# Patient Record
Sex: MALE | Race: WHITE | Employment: FULL TIME | URBAN - METROPOLITAN AREA
[De-identification: names, ages, dates, MRNs, and addresses within clinical notes are randomized per-mention and may not be internally consistent; named-entity substitution may affect disease eponyms.]

---

## 2017-02-14 ENCOUNTER — OFFICE VISIT (OUTPATIENT)
Dept: INTERNAL MEDICINE CLINIC | Age: 57
End: 2017-02-14

## 2017-02-14 VITALS
RESPIRATION RATE: 18 BRPM | WEIGHT: 220.4 LBS | HEART RATE: 111 BPM | OXYGEN SATURATION: 96 % | DIASTOLIC BLOOD PRESSURE: 86 MMHG | SYSTOLIC BLOOD PRESSURE: 128 MMHG | HEIGHT: 69 IN | BODY MASS INDEX: 32.64 KG/M2 | TEMPERATURE: 97.8 F

## 2017-02-14 DIAGNOSIS — E11.41 DIABETIC MONONEUROPATHY ASSOCIATED WITH TYPE 2 DIABETES MELLITUS (HCC): ICD-10-CM

## 2017-02-14 DIAGNOSIS — Z23 ENCOUNTER FOR IMMUNIZATION: ICD-10-CM

## 2017-02-14 DIAGNOSIS — E11.65 CONTROLLED TYPE 2 DIABETES MELLITUS WITH HYPERGLYCEMIA, WITHOUT LONG-TERM CURRENT USE OF INSULIN (HCC): Primary | ICD-10-CM

## 2017-02-14 DIAGNOSIS — I10 ESSENTIAL HYPERTENSION: ICD-10-CM

## 2017-02-14 RX ORDER — METFORMIN HYDROCHLORIDE 500 MG/1
TABLET ORAL
COMMUNITY
Start: 2016-12-09

## 2017-02-14 RX ORDER — VARDENAFIL HYDROCHLORIDE 20 MG/1
20 TABLET ORAL AS NEEDED
COMMUNITY

## 2017-02-14 NOTE — PATIENT INSTRUCTIONS
Pneumococcal 13-Valent Vaccine, Diphtheria Conjugate (By injection)   Pneumococcal 13-Valent Vaccine, Diphtheria Conjugate (BIR-pnx-EMU-al 13-VAY-lent VAX-een, dif-THEER-ee-a VXH-jhk-mqlo)  Prevents infections, such as pneumonia and meningitis. Brand Name(s):Prevnar 13   There may be other brand names for this medicine. When This Medicine Should Not Be Used: This vaccine is not right for everyone. You should not receive this vaccine if you had an allergic reaction to pneumococcal or diphtheria vaccine. How to Use This Medicine:   Injectable  · A nurse or other health provider will give you this medicine. This vaccine is usually given as a shot into a muscle in the thigh or upper arm. · The vaccine schedule is different for different people. ¨ Tell your doctor if your child was born prematurely. Children who were premature may need to follow a different schedule. ¨ Children under 6: This vaccine is usually given as 3 or 4 separate shots over several months. Your child's doctor will tell you how many shots are needed and when to come back for the next one. ¨ Children over 6: This vaccine is given as a single shot. If your child recently received another pneumonia vaccine, this one should be given at least 8 weeks later. ¨ Adults over 50: This vaccine is given as a single dose. · It is very important for your child to receive all of the shots for the vaccine. · Missed dose: This vaccine must be given on a fixed schedule. If your child misses a dose, call your child's doctor for another appointment. Drugs and Foods to Avoid:   Ask your doctor or pharmacist before using any other medicine, including over-the-counter medicines, vitamins, and herbal products. · Some medicines can affect how this vaccine works. Tell your doctor if you are receiving a treatment or medicine that causes a weak immune system.  This includes radiation treatment, steroid medicine (such as hydrocortisone, methylprednisolone, prednisolone, prednisone), or cancer medicine. Warnings While Using This Medicine:   · Adults and adolescents: Tell your doctor if you are pregnant or breastfeeding. · Tell your doctor if you have a weak immune system. You may not be fully protected by this vaccine. Possible Side Effects While Using This Medicine:   Call your doctor right away if you notice any of these side effects:  · Allergic reaction: Itching or hives, swelling in your face or hands, swelling or tingling in your mouth or throat, chest tightness, trouble breathing  · High fever  If you notice these less serious side effects, talk with your doctor:   · Crying, irritability, or fussiness  · Joint or muscle pain  · Mild skin rash  · Pain, burning, redness, or swelling where the shot was given  · Poor appetite  · Sleep changes  If you notice other side effects that you think are caused by this medicine, tell your doctor. Call your doctor for medical advice about side effects. You may report side effects to FDA at 4-052-FDA-3982  © 2016 [address] is for End User's use only and may not be sold, redistributed or otherwise used for commercial purposes. The above information is an  only. It is not intended as medical advice for individual conditions or treatments. Talk to your doctor, nurse or pharmacist before following any medical regimen to see if it is safe and effective for you.  ---------------------------  February 16th from 10-12 PNA shot       Learning About Sun Damage and Your Skin  How does the sun affect your skin? Being out in the sun can be good for you. Sunlight can brighten your mood and help you feel healthier. But getting too much of the ultraviolet (UV) rays in sunlight or from a tanning bed can harm your skin. The damage may include:  · Skin changes like early wrinkling, sagging skin, and age spots. · Skin cancer.   The lighter your skin is, the more easily it can be damaged by the sun. But everyone can benefit from protecting their skin from the sun, regardless of skin color. The amount of skin damage you can get from sunlight depends on:  · The time of day. You are more likely to get a sunburn in the middle of the day. You might think that exposure to the sun isn't a problem on cloudy days, but the sun's UV rays can still pass through clouds. · Whether you are near reflective surfaces, such as water, white sand, concrete, snow, or ice. All of these reflect the sun's UV rays. · The season of the year. The intense sunlight of summer days can cause more skin damage than the sunlight in other seasons. How can you protect your skin from the sun? Most sunburn and skin cancer can be prevented. Use the following tips to protect your skin. Avoid exposure to the sun  · Try to spend less time in the sun from 10 in the morning to 4 in the afternoon. Find shade if you need to be outdoors. · Wear clothing that blocks the sun. This can be a wide-brimmed hat that covers your neck, ears, eyes, and scalp. It can also include loose-fitting, tightly-woven clothes that cover your arms and legs. · Wear sunglasses that block UV rays. Use sunscreen  · Always wear sunscreen on exposed skin. Make sure to use a broad-spectrum sunscreen that has a sun protection factor (SPF) of 30 or higher. Use it every day, even when it is cloudy. · Apply sunscreen at least 30 minutes before you go out in the sun. Put on more every 2 to 3 hours while you are in the sun and after you sweat a lot or swim. · Take extra care to protect your skin when you're near water, at higher elevations, or in tropical climates. · Use a broad-spectrum lip balm or cream that has SPF of 30 to protect your lips from getting sunburned. Where can you learn more? Go to http://tara-clifford.info/. Enter R455 in the search box to learn more about \"Learning About Sun Damage and Your Skin. \"  Current as of: February 5, 2016  Content Version: 11.1  © 6388-6932 [employer]. Care instructions adapted under license by [employer] (which disclaims liability or warranty for this information). If you have questions about a medical condition or this instruction, always ask your healthcare professional. [address] any warranty or liability for your use of this information.

## 2017-02-14 NOTE — PROGRESS NOTES
Chief Complaint   Patient presents with   [place] [facility] Establish Care     1. Have you been to the ER, urgent care clinic since your last visit? Hospitalized since your last visit? No    2. Have you seen or consulted any other health care providers outside of the [facility] since your last visit? Include any pap smears or colon screening.  No

## 2017-02-14 NOTE — PROGRESS NOTES
Mr. Nelson Cobb is a new patient who is here to establish care. CC:  Establish Care/ DMII and HTN        HPI:  [facility], patient sees multiple specialists but no current PCP. Diabetes type 2: patient reports difficult to control diabetes followed by endocrinologist [facility] endocrinologist   Currently on Metformin 2000 and amaryl 4mg - Previously on insulin lantus with poor control. Currently not on insulin   Has tried [place] and [place]. Glucose running 180s-200s - patient wants to continued to be managed by diabetes specialist and obtains all his labs there. Last eye appointment in 2016 - no hx of diabetic retinopathy  Admits to diabetic neuropathy of left foot and followed by podiatrist    Reports hx of renal issues related to eating too many tomatoes: patient sees renal specialist periodically. GI: Dr Jose A Mccall  Last colonoscopy: 2015 - normal     HTN: on lisinopril. Well controlled. Denies chest pain and shortness of breath. Has good exercise tolerance. On aspirin 81 mg daily. Neck pain hx: had cervical neck surgery and doing well. Only taking aleve for pain.  [facility]       Review of systems:  Constitutional: negative for fever, chills, weight loss, night sweats   Eyes : negative for vision changes, eye pain and discharge  Nose and Throat: negative for tinnitus, sore throat   Cardiovascular: negative for chest pain, palpitations and shortness of breath  Respiratory: negative for shortness of breath, cough and wheezing   Gastroinstestinal: negative for abdominal pain, nausea, vomiting, diarrhea, constipation, and blood in the stool  Musculoskeletal: chronic neck pain much improved after neck sx   Genitourinary: negative for dysuria, nocturia, polyuria and hematuria   Neurologic: Negative for focal weakness, numbness or incoordination  Skin: works outside, does not wear sunscreen  Hematologic: negative for easy bruising      Past Medical History   Diagnosis Date    Diabetes ([facility] [address]     Elevated cholesterol     GERD (gastroesophageal reflux disease)      helps with meds upsetting stomach    Hypertension     Low vitamin D level     Neuropathy      rt lower extremity        Past Surgical History   Procedure Laterality Date    Pr cardiac surg procedure unlist       heart cath. normal    Hx colonoscopy  2015    Hx hernia repair       umbilical    Hx orthopaedic       reattached tendon and muscle rt shoulder    Hx cervical fusion       2015       No Known Allergies    Current Outpatient Prescriptions on File Prior to Visit   Medication Sig Dispense Refill    glimepiride (AMARYL) 4 mg tablet Take 4 mg by mouth two (2) times a day.  ergocalciferol (VITAMIN D2) 50,000 unit capsule Take 50,000 Units by mouth every seven (7) days.  aspirin delayed-release 81 mg tablet Take 81 mg by mouth daily.  omeprazole (PRILOSEC) 20 mg capsule Take 20 mg by mouth daily.  rosuvastatin (CRESTOR) 20 mg tablet Take 20 mg by mouth nightly.  fenofibric acid (TRILIPIX) 135 mg capsule Take 135 mg by mouth nightly.  lisinopril (PRINIVIL, ZESTRIL) 20 mg tablet Take 20 mg by mouth two (2) times a day.  metFORMIN (GLUCOPHAGE) 1,000 mg tablet Take 2,000 mg by mouth two (2) times a day. No current facility-administered medications on file prior to visit. family history is not on file. Social History     Social History    Marital status: UNKNOWN     Spouse name: N/A    Number of children: N/A    Years of education: N/A     Occupational History    Not on file.      Social History Main Topics    Smoking status: Current Every Day Smoker     Packs/day: 1.00    Smokeless tobacco: Not on file    Alcohol use Yes      Comment: rarely    Drug use: Not on file    Sexual activity: Not on file     Other Topics Concern    Not on file     Social History Narrative       Visit Vitals    /86 (BP 1 Location: Left arm, BP Patient Position: Sitting)  Pulse (!) 111    Temp 97.8 °F (36.6 °C) (Oral)    Resp 18    Ht 5' 9\" (1.753 m)    Wt 220 lb 6.4 oz (100 kg)    SpO2 96%    BMI 32.55 kg/m2     General:  Well appearing male no acute distress  HEENT:   PERRL,normal conjunctiva. External ear and canals normal, TMs normal.  Hearing normal to voice. Nose without edema or discharge, normal septum. Lips, teeth, gums normal.  Oropharynx: no erythema, no exudates, no lesions, normal tongue. Neck:  Supple. Thyroid normal size, nontender, without nodules. No carotid bruit. No masses or lymphadenopathy  Respiratory: no respiratory distress,  no wheezing, no rhonchi, no rales. No chest wall tenderness. Cardiovascular:  RRR, normal S1S2, no murmur. Gastrointestinal: normal bowel sounds, soft, nontender, without masses. No hepatosplenomegaly. Extremities +2 pulses, no edema, normal sensation   Musculoskeletal:  Normal gait. Normal digits and nails. Normal strength and tone, no atrophy, and no abnormal movement. Skin:  Evidence of sun damage on face (too much sun exposure) no lesions noted  Neuro:  A and OX4, fluent speech, cranial nerves normal 2-12. Sensation normal to light touch.     Psych:  Normal affect      Lab Results   Component Value Date/Time    WBC 9.4 12/11/2015 09:31 AM    HGB 15.9 12/11/2015 09:31 AM    HCT 46.5 12/11/2015 09:31 AM    PLATELET 547 70/26/1929 09:31 AM    MCV 97.9 12/11/2015 09:31 AM     Lab Results   Component Value Date/Time    Sodium 138 12/11/2015 09:31 AM    Potassium 4.4 12/11/2015 09:31 AM    Chloride 105 12/11/2015 09:31 AM    CO2 23 12/11/2015 09:31 AM    Anion gap 10 12/11/2015 09:31 AM    Glucose 323 12/11/2015 09:31 AM    BUN 21 12/11/2015 09:31 AM    Creatinine 1.03 12/11/2015 09:31 AM    BUN/Creatinine ratio 20 12/11/2015 09:31 AM    GFR est AA >60 12/11/2015 09:31 AM    GFR est non-AA >60 12/11/2015 09:31 AM    Calcium 9.2 12/11/2015 09:31 AM     Lab Results   Component Value Date/Time    Hemoglobin A1c 11.4 12/11/2015 09:31 AM     Lab Results   Component Value Date/Time    Vitamin D 25-Hydroxy 38.5 12/11/2015 09:31 AM             Patient reports recent labs done at endocrinologist and we will request records         Assessment and Plan:     1.  type 2 diabetes mellitus with hyperglycemia, without long-term current use of insulin ([facility] [address]  - followed by [facility] endocrinologist - patient is currently on glimepiride and metformin. Reports difficult to control diabetes  - up to date on eye exam no hx of diabetic retinopathy  - requested records from [place] endocrinologit  - on aspirin and ace inhibitor     2. Diabetic neuropathy associated with type 2 diabetes mellitus ([facility] [address]  - patient sees podiatrist every 6 months       3. Essential hypertension  - BP is at goal   - continue lisinopril     4. Elevated cholesterol: on crestor and trilipix   - requested records of last lipid panel check done at [facility] endocrinologist      5. Low vitamin D: followed by endocrinologist   - patient is on vitamin D2 50,000 units    Encounter for immunization    - TETANUS, DIPHTHERIA TOXOIDS AND ACELLULAR PERTUSSIS VACCINE (TDAP), IN INDIVIDS. >=7, IM  - MO IMMUNIZ ADMIN,1 SINGLE/COMB VAC/TOXOID    Advised to come to [address] [place] [address] on Feb 16th   Need records from last colonoscopy in 2015 per patient and normal    Follow-up Disposition:  Return in about 6 months (around 8/14/2017) for HTN, Diabetes .      MD Yoni

## 2017-02-14 NOTE — MR AVS SNAPSHOT
Visit Information Date & Time Provider Department Dept. Phone Encounter #  
 2/14/2017 10:00 AM Yoni, [address] [id number] Follow-up Instructions Return in about 6 months (around 8/14/2017) for HTN, Diabetes . Upcoming Health Maintenance Date Due Hepatitis C Screening 1960 Pneumococcal 19-64 Medium Risk (1 of 1 - PPSV23) 9/16/1978 DTaP/Tdap/Td series (1 - Tdap) 9/16/1980 FOBT Q 1 YEAR AGE 50-75 5/5/2016 Allergies as of 2/14/2017  Review Complete On: 2/14/2017 By: Dandre Garcia No Known Allergies Current Immunizations  Never Reviewed Name Date Tdap  Incomplete Not reviewed this visit You Were Diagnosed With   
  
 Codes Comments Controlled type 2 diabetes mellitus with hyperglycemia, without long-term current use of insulin ([facility].)    -  Primary ICD-10-CM: E11.65 ICD-9-CM: 250.80, 790.29 Diabetic mononeuropathy associated with type 2 diabetes mellitus ([facility]     ICD-10-CM: E11.41 
ICD-9-CM: 250.60, 355.9 Essential hypertension     ICD-10-CM: I10 
ICD-9-CM: 401.9 Encounter for immunization     ICD-10-CM: F32 ICD-9-CM: V03.89 Vitals BP Pulse Temp Resp Height(growth percentile) Weight(growth percentile) 128/86 (BP 1 Location: Left arm, BP Patient Position: Sitting) (!) 111 97.8 °F (36.6 °C) (Oral) 18 5' 9\" (1.753 m) 220 lb 6.4 oz (100 kg) SpO2 BMI Smoking Status 96% 32.55 kg/m2 Current Every Day Smoker BMI and BSA Data Body Mass Index Body Surface Area 32.55 kg/m 2 2.21 m 2 Preferred Pharmacy Pharmacy Name Phone [facility] [phone] Your Updated Medication List  
  
   
This list is accurate as of: 2/14/17 10:46 AM.  Always use your most recent med list.  
  
  
  
  
 aspirin delayed-release 81 mg tablet Take 81 mg by mouth daily. CRESTOR 20 mg tablet Generic drug:  rosuvastatin Take 20 mg by mouth nightly. glimepiride 4 mg tablet Commonly known as:  AMARYL Take 4 mg by mouth two (2) times a day. LEVITRA 20 mg tablet Generic drug:  vardenafil Take 20 mg by mouth as needed. lisinopril 20 mg tablet Commonly known as:  Marion November Take 20 mg by mouth two (2) times a day. * metFORMIN 1,000 mg tablet Commonly known as:  GLUCOPHAGE Take 2,000 mg by mouth two (2) times a day. * metFORMIN 500 mg tablet Commonly known as:  GLUCOPHAGE  
  
 omeprazole 20 mg capsule Commonly known as:  PRILOSEC Take 20 mg by mouth daily. TRILIPIX 135 mg capsule Generic drug:  fenofibric acid Take 135 mg by mouth nightly. VITAMIN D2 50,000 unit capsule Generic drug:  ergocalciferol Take 50,000 Units by mouth every seven (7) days. * Notice: This list has 2 medication(s) that are the same as other medications prescribed for you. Read the directions carefully, and ask your doctor or other care provider to review them with you. We Performed the Following CT IMMUNIZ ADMIN,1 SINGLE/COMB VAC/TOXOID [id number] CPT(R)] TETANUS, DIPHTHERIA TOXOIDS AND ACELLULAR PERTUSSIS VACCINE (TDAP), IN INDIVIDS. >=7, IM [id number] CPT(R)] Follow-up Instructions Return in about 6 months (around 8/14/2017) for HTN, Diabetes . Patient Instructions Pneumococcal 13-Valent Vaccine, Diphtheria Conjugate (By injection) Pneumococcal 13-Valent Vaccine, Diphtheria Conjugate (CQW-txx-MVL-al 13-VAY-lent VAX-een, dif-THEER-ee-a WKU-kmq-oqnh) Prevents infections, such as pneumonia and meningitis. Brand Name(s):Prevnar 13 There may be other brand names for this medicine. When This Medicine Should Not Be Used: This vaccine is not right for everyone. You should not receive this vaccine if you had an allergic reaction to pneumococcal or diphtheria vaccine. How to Use This Medicine:  
Injectable · A nurse or other health provider will give you this medicine. This vaccine is usually given as a shot into a muscle in the thigh or upper arm. · The vaccine schedule is different for different people. ¨ Tell your doctor if your child was born prematurely. Children who were premature may need to follow a different schedule. ¨ Children under 6: This vaccine is usually given as 3 or 4 separate shots over several months. Your child's doctor will tell you how many shots are needed and when to come back for the next one. ¨ Children over 6: This vaccine is given as a single shot. If your child recently received another pneumonia vaccine, this one should be given at least 8 weeks later. ¨ Adults over 50: This vaccine is given as a single dose. · It is very important for your child to receive all of the shots for the vaccine. · Missed dose: This vaccine must be given on a fixed schedule. If your child misses a dose, call your child's doctor for another appointment. Drugs and Foods to Avoid: Ask your doctor or pharmacist before using any other medicine, including over-the-counter medicines, vitamins, and herbal products. · Some medicines can affect how this vaccine works. Tell your doctor if you are receiving a treatment or medicine that causes a weak immune system. This includes radiation treatment, steroid medicine (such as hydrocortisone, methylprednisolone, prednisolone, prednisone), or cancer medicine. Warnings While Using This Medicine: · Adults and adolescents: Tell your doctor if you are pregnant or breastfeeding. · Tell your doctor if you have a weak immune system. You may not be fully protected by this vaccine. Possible Side Effects While Using This Medicine:  
Call your doctor right away if you notice any of these side effects: · Allergic reaction: Itching or hives, swelling in your face or hands, swelling or tingling in your mouth or throat, chest tightness, trouble breathing · High fever If you notice these less serious side effects, talk with your doctor: · Crying, irritability, or fussiness · Joint or muscle pain · Mild skin rash · Pain, burning, redness, or swelling where the shot was given · Poor appetite · Sleep changes If you notice other side effects that you think are caused by this medicine, tell your doctor. Call your doctor for medical advice about side effects. You may report side effects to FDA at 0-717-GEB-9837 © 2016 [address] is for End User's use only and may not be sold, redistributed or otherwise used for commercial purposes. The above information is an  only. It is not intended as medical advice for individual conditions or treatments. Talk to your doctor, nurse or pharmacist before following any medical regimen to see if it is safe and effective for you. 
--------------------------- February 16th from 10-12 PNA shot Learning About Sun Damage and Your Skin How does the sun affect your skin? Being out in the sun can be good for you. Sunlight can brighten your mood and help you feel healthier. But getting too much of the ultraviolet (UV) rays in sunlight or from a tanning bed can harm your skin. The damage may include: 
· Skin changes like early wrinkling, sagging skin, and age spots. · Skin cancer. The lighter your skin is, the more easily it can be damaged by the sun. But everyone can benefit from protecting their skin from the sun, regardless of skin color. The amount of skin damage you can get from sunlight depends on: · The time of day. You are more likely to get a sunburn in the middle of the day. You might think that exposure to the sun isn't a problem on cloudy days, but the sun's UV rays can still pass through clouds.  
· Whether you are near reflective surfaces, such as water, white sand, concrete, snow, or ice. All of these reflect the sun's UV rays. · The season of the year. The intense sunlight of summer days can cause more skin damage than the sunlight in other seasons. How can you protect your skin from the sun? Most sunburn and skin cancer can be prevented. Use the following tips to protect your skin. Avoid exposure to the sun · Try to spend less time in the sun from 10 in the morning to 4 in the afternoon. Find shade if you need to be outdoors. · Wear clothing that blocks the sun. This can be a wide-brimmed hat that covers your neck, ears, eyes, and scalp. It can also include loose-fitting, tightly-woven clothes that cover your arms and legs. · Wear sunglasses that block UV rays. Use sunscreen · Always wear sunscreen on exposed skin. Make sure to use a broad-spectrum sunscreen that has a sun protection factor (SPF) of 30 or higher. Use it every day, even when it is cloudy. · Apply sunscreen at least 30 minutes before you go out in the sun. Put on more every 2 to 3 hours while you are in the sun and after you sweat a lot or swim. · Take extra care to protect your skin when you're near water, at higher elevations, or in tropical climates. · Use a broad-spectrum lip balm or cream that has SPF of 30 to protect your lips from getting sunburned. Where can you learn more? Go to http://tara-clifford.info/. Enter R455 in the search box to learn more about \"Learning About Sun Damage and Your Skin. \" Current as of: February 5, 2016 Content Version: 11.1 © 5930-3355 [employer]wise[employer]. Care instructions adapted under license by [employer] (which disclaims liability or warranty for this information). If you have questions about a medical condition or this instruction, always ask your healthcare professional. [address]  any warranty or liability for your use of this information. Introducing [facility]! Mariela Ro introduces [employer] patient portal. Now you can access parts of your medical record, email your doctor's office, and request medication refills online. 1. In your internet browser, go to [email or web address] 2. Click on the First Time User? Click Here link in the Sign In box. You will see the New Member Sign Up page. 3. Enter your [employer] Access Code exactly as it appears below. You will not need to use this code after youve completed the sign-up process. If you do not sign up before the expiration date, you must request a new code. · [employer] Access Code: [email or web address] Expires: 5/15/2017 10:46 AM 
 
4. Enter the last four digits of your Social Security Number (xxxx) and Date of Birth (mm/dd/yyyy) as indicated and click Submit. You will be taken to the next sign-up page. 5. Create a [employer] ID. This will be your [employer] login ID and cannot be changed, so think of one that is secure and easy to remember. 6. Create a [employer] password. You can change your password at any time. 7. Enter your Password Reset Question and Answer. This can be used at a later time if you forget your password. 8. Enter your e-mail address. You will receive e-mail notification when new information is available in [address] 9. Click Sign Up. You can now view and download portions of your medical record. 10. Click the Download Summary menu link to download a portable copy of your medical information. If you have questions, please visit the Frequently Asked Questions section of the [employer] website. Remember, [employer] is NOT to be used for urgent needs. For medical emergencies, dial 911. Now available from your iPhone and Android! Please provide this summary of care documentation to your next provider. Your primary care clinician is listed as SMITH HAYES [address] If you have any questions after today's visit, please call [phone].

## 2017-04-18 ENCOUNTER — HOSPITAL ENCOUNTER (OUTPATIENT)
Dept: GENERAL RADIOLOGY | Age: 57
Discharge: HOME | End: 2017-04-18
Payer: COMMERCIAL

## 2017-04-18 DIAGNOSIS — K59.00 CONSTIPATION: ICD-10-CM

## 2017-04-18 PROCEDURE — 74020 XR ABD (AP AND ERECT OR DECUB): CPT

## 2020-01-01 ENCOUNTER — HOSPITAL ENCOUNTER (INPATIENT)
Age: 60
LOS: 12 days | DRG: 003 | End: 2020-09-22
Attending: EMERGENCY MEDICINE | Admitting: THORACIC SURGERY (CARDIOTHORACIC VASCULAR SURGERY)
Payer: COMMERCIAL

## 2020-01-01 ENCOUNTER — APPOINTMENT (OUTPATIENT)
Dept: GENERAL RADIOLOGY | Age: 60
DRG: 003 | End: 2020-01-01
Attending: INTERNAL MEDICINE
Payer: COMMERCIAL

## 2020-01-01 ENCOUNTER — APPOINTMENT (OUTPATIENT)
Dept: GENERAL RADIOLOGY | Age: 60
DRG: 003 | End: 2020-01-01
Attending: SURGERY
Payer: COMMERCIAL

## 2020-01-01 ENCOUNTER — APPOINTMENT (OUTPATIENT)
Dept: GENERAL RADIOLOGY | Age: 60
DRG: 003 | End: 2020-01-01
Attending: NURSE PRACTITIONER
Payer: COMMERCIAL

## 2020-01-01 ENCOUNTER — ANESTHESIA EVENT (OUTPATIENT)
Dept: CARDIOTHORACIC SURGERY | Age: 60
DRG: 003 | End: 2020-01-01
Payer: COMMERCIAL

## 2020-01-01 ENCOUNTER — APPOINTMENT (OUTPATIENT)
Dept: INTERVENTIONAL RADIOLOGY/VASCULAR | Age: 60
DRG: 003 | End: 2020-01-01
Attending: INTERNAL MEDICINE
Payer: COMMERCIAL

## 2020-01-01 ENCOUNTER — APPOINTMENT (OUTPATIENT)
Dept: GENERAL RADIOLOGY | Age: 60
DRG: 003 | End: 2020-01-01
Attending: THORACIC SURGERY (CARDIOTHORACIC VASCULAR SURGERY)
Payer: COMMERCIAL

## 2020-01-01 ENCOUNTER — ANESTHESIA EVENT (OUTPATIENT)
Dept: SURGERY | Age: 60
DRG: 003 | End: 2020-01-01
Payer: COMMERCIAL

## 2020-01-01 ENCOUNTER — ANESTHESIA (OUTPATIENT)
Dept: CARDIOTHORACIC SURGERY | Age: 60
DRG: 003 | End: 2020-01-01
Payer: COMMERCIAL

## 2020-01-01 ENCOUNTER — APPOINTMENT (OUTPATIENT)
Dept: NON INVASIVE DIAGNOSTICS | Age: 60
DRG: 003 | End: 2020-01-01
Attending: NURSE PRACTITIONER
Payer: COMMERCIAL

## 2020-01-01 ENCOUNTER — APPOINTMENT (OUTPATIENT)
Dept: GENERAL RADIOLOGY | Age: 60
DRG: 003 | End: 2020-01-01
Attending: RADIOLOGY
Payer: COMMERCIAL

## 2020-01-01 ENCOUNTER — APPOINTMENT (OUTPATIENT)
Dept: ULTRASOUND IMAGING | Age: 60
DRG: 003 | End: 2020-01-01
Attending: SURGERY
Payer: COMMERCIAL

## 2020-01-01 ENCOUNTER — APPOINTMENT (OUTPATIENT)
Dept: ULTRASOUND IMAGING | Age: 60
DRG: 003 | End: 2020-01-01
Attending: NURSE PRACTITIONER
Payer: COMMERCIAL

## 2020-01-01 ENCOUNTER — APPOINTMENT (OUTPATIENT)
Dept: NON INVASIVE DIAGNOSTICS | Age: 60
DRG: 003 | End: 2020-01-01
Attending: INTERNAL MEDICINE
Payer: COMMERCIAL

## 2020-01-01 ENCOUNTER — APPOINTMENT (OUTPATIENT)
Dept: ULTRASOUND IMAGING | Age: 60
DRG: 003 | End: 2020-01-01
Attending: INTERNAL MEDICINE
Payer: COMMERCIAL

## 2020-01-01 ENCOUNTER — APPOINTMENT (OUTPATIENT)
Dept: MRI IMAGING | Age: 60
DRG: 003 | End: 2020-01-01
Attending: THORACIC SURGERY (CARDIOTHORACIC VASCULAR SURGERY)
Payer: COMMERCIAL

## 2020-01-01 ENCOUNTER — APPOINTMENT (OUTPATIENT)
Dept: MRI IMAGING | Age: 60
DRG: 003 | End: 2020-01-01
Attending: PSYCHIATRY & NEUROLOGY
Payer: COMMERCIAL

## 2020-01-01 ENCOUNTER — ANESTHESIA (OUTPATIENT)
Dept: SURGERY | Age: 60
DRG: 003 | End: 2020-01-01
Payer: COMMERCIAL

## 2020-01-01 VITALS
WEIGHT: 212.08 LBS | TEMPERATURE: 99.8 F | HEIGHT: 69 IN | DIASTOLIC BLOOD PRESSURE: 49 MMHG | OXYGEN SATURATION: 97 % | SYSTOLIC BLOOD PRESSURE: 89 MMHG | BODY MASS INDEX: 31.41 KG/M2 | HEART RATE: 109 BPM | RESPIRATION RATE: 24 BRPM

## 2020-01-01 DIAGNOSIS — I46.9 CARDIAC ARREST (HCC): Primary | ICD-10-CM

## 2020-01-01 DIAGNOSIS — N17.0 ACUTE RENAL FAILURE WITH TUBULAR NECROSIS (HCC): ICD-10-CM

## 2020-01-01 DIAGNOSIS — I25.10 CORONARY ARTERY DISEASE DUE TO LIPID RICH PLAQUE: ICD-10-CM

## 2020-01-01 DIAGNOSIS — R57.9 SHOCK (HCC): ICD-10-CM

## 2020-01-01 DIAGNOSIS — Z51.5 COMFORT MEASURES ONLY STATUS: ICD-10-CM

## 2020-01-01 DIAGNOSIS — K72.00 SHOCK LIVER: ICD-10-CM

## 2020-01-01 DIAGNOSIS — R04.2 HEMOPTYSIS: ICD-10-CM

## 2020-01-01 DIAGNOSIS — R53.1 WEAKNESS GENERALIZED: ICD-10-CM

## 2020-01-01 DIAGNOSIS — R65.20 SEPSIS WITH ACUTE HYPOXIC RESPIRATORY FAILURE WITHOUT SEPTIC SHOCK, DUE TO UNSPECIFIED ORGANISM (HCC): ICD-10-CM

## 2020-01-01 DIAGNOSIS — I63.49 CEREBROVASCULAR ACCIDENT (CVA) DUE TO EMBOLISM OF OTHER CEREBRAL ARTERY (HCC): ICD-10-CM

## 2020-01-01 DIAGNOSIS — A41.9 SEPSIS WITH ACUTE HYPOXIC RESPIRATORY FAILURE WITHOUT SEPTIC SHOCK, DUE TO UNSPECIFIED ORGANISM (HCC): ICD-10-CM

## 2020-01-01 DIAGNOSIS — J96.02 ACUTE RESPIRATORY FAILURE WITH HYPOXIA AND HYPERCAPNIA (HCC): ICD-10-CM

## 2020-01-01 DIAGNOSIS — J96.01 ACUTE RESPIRATORY FAILURE WITH HYPOXIA AND HYPERCAPNIA (HCC): ICD-10-CM

## 2020-01-01 DIAGNOSIS — R91.8 BILATERAL PULMONARY INFILTRATES ON CHEST X-RAY: ICD-10-CM

## 2020-01-01 DIAGNOSIS — J96.01 SEPSIS WITH ACUTE HYPOXIC RESPIRATORY FAILURE WITHOUT SEPTIC SHOCK, DUE TO UNSPECIFIED ORGANISM (HCC): ICD-10-CM

## 2020-01-01 DIAGNOSIS — G93.1 HYPOXIC BRAIN INJURY (HCC): ICD-10-CM

## 2020-01-01 DIAGNOSIS — E11.65 TYPE 2 DIABETES MELLITUS WITH HYPERGLYCEMIA, UNSPECIFIED WHETHER LONG TERM INSULIN USE (HCC): ICD-10-CM

## 2020-01-01 DIAGNOSIS — I25.83 CORONARY ARTERY DISEASE DUE TO LIPID RICH PLAQUE: ICD-10-CM

## 2020-01-01 DIAGNOSIS — D72.825 BANDEMIA: ICD-10-CM

## 2020-01-01 DIAGNOSIS — G93.40 ENCEPHALOPATHY ACUTE: ICD-10-CM

## 2020-01-01 LAB
ABO + RH BLD: NORMAL
ABO + RH BLD: NORMAL
ACE SERPL-CCNC: 10 U/L (ref 14–82)
ACT BLD: 131 SECS (ref 79–138)
ACT BLD: 136 SECS (ref 79–138)
ACT BLD: 136 SECS (ref 79–138)
ACT BLD: 142 SECS (ref 79–138)
ACT BLD: 142 SECS (ref 79–138)
ACT BLD: 147 SECS (ref 79–138)
ACT BLD: 186 SECS (ref 79–138)
ACT BLD: 439 SECS (ref 79–138)
ADMINISTERED INITIALS, ADMINIT: NORMAL
ALBUMIN SERPL-MCNC: 2.1 G/DL (ref 3.5–5)
ALBUMIN SERPL-MCNC: 2.3 G/DL (ref 3.5–5)
ALBUMIN SERPL-MCNC: 2.3 G/DL (ref 3.5–5)
ALBUMIN SERPL-MCNC: 2.5 G/DL (ref 3.5–5)
ALBUMIN SERPL-MCNC: 2.7 G/DL (ref 3.5–5)
ALBUMIN SERPL-MCNC: 2.8 G/DL (ref 3.5–5)
ALBUMIN SERPL-MCNC: 3 G/DL (ref 3.5–5)
ALBUMIN SERPL-MCNC: 3.2 G/DL (ref 3.5–5)
ALBUMIN/GLOB SERPL: 0.5 {RATIO} (ref 1.1–2.2)
ALBUMIN/GLOB SERPL: 0.6 {RATIO} (ref 1.1–2.2)
ALBUMIN/GLOB SERPL: 0.6 {RATIO} (ref 1.1–2.2)
ALBUMIN/GLOB SERPL: 0.7 {RATIO} (ref 1.1–2.2)
ALBUMIN/GLOB SERPL: 0.7 {RATIO} (ref 1.1–2.2)
ALBUMIN/GLOB SERPL: 0.8 {RATIO} (ref 1.1–2.2)
ALBUMIN/GLOB SERPL: 0.9 {RATIO} (ref 1.1–2.2)
ALBUMIN/GLOB SERPL: 1.2 {RATIO} (ref 1.1–2.2)
ALBUMIN/GLOB SERPL: 1.3 {RATIO} (ref 1.1–2.2)
ALBUMIN/GLOB SERPL: 1.3 {RATIO} (ref 1.1–2.2)
ALBUMIN/GLOB SERPL: 1.4 {RATIO} (ref 1.1–2.2)
ALBUMIN/GLOB SERPL: 1.5 {RATIO} (ref 1.1–2.2)
ALBUMIN/GLOB SERPL: 1.8 {RATIO} (ref 1.1–2.2)
ALDOLASE SERPL-CCNC: 12.4 U/L (ref 3.3–10.3)
ALP SERPL-CCNC: 120 U/L (ref 45–117)
ALP SERPL-CCNC: 130 U/L (ref 45–117)
ALP SERPL-CCNC: 212 U/L (ref 45–117)
ALP SERPL-CCNC: 219 U/L (ref 45–117)
ALP SERPL-CCNC: 232 U/L (ref 45–117)
ALP SERPL-CCNC: 28 U/L (ref 45–117)
ALP SERPL-CCNC: 42 U/L (ref 45–117)
ALP SERPL-CCNC: 44 U/L (ref 45–117)
ALP SERPL-CCNC: 46 U/L (ref 45–117)
ALP SERPL-CCNC: 48 U/L (ref 45–117)
ALP SERPL-CCNC: 49 U/L (ref 45–117)
ALP SERPL-CCNC: 66 U/L (ref 45–117)
ALP SERPL-CCNC: 67 U/L (ref 45–117)
ALP SERPL-CCNC: 72 U/L (ref 45–117)
ALP SERPL-CCNC: 72 U/L (ref 45–117)
ALT SERPL-CCNC: 11 U/L (ref 12–78)
ALT SERPL-CCNC: 11 U/L (ref 12–78)
ALT SERPL-CCNC: 128 U/L (ref 12–78)
ALT SERPL-CCNC: 13 U/L (ref 12–78)
ALT SERPL-CCNC: 153 U/L (ref 12–78)
ALT SERPL-CCNC: 19 U/L (ref 12–78)
ALT SERPL-CCNC: 247 U/L (ref 12–78)
ALT SERPL-CCNC: 28 U/L (ref 12–78)
ALT SERPL-CCNC: 30 U/L (ref 12–78)
ALT SERPL-CCNC: 31 U/L (ref 12–78)
ALT SERPL-CCNC: 35 U/L (ref 12–78)
ALT SERPL-CCNC: 84 U/L (ref 12–78)
ALT SERPL-CCNC: 9 U/L (ref 12–78)
ANION GAP BLD CALC-SCNC: 22 MMOL/L (ref 10–20)
ANION GAP SERPL CALC-SCNC: 1 MMOL/L (ref 5–15)
ANION GAP SERPL CALC-SCNC: 10 MMOL/L (ref 5–15)
ANION GAP SERPL CALC-SCNC: 11 MMOL/L (ref 5–15)
ANION GAP SERPL CALC-SCNC: 11 MMOL/L (ref 5–15)
ANION GAP SERPL CALC-SCNC: 20 MMOL/L (ref 5–15)
ANION GAP SERPL CALC-SCNC: 3 MMOL/L (ref 5–15)
ANION GAP SERPL CALC-SCNC: 4 MMOL/L (ref 5–15)
ANION GAP SERPL CALC-SCNC: 5 MMOL/L (ref 5–15)
ANION GAP SERPL CALC-SCNC: 6 MMOL/L (ref 5–15)
ANION GAP SERPL CALC-SCNC: 7 MMOL/L (ref 5–15)
ANION GAP SERPL CALC-SCNC: 8 MMOL/L (ref 5–15)
ANION GAP SERPL CALC-SCNC: 9 MMOL/L (ref 5–15)
APPEARANCE UR: CLEAR
APTT PPP: 25.4 SEC (ref 22.1–32)
APTT PPP: 31 SEC (ref 22.1–32)
APTT PPP: 33.9 SEC (ref 22.1–32)
APTT PPP: 34.5 SEC (ref 22.1–32)
APTT PPP: 34.9 SEC (ref 22.1–32)
APTT PPP: 35.1 SEC (ref 22.1–32)
APTT PPP: 37.3 SEC (ref 22.1–32)
APTT PPP: 37.3 SEC (ref 22.1–32)
APTT PPP: 37.5 SEC (ref 22.1–32)
APTT PPP: 37.9 SEC (ref 22.1–32)
APTT PPP: 38.4 SEC (ref 22.1–32)
APTT PPP: 39.7 SEC (ref 22.1–32)
APTT PPP: 40.7 SEC (ref 22.1–32)
APTT PPP: 41 SEC (ref 22.1–32)
APTT PPP: 43.4 SEC (ref 22.1–32)
APTT PPP: 43.7 SEC (ref 22.1–32)
APTT PPP: 47.6 SEC (ref 22.1–32)
APTT PPP: 53.6 SEC (ref 22.1–32)
APTT PPP: 55.3 SEC (ref 22.1–32)
APTT PPP: 70.1 SEC (ref 22.1–32)
APTT PPP: 72.1 SEC (ref 22.1–32)
APTT PPP: 74.3 SEC (ref 22.1–32)
APTT PPP: 76.9 SEC (ref 22.1–32)
APTT PPP: 77.1 SEC (ref 22.1–32)
APTT PPP: 77.7 SEC (ref 22.1–32)
APTT PPP: 80 SEC (ref 22.1–32)
APTT PPP: 84.5 SEC (ref 22.1–32)
APTT PPP: 86 SEC (ref 22.1–32)
APTT PPP: >130 SEC (ref 22.1–32)
ARTERIAL PATENCY WRIST A: ABNORMAL
ARTERIAL PATENCY WRIST A: NORMAL
ARTERIAL PATENCY WRIST A: NORMAL
AST SERPL-CCNC: 109 U/L (ref 15–37)
AST SERPL-CCNC: 162 U/L (ref 15–37)
AST SERPL-CCNC: 215 U/L (ref 15–37)
AST SERPL-CCNC: 300 U/L (ref 15–37)
AST SERPL-CCNC: 38 U/L (ref 15–37)
AST SERPL-CCNC: 42 U/L (ref 15–37)
AST SERPL-CCNC: 44 U/L (ref 15–37)
AST SERPL-CCNC: 45 U/L (ref 15–37)
AST SERPL-CCNC: 50 U/L (ref 15–37)
AST SERPL-CCNC: 51 U/L (ref 15–37)
AST SERPL-CCNC: 55 U/L (ref 15–37)
AST SERPL-CCNC: 55 U/L (ref 15–37)
AST SERPL-CCNC: 58 U/L (ref 15–37)
AST SERPL-CCNC: 62 U/L (ref 15–37)
AST SERPL-CCNC: 68 U/L (ref 15–37)
BACTERIA SPEC CULT: ABNORMAL
BACTERIA SPEC CULT: ABNORMAL
BACTERIA SPEC CULT: NORMAL
BACTERIA SPEC CULT: NORMAL
BACTERIA URNS QL MICRO: NEGATIVE /HPF
BASE DEFICIT BLD-SCNC: 1 MMOL/L
BASE DEFICIT BLD-SCNC: 10 MMOL/L
BASE DEFICIT BLD-SCNC: 11 MMOL/L
BASE DEFICIT BLD-SCNC: 12 MMOL/L
BASE DEFICIT BLD-SCNC: 12 MMOL/L
BASE DEFICIT BLD-SCNC: 2 MMOL/L
BASE DEFICIT BLD-SCNC: 3 MMOL/L
BASE DEFICIT BLD-SCNC: 4 MMOL/L
BASE DEFICIT BLD-SCNC: 4 MMOL/L
BASE DEFICIT BLD-SCNC: 5 MMOL/L
BASE DEFICIT BLD-SCNC: 7 MMOL/L
BASE DEFICIT BLD-SCNC: 9 MMOL/L
BASE EXCESS BLD CALC-SCNC: 0 MMOL/L
BASE EXCESS BLD CALC-SCNC: 1 MMOL/L
BASE EXCESS BLD CALC-SCNC: 2 MMOL/L
BASE EXCESS BLD CALC-SCNC: 3 MMOL/L
BASE EXCESS BLD CALC-SCNC: 4 MMOL/L
BASE EXCESS BLD CALC-SCNC: 5 MMOL/L
BASE EXCESS BLD CALC-SCNC: 7 MMOL/L
BASE EXCESS BLD CALC-SCNC: 8 MMOL/L
BASOPHILS # BLD: 0 K/UL (ref 0–0.1)
BASOPHILS NFR BLD: 0 % (ref 0–1)
BDY SITE: ABNORMAL
BDY SITE: NORMAL
BDY SITE: NORMAL
BILIRUB DIRECT SERPL-MCNC: 0.9 MG/DL (ref 0–0.2)
BILIRUB SERPL-MCNC: 0.7 MG/DL (ref 0.2–1)
BILIRUB SERPL-MCNC: 0.8 MG/DL (ref 0.2–1)
BILIRUB SERPL-MCNC: 0.9 MG/DL (ref 0.2–1)
BILIRUB SERPL-MCNC: 1 MG/DL (ref 0.2–1)
BILIRUB SERPL-MCNC: 1 MG/DL (ref 0.2–1)
BILIRUB SERPL-MCNC: 1.1 MG/DL (ref 0.2–1)
BILIRUB SERPL-MCNC: 1.1 MG/DL (ref 0.2–1)
BILIRUB SERPL-MCNC: 1.2 MG/DL (ref 0.2–1)
BILIRUB SERPL-MCNC: 1.2 MG/DL (ref 0.2–1)
BILIRUB SERPL-MCNC: 1.3 MG/DL (ref 0.2–1)
BILIRUB SERPL-MCNC: 1.6 MG/DL (ref 0.2–1)
BILIRUB SERPL-MCNC: 2.2 MG/DL (ref 0.2–1)
BILIRUB UR QL: NEGATIVE
BLD PROD TYP BPU: NORMAL
BLOOD GROUP ANTIBODIES SERPL: NORMAL
BLOOD GROUP ANTIBODIES SERPL: NORMAL
BODY TEMPERATURE: 93.2
BODY TEMPERATURE: 93.3
BPU ID: NORMAL
BUN BLD-MCNC: 22 MG/DL (ref 9–20)
BUN SERPL-MCNC: 22 MG/DL (ref 6–20)
BUN SERPL-MCNC: 28 MG/DL (ref 6–20)
BUN SERPL-MCNC: 30 MG/DL (ref 6–20)
BUN SERPL-MCNC: 31 MG/DL (ref 6–20)
BUN SERPL-MCNC: 31 MG/DL (ref 6–20)
BUN SERPL-MCNC: 32 MG/DL (ref 6–20)
BUN SERPL-MCNC: 32 MG/DL (ref 6–20)
BUN SERPL-MCNC: 34 MG/DL (ref 6–20)
BUN SERPL-MCNC: 35 MG/DL (ref 6–20)
BUN SERPL-MCNC: 36 MG/DL (ref 6–20)
BUN SERPL-MCNC: 38 MG/DL (ref 6–20)
BUN SERPL-MCNC: 38 MG/DL (ref 6–20)
BUN SERPL-MCNC: 40 MG/DL (ref 6–20)
BUN SERPL-MCNC: 41 MG/DL (ref 6–20)
BUN SERPL-MCNC: 42 MG/DL (ref 6–20)
BUN SERPL-MCNC: 42 MG/DL (ref 6–20)
BUN SERPL-MCNC: 45 MG/DL (ref 6–20)
BUN SERPL-MCNC: 46 MG/DL (ref 6–20)
BUN SERPL-MCNC: 48 MG/DL (ref 6–20)
BUN SERPL-MCNC: 49 MG/DL (ref 6–20)
BUN SERPL-MCNC: 51 MG/DL (ref 6–20)
BUN SERPL-MCNC: 51 MG/DL (ref 6–20)
BUN SERPL-MCNC: 52 MG/DL (ref 6–20)
BUN SERPL-MCNC: 53 MG/DL (ref 6–20)
BUN SERPL-MCNC: 56 MG/DL (ref 6–20)
BUN SERPL-MCNC: 57 MG/DL (ref 6–20)
BUN SERPL-MCNC: 57 MG/DL (ref 6–20)
BUN SERPL-MCNC: 62 MG/DL (ref 6–20)
BUN SERPL-MCNC: 71 MG/DL (ref 6–20)
BUN/CREAT SERPL: 12 (ref 12–20)
BUN/CREAT SERPL: 13 (ref 12–20)
BUN/CREAT SERPL: 14 (ref 12–20)
BUN/CREAT SERPL: 14 (ref 12–20)
BUN/CREAT SERPL: 15 (ref 12–20)
BUN/CREAT SERPL: 16 (ref 12–20)
BUN/CREAT SERPL: 17 (ref 12–20)
BUN/CREAT SERPL: 18 (ref 12–20)
BUN/CREAT SERPL: 18 (ref 12–20)
BUN/CREAT SERPL: 21 (ref 12–20)
BUN/CREAT SERPL: 21 (ref 12–20)
BUN/CREAT SERPL: 22 (ref 12–20)
BUN/CREAT SERPL: 22 (ref 12–20)
CA-I BLD-MCNC: 1.17 MMOL/L (ref 1.12–1.32)
CA-I BLD-SCNC: 1.03 MMOL/L (ref 1.12–1.32)
CA-I BLD-SCNC: 1.07 MMOL/L (ref 1.12–1.32)
CA-I BLD-SCNC: 1.09 MMOL/L (ref 1.12–1.32)
CA-I BLD-SCNC: 1.1 MMOL/L (ref 1.12–1.32)
CA-I BLD-SCNC: 1.11 MMOL/L (ref 1.12–1.32)
CA-I BLD-SCNC: 1.11 MMOL/L (ref 1.12–1.32)
CA-I BLD-SCNC: 1.12 MMOL/L (ref 1.12–1.32)
CA-I BLD-SCNC: 1.13 MMOL/L (ref 1.12–1.32)
CA-I BLD-SCNC: 1.13 MMOL/L (ref 1.12–1.32)
CA-I BLD-SCNC: 1.14 MMOL/L (ref 1.12–1.32)
CA-I BLD-SCNC: 1.14 MMOL/L (ref 1.12–1.32)
CA-I BLD-SCNC: 1.15 MMOL/L (ref 1.12–1.32)
CA-I BLD-SCNC: 1.17 MMOL/L (ref 1.12–1.32)
CA-I BLD-SCNC: 1.18 MMOL/L (ref 1.12–1.32)
CA-I BLD-SCNC: 1.18 MMOL/L (ref 1.12–1.32)
CA-I BLD-SCNC: 1.19 MMOL/L (ref 1.12–1.32)
CA-I BLD-SCNC: 1.2 MMOL/L (ref 1.12–1.32)
CA-I BLD-SCNC: 1.22 MMOL/L (ref 1.12–1.32)
CA-I BLD-SCNC: 1.23 MMOL/L (ref 1.12–1.32)
CA-I BLD-SCNC: 1.24 MMOL/L (ref 1.12–1.32)
CA-I BLD-SCNC: 1.26 MMOL/L (ref 1.12–1.32)
CA-I BLD-SCNC: 1.27 MMOL/L (ref 1.12–1.32)
CA-I BLD-SCNC: 1.28 MMOL/L (ref 1.12–1.32)
CA-I BLD-SCNC: 1.29 MMOL/L (ref 1.12–1.32)
CA-I BLD-SCNC: 1.3 MMOL/L (ref 1.12–1.32)
CA-I BLD-SCNC: 1.31 MMOL/L (ref 1.12–1.32)
CA-I BLD-SCNC: 1.32 MMOL/L (ref 1.12–1.32)
CA-I BLD-SCNC: 1.34 MMOL/L (ref 1.12–1.32)
CA-I BLD-SCNC: 1.34 MMOL/L (ref 1.12–1.32)
CA-I BLD-SCNC: 1.35 MMOL/L (ref 1.12–1.32)
CA-I BLD-SCNC: 1.36 MMOL/L (ref 1.12–1.32)
CA-I BLD-SCNC: 1.37 MMOL/L (ref 1.12–1.32)
CA-I BLD-SCNC: 1.38 MMOL/L (ref 1.12–1.32)
CA-I BLD-SCNC: 1.39 MMOL/L (ref 1.12–1.32)
CA-I BLD-SCNC: 1.4 MMOL/L (ref 1.12–1.32)
CA-I BLD-SCNC: 1.41 MMOL/L (ref 1.12–1.32)
CA-I BLD-SCNC: 1.41 MMOL/L (ref 1.12–1.32)
CA-I BLD-SCNC: 1.42 MMOL/L (ref 1.12–1.32)
CA-I BLD-SCNC: 1.42 MMOL/L (ref 1.12–1.32)
CA-I BLD-SCNC: 1.44 MMOL/L (ref 1.12–1.32)
CA-I BLD-SCNC: 1.5 MMOL/L (ref 1.12–1.32)
CA-I BLD-SCNC: 1.5 MMOL/L (ref 1.12–1.32)
CA-I BLD-SCNC: 1.52 MMOL/L (ref 1.12–1.32)
CALCIUM SERPL-MCNC: 10.4 MG/DL (ref 8.5–10.1)
CALCIUM SERPL-MCNC: 7.5 MG/DL (ref 8.5–10.1)
CALCIUM SERPL-MCNC: 7.6 MG/DL (ref 8.5–10.1)
CALCIUM SERPL-MCNC: 7.7 MG/DL (ref 8.5–10.1)
CALCIUM SERPL-MCNC: 7.7 MG/DL (ref 8.5–10.1)
CALCIUM SERPL-MCNC: 7.8 MG/DL (ref 8.5–10.1)
CALCIUM SERPL-MCNC: 7.9 MG/DL (ref 8.5–10.1)
CALCIUM SERPL-MCNC: 7.9 MG/DL (ref 8.5–10.1)
CALCIUM SERPL-MCNC: 8 MG/DL (ref 8.5–10.1)
CALCIUM SERPL-MCNC: 8 MG/DL (ref 8.5–10.1)
CALCIUM SERPL-MCNC: 8.1 MG/DL (ref 8.5–10.1)
CALCIUM SERPL-MCNC: 8.3 MG/DL (ref 8.5–10.1)
CALCIUM SERPL-MCNC: 8.4 MG/DL (ref 8.5–10.1)
CALCIUM SERPL-MCNC: 8.5 MG/DL (ref 8.5–10.1)
CALCIUM SERPL-MCNC: 8.6 MG/DL (ref 8.5–10.1)
CALCIUM SERPL-MCNC: 8.7 MG/DL (ref 8.5–10.1)
CALCIUM SERPL-MCNC: 8.7 MG/DL (ref 8.5–10.1)
CALCIUM SERPL-MCNC: 8.8 MG/DL (ref 8.5–10.1)
CALCIUM SERPL-MCNC: 8.9 MG/DL (ref 8.5–10.1)
CALCIUM SERPL-MCNC: 9.2 MG/DL (ref 8.5–10.1)
CALCIUM SERPL-MCNC: 9.3 MG/DL (ref 8.5–10.1)
CALCIUM SERPL-MCNC: 9.4 MG/DL (ref 8.5–10.1)
CALCIUM SERPL-MCNC: 9.6 MG/DL (ref 8.5–10.1)
CALCIUM SERPL-MCNC: 9.7 MG/DL (ref 8.5–10.1)
CHLORIDE BLD-SCNC: 105 MMOL/L (ref 98–107)
CHLORIDE SERPL-SCNC: 104 MMOL/L (ref 97–108)
CHLORIDE SERPL-SCNC: 104 MMOL/L (ref 97–108)
CHLORIDE SERPL-SCNC: 105 MMOL/L (ref 97–108)
CHLORIDE SERPL-SCNC: 107 MMOL/L (ref 97–108)
CHLORIDE SERPL-SCNC: 108 MMOL/L (ref 97–108)
CHLORIDE SERPL-SCNC: 111 MMOL/L (ref 97–108)
CHLORIDE SERPL-SCNC: 111 MMOL/L (ref 97–108)
CHLORIDE SERPL-SCNC: 112 MMOL/L (ref 97–108)
CHLORIDE SERPL-SCNC: 112 MMOL/L (ref 97–108)
CHLORIDE SERPL-SCNC: 113 MMOL/L (ref 97–108)
CHLORIDE SERPL-SCNC: 113 MMOL/L (ref 97–108)
CHLORIDE SERPL-SCNC: 114 MMOL/L (ref 97–108)
CHLORIDE SERPL-SCNC: 115 MMOL/L (ref 97–108)
CHLORIDE SERPL-SCNC: 115 MMOL/L (ref 97–108)
CHLORIDE SERPL-SCNC: 116 MMOL/L (ref 97–108)
CHLORIDE SERPL-SCNC: 117 MMOL/L (ref 97–108)
CHOLEST SERPL-MCNC: 146 MG/DL
CK SERPL-CCNC: 929 U/L (ref 39–308)
CO2 BLD-SCNC: 17 MMOL/L (ref 21–32)
CO2 SERPL-SCNC: 17 MMOL/L (ref 21–32)
CO2 SERPL-SCNC: 23 MMOL/L (ref 21–32)
CO2 SERPL-SCNC: 24 MMOL/L (ref 21–32)
CO2 SERPL-SCNC: 24 MMOL/L (ref 21–32)
CO2 SERPL-SCNC: 25 MMOL/L (ref 21–32)
CO2 SERPL-SCNC: 26 MMOL/L (ref 21–32)
CO2 SERPL-SCNC: 27 MMOL/L (ref 21–32)
CO2 SERPL-SCNC: 28 MMOL/L (ref 21–32)
CO2 SERPL-SCNC: 29 MMOL/L (ref 21–32)
CO2 SERPL-SCNC: 31 MMOL/L (ref 21–32)
CO2 SERPL-SCNC: 33 MMOL/L (ref 21–32)
CO2 SERPL-SCNC: 34 MMOL/L (ref 21–32)
COHGB MFR BLD: 1.1 % (ref 1–2)
COLOR UR: ABNORMAL
COMMENT, HOLDF: NORMAL
COVID-19, XGCOVT: NOT DETECTED
CREAT BLD-MCNC: 1 MG/DL (ref 0.6–1.3)
CREAT SERPL-MCNC: 1.76 MG/DL (ref 0.7–1.3)
CREAT SERPL-MCNC: 1.89 MG/DL (ref 0.7–1.3)
CREAT SERPL-MCNC: 1.95 MG/DL (ref 0.7–1.3)
CREAT SERPL-MCNC: 1.99 MG/DL (ref 0.7–1.3)
CREAT SERPL-MCNC: 2.01 MG/DL (ref 0.7–1.3)
CREAT SERPL-MCNC: 2.01 MG/DL (ref 0.7–1.3)
CREAT SERPL-MCNC: 2.05 MG/DL (ref 0.7–1.3)
CREAT SERPL-MCNC: 2.08 MG/DL (ref 0.7–1.3)
CREAT SERPL-MCNC: 2.12 MG/DL (ref 0.7–1.3)
CREAT SERPL-MCNC: 2.14 MG/DL (ref 0.7–1.3)
CREAT SERPL-MCNC: 2.15 MG/DL (ref 0.7–1.3)
CREAT SERPL-MCNC: 2.16 MG/DL (ref 0.7–1.3)
CREAT SERPL-MCNC: 2.19 MG/DL (ref 0.7–1.3)
CREAT SERPL-MCNC: 2.2 MG/DL (ref 0.7–1.3)
CREAT SERPL-MCNC: 2.2 MG/DL (ref 0.7–1.3)
CREAT SERPL-MCNC: 2.21 MG/DL (ref 0.7–1.3)
CREAT SERPL-MCNC: 2.21 MG/DL (ref 0.7–1.3)
CREAT SERPL-MCNC: 2.23 MG/DL (ref 0.7–1.3)
CREAT SERPL-MCNC: 2.25 MG/DL (ref 0.7–1.3)
CREAT SERPL-MCNC: 2.27 MG/DL (ref 0.7–1.3)
CREAT SERPL-MCNC: 2.36 MG/DL (ref 0.7–1.3)
CREAT SERPL-MCNC: 2.49 MG/DL (ref 0.7–1.3)
CREAT SERPL-MCNC: 2.56 MG/DL (ref 0.7–1.3)
CREAT SERPL-MCNC: 2.7 MG/DL (ref 0.7–1.3)
CREAT SERPL-MCNC: 2.76 MG/DL (ref 0.7–1.3)
CREAT SERPL-MCNC: 2.8 MG/DL (ref 0.7–1.3)
CREAT SERPL-MCNC: 2.99 MG/DL (ref 0.7–1.3)
CREAT SERPL-MCNC: 3.12 MG/DL (ref 0.7–1.3)
CREAT SERPL-MCNC: 3.19 MG/DL (ref 0.7–1.3)
CREAT SERPL-MCNC: 3.32 MG/DL (ref 0.7–1.3)
CREAT SERPL-MCNC: 3.41 MG/DL (ref 0.7–1.3)
CREAT SERPL-MCNC: 3.53 MG/DL (ref 0.7–1.3)
CREAT SERPL-MCNC: 3.82 MG/DL (ref 0.7–1.3)
CREAT SERPL-MCNC: 4.06 MG/DL (ref 0.7–1.3)
CREAT SERPL-MCNC: 4.23 MG/DL (ref 0.7–1.3)
CROSSMATCH RESULT,%XM: NORMAL
D50 ADMINISTERED, D50ADM: 0 ML
D50 ADMINISTERED, D50ADM: 8 ML
D50 ADMINISTERED, D50ADM: 9 ML
D50 ORDER, D50ORD: 0 ML
D50 ORDER, D50ORD: 8 ML
D50 ORDER, D50ORD: 9 ML
DIFFERENTIAL METHOD BLD: ABNORMAL
EOSINOPHIL # BLD: 0 K/UL (ref 0–0.4)
EOSINOPHIL # BLD: 0.5 K/UL (ref 0–0.4)
EOSINOPHIL NFR BLD: 0 % (ref 0–7)
EOSINOPHIL NFR BLD: 2 % (ref 0–7)
EPITH CASTS URNS QL MICRO: ABNORMAL /LPF
ERYTHROCYTE [DISTWIDTH] IN BLOOD BY AUTOMATED COUNT: 13.5 % (ref 11.5–14.5)
ERYTHROCYTE [DISTWIDTH] IN BLOOD BY AUTOMATED COUNT: 13.6 % (ref 11.5–14.5)
ERYTHROCYTE [DISTWIDTH] IN BLOOD BY AUTOMATED COUNT: 13.6 % (ref 11.5–14.5)
ERYTHROCYTE [DISTWIDTH] IN BLOOD BY AUTOMATED COUNT: 13.7 % (ref 11.5–14.5)
ERYTHROCYTE [DISTWIDTH] IN BLOOD BY AUTOMATED COUNT: 13.8 % (ref 11.5–14.5)
ERYTHROCYTE [DISTWIDTH] IN BLOOD BY AUTOMATED COUNT: 13.9 % (ref 11.5–14.5)
ERYTHROCYTE [DISTWIDTH] IN BLOOD BY AUTOMATED COUNT: 14.2 % (ref 11.5–14.5)
ERYTHROCYTE [DISTWIDTH] IN BLOOD BY AUTOMATED COUNT: 14.5 % (ref 11.5–14.5)
ERYTHROCYTE [DISTWIDTH] IN BLOOD BY AUTOMATED COUNT: 14.7 % (ref 11.5–14.5)
ERYTHROCYTE [DISTWIDTH] IN BLOOD BY AUTOMATED COUNT: 14.8 % (ref 11.5–14.5)
ERYTHROCYTE [DISTWIDTH] IN BLOOD BY AUTOMATED COUNT: 15 % (ref 11.5–14.5)
ERYTHROCYTE [DISTWIDTH] IN BLOOD BY AUTOMATED COUNT: 15.2 % (ref 11.5–14.5)
ERYTHROCYTE [DISTWIDTH] IN BLOOD BY AUTOMATED COUNT: 16.4 % (ref 11.5–14.5)
ERYTHROCYTE [DISTWIDTH] IN BLOOD BY AUTOMATED COUNT: 16.4 % (ref 11.5–14.5)
ERYTHROCYTE [DISTWIDTH] IN BLOOD BY AUTOMATED COUNT: 16.7 % (ref 11.5–14.5)
ERYTHROCYTE [DISTWIDTH] IN BLOOD BY AUTOMATED COUNT: 16.8 % (ref 11.5–14.5)
ERYTHROCYTE [DISTWIDTH] IN BLOOD BY AUTOMATED COUNT: 16.8 % (ref 11.5–14.5)
ERYTHROCYTE [DISTWIDTH] IN BLOOD BY AUTOMATED COUNT: 16.9 % (ref 11.5–14.5)
ERYTHROCYTE [DISTWIDTH] IN BLOOD BY AUTOMATED COUNT: 16.9 % (ref 11.5–14.5)
ERYTHROCYTE [DISTWIDTH] IN BLOOD BY AUTOMATED COUNT: 17.1 % (ref 11.5–14.5)
ERYTHROCYTE [DISTWIDTH] IN BLOOD BY AUTOMATED COUNT: 17.1 % (ref 11.5–14.5)
ERYTHROCYTE [DISTWIDTH] IN BLOOD BY AUTOMATED COUNT: 17.2 % (ref 11.5–14.5)
ERYTHROCYTE [DISTWIDTH] IN BLOOD BY AUTOMATED COUNT: 17.3 % (ref 11.5–14.5)
ERYTHROCYTE [DISTWIDTH] IN BLOOD BY AUTOMATED COUNT: 17.4 % (ref 11.5–14.5)
ERYTHROCYTE [DISTWIDTH] IN BLOOD BY AUTOMATED COUNT: 17.5 % (ref 11.5–14.5)
ERYTHROCYTE [DISTWIDTH] IN BLOOD BY AUTOMATED COUNT: 17.5 % (ref 11.5–14.5)
ERYTHROCYTE [DISTWIDTH] IN BLOOD BY AUTOMATED COUNT: 17.7 % (ref 11.5–14.5)
ERYTHROCYTE [DISTWIDTH] IN BLOOD BY AUTOMATED COUNT: 17.8 % (ref 11.5–14.5)
ERYTHROCYTE [DISTWIDTH] IN BLOOD BY AUTOMATED COUNT: 18.1 % (ref 11.5–14.5)
ERYTHROCYTE [DISTWIDTH] IN BLOOD BY AUTOMATED COUNT: 18.1 % (ref 11.5–14.5)
EST. AVERAGE GLUCOSE BLD GHB EST-MCNC: 194 MG/DL
FOLATE SERPL-MCNC: 15.6 NG/ML (ref 5–21)
GAS FLOW.O2 O2 DELIVERY SYS: ABNORMAL L/MIN
GAS FLOW.O2 O2 DELIVERY SYS: NORMAL L/MIN
GAS FLOW.O2 O2 DELIVERY SYS: NORMAL L/MIN
GAS FLOW.O2 SETTING OXYMISER: 10 BPM
GAS FLOW.O2 SETTING OXYMISER: 12 BPM
GAS FLOW.O2 SETTING OXYMISER: 26 BPM
GAS FLOW.O2 SETTING OXYMISER: 26 BPM
GAS FLOW.O2 SETTING OXYMISER: 30 BPM
GAS FLOW.O2 SETTING OXYMISER: 30 BPM
GLOBULIN SER CALC-MCNC: 1.8 G/DL (ref 2–4)
GLOBULIN SER CALC-MCNC: 2 G/DL (ref 2–4)
GLOBULIN SER CALC-MCNC: 2.1 G/DL (ref 2–4)
GLOBULIN SER CALC-MCNC: 2.2 G/DL (ref 2–4)
GLOBULIN SER CALC-MCNC: 2.4 G/DL (ref 2–4)
GLOBULIN SER CALC-MCNC: 2.6 G/DL (ref 2–4)
GLOBULIN SER CALC-MCNC: 2.9 G/DL (ref 2–4)
GLOBULIN SER CALC-MCNC: 3 G/DL (ref 2–4)
GLOBULIN SER CALC-MCNC: 3.1 G/DL (ref 2–4)
GLOBULIN SER CALC-MCNC: 3.3 G/DL (ref 2–4)
GLOBULIN SER CALC-MCNC: 3.4 G/DL (ref 2–4)
GLOBULIN SER CALC-MCNC: 3.4 G/DL (ref 2–4)
GLOBULIN SER CALC-MCNC: 3.8 G/DL (ref 2–4)
GLOBULIN SER CALC-MCNC: 3.8 G/DL (ref 2–4)
GLOBULIN SER CALC-MCNC: 4.5 G/DL (ref 2–4)
GLSCOM COMMENTS: NORMAL
GLUCOSE BLD STRIP.AUTO-MCNC: 100 MG/DL (ref 65–100)
GLUCOSE BLD STRIP.AUTO-MCNC: 101 MG/DL (ref 65–100)
GLUCOSE BLD STRIP.AUTO-MCNC: 101 MG/DL (ref 65–100)
GLUCOSE BLD STRIP.AUTO-MCNC: 102 MG/DL (ref 65–100)
GLUCOSE BLD STRIP.AUTO-MCNC: 103 MG/DL (ref 65–100)
GLUCOSE BLD STRIP.AUTO-MCNC: 104 MG/DL (ref 65–100)
GLUCOSE BLD STRIP.AUTO-MCNC: 105 MG/DL (ref 65–100)
GLUCOSE BLD STRIP.AUTO-MCNC: 105 MG/DL (ref 65–100)
GLUCOSE BLD STRIP.AUTO-MCNC: 106 MG/DL (ref 65–100)
GLUCOSE BLD STRIP.AUTO-MCNC: 107 MG/DL (ref 65–100)
GLUCOSE BLD STRIP.AUTO-MCNC: 108 MG/DL (ref 65–100)
GLUCOSE BLD STRIP.AUTO-MCNC: 108 MG/DL (ref 65–100)
GLUCOSE BLD STRIP.AUTO-MCNC: 109 MG/DL (ref 65–100)
GLUCOSE BLD STRIP.AUTO-MCNC: 110 MG/DL (ref 65–100)
GLUCOSE BLD STRIP.AUTO-MCNC: 111 MG/DL (ref 65–100)
GLUCOSE BLD STRIP.AUTO-MCNC: 112 MG/DL (ref 65–100)
GLUCOSE BLD STRIP.AUTO-MCNC: 112 MG/DL (ref 65–100)
GLUCOSE BLD STRIP.AUTO-MCNC: 113 MG/DL (ref 65–100)
GLUCOSE BLD STRIP.AUTO-MCNC: 114 MG/DL (ref 65–100)
GLUCOSE BLD STRIP.AUTO-MCNC: 115 MG/DL (ref 65–100)
GLUCOSE BLD STRIP.AUTO-MCNC: 116 MG/DL (ref 65–100)
GLUCOSE BLD STRIP.AUTO-MCNC: 118 MG/DL (ref 65–100)
GLUCOSE BLD STRIP.AUTO-MCNC: 118 MG/DL (ref 65–100)
GLUCOSE BLD STRIP.AUTO-MCNC: 119 MG/DL (ref 65–100)
GLUCOSE BLD STRIP.AUTO-MCNC: 119 MG/DL (ref 65–100)
GLUCOSE BLD STRIP.AUTO-MCNC: 120 MG/DL (ref 65–100)
GLUCOSE BLD STRIP.AUTO-MCNC: 120 MG/DL (ref 65–100)
GLUCOSE BLD STRIP.AUTO-MCNC: 121 MG/DL (ref 65–100)
GLUCOSE BLD STRIP.AUTO-MCNC: 122 MG/DL (ref 65–100)
GLUCOSE BLD STRIP.AUTO-MCNC: 123 MG/DL (ref 65–100)
GLUCOSE BLD STRIP.AUTO-MCNC: 124 MG/DL (ref 65–100)
GLUCOSE BLD STRIP.AUTO-MCNC: 126 MG/DL (ref 65–100)
GLUCOSE BLD STRIP.AUTO-MCNC: 126 MG/DL (ref 65–100)
GLUCOSE BLD STRIP.AUTO-MCNC: 127 MG/DL (ref 65–100)
GLUCOSE BLD STRIP.AUTO-MCNC: 127 MG/DL (ref 65–100)
GLUCOSE BLD STRIP.AUTO-MCNC: 128 MG/DL (ref 65–100)
GLUCOSE BLD STRIP.AUTO-MCNC: 132 MG/DL (ref 65–100)
GLUCOSE BLD STRIP.AUTO-MCNC: 133 MG/DL (ref 65–100)
GLUCOSE BLD STRIP.AUTO-MCNC: 135 MG/DL (ref 65–100)
GLUCOSE BLD STRIP.AUTO-MCNC: 135 MG/DL (ref 65–100)
GLUCOSE BLD STRIP.AUTO-MCNC: 137 MG/DL (ref 65–100)
GLUCOSE BLD STRIP.AUTO-MCNC: 138 MG/DL (ref 65–100)
GLUCOSE BLD STRIP.AUTO-MCNC: 138 MG/DL (ref 65–100)
GLUCOSE BLD STRIP.AUTO-MCNC: 139 MG/DL (ref 65–100)
GLUCOSE BLD STRIP.AUTO-MCNC: 139 MG/DL (ref 65–100)
GLUCOSE BLD STRIP.AUTO-MCNC: 140 MG/DL (ref 65–100)
GLUCOSE BLD STRIP.AUTO-MCNC: 143 MG/DL (ref 65–100)
GLUCOSE BLD STRIP.AUTO-MCNC: 143 MG/DL (ref 65–100)
GLUCOSE BLD STRIP.AUTO-MCNC: 148 MG/DL (ref 65–100)
GLUCOSE BLD STRIP.AUTO-MCNC: 154 MG/DL (ref 65–100)
GLUCOSE BLD STRIP.AUTO-MCNC: 155 MG/DL (ref 65–100)
GLUCOSE BLD STRIP.AUTO-MCNC: 162 MG/DL (ref 65–100)
GLUCOSE BLD STRIP.AUTO-MCNC: 162 MG/DL (ref 65–100)
GLUCOSE BLD STRIP.AUTO-MCNC: 163 MG/DL (ref 65–100)
GLUCOSE BLD STRIP.AUTO-MCNC: 166 MG/DL (ref 65–100)
GLUCOSE BLD STRIP.AUTO-MCNC: 166 MG/DL (ref 65–100)
GLUCOSE BLD STRIP.AUTO-MCNC: 170 MG/DL (ref 65–100)
GLUCOSE BLD STRIP.AUTO-MCNC: 173 MG/DL (ref 65–100)
GLUCOSE BLD STRIP.AUTO-MCNC: 177 MG/DL (ref 65–100)
GLUCOSE BLD STRIP.AUTO-MCNC: 181 MG/DL (ref 65–100)
GLUCOSE BLD STRIP.AUTO-MCNC: 189 MG/DL (ref 65–100)
GLUCOSE BLD STRIP.AUTO-MCNC: 193 MG/DL (ref 65–100)
GLUCOSE BLD STRIP.AUTO-MCNC: 199 MG/DL (ref 65–100)
GLUCOSE BLD STRIP.AUTO-MCNC: 205 MG/DL (ref 65–100)
GLUCOSE BLD STRIP.AUTO-MCNC: 210 MG/DL (ref 65–100)
GLUCOSE BLD STRIP.AUTO-MCNC: 214 MG/DL (ref 65–100)
GLUCOSE BLD STRIP.AUTO-MCNC: 217 MG/DL (ref 65–100)
GLUCOSE BLD STRIP.AUTO-MCNC: 223 MG/DL (ref 65–100)
GLUCOSE BLD STRIP.AUTO-MCNC: 228 MG/DL (ref 65–100)
GLUCOSE BLD STRIP.AUTO-MCNC: 229 MG/DL (ref 65–100)
GLUCOSE BLD STRIP.AUTO-MCNC: 231 MG/DL (ref 65–100)
GLUCOSE BLD STRIP.AUTO-MCNC: 232 MG/DL (ref 65–100)
GLUCOSE BLD STRIP.AUTO-MCNC: 244 MG/DL (ref 65–100)
GLUCOSE BLD STRIP.AUTO-MCNC: 248 MG/DL (ref 65–100)
GLUCOSE BLD STRIP.AUTO-MCNC: 256 MG/DL (ref 65–100)
GLUCOSE BLD STRIP.AUTO-MCNC: 263 MG/DL (ref 65–100)
GLUCOSE BLD STRIP.AUTO-MCNC: 271 MG/DL (ref 65–100)
GLUCOSE BLD STRIP.AUTO-MCNC: 286 MG/DL (ref 65–100)
GLUCOSE BLD STRIP.AUTO-MCNC: 297 MG/DL (ref 65–100)
GLUCOSE BLD STRIP.AUTO-MCNC: 300 MG/DL (ref 65–100)
GLUCOSE BLD STRIP.AUTO-MCNC: 301 MG/DL (ref 65–100)
GLUCOSE BLD STRIP.AUTO-MCNC: 304 MG/DL (ref 65–100)
GLUCOSE BLD STRIP.AUTO-MCNC: 309 MG/DL (ref 65–100)
GLUCOSE BLD STRIP.AUTO-MCNC: 325 MG/DL (ref 65–100)
GLUCOSE BLD STRIP.AUTO-MCNC: 326 MG/DL (ref 65–100)
GLUCOSE BLD STRIP.AUTO-MCNC: 332 MG/DL (ref 65–100)
GLUCOSE BLD STRIP.AUTO-MCNC: 335 MG/DL (ref 65–100)
GLUCOSE BLD STRIP.AUTO-MCNC: 345 MG/DL (ref 65–100)
GLUCOSE BLD STRIP.AUTO-MCNC: 346 MG/DL (ref 65–100)
GLUCOSE BLD STRIP.AUTO-MCNC: 387 MG/DL (ref 65–100)
GLUCOSE BLD STRIP.AUTO-MCNC: 388 MG/DL (ref 65–100)
GLUCOSE BLD STRIP.AUTO-MCNC: 416 MG/DL (ref 65–100)
GLUCOSE BLD STRIP.AUTO-MCNC: 445 MG/DL (ref 65–100)
GLUCOSE BLD STRIP.AUTO-MCNC: 450 MG/DL (ref 65–100)
GLUCOSE BLD STRIP.AUTO-MCNC: 530 MG/DL (ref 65–100)
GLUCOSE BLD STRIP.AUTO-MCNC: 68 MG/DL (ref 65–100)
GLUCOSE BLD STRIP.AUTO-MCNC: 78 MG/DL (ref 65–100)
GLUCOSE BLD STRIP.AUTO-MCNC: 79 MG/DL (ref 65–100)
GLUCOSE BLD STRIP.AUTO-MCNC: 81 MG/DL (ref 65–100)
GLUCOSE BLD STRIP.AUTO-MCNC: 82 MG/DL (ref 65–100)
GLUCOSE BLD STRIP.AUTO-MCNC: 85 MG/DL (ref 65–100)
GLUCOSE BLD STRIP.AUTO-MCNC: 86 MG/DL (ref 65–100)
GLUCOSE BLD STRIP.AUTO-MCNC: 88 MG/DL (ref 65–100)
GLUCOSE BLD STRIP.AUTO-MCNC: 89 MG/DL (ref 65–100)
GLUCOSE BLD STRIP.AUTO-MCNC: 92 MG/DL (ref 65–100)
GLUCOSE BLD STRIP.AUTO-MCNC: 92 MG/DL (ref 65–100)
GLUCOSE BLD STRIP.AUTO-MCNC: 93 MG/DL (ref 65–100)
GLUCOSE BLD STRIP.AUTO-MCNC: 94 MG/DL (ref 65–100)
GLUCOSE BLD STRIP.AUTO-MCNC: 94 MG/DL (ref 65–100)
GLUCOSE BLD STRIP.AUTO-MCNC: 95 MG/DL (ref 65–100)
GLUCOSE BLD STRIP.AUTO-MCNC: 96 MG/DL (ref 65–100)
GLUCOSE BLD STRIP.AUTO-MCNC: 97 MG/DL (ref 65–100)
GLUCOSE BLD STRIP.AUTO-MCNC: 98 MG/DL (ref 65–100)
GLUCOSE BLD STRIP.AUTO-MCNC: 99 MG/DL (ref 65–100)
GLUCOSE BLD-MCNC: 386 MG/DL (ref 65–100)
GLUCOSE SERPL-MCNC: 104 MG/DL (ref 65–100)
GLUCOSE SERPL-MCNC: 104 MG/DL (ref 65–100)
GLUCOSE SERPL-MCNC: 105 MG/DL (ref 65–100)
GLUCOSE SERPL-MCNC: 106 MG/DL (ref 65–100)
GLUCOSE SERPL-MCNC: 110 MG/DL (ref 65–100)
GLUCOSE SERPL-MCNC: 113 MG/DL (ref 65–100)
GLUCOSE SERPL-MCNC: 114 MG/DL (ref 65–100)
GLUCOSE SERPL-MCNC: 115 MG/DL (ref 65–100)
GLUCOSE SERPL-MCNC: 116 MG/DL (ref 65–100)
GLUCOSE SERPL-MCNC: 117 MG/DL (ref 65–100)
GLUCOSE SERPL-MCNC: 118 MG/DL (ref 65–100)
GLUCOSE SERPL-MCNC: 123 MG/DL (ref 65–100)
GLUCOSE SERPL-MCNC: 127 MG/DL (ref 65–100)
GLUCOSE SERPL-MCNC: 129 MG/DL (ref 65–100)
GLUCOSE SERPL-MCNC: 129 MG/DL (ref 65–100)
GLUCOSE SERPL-MCNC: 131 MG/DL (ref 65–100)
GLUCOSE SERPL-MCNC: 133 MG/DL (ref 65–100)
GLUCOSE SERPL-MCNC: 160 MG/DL (ref 65–100)
GLUCOSE SERPL-MCNC: 166 MG/DL (ref 65–100)
GLUCOSE SERPL-MCNC: 170 MG/DL (ref 65–100)
GLUCOSE SERPL-MCNC: 220 MG/DL (ref 65–100)
GLUCOSE SERPL-MCNC: 230 MG/DL (ref 65–100)
GLUCOSE SERPL-MCNC: 240 MG/DL (ref 65–100)
GLUCOSE SERPL-MCNC: 301 MG/DL (ref 65–100)
GLUCOSE SERPL-MCNC: 342 MG/DL (ref 65–100)
GLUCOSE SERPL-MCNC: 482 MG/DL (ref 65–100)
GLUCOSE SERPL-MCNC: 526 MG/DL (ref 65–100)
GLUCOSE SERPL-MCNC: 580 MG/DL (ref 65–100)
GLUCOSE SERPL-MCNC: 86 MG/DL (ref 65–100)
GLUCOSE SERPL-MCNC: 88 MG/DL (ref 65–100)
GLUCOSE SERPL-MCNC: 92 MG/DL (ref 65–100)
GLUCOSE SERPL-MCNC: 94 MG/DL (ref 65–100)
GLUCOSE SERPL-MCNC: 96 MG/DL (ref 65–100)
GLUCOSE SERPL-MCNC: 96 MG/DL (ref 65–100)
GLUCOSE SERPL-MCNC: 97 MG/DL (ref 65–100)
GLUCOSE SERPL-MCNC: 97 MG/DL (ref 65–100)
GLUCOSE UR STRIP.AUTO-MCNC: NEGATIVE MG/DL
GLUCOSE, GLC: 100 MG/DL
GLUCOSE, GLC: 101 MG/DL
GLUCOSE, GLC: 101 MG/DL
GLUCOSE, GLC: 102 MG/DL
GLUCOSE, GLC: 103 MG/DL
GLUCOSE, GLC: 104 MG/DL
GLUCOSE, GLC: 105 MG/DL
GLUCOSE, GLC: 105 MG/DL
GLUCOSE, GLC: 106 MG/DL
GLUCOSE, GLC: 107 MG/DL
GLUCOSE, GLC: 108 MG/DL
GLUCOSE, GLC: 108 MG/DL
GLUCOSE, GLC: 109 MG/DL
GLUCOSE, GLC: 110 MG/DL
GLUCOSE, GLC: 111 MG/DL
GLUCOSE, GLC: 112 MG/DL
GLUCOSE, GLC: 112 MG/DL
GLUCOSE, GLC: 113 MG/DL
GLUCOSE, GLC: 114 MG/DL
GLUCOSE, GLC: 115 MG/DL
GLUCOSE, GLC: 116 MG/DL
GLUCOSE, GLC: 118 MG/DL
GLUCOSE, GLC: 118 MG/DL
GLUCOSE, GLC: 119 MG/DL
GLUCOSE, GLC: 119 MG/DL
GLUCOSE, GLC: 120 MG/DL
GLUCOSE, GLC: 120 MG/DL
GLUCOSE, GLC: 121 MG/DL
GLUCOSE, GLC: 122 MG/DL
GLUCOSE, GLC: 123 MG/DL
GLUCOSE, GLC: 124 MG/DL
GLUCOSE, GLC: 126 MG/DL
GLUCOSE, GLC: 127 MG/DL
GLUCOSE, GLC: 127 MG/DL
GLUCOSE, GLC: 128 MG/DL
GLUCOSE, GLC: 132 MG/DL
GLUCOSE, GLC: 133 MG/DL
GLUCOSE, GLC: 135 MG/DL
GLUCOSE, GLC: 135 MG/DL
GLUCOSE, GLC: 137 MG/DL
GLUCOSE, GLC: 138 MG/DL
GLUCOSE, GLC: 138 MG/DL
GLUCOSE, GLC: 139 MG/DL
GLUCOSE, GLC: 139 MG/DL
GLUCOSE, GLC: 140 MG/DL
GLUCOSE, GLC: 143 MG/DL
GLUCOSE, GLC: 143 MG/DL
GLUCOSE, GLC: 148 MG/DL
GLUCOSE, GLC: 154 MG/DL
GLUCOSE, GLC: 155 MG/DL
GLUCOSE, GLC: 163 MG/DL
GLUCOSE, GLC: 166 MG/DL
GLUCOSE, GLC: 170 MG/DL
GLUCOSE, GLC: 181 MG/DL
GLUCOSE, GLC: 248 MG/DL
GLUCOSE, GLC: 271 MG/DL
GLUCOSE, GLC: 300 MG/DL
GLUCOSE, GLC: 309 MG/DL
GLUCOSE, GLC: 325 MG/DL
GLUCOSE, GLC: 326 MG/DL
GLUCOSE, GLC: 332 MG/DL
GLUCOSE, GLC: 335 MG/DL
GLUCOSE, GLC: 345 MG/DL
GLUCOSE, GLC: 387 MG/DL
GLUCOSE, GLC: 388 MG/DL
GLUCOSE, GLC: 416 MG/DL
GLUCOSE, GLC: 450 MG/DL
GLUCOSE, GLC: 530 MG/DL
GLUCOSE, GLC: 78 MG/DL
GLUCOSE, GLC: 79 MG/DL
GLUCOSE, GLC: 81 MG/DL
GLUCOSE, GLC: 82 MG/DL
GLUCOSE, GLC: 86 MG/DL
GLUCOSE, GLC: 88 MG/DL
GLUCOSE, GLC: 89 MG/DL
GLUCOSE, GLC: 92 MG/DL
GLUCOSE, GLC: 92 MG/DL
GLUCOSE, GLC: 93 MG/DL
GLUCOSE, GLC: 94 MG/DL
GLUCOSE, GLC: 94 MG/DL
GLUCOSE, GLC: 95 MG/DL
GLUCOSE, GLC: 96 MG/DL
GLUCOSE, GLC: 97 MG/DL
GLUCOSE, GLC: 98 MG/DL
GLUCOSE, GLC: 99 MG/DL
GLUCOSE, GLC: 99 MG/DL
GRAM STN SPEC: ABNORMAL
GRAM STN SPEC: NORMAL
HBA1C MFR BLD: 8.4 % (ref 4–5.6)
HBV E AB SERPL QL IA: NEGATIVE
HBV E AG SERPL QL IA: NEGATIVE
HBV SURFACE AB SER QL: REACTIVE
HBV SURFACE AB SER-ACNC: >1000 MIU/ML
HBV SURFACE AG SER QL: <0.1 INDEX
HBV SURFACE AG SER QL: NEGATIVE
HCO3 BLD-SCNC: 16 MMOL/L (ref 22–26)
HCO3 BLD-SCNC: 16.5 MMOL/L (ref 22–26)
HCO3 BLD-SCNC: 16.6 MMOL/L (ref 22–26)
HCO3 BLD-SCNC: 17.5 MMOL/L (ref 22–26)
HCO3 BLD-SCNC: 17.6 MMOL/L (ref 22–26)
HCO3 BLD-SCNC: 18.6 MMOL/L (ref 22–26)
HCO3 BLD-SCNC: 20.1 MMOL/L (ref 22–26)
HCO3 BLD-SCNC: 20.3 MMOL/L (ref 22–26)
HCO3 BLD-SCNC: 21 MMOL/L (ref 22–26)
HCO3 BLD-SCNC: 21 MMOL/L (ref 22–26)
HCO3 BLD-SCNC: 21.2 MMOL/L (ref 22–26)
HCO3 BLD-SCNC: 21.5 MMOL/L (ref 22–26)
HCO3 BLD-SCNC: 21.9 MMOL/L (ref 22–26)
HCO3 BLD-SCNC: 22.1 MMOL/L (ref 22–26)
HCO3 BLD-SCNC: 22.1 MMOL/L (ref 22–26)
HCO3 BLD-SCNC: 22.2 MMOL/L (ref 22–26)
HCO3 BLD-SCNC: 22.2 MMOL/L (ref 22–26)
HCO3 BLD-SCNC: 22.3 MMOL/L (ref 22–26)
HCO3 BLD-SCNC: 22.5 MMOL/L (ref 22–26)
HCO3 BLD-SCNC: 22.7 MMOL/L (ref 22–26)
HCO3 BLD-SCNC: 22.9 MMOL/L (ref 22–26)
HCO3 BLD-SCNC: 23 MMOL/L (ref 22–26)
HCO3 BLD-SCNC: 23.2 MMOL/L (ref 22–26)
HCO3 BLD-SCNC: 23.5 MMOL/L (ref 22–26)
HCO3 BLD-SCNC: 23.7 MMOL/L (ref 22–26)
HCO3 BLD-SCNC: 23.8 MMOL/L (ref 22–26)
HCO3 BLD-SCNC: 23.9 MMOL/L (ref 22–26)
HCO3 BLD-SCNC: 24.1 MMOL/L (ref 22–26)
HCO3 BLD-SCNC: 24.3 MMOL/L (ref 22–26)
HCO3 BLD-SCNC: 24.3 MMOL/L (ref 22–26)
HCO3 BLD-SCNC: 24.4 MMOL/L (ref 22–26)
HCO3 BLD-SCNC: 24.5 MMOL/L (ref 22–26)
HCO3 BLD-SCNC: 24.5 MMOL/L (ref 22–26)
HCO3 BLD-SCNC: 24.7 MMOL/L (ref 22–26)
HCO3 BLD-SCNC: 24.8 MMOL/L (ref 22–26)
HCO3 BLD-SCNC: 24.9 MMOL/L (ref 22–26)
HCO3 BLD-SCNC: 25.4 MMOL/L (ref 22–26)
HCO3 BLD-SCNC: 25.5 MMOL/L (ref 22–26)
HCO3 BLD-SCNC: 25.6 MMOL/L (ref 22–26)
HCO3 BLD-SCNC: 25.6 MMOL/L (ref 22–26)
HCO3 BLD-SCNC: 25.7 MMOL/L (ref 22–26)
HCO3 BLD-SCNC: 25.7 MMOL/L (ref 22–26)
HCO3 BLD-SCNC: 25.8 MMOL/L (ref 22–26)
HCO3 BLD-SCNC: 25.9 MMOL/L (ref 22–26)
HCO3 BLD-SCNC: 26 MMOL/L (ref 22–26)
HCO3 BLD-SCNC: 26.1 MMOL/L (ref 22–26)
HCO3 BLD-SCNC: 26.2 MMOL/L (ref 22–26)
HCO3 BLD-SCNC: 26.2 MMOL/L (ref 22–26)
HCO3 BLD-SCNC: 26.4 MMOL/L (ref 22–26)
HCO3 BLD-SCNC: 26.4 MMOL/L (ref 22–26)
HCO3 BLD-SCNC: 26.5 MMOL/L (ref 22–26)
HCO3 BLD-SCNC: 26.5 MMOL/L (ref 22–26)
HCO3 BLD-SCNC: 26.6 MMOL/L (ref 22–26)
HCO3 BLD-SCNC: 26.7 MMOL/L (ref 22–26)
HCO3 BLD-SCNC: 26.8 MMOL/L (ref 22–26)
HCO3 BLD-SCNC: 26.9 MMOL/L (ref 22–26)
HCO3 BLD-SCNC: 26.9 MMOL/L (ref 22–26)
HCO3 BLD-SCNC: 27 MMOL/L (ref 22–26)
HCO3 BLD-SCNC: 27.1 MMOL/L (ref 22–26)
HCO3 BLD-SCNC: 27.4 MMOL/L (ref 22–26)
HCO3 BLD-SCNC: 27.9 MMOL/L (ref 22–26)
HCO3 BLD-SCNC: 28.2 MMOL/L (ref 22–26)
HCO3 BLD-SCNC: 28.3 MMOL/L (ref 22–26)
HCO3 BLD-SCNC: 28.4 MMOL/L (ref 22–26)
HCO3 BLD-SCNC: 28.4 MMOL/L (ref 22–26)
HCO3 BLD-SCNC: 28.7 MMOL/L (ref 22–26)
HCO3 BLD-SCNC: 28.7 MMOL/L (ref 22–26)
HCO3 BLD-SCNC: 30 MMOL/L (ref 22–26)
HCO3 BLD-SCNC: 30.2 MMOL/L (ref 22–26)
HCO3 BLD-SCNC: 30.6 MMOL/L (ref 22–26)
HCO3 BLD-SCNC: 31.4 MMOL/L (ref 22–26)
HCT VFR BLD AUTO: 17.9 % (ref 36.6–50.3)
HCT VFR BLD AUTO: 20.4 % (ref 36.6–50.3)
HCT VFR BLD AUTO: 21.5 % (ref 36.6–50.3)
HCT VFR BLD AUTO: 21.6 % (ref 36.6–50.3)
HCT VFR BLD AUTO: 22 % (ref 36.6–50.3)
HCT VFR BLD AUTO: 22.1 % (ref 36.6–50.3)
HCT VFR BLD AUTO: 22.2 % (ref 36.6–50.3)
HCT VFR BLD AUTO: 22.7 % (ref 36.6–50.3)
HCT VFR BLD AUTO: 22.9 % (ref 36.6–50.3)
HCT VFR BLD AUTO: 23.1 % (ref 36.6–50.3)
HCT VFR BLD AUTO: 23.1 % (ref 36.6–50.3)
HCT VFR BLD AUTO: 23.4 % (ref 36.6–50.3)
HCT VFR BLD AUTO: 23.9 % (ref 36.6–50.3)
HCT VFR BLD AUTO: 24 % (ref 36.6–50.3)
HCT VFR BLD AUTO: 24.2 % (ref 36.6–50.3)
HCT VFR BLD AUTO: 24.3 % (ref 36.6–50.3)
HCT VFR BLD AUTO: 24.5 % (ref 36.6–50.3)
HCT VFR BLD AUTO: 24.5 % (ref 36.6–50.3)
HCT VFR BLD AUTO: 24.8 % (ref 36.6–50.3)
HCT VFR BLD AUTO: 24.9 % (ref 36.6–50.3)
HCT VFR BLD AUTO: 25.1 % (ref 36.6–50.3)
HCT VFR BLD AUTO: 25.2 % (ref 36.6–50.3)
HCT VFR BLD AUTO: 25.4 % (ref 36.6–50.3)
HCT VFR BLD AUTO: 25.4 % (ref 36.6–50.3)
HCT VFR BLD AUTO: 26 % (ref 36.6–50.3)
HCT VFR BLD AUTO: 26.3 % (ref 36.6–50.3)
HCT VFR BLD AUTO: 29.5 % (ref 36.6–50.3)
HCT VFR BLD AUTO: 29.7 % (ref 36.6–50.3)
HCT VFR BLD AUTO: 30.5 % (ref 36.6–50.3)
HCT VFR BLD AUTO: 31.3 % (ref 36.6–50.3)
HCT VFR BLD AUTO: 31.9 % (ref 36.6–50.3)
HCT VFR BLD AUTO: 34.2 % (ref 36.6–50.3)
HCT VFR BLD AUTO: 40.9 % (ref 36.6–50.3)
HCT VFR BLD AUTO: 46.2 % (ref 36.6–50.3)
HCT VFR BLD CALC: 53 % (ref 36.6–50.3)
HDLC SERPL-MCNC: 20 MG/DL
HDLC SERPL: 7.3 {RATIO} (ref 0–5)
HEALTH STATUS, XMCV2T: NORMAL
HGB BLD OXIMETRY-MCNC: 5.7 G/DL (ref 14–17)
HGB BLD-MCNC: 10 G/DL (ref 12.1–17)
HGB BLD-MCNC: 10.4 G/DL (ref 12.1–17)
HGB BLD-MCNC: 10.6 G/DL (ref 12.1–17)
HGB BLD-MCNC: 11 G/DL (ref 12.1–17)
HGB BLD-MCNC: 11 G/DL (ref 12.1–17)
HGB BLD-MCNC: 11.9 G/DL (ref 12.1–17)
HGB BLD-MCNC: 14 G/DL (ref 12.1–17)
HGB BLD-MCNC: 15.6 G/DL (ref 12.1–17)
HGB BLD-MCNC: 6.1 G/DL (ref 12.1–17)
HGB BLD-MCNC: 6.9 G/DL (ref 12.1–17)
HGB BLD-MCNC: 7 G/DL (ref 12.1–17)
HGB BLD-MCNC: 7 G/DL (ref 12.1–17)
HGB BLD-MCNC: 7.1 G/DL (ref 12.1–17)
HGB BLD-MCNC: 7.2 G/DL (ref 12.1–17)
HGB BLD-MCNC: 7.2 G/DL (ref 12.1–17)
HGB BLD-MCNC: 7.5 G/DL (ref 12.1–17)
HGB BLD-MCNC: 7.5 G/DL (ref 12.1–17)
HGB BLD-MCNC: 7.6 G/DL (ref 12.1–17)
HGB BLD-MCNC: 7.7 G/DL (ref 12.1–17)
HGB BLD-MCNC: 7.9 G/DL (ref 12.1–17)
HGB BLD-MCNC: 8 G/DL (ref 12.1–17)
HGB BLD-MCNC: 8.1 G/DL (ref 12.1–17)
HGB BLD-MCNC: 8.2 G/DL (ref 12.1–17)
HGB BLD-MCNC: 8.3 G/DL (ref 12.1–17)
HGB BLD-MCNC: 8.4 G/DL (ref 12.1–17)
HGB BLD-MCNC: 8.5 G/DL (ref 12.1–17)
HGB BLD-MCNC: 8.5 G/DL (ref 12.1–17)
HGB BLD-MCNC: 8.7 G/DL (ref 12.1–17)
HGB BLD-MCNC: 8.8 G/DL (ref 12.1–17)
HGB BLD-MCNC: 8.9 G/DL (ref 12.1–17)
HGB BLD-MCNC: 9.1 G/DL (ref 12.1–17)
HGB UR QL STRIP: ABNORMAL
HIGH TARGET, HITG: 130 MG/DL
HISTORY CHECKED?,CKHIST: NORMAL
IMM GRANULOCYTES # BLD AUTO: 0 K/UL (ref 0–0.04)
IMM GRANULOCYTES # BLD AUTO: 0.1 K/UL (ref 0–0.04)
IMM GRANULOCYTES NFR BLD AUTO: 0 % (ref 0–0.5)
IMM GRANULOCYTES NFR BLD AUTO: 1 % (ref 0–0.5)
INSPIRATION.DURATION SETTING TIME VENT: 0.8 SEC
INSPIRATION.DURATION SETTING TIME VENT: 1.3 SEC
INSPIRATION.DURATION SETTING TIME VENT: 1.31 SEC
INSPIRATION.DURATION SETTING TIME VENT: 1.5 SEC
INSPIRATION.DURATION SETTING TIME VENT: 1.7 SEC
INSULIN ADMINSTERED, INSADM: 0 UNITS/HOUR
INSULIN ADMINSTERED, INSADM: 1.3 UNITS/HOUR
INSULIN ADMINSTERED, INSADM: 1.5 UNITS/HOUR
INSULIN ADMINSTERED, INSADM: 1.7 UNITS/HOUR
INSULIN ADMINSTERED, INSADM: 1.7 UNITS/HOUR
INSULIN ADMINSTERED, INSADM: 10.5 UNITS/HOUR
INSULIN ADMINSTERED, INSADM: 12.5 UNITS/HOUR
INSULIN ADMINSTERED, INSADM: 14.2 UNITS/HOUR
INSULIN ADMINSTERED, INSADM: 14.9 UNITS/HOUR
INSULIN ADMINSTERED, INSADM: 17.5 UNITS/HOUR
INSULIN ADMINSTERED, INSADM: 18.6 UNITS/HOUR
INSULIN ADMINSTERED, INSADM: 18.8 UNITS/HOUR
INSULIN ADMINSTERED, INSADM: 19.4 UNITS/HOUR
INSULIN ADMINSTERED, INSADM: 19.5 UNITS/HOUR
INSULIN ADMINSTERED, INSADM: 19.6 UNITS/HOUR
INSULIN ADMINSTERED, INSADM: 2 UNITS/HOUR
INSULIN ADMINSTERED, INSADM: 2.1 UNITS/HOUR
INSULIN ADMINSTERED, INSADM: 2.2 UNITS/HOUR
INSULIN ADMINSTERED, INSADM: 2.3 UNITS/HOUR
INSULIN ADMINSTERED, INSADM: 2.4 UNITS/HOUR
INSULIN ADMINSTERED, INSADM: 2.5 UNITS/HOUR
INSULIN ADMINSTERED, INSADM: 2.6 UNITS/HOUR
INSULIN ADMINSTERED, INSADM: 2.7 UNITS/HOUR
INSULIN ADMINSTERED, INSADM: 2.8 UNITS/HOUR
INSULIN ADMINSTERED, INSADM: 2.9 UNITS/HOUR
INSULIN ADMINSTERED, INSADM: 2.9 UNITS/HOUR
INSULIN ADMINSTERED, INSADM: 21.2 UNITS/HOUR
INSULIN ADMINSTERED, INSADM: 21.6 UNITS/HOUR
INSULIN ADMINSTERED, INSADM: 27.4 UNITS/HOUR
INSULIN ADMINSTERED, INSADM: 28.2 UNITS/HOUR
INSULIN ADMINSTERED, INSADM: 28.5 UNITS/HOUR
INSULIN ADMINSTERED, INSADM: 29.5 UNITS/HOUR
INSULIN ADMINSTERED, INSADM: 3 UNITS/HOUR
INSULIN ADMINSTERED, INSADM: 3.1 UNITS/HOUR
INSULIN ADMINSTERED, INSADM: 3.1 UNITS/HOUR
INSULIN ADMINSTERED, INSADM: 3.2 UNITS/HOUR
INSULIN ADMINSTERED, INSADM: 3.3 UNITS/HOUR
INSULIN ADMINSTERED, INSADM: 3.4 UNITS/HOUR
INSULIN ADMINSTERED, INSADM: 3.5 UNITS/HOUR
INSULIN ADMINSTERED, INSADM: 3.6 UNITS/HOUR
INSULIN ADMINSTERED, INSADM: 3.7 UNITS/HOUR
INSULIN ADMINSTERED, INSADM: 3.9 UNITS/HOUR
INSULIN ADMINSTERED, INSADM: 3.9 UNITS/HOUR
INSULIN ADMINSTERED, INSADM: 33 UNITS/HOUR
INSULIN ADMINSTERED, INSADM: 35.4 UNITS/HOUR
INSULIN ADMINSTERED, INSADM: 4 UNITS/HOUR
INSULIN ADMINSTERED, INSADM: 4.1 UNITS/HOUR
INSULIN ADMINSTERED, INSADM: 4.2 UNITS/HOUR
INSULIN ADMINSTERED, INSADM: 4.2 UNITS/HOUR
INSULIN ADMINSTERED, INSADM: 4.3 UNITS/HOUR
INSULIN ADMINSTERED, INSADM: 4.4 UNITS/HOUR
INSULIN ADMINSTERED, INSADM: 4.8 UNITS/HOUR
INSULIN ADMINSTERED, INSADM: 4.9 UNITS/HOUR
INSULIN ADMINSTERED, INSADM: 5 UNITS/HOUR
INSULIN ADMINSTERED, INSADM: 5.1 UNITS/HOUR
INSULIN ADMINSTERED, INSADM: 5.2 UNITS/HOUR
INSULIN ADMINSTERED, INSADM: 5.3 UNITS/HOUR
INSULIN ADMINSTERED, INSADM: 5.4 UNITS/HOUR
INSULIN ADMINSTERED, INSADM: 5.5 UNITS/HOUR
INSULIN ADMINSTERED, INSADM: 5.8 UNITS/HOUR
INSULIN ADMINSTERED, INSADM: 5.8 UNITS/HOUR
INSULIN ADMINSTERED, INSADM: 6.3 UNITS/HOUR
INSULIN ADMINSTERED, INSADM: 6.3 UNITS/HOUR
INSULIN ADMINSTERED, INSADM: 6.4 UNITS/HOUR
INSULIN ADMINSTERED, INSADM: 6.6 UNITS/HOUR
INSULIN ADMINSTERED, INSADM: 6.9 UNITS/HOUR
INSULIN ADMINSTERED, INSADM: 7.1 UNITS/HOUR
INSULIN ADMINSTERED, INSADM: 7.2 UNITS/HOUR
INSULIN ADMINSTERED, INSADM: 7.3 UNITS/HOUR
INSULIN ADMINSTERED, INSADM: 7.4 UNITS/HOUR
INSULIN ADMINSTERED, INSADM: 7.6 UNITS/HOUR
INSULIN ADMINSTERED, INSADM: 7.8 UNITS/HOUR
INSULIN ADMINSTERED, INSADM: 7.9 UNITS/HOUR
INSULIN ADMINSTERED, INSADM: 8.2 UNITS/HOUR
INSULIN ADMINSTERED, INSADM: 8.2 UNITS/HOUR
INSULIN ADMINSTERED, INSADM: 8.3 UNITS/HOUR
INSULIN ADMINSTERED, INSADM: 8.5 UNITS/HOUR
INSULIN ADMINSTERED, INSADM: 9.2 UNITS/HOUR
INSULIN ADMINSTERED, INSADM: 9.2 UNITS/HOUR
INSULIN ADMINSTERED, INSADM: 9.5 UNITS/HOUR
INSULIN ADMINSTERED, INSADM: 9.8 UNITS/HOUR
INSULIN ORDER, INSORD: 0 UNITS/HOUR
INSULIN ORDER, INSORD: 0 UNITS/HOUR
INSULIN ORDER, INSORD: 1.3 UNITS/HOUR
INSULIN ORDER, INSORD: 1.5 UNITS/HOUR
INSULIN ORDER, INSORD: 1.7 UNITS/HOUR
INSULIN ORDER, INSORD: 1.7 UNITS/HOUR
INSULIN ORDER, INSORD: 1.8 UNITS/HOUR
INSULIN ORDER, INSORD: 10.5 UNITS/HOUR
INSULIN ORDER, INSORD: 12.5 UNITS/HOUR
INSULIN ORDER, INSORD: 14.2 UNITS/HOUR
INSULIN ORDER, INSORD: 14.9 UNITS/HOUR
INSULIN ORDER, INSORD: 17.5 UNITS/HOUR
INSULIN ORDER, INSORD: 18.6 UNITS/HOUR
INSULIN ORDER, INSORD: 18.8 UNITS/HOUR
INSULIN ORDER, INSORD: 19.4 UNITS/HOUR
INSULIN ORDER, INSORD: 19.5 UNITS/HOUR
INSULIN ORDER, INSORD: 19.6 UNITS/HOUR
INSULIN ORDER, INSORD: 2 UNITS/HOUR
INSULIN ORDER, INSORD: 2.1 UNITS/HOUR
INSULIN ORDER, INSORD: 2.2 UNITS/HOUR
INSULIN ORDER, INSORD: 2.3 UNITS/HOUR
INSULIN ORDER, INSORD: 2.4 UNITS/HOUR
INSULIN ORDER, INSORD: 2.5 UNITS/HOUR
INSULIN ORDER, INSORD: 2.6 UNITS/HOUR
INSULIN ORDER, INSORD: 2.7 UNITS/HOUR
INSULIN ORDER, INSORD: 2.8 UNITS/HOUR
INSULIN ORDER, INSORD: 2.9 UNITS/HOUR
INSULIN ORDER, INSORD: 2.9 UNITS/HOUR
INSULIN ORDER, INSORD: 21.2 UNITS/HOUR
INSULIN ORDER, INSORD: 21.6 UNITS/HOUR
INSULIN ORDER, INSORD: 27.4 UNITS/HOUR
INSULIN ORDER, INSORD: 28.2 UNITS/HOUR
INSULIN ORDER, INSORD: 28.5 UNITS/HOUR
INSULIN ORDER, INSORD: 29.5 UNITS/HOUR
INSULIN ORDER, INSORD: 3 UNITS/HOUR
INSULIN ORDER, INSORD: 3.1 UNITS/HOUR
INSULIN ORDER, INSORD: 3.1 UNITS/HOUR
INSULIN ORDER, INSORD: 3.2 UNITS/HOUR
INSULIN ORDER, INSORD: 3.3 UNITS/HOUR
INSULIN ORDER, INSORD: 3.4 UNITS/HOUR
INSULIN ORDER, INSORD: 3.5 UNITS/HOUR
INSULIN ORDER, INSORD: 3.6 UNITS/HOUR
INSULIN ORDER, INSORD: 3.7 UNITS/HOUR
INSULIN ORDER, INSORD: 3.9 UNITS/HOUR
INSULIN ORDER, INSORD: 3.9 UNITS/HOUR
INSULIN ORDER, INSORD: 33 UNITS/HOUR
INSULIN ORDER, INSORD: 35.4 UNITS/HOUR
INSULIN ORDER, INSORD: 4 UNITS/HOUR
INSULIN ORDER, INSORD: 4.1 UNITS/HOUR
INSULIN ORDER, INSORD: 4.2 UNITS/HOUR
INSULIN ORDER, INSORD: 4.2 UNITS/HOUR
INSULIN ORDER, INSORD: 4.3 UNITS/HOUR
INSULIN ORDER, INSORD: 4.4 UNITS/HOUR
INSULIN ORDER, INSORD: 4.8 UNITS/HOUR
INSULIN ORDER, INSORD: 4.9 UNITS/HOUR
INSULIN ORDER, INSORD: 5 UNITS/HOUR
INSULIN ORDER, INSORD: 5.1 UNITS/HOUR
INSULIN ORDER, INSORD: 5.2 UNITS/HOUR
INSULIN ORDER, INSORD: 5.3 UNITS/HOUR
INSULIN ORDER, INSORD: 5.4 UNITS/HOUR
INSULIN ORDER, INSORD: 5.5 UNITS/HOUR
INSULIN ORDER, INSORD: 5.8 UNITS/HOUR
INSULIN ORDER, INSORD: 5.8 UNITS/HOUR
INSULIN ORDER, INSORD: 6.3 UNITS/HOUR
INSULIN ORDER, INSORD: 6.3 UNITS/HOUR
INSULIN ORDER, INSORD: 6.4 UNITS/HOUR
INSULIN ORDER, INSORD: 6.6 UNITS/HOUR
INSULIN ORDER, INSORD: 6.9 UNITS/HOUR
INSULIN ORDER, INSORD: 7.1 UNITS/HOUR
INSULIN ORDER, INSORD: 7.2 UNITS/HOUR
INSULIN ORDER, INSORD: 7.3 UNITS/HOUR
INSULIN ORDER, INSORD: 7.4 UNITS/HOUR
INSULIN ORDER, INSORD: 7.6 UNITS/HOUR
INSULIN ORDER, INSORD: 7.8 UNITS/HOUR
INSULIN ORDER, INSORD: 7.9 UNITS/HOUR
INSULIN ORDER, INSORD: 8.2 UNITS/HOUR
INSULIN ORDER, INSORD: 8.2 UNITS/HOUR
INSULIN ORDER, INSORD: 8.3 UNITS/HOUR
INSULIN ORDER, INSORD: 8.5 UNITS/HOUR
INSULIN ORDER, INSORD: 9.2 UNITS/HOUR
INSULIN ORDER, INSORD: 9.2 UNITS/HOUR
INSULIN ORDER, INSORD: 9.5 UNITS/HOUR
INSULIN ORDER, INSORD: 9.8 UNITS/HOUR
KETONES UR QL STRIP.AUTO: NEGATIVE MG/DL
LACTATE BLD-SCNC: 13.8 MMOL/L (ref 0.4–2)
LACTATE SERPL-SCNC: 0.9 MMOL/L (ref 0.4–2)
LACTATE SERPL-SCNC: 0.9 MMOL/L (ref 0.4–2)
LACTATE SERPL-SCNC: 1.6 MMOL/L (ref 0.4–2)
LACTATE SERPL-SCNC: 10.1 MMOL/L (ref 0.4–2)
LACTATE SERPL-SCNC: 8.9 MMOL/L (ref 0.4–2)
LDH SERPL L TO P-CCNC: 288 U/L (ref 85–241)
LDH SERPL L TO P-CCNC: 499 U/L (ref 85–241)
LDH SERPL L TO P-CCNC: 644 U/L (ref 85–241)
LDLC SERPL CALC-MCNC: 55.2 MG/DL (ref 0–100)
LEUKOCYTE ESTERASE UR QL STRIP.AUTO: NEGATIVE
LIPID PROFILE,FLP: ABNORMAL
LOW TARGET, LOT: 95 MG/DL
LYMPHOCYTES # BLD: 1.3 K/UL (ref 0.8–3.5)
LYMPHOCYTES # BLD: 1.6 K/UL (ref 0.8–3.5)
LYMPHOCYTES # BLD: 1.7 K/UL (ref 0.8–3.5)
LYMPHOCYTES # BLD: 2.5 K/UL (ref 0.8–3.5)
LYMPHOCYTES NFR BLD: 13 % (ref 12–49)
LYMPHOCYTES NFR BLD: 6 % (ref 12–49)
LYMPHOCYTES NFR BLD: 6 % (ref 12–49)
LYMPHOCYTES NFR BLD: 8 % (ref 12–49)
MAGNESIUM SERPL-MCNC: 1.7 MG/DL (ref 1.6–2.4)
MAGNESIUM SERPL-MCNC: 1.8 MG/DL (ref 1.6–2.4)
MAGNESIUM SERPL-MCNC: 1.8 MG/DL (ref 1.6–2.4)
MAGNESIUM SERPL-MCNC: 1.9 MG/DL (ref 1.6–2.4)
MAGNESIUM SERPL-MCNC: 2 MG/DL (ref 1.6–2.4)
MAGNESIUM SERPL-MCNC: 2.1 MG/DL (ref 1.6–2.4)
MAGNESIUM SERPL-MCNC: 2.2 MG/DL (ref 1.6–2.4)
MAGNESIUM SERPL-MCNC: 2.3 MG/DL (ref 1.6–2.4)
MAGNESIUM SERPL-MCNC: 2.6 MG/DL (ref 1.6–2.4)
MAGNESIUM SERPL-MCNC: 2.7 MG/DL (ref 1.6–2.4)
MAGNESIUM SERPL-MCNC: 2.7 MG/DL (ref 1.6–2.4)
MAGNESIUM SERPL-MCNC: 2.9 MG/DL (ref 1.6–2.4)
MAGNESIUM SERPL-MCNC: 2.9 MG/DL (ref 1.6–2.4)
MCH RBC QN AUTO: 30 PG (ref 26–34)
MCH RBC QN AUTO: 30.1 PG (ref 26–34)
MCH RBC QN AUTO: 30.2 PG (ref 26–34)
MCH RBC QN AUTO: 30.4 PG (ref 26–34)
MCH RBC QN AUTO: 30.4 PG (ref 26–34)
MCH RBC QN AUTO: 30.6 PG (ref 26–34)
MCH RBC QN AUTO: 30.8 PG (ref 26–34)
MCH RBC QN AUTO: 31 PG (ref 26–34)
MCH RBC QN AUTO: 31.4 PG (ref 26–34)
MCH RBC QN AUTO: 31.7 PG (ref 26–34)
MCH RBC QN AUTO: 32.4 PG (ref 26–34)
MCH RBC QN AUTO: 32.6 PG (ref 26–34)
MCH RBC QN AUTO: 32.7 PG (ref 26–34)
MCH RBC QN AUTO: 33 PG (ref 26–34)
MCH RBC QN AUTO: 33.1 PG (ref 26–34)
MCH RBC QN AUTO: 33.2 PG (ref 26–34)
MCH RBC QN AUTO: 33.2 PG (ref 26–34)
MCH RBC QN AUTO: 33.3 PG (ref 26–34)
MCH RBC QN AUTO: 33.4 PG (ref 26–34)
MCH RBC QN AUTO: 33.7 PG (ref 26–34)
MCH RBC QN AUTO: 33.7 PG (ref 26–34)
MCH RBC QN AUTO: 34 PG (ref 26–34)
MCH RBC QN AUTO: 34 PG (ref 26–34)
MCH RBC QN AUTO: 34.1 PG (ref 26–34)
MCH RBC QN AUTO: 34.2 PG (ref 26–34)
MCH RBC QN AUTO: 34.9 PG (ref 26–34)
MCH RBC QN AUTO: 34.9 PG (ref 26–34)
MCH RBC QN AUTO: 35 PG (ref 26–34)
MCHC RBC AUTO-ENTMCNC: 31.1 G/DL (ref 30–36.5)
MCHC RBC AUTO-ENTMCNC: 31.7 G/DL (ref 30–36.5)
MCHC RBC AUTO-ENTMCNC: 32.1 G/DL (ref 30–36.5)
MCHC RBC AUTO-ENTMCNC: 32.3 G/DL (ref 30–36.5)
MCHC RBC AUTO-ENTMCNC: 32.4 G/DL (ref 30–36.5)
MCHC RBC AUTO-ENTMCNC: 32.5 G/DL (ref 30–36.5)
MCHC RBC AUTO-ENTMCNC: 32.6 G/DL (ref 30–36.5)
MCHC RBC AUTO-ENTMCNC: 32.7 G/DL (ref 30–36.5)
MCHC RBC AUTO-ENTMCNC: 32.7 G/DL (ref 30–36.5)
MCHC RBC AUTO-ENTMCNC: 32.9 G/DL (ref 30–36.5)
MCHC RBC AUTO-ENTMCNC: 33.1 G/DL (ref 30–36.5)
MCHC RBC AUTO-ENTMCNC: 33.3 G/DL (ref 30–36.5)
MCHC RBC AUTO-ENTMCNC: 33.5 G/DL (ref 30–36.5)
MCHC RBC AUTO-ENTMCNC: 33.5 G/DL (ref 30–36.5)
MCHC RBC AUTO-ENTMCNC: 33.6 G/DL (ref 30–36.5)
MCHC RBC AUTO-ENTMCNC: 33.8 G/DL (ref 30–36.5)
MCHC RBC AUTO-ENTMCNC: 33.8 G/DL (ref 30–36.5)
MCHC RBC AUTO-ENTMCNC: 33.9 G/DL (ref 30–36.5)
MCHC RBC AUTO-ENTMCNC: 33.9 G/DL (ref 30–36.5)
MCHC RBC AUTO-ENTMCNC: 34.1 G/DL (ref 30–36.5)
MCHC RBC AUTO-ENTMCNC: 34.2 G/DL (ref 30–36.5)
MCHC RBC AUTO-ENTMCNC: 34.2 G/DL (ref 30–36.5)
MCHC RBC AUTO-ENTMCNC: 34.3 G/DL (ref 30–36.5)
MCHC RBC AUTO-ENTMCNC: 34.5 G/DL (ref 30–36.5)
MCHC RBC AUTO-ENTMCNC: 34.6 G/DL (ref 30–36.5)
MCHC RBC AUTO-ENTMCNC: 34.7 G/DL (ref 30–36.5)
MCHC RBC AUTO-ENTMCNC: 34.8 G/DL (ref 30–36.5)
MCHC RBC AUTO-ENTMCNC: 34.8 G/DL (ref 30–36.5)
MCHC RBC AUTO-ENTMCNC: 35 G/DL (ref 30–36.5)
MCHC RBC AUTO-ENTMCNC: 35.1 G/DL (ref 30–36.5)
MCHC RBC AUTO-ENTMCNC: 35.3 G/DL (ref 30–36.5)
MCV RBC AUTO: 100.4 FL (ref 80–99)
MCV RBC AUTO: 100.7 FL (ref 80–99)
MCV RBC AUTO: 101.2 FL (ref 80–99)
MCV RBC AUTO: 102 FL (ref 80–99)
MCV RBC AUTO: 102.3 FL (ref 80–99)
MCV RBC AUTO: 92.1 FL (ref 80–99)
MCV RBC AUTO: 92.5 FL (ref 80–99)
MCV RBC AUTO: 92.9 FL (ref 80–99)
MCV RBC AUTO: 93.5 FL (ref 80–99)
MCV RBC AUTO: 93.6 FL (ref 80–99)
MCV RBC AUTO: 94 FL (ref 80–99)
MCV RBC AUTO: 94.2 FL (ref 80–99)
MCV RBC AUTO: 94.3 FL (ref 80–99)
MCV RBC AUTO: 94.3 FL (ref 80–99)
MCV RBC AUTO: 94.6 FL (ref 80–99)
MCV RBC AUTO: 94.8 FL (ref 80–99)
MCV RBC AUTO: 94.9 FL (ref 80–99)
MCV RBC AUTO: 94.9 FL (ref 80–99)
MCV RBC AUTO: 95 FL (ref 80–99)
MCV RBC AUTO: 95.1 FL (ref 80–99)
MCV RBC AUTO: 95.2 FL (ref 80–99)
MCV RBC AUTO: 95.5 FL (ref 80–99)
MCV RBC AUTO: 95.7 FL (ref 80–99)
MCV RBC AUTO: 95.8 FL (ref 80–99)
MCV RBC AUTO: 96.7 FL (ref 80–99)
MCV RBC AUTO: 96.9 FL (ref 80–99)
MCV RBC AUTO: 97 FL (ref 80–99)
MCV RBC AUTO: 97.2 FL (ref 80–99)
MCV RBC AUTO: 97.7 FL (ref 80–99)
MCV RBC AUTO: 97.7 FL (ref 80–99)
MCV RBC AUTO: 98.1 FL (ref 80–99)
MCV RBC AUTO: 98.3 FL (ref 80–99)
MCV RBC AUTO: 99.3 FL (ref 80–99)
METAMYELOCYTES NFR BLD MANUAL: 2 %
METHGB MFR BLD: 0.9 % (ref 0–1.4)
MINUTES UNTIL NEXT BG, NBG: 120 MIN
MINUTES UNTIL NEXT BG, NBG: 15 MIN
MINUTES UNTIL NEXT BG, NBG: 15 MIN
MINUTES UNTIL NEXT BG, NBG: 60 MIN
MONOCYTES # BLD: 0.2 K/UL (ref 0–1)
MONOCYTES # BLD: 0.4 K/UL (ref 0–1)
MONOCYTES # BLD: 0.5 K/UL (ref 0–1)
MONOCYTES # BLD: 1.4 K/UL (ref 0–1)
MONOCYTES NFR BLD: 1 % (ref 5–13)
MONOCYTES NFR BLD: 2 % (ref 5–13)
MONOCYTES NFR BLD: 2 % (ref 5–13)
MONOCYTES NFR BLD: 7 % (ref 5–13)
MULTIPLIER, MUL: 0.03
MULTIPLIER, MUL: 0.04
MULTIPLIER, MUL: 0.04
MULTIPLIER, MUL: 0.05
MULTIPLIER, MUL: 0.06
MULTIPLIER, MUL: 0.07
MULTIPLIER, MUL: 0.08
MULTIPLIER, MUL: 0.09
MULTIPLIER, MUL: 0.1
MULTIPLIER, MUL: 0.11
MULTIPLIER, MUL: 0.12
MULTIPLIER, MUL: 0.13
MULTIPLIER, MUL: 0.14
MULTIPLIER, MUL: 0.15
MULTIPLIER, MUL: 0.15
MULTIPLIER, MUL: 0.16
MULTIPLIER, MUL: 0.17
MULTIPLIER, MUL: 0.18
MULTIPLIER, MUL: 0.18
MYELOCYTES NFR BLD MANUAL: 1 %
NEUTS BAND NFR BLD MANUAL: 10 %
NEUTS BAND NFR BLD MANUAL: 8 %
NEUTS BAND NFR BLD MANUAL: 9 %
NEUTS SEG # BLD: 15.1 K/UL (ref 1.8–8)
NEUTS SEG # BLD: 19.2 K/UL (ref 1.8–8)
NEUTS SEG # BLD: 19.2 K/UL (ref 1.8–8)
NEUTS SEG # BLD: 22.7 K/UL (ref 1.8–8)
NEUTS SEG NFR BLD: 77 % (ref 32–75)
NEUTS SEG NFR BLD: 79 % (ref 32–75)
NEUTS SEG NFR BLD: 83 % (ref 32–75)
NEUTS SEG NFR BLD: 83 % (ref 32–75)
NITRITE UR QL STRIP.AUTO: NEGATIVE
NRBC # BLD: 0 K/UL (ref 0–0.01)
NRBC # BLD: 0.02 K/UL (ref 0–0.01)
NRBC # BLD: 0.04 K/UL (ref 0–0.01)
NRBC # BLD: 0.05 K/UL (ref 0–0.01)
NRBC # BLD: 0.06 K/UL (ref 0–0.01)
NRBC BLD-RTO: 0 PER 100 WBC
NRBC BLD-RTO: 0.1 PER 100 WBC
NRBC BLD-RTO: 0.2 PER 100 WBC
O2/TOTAL GAS SETTING VFR VENT: 100 %
O2/TOTAL GAS SETTING VFR VENT: 28 %
O2/TOTAL GAS SETTING VFR VENT: 40 %
O2/TOTAL GAS SETTING VFR VENT: 50 %
O2/TOTAL GAS SETTING VFR VENT: 60 %
O2/TOTAL GAS SETTING VFR VENT: 70 %
O2/TOTAL GAS SETTING VFR VENT: 700 %
O2/TOTAL GAS SETTING VFR VENT: 80 %
O2/TOTAL GAS SETTING VFR VENT: 90 %
O2/TOTAL GAS SETTING VFR VENT: 90 %
ORDER INITIALS, ORDINIT: NORMAL
OXYHGB MFR BLD: 65.8 % (ref 94–97)
PCO2 BLD: 27.8 MMHG (ref 35–45)
PCO2 BLD: 30.4 MMHG (ref 35–45)
PCO2 BLD: 30.5 MMHG (ref 35–45)
PCO2 BLD: 32.4 MMHG (ref 35–45)
PCO2 BLD: 33 MMHG (ref 35–45)
PCO2 BLD: 33.6 MMHG (ref 35–45)
PCO2 BLD: 34 MMHG (ref 35–45)
PCO2 BLD: 35.2 MMHG (ref 35–45)
PCO2 BLD: 35.4 MMHG (ref 35–45)
PCO2 BLD: 35.5 MMHG (ref 35–45)
PCO2 BLD: 35.6 MMHG (ref 35–45)
PCO2 BLD: 35.6 MMHG (ref 35–45)
PCO2 BLD: 35.8 MMHG (ref 35–45)
PCO2 BLD: 35.9 MMHG (ref 35–45)
PCO2 BLD: 36 MMHG (ref 35–45)
PCO2 BLD: 36.3 MMHG (ref 35–45)
PCO2 BLD: 36.3 MMHG (ref 35–45)
PCO2 BLD: 36.4 MMHG (ref 35–45)
PCO2 BLD: 36.6 MMHG (ref 35–45)
PCO2 BLD: 36.7 MMHG (ref 35–45)
PCO2 BLD: 36.8 MMHG (ref 35–45)
PCO2 BLD: 37.4 MMHG (ref 35–45)
PCO2 BLD: 37.5 MMHG (ref 35–45)
PCO2 BLD: 37.6 MMHG (ref 35–45)
PCO2 BLD: 37.7 MMHG (ref 35–45)
PCO2 BLD: 37.8 MMHG (ref 35–45)
PCO2 BLD: 37.9 MMHG (ref 35–45)
PCO2 BLD: 38 MMHG (ref 35–45)
PCO2 BLD: 38.1 MMHG (ref 35–45)
PCO2 BLD: 38.2 MMHG (ref 35–45)
PCO2 BLD: 38.3 MMHG (ref 35–45)
PCO2 BLD: 38.4 MMHG (ref 35–45)
PCO2 BLD: 38.4 MMHG (ref 35–45)
PCO2 BLD: 38.6 MMHG (ref 35–45)
PCO2 BLD: 39 MMHG (ref 35–45)
PCO2 BLD: 39 MMHG (ref 35–45)
PCO2 BLD: 39.1 MMHG (ref 35–45)
PCO2 BLD: 39.1 MMHG (ref 35–45)
PCO2 BLD: 39.5 MMHG (ref 35–45)
PCO2 BLD: 39.6 MMHG (ref 35–45)
PCO2 BLD: 39.7 MMHG (ref 35–45)
PCO2 BLD: 39.8 MMHG (ref 35–45)
PCO2 BLD: 40.1 MMHG (ref 35–45)
PCO2 BLD: 40.2 MMHG (ref 35–45)
PCO2 BLD: 40.2 MMHG (ref 35–45)
PCO2 BLD: 40.3 MMHG (ref 35–45)
PCO2 BLD: 40.4 MMHG (ref 35–45)
PCO2 BLD: 40.5 MMHG (ref 35–45)
PCO2 BLD: 40.5 MMHG (ref 35–45)
PCO2 BLD: 40.7 MMHG (ref 35–45)
PCO2 BLD: 40.8 MMHG (ref 35–45)
PCO2 BLD: 40.9 MMHG (ref 35–45)
PCO2 BLD: 41.1 MMHG (ref 35–45)
PCO2 BLD: 41.3 MMHG (ref 35–45)
PCO2 BLD: 41.3 MMHG (ref 35–45)
PCO2 BLD: 41.6 MMHG (ref 35–45)
PCO2 BLD: 41.6 MMHG (ref 35–45)
PCO2 BLD: 41.8 MMHG (ref 35–45)
PCO2 BLD: 42.3 MMHG (ref 35–45)
PCO2 BLD: 42.7 MMHG (ref 35–45)
PCO2 BLD: 42.9 MMHG (ref 35–45)
PCO2 BLD: 43.4 MMHG (ref 35–45)
PCO2 BLD: 43.5 MMHG (ref 35–45)
PCO2 BLD: 43.8 MMHG (ref 35–45)
PCO2 BLD: 43.9 MMHG (ref 35–45)
PCO2 BLD: 44.4 MMHG (ref 35–45)
PCO2 BLD: 45.6 MMHG (ref 35–45)
PCO2 BLD: 45.6 MMHG (ref 35–45)
PCO2 BLD: 47.5 MMHG (ref 35–45)
PCO2 BLD: 48.1 MMHG (ref 35–45)
PCO2 BLD: 50 MMHG (ref 35–45)
PCO2 BLD: 51.4 MMHG (ref 35–45)
PCO2 BLD: 52.2 MMHG (ref 35–45)
PCO2 BLD: 52.9 MMHG (ref 35–45)
PCO2 BLD: 54.6 MMHG (ref 35–45)
PCO2 BLD: 61 MMHG (ref 35–45)
PEEP RESPIRATORY: 10 CMH2O
PEEP RESPIRATORY: 12 CMH2O
PEEP RESPIRATORY: 6 CMH2O
PEEP RESPIRATORY: 8 CMH2O
PH BLD: 7.21 [PH] (ref 7.35–7.45)
PH BLD: 7.21 [PH] (ref 7.35–7.45)
PH BLD: 7.23 [PH] (ref 7.35–7.45)
PH BLD: 7.24 [PH] (ref 7.35–7.45)
PH BLD: 7.24 [PH] (ref 7.35–7.45)
PH BLD: 7.25 [PH] (ref 7.35–7.45)
PH BLD: 7.26 [PH] (ref 7.35–7.45)
PH BLD: 7.3 [PH] (ref 7.35–7.45)
PH BLD: 7.31 [PH] (ref 7.35–7.45)
PH BLD: 7.31 [PH] (ref 7.35–7.45)
PH BLD: 7.32 [PH] (ref 7.35–7.45)
PH BLD: 7.33 [PH] (ref 7.35–7.45)
PH BLD: 7.34 [PH] (ref 7.35–7.45)
PH BLD: 7.34 [PH] (ref 7.35–7.45)
PH BLD: 7.35 [PH] (ref 7.35–7.45)
PH BLD: 7.35 [PH] (ref 7.35–7.45)
PH BLD: 7.36 [PH] (ref 7.35–7.45)
PH BLD: 7.37 [PH] (ref 7.35–7.45)
PH BLD: 7.38 [PH] (ref 7.35–7.45)
PH BLD: 7.39 [PH] (ref 7.35–7.45)
PH BLD: 7.39 [PH] (ref 7.35–7.45)
PH BLD: 7.4 [PH] (ref 7.35–7.45)
PH BLD: 7.41 [PH] (ref 7.35–7.45)
PH BLD: 7.42 [PH] (ref 7.35–7.45)
PH BLD: 7.43 [PH] (ref 7.35–7.45)
PH BLD: 7.44 [PH] (ref 7.35–7.45)
PH BLD: 7.45 [PH] (ref 7.35–7.45)
PH BLD: 7.47 [PH] (ref 7.35–7.45)
PH BLD: 7.48 [PH] (ref 7.35–7.45)
PH BLD: 7.48 [PH] (ref 7.35–7.45)
PH BLD: 7.5 [PH] (ref 7.35–7.45)
PH BLD: 7.52 [PH] (ref 7.35–7.45)
PH BLD: 7.53 [PH] (ref 7.35–7.45)
PH BLD: 7.55 [PH] (ref 7.35–7.45)
PH UR STRIP: 7.5 [PH] (ref 5–8)
PHOSPHATE SERPL-MCNC: 2.3 MG/DL (ref 2.6–4.7)
PHOSPHATE SERPL-MCNC: 2.5 MG/DL (ref 2.6–4.7)
PHOSPHATE SERPL-MCNC: 2.6 MG/DL (ref 2.6–4.7)
PHOSPHATE SERPL-MCNC: 3.8 MG/DL (ref 2.6–4.7)
PHOSPHATE SERPL-MCNC: 3.9 MG/DL (ref 2.6–4.7)
PHOSPHATE SERPL-MCNC: 4 MG/DL (ref 2.6–4.7)
PHOSPHATE SERPL-MCNC: 4.1 MG/DL (ref 2.6–4.7)
PHOSPHATE SERPL-MCNC: 4.3 MG/DL (ref 2.6–4.7)
PHOSPHATE SERPL-MCNC: 8.6 MG/DL (ref 2.6–4.7)
PIP ISTAT,IPIP: 10
PIP ISTAT,IPIP: 15
PIP ISTAT,IPIP: 18
PIP ISTAT,IPIP: 20
PIP ISTAT,IPIP: 26
PIP ISTAT,IPIP: 27
PIP ISTAT,IPIP: 29
PLATELET # BLD AUTO: 107 K/UL (ref 150–400)
PLATELET # BLD AUTO: 108 K/UL (ref 150–400)
PLATELET # BLD AUTO: 109 K/UL (ref 150–400)
PLATELET # BLD AUTO: 109 K/UL (ref 150–400)
PLATELET # BLD AUTO: 122 K/UL (ref 150–400)
PLATELET # BLD AUTO: 134 K/UL (ref 150–400)
PLATELET # BLD AUTO: 156 K/UL (ref 150–400)
PLATELET # BLD AUTO: 198 K/UL (ref 150–400)
PLATELET # BLD AUTO: 201 K/UL (ref 150–400)
PLATELET # BLD AUTO: 239 K/UL (ref 150–400)
PLATELET # BLD AUTO: 330 K/UL (ref 150–400)
PLATELET # BLD AUTO: 49 K/UL (ref 150–400)
PLATELET # BLD AUTO: 493 K/UL (ref 150–400)
PLATELET # BLD AUTO: 50 K/UL (ref 150–400)
PLATELET # BLD AUTO: 51 K/UL (ref 150–400)
PLATELET # BLD AUTO: 51 K/UL (ref 150–400)
PLATELET # BLD AUTO: 52 K/UL (ref 150–400)
PLATELET # BLD AUTO: 53 K/UL (ref 150–400)
PLATELET # BLD AUTO: 57 K/UL (ref 150–400)
PLATELET # BLD AUTO: 58 K/UL (ref 150–400)
PLATELET # BLD AUTO: 61 K/UL (ref 150–400)
PLATELET # BLD AUTO: 62 K/UL (ref 150–400)
PLATELET # BLD AUTO: 62 K/UL (ref 150–400)
PLATELET # BLD AUTO: 64 K/UL (ref 150–400)
PLATELET # BLD AUTO: 65 K/UL (ref 150–400)
PLATELET # BLD AUTO: 66 K/UL (ref 150–400)
PLATELET # BLD AUTO: 68 K/UL (ref 150–400)
PLATELET # BLD AUTO: 68 K/UL (ref 150–400)
PLATELET # BLD AUTO: 71 K/UL (ref 150–400)
PLATELET # BLD AUTO: 81 K/UL (ref 150–400)
PLATELET # BLD AUTO: 86 K/UL (ref 150–400)
PLATELET # BLD AUTO: 93 K/UL (ref 150–400)
PLATELET # BLD AUTO: 96 K/UL (ref 150–400)
PMV BLD AUTO: 10.7 FL (ref 8.9–12.9)
PMV BLD AUTO: 10.9 FL (ref 8.9–12.9)
PMV BLD AUTO: 11 FL (ref 8.9–12.9)
PMV BLD AUTO: 11.2 FL (ref 8.9–12.9)
PMV BLD AUTO: 11.2 FL (ref 8.9–12.9)
PMV BLD AUTO: 11.3 FL (ref 8.9–12.9)
PMV BLD AUTO: 11.4 FL (ref 8.9–12.9)
PMV BLD AUTO: 11.5 FL (ref 8.9–12.9)
PMV BLD AUTO: 11.6 FL (ref 8.9–12.9)
PMV BLD AUTO: 11.6 FL (ref 8.9–12.9)
PMV BLD AUTO: 11.7 FL (ref 8.9–12.9)
PMV BLD AUTO: 11.8 FL (ref 8.9–12.9)
PMV BLD AUTO: 11.9 FL (ref 8.9–12.9)
PMV BLD AUTO: 12 FL (ref 8.9–12.9)
PMV BLD AUTO: 12.1 FL (ref 8.9–12.9)
PMV BLD AUTO: 12.2 FL (ref 8.9–12.9)
PO2 BLD: 100 MMHG (ref 80–100)
PO2 BLD: 103 MMHG (ref 80–100)
PO2 BLD: 106 MMHG (ref 80–100)
PO2 BLD: 109 MMHG (ref 80–100)
PO2 BLD: 116 MMHG (ref 80–100)
PO2 BLD: 118 MMHG (ref 80–100)
PO2 BLD: 120 MMHG (ref 80–100)
PO2 BLD: 120 MMHG (ref 80–100)
PO2 BLD: 126 MMHG (ref 80–100)
PO2 BLD: 130 MMHG (ref 80–100)
PO2 BLD: 136 MMHG (ref 80–100)
PO2 BLD: 150 MMHG (ref 80–100)
PO2 BLD: 161 MMHG (ref 80–100)
PO2 BLD: 188 MMHG (ref 80–100)
PO2 BLD: 189 MMHG (ref 80–100)
PO2 BLD: 189 MMHG (ref 80–100)
PO2 BLD: 190 MMHG (ref 80–100)
PO2 BLD: 193 MMHG (ref 80–100)
PO2 BLD: 207 MMHG (ref 80–100)
PO2 BLD: 248 MMHG (ref 80–100)
PO2 BLD: 275 MMHG (ref 80–100)
PO2 BLD: 337 MMHG (ref 80–100)
PO2 BLD: 407 MMHG (ref 80–100)
PO2 BLD: 416 MMHG (ref 80–100)
PO2 BLD: 418 MMHG (ref 80–100)
PO2 BLD: 419 MMHG (ref 80–100)
PO2 BLD: 42 MMHG (ref 80–100)
PO2 BLD: 458 MMHG (ref 80–100)
PO2 BLD: 48 MMHG (ref 80–100)
PO2 BLD: 499 MMHG (ref 80–100)
PO2 BLD: 504 MMHG (ref 80–100)
PO2 BLD: 51 MMHG (ref 80–100)
PO2 BLD: 51 MMHG (ref 80–100)
PO2 BLD: 56 MMHG (ref 80–100)
PO2 BLD: 567 MMHG (ref 80–100)
PO2 BLD: 57 MMHG (ref 80–100)
PO2 BLD: 57 MMHG (ref 80–100)
PO2 BLD: 585 MMHG (ref 80–100)
PO2 BLD: 588 MMHG (ref 80–100)
PO2 BLD: 59 MMHG (ref 80–100)
PO2 BLD: 62 MMHG (ref 80–100)
PO2 BLD: 64 MMHG (ref 80–100)
PO2 BLD: 65 MMHG (ref 80–100)
PO2 BLD: 66 MMHG (ref 80–100)
PO2 BLD: 67 MMHG (ref 80–100)
PO2 BLD: 69 MMHG (ref 80–100)
PO2 BLD: 70 MMHG (ref 80–100)
PO2 BLD: 71 MMHG (ref 80–100)
PO2 BLD: 71 MMHG (ref 80–100)
PO2 BLD: 72 MMHG (ref 80–100)
PO2 BLD: 72 MMHG (ref 80–100)
PO2 BLD: 73 MMHG (ref 80–100)
PO2 BLD: 74 MMHG (ref 80–100)
PO2 BLD: 76 MMHG (ref 80–100)
PO2 BLD: 76 MMHG (ref 80–100)
PO2 BLD: 77 MMHG (ref 80–100)
PO2 BLD: 77 MMHG (ref 80–100)
PO2 BLD: 79 MMHG (ref 80–100)
PO2 BLD: 80 MMHG (ref 80–100)
PO2 BLD: 82 MMHG (ref 80–100)
PO2 BLD: 82 MMHG (ref 80–100)
PO2 BLD: 85 MMHG (ref 80–100)
PO2 BLD: 86 MMHG (ref 80–100)
PO2 BLD: 87 MMHG (ref 80–100)
PO2 BLD: 87 MMHG (ref 80–100)
PO2 BLD: 90 MMHG (ref 80–100)
PO2 BLD: 92 MMHG (ref 80–100)
PO2 BLD: 92 MMHG (ref 80–100)
PO2 BLD: 93 MMHG (ref 80–100)
PO2 BLD: 97 MMHG (ref 80–100)
PO2 BLD: 98 MMHG (ref 80–100)
PO2 BLD: 98 MMHG (ref 80–100)
POTASSIUM BLD-SCNC: 3.5 MMOL/L (ref 3.5–5.1)
POTASSIUM SERPL-SCNC: 2.9 MMOL/L (ref 3.5–5.1)
POTASSIUM SERPL-SCNC: 3 MMOL/L (ref 3.5–5.1)
POTASSIUM SERPL-SCNC: 3.1 MMOL/L (ref 3.5–5.1)
POTASSIUM SERPL-SCNC: 3.5 MMOL/L (ref 3.5–5.1)
POTASSIUM SERPL-SCNC: 3.5 MMOL/L (ref 3.5–5.1)
POTASSIUM SERPL-SCNC: 3.6 MMOL/L (ref 3.5–5.1)
POTASSIUM SERPL-SCNC: 3.7 MMOL/L (ref 3.5–5.1)
POTASSIUM SERPL-SCNC: 3.7 MMOL/L (ref 3.5–5.1)
POTASSIUM SERPL-SCNC: 3.8 MMOL/L (ref 3.5–5.1)
POTASSIUM SERPL-SCNC: 3.9 MMOL/L (ref 3.5–5.1)
POTASSIUM SERPL-SCNC: 4 MMOL/L (ref 3.5–5.1)
POTASSIUM SERPL-SCNC: 4.1 MMOL/L (ref 3.5–5.1)
POTASSIUM SERPL-SCNC: 4.2 MMOL/L (ref 3.5–5.1)
POTASSIUM SERPL-SCNC: 4.2 MMOL/L (ref 3.5–5.1)
POTASSIUM SERPL-SCNC: 4.3 MMOL/L (ref 3.5–5.1)
POTASSIUM SERPL-SCNC: 4.4 MMOL/L (ref 3.5–5.1)
POTASSIUM SERPL-SCNC: 4.4 MMOL/L (ref 3.5–5.1)
POTASSIUM SERPL-SCNC: 4.5 MMOL/L (ref 3.5–5.1)
POTASSIUM SERPL-SCNC: 4.5 MMOL/L (ref 3.5–5.1)
POTASSIUM SERPL-SCNC: 4.8 MMOL/L (ref 3.5–5.1)
POTASSIUM SERPL-SCNC: 4.8 MMOL/L (ref 3.5–5.1)
POTASSIUM SERPL-SCNC: 5 MMOL/L (ref 3.5–5.1)
POTASSIUM SERPL-SCNC: 5.2 MMOL/L (ref 3.5–5.1)
POTASSIUM SERPL-SCNC: 5.2 MMOL/L (ref 3.5–5.1)
PRESSURE CONTROL, IPC: YES
PRESSURE SUPPORT SETTING VENT: 6 CMH2O
PRESSURE SUPPORT SETTING VENT: 8 CMH2O
PROT SERPL-MCNC: 5 G/DL (ref 6.4–8.2)
PROT SERPL-MCNC: 5.1 G/DL (ref 6.4–8.2)
PROT SERPL-MCNC: 5.4 G/DL (ref 6.4–8.2)
PROT SERPL-MCNC: 5.6 G/DL (ref 6.4–8.2)
PROT SERPL-MCNC: 5.6 G/DL (ref 6.4–8.2)
PROT SERPL-MCNC: 5.7 G/DL (ref 6.4–8.2)
PROT SERPL-MCNC: 5.8 G/DL (ref 6.4–8.2)
PROT SERPL-MCNC: 5.8 G/DL (ref 6.4–8.2)
PROT SERPL-MCNC: 5.9 G/DL (ref 6.4–8.2)
PROT SERPL-MCNC: 5.9 G/DL (ref 6.4–8.2)
PROT SERPL-MCNC: 6.1 G/DL (ref 6.4–8.2)
PROT SERPL-MCNC: 6.1 G/DL (ref 6.4–8.2)
PROT SERPL-MCNC: 6.6 G/DL (ref 6.4–8.2)
PROT UR STRIP-MCNC: NEGATIVE MG/DL
RBC # BLD AUTO: 1.75 M/UL (ref 4.1–5.7)
RBC # BLD AUTO: 2 M/UL (ref 4.1–5.7)
RBC # BLD AUTO: 2.27 M/UL (ref 4.1–5.7)
RBC # BLD AUTO: 2.28 M/UL (ref 4.1–5.7)
RBC # BLD AUTO: 2.29 M/UL (ref 4.1–5.7)
RBC # BLD AUTO: 2.32 M/UL (ref 4.1–5.7)
RBC # BLD AUTO: 2.37 M/UL (ref 4.1–5.7)
RBC # BLD AUTO: 2.38 M/UL (ref 4.1–5.7)
RBC # BLD AUTO: 2.4 M/UL (ref 4.1–5.7)
RBC # BLD AUTO: 2.42 M/UL (ref 4.1–5.7)
RBC # BLD AUTO: 2.42 M/UL (ref 4.1–5.7)
RBC # BLD AUTO: 2.43 M/UL (ref 4.1–5.7)
RBC # BLD AUTO: 2.47 M/UL (ref 4.1–5.7)
RBC # BLD AUTO: 2.53 M/UL (ref 4.1–5.7)
RBC # BLD AUTO: 2.53 M/UL (ref 4.1–5.7)
RBC # BLD AUTO: 2.57 M/UL (ref 4.1–5.7)
RBC # BLD AUTO: 2.6 M/UL (ref 4.1–5.7)
RBC # BLD AUTO: 2.61 M/UL (ref 4.1–5.7)
RBC # BLD AUTO: 2.63 M/UL (ref 4.1–5.7)
RBC # BLD AUTO: 2.65 M/UL (ref 4.1–5.7)
RBC # BLD AUTO: 2.67 M/UL (ref 4.1–5.7)
RBC # BLD AUTO: 2.68 M/UL (ref 4.1–5.7)
RBC # BLD AUTO: 2.74 M/UL (ref 4.1–5.7)
RBC # BLD AUTO: 2.97 M/UL (ref 4.1–5.7)
RBC # BLD AUTO: 3.04 M/UL (ref 4.1–5.7)
RBC # BLD AUTO: 3.11 M/UL (ref 4.1–5.7)
RBC # BLD AUTO: 3.22 M/UL (ref 4.1–5.7)
RBC # BLD AUTO: 3.29 M/UL (ref 4.1–5.7)
RBC # BLD AUTO: 3.5 M/UL (ref 4.1–5.7)
RBC # BLD AUTO: 4.16 M/UL (ref 4.1–5.7)
RBC # BLD AUTO: 4.59 M/UL (ref 4.1–5.7)
RBC #/AREA URNS HPF: ABNORMAL /HPF (ref 0–5)
RBC MORPH BLD: ABNORMAL
SAMPLES BEING HELD,HOLD: NORMAL
SAO2 % BLD: 100 % (ref 92–97)
SAO2 % BLD: 67 % (ref 95–99)
SAO2 % BLD: 79 % (ref 92–97)
SAO2 % BLD: 84 % (ref 92–97)
SAO2 % BLD: 85 % (ref 92–97)
SAO2 % BLD: 87 % (ref 92–97)
SAO2 % BLD: 88 % (ref 92–97)
SAO2 % BLD: 89 % (ref 92–97)
SAO2 % BLD: 90 % (ref 92–97)
SAO2 % BLD: 91 % (ref 92–97)
SAO2 % BLD: 92 % (ref 92–97)
SAO2 % BLD: 92 % (ref 92–97)
SAO2 % BLD: 93 % (ref 92–97)
SAO2 % BLD: 94 % (ref 92–97)
SAO2 % BLD: 95 % (ref 92–97)
SAO2 % BLD: 96 % (ref 92–97)
SAO2 % BLD: 97 % (ref 92–97)
SAO2 % BLD: 98 % (ref 92–97)
SAO2 % BLD: 99 % (ref 92–97)
SERVICE CMNT-IMP: ABNORMAL
SERVICE CMNT-IMP: NORMAL
SODIUM BLD-SCNC: 139 MMOL/L (ref 136–145)
SODIUM SERPL-SCNC: 135 MMOL/L (ref 136–145)
SODIUM SERPL-SCNC: 135 MMOL/L (ref 136–145)
SODIUM SERPL-SCNC: 137 MMOL/L (ref 136–145)
SODIUM SERPL-SCNC: 139 MMOL/L (ref 136–145)
SODIUM SERPL-SCNC: 143 MMOL/L (ref 136–145)
SODIUM SERPL-SCNC: 144 MMOL/L (ref 136–145)
SODIUM SERPL-SCNC: 145 MMOL/L (ref 136–145)
SODIUM SERPL-SCNC: 146 MMOL/L (ref 136–145)
SODIUM SERPL-SCNC: 146 MMOL/L (ref 136–145)
SODIUM SERPL-SCNC: 147 MMOL/L (ref 136–145)
SODIUM SERPL-SCNC: 147 MMOL/L (ref 136–145)
SODIUM SERPL-SCNC: 148 MMOL/L (ref 136–145)
SODIUM SERPL-SCNC: 149 MMOL/L (ref 136–145)
SODIUM SERPL-SCNC: 150 MMOL/L (ref 136–145)
SODIUM SERPL-SCNC: 152 MMOL/L (ref 136–145)
SOURCE, COVRS: NORMAL
SP GR UR REFRACTOMETRY: 1.02 (ref 1–1.03)
SPECIMEN EXP DATE BLD: NORMAL
SPECIMEN EXP DATE BLD: NORMAL
SPECIMEN SOURCE, FCOV2M: NORMAL
SPECIMEN TYPE, XMCV1T: NORMAL
SPECIMEN TYPE: ABNORMAL
SPECIMEN TYPE: NORMAL
SPECIMEN TYPE: NORMAL
STATUS OF UNIT,%ST: NORMAL
THERAPEUTIC RANGE,PTTT: ABNORMAL SECS (ref 58–77)
THERAPEUTIC RANGE,PTTT: NORMAL SECS (ref 58–77)
THERAPEUTIC RANGE,PTTT: NORMAL SECS (ref 58–77)
TOTAL RESP. RATE, ITRR: 10
TOTAL RESP. RATE, ITRR: 11
TOTAL RESP. RATE, ITRR: 12
TOTAL RESP. RATE, ITRR: 13
TOTAL RESP. RATE, ITRR: 19
TOTAL RESP. RATE, ITRR: 20
TOTAL RESP. RATE, ITRR: 21
TOTAL RESP. RATE, ITRR: 22
TOTAL RESP. RATE, ITRR: 26
TOTAL RESP. RATE, ITRR: 27
TOTAL RESP. RATE, ITRR: 28
TOTAL RESP. RATE, ITRR: 28
TOTAL RESP. RATE, ITRR: 30
TOTAL RESP. RATE, ITRR: 30
TOTAL RESP. RATE, ITRR: 32
TOTAL RESP. RATE, ITRR: 34
TRIGL SERPL-MCNC: 354 MG/DL (ref ?–150)
TRIGL SERPL-MCNC: 420 MG/DL (ref ?–150)
TSH SERPL DL<=0.05 MIU/L-ACNC: 1.86 UIU/ML (ref 0.36–3.74)
UNIT DIVISION, %UDIV: 0
UROBILINOGEN UR QL STRIP.AUTO: 0.2 EU/DL (ref 0.2–1)
VENTILATION MODE VENT: ABNORMAL
VENTILATION MODE VENT: NORMAL
VENTILATION MODE VENT: NORMAL
VIT B12 SERPL-MCNC: >2000 PG/ML (ref 193–986)
VLDLC SERPL CALC-MCNC: 70.8 MG/DL
VOLUME CONTROL IVLC: YES
VT SETTING VENT: 440 ML
VT SETTING VENT: 450 ML
WBC # BLD AUTO: 10 K/UL (ref 4.1–11.1)
WBC # BLD AUTO: 10.3 K/UL (ref 4.1–11.1)
WBC # BLD AUTO: 10.6 K/UL (ref 4.1–11.1)
WBC # BLD AUTO: 10.7 K/UL (ref 4.1–11.1)
WBC # BLD AUTO: 10.7 K/UL (ref 4.1–11.1)
WBC # BLD AUTO: 10.9 K/UL (ref 4.1–11.1)
WBC # BLD AUTO: 11.5 K/UL (ref 4.1–11.1)
WBC # BLD AUTO: 11.7 K/UL (ref 4.1–11.1)
WBC # BLD AUTO: 11.8 K/UL (ref 4.1–11.1)
WBC # BLD AUTO: 12.3 K/UL (ref 4.1–11.1)
WBC # BLD AUTO: 12.3 K/UL (ref 4.1–11.1)
WBC # BLD AUTO: 12.5 K/UL (ref 4.1–11.1)
WBC # BLD AUTO: 12.8 K/UL (ref 4.1–11.1)
WBC # BLD AUTO: 12.9 K/UL (ref 4.1–11.1)
WBC # BLD AUTO: 13.9 K/UL (ref 4.1–11.1)
WBC # BLD AUTO: 14.5 K/UL (ref 4.1–11.1)
WBC # BLD AUTO: 18.3 K/UL (ref 4.1–11.1)
WBC # BLD AUTO: 19 K/UL (ref 4.1–11.1)
WBC # BLD AUTO: 19 K/UL (ref 4.1–11.1)
WBC # BLD AUTO: 19.1 K/UL (ref 4.1–11.1)
WBC # BLD AUTO: 19.2 K/UL (ref 4.1–11.1)
WBC # BLD AUTO: 20.9 K/UL (ref 4.1–11.1)
WBC # BLD AUTO: 21.1 K/UL (ref 4.1–11.1)
WBC # BLD AUTO: 21.1 K/UL (ref 4.1–11.1)
WBC # BLD AUTO: 22.5 K/UL (ref 4.1–11.1)
WBC # BLD AUTO: 23.6 K/UL (ref 4.1–11.1)
WBC # BLD AUTO: 26.1 K/UL (ref 4.1–11.1)
WBC # BLD AUTO: 27.3 K/UL (ref 4.1–11.1)
WBC # BLD AUTO: 27.5 K/UL (ref 4.1–11.1)
WBC # BLD AUTO: 31 K/UL (ref 4.1–11.1)
WBC # BLD AUTO: 9.3 K/UL (ref 4.1–11.1)
WBC # BLD AUTO: 9.6 K/UL (ref 4.1–11.1)
WBC # BLD AUTO: 9.6 K/UL (ref 4.1–11.1)
WBC # BLD AUTO: 9.7 K/UL (ref 4.1–11.1)
WBC # BLD AUTO: 9.8 K/UL (ref 4.1–11.1)
WBC MORPH BLD: ABNORMAL
WBC URNS QL MICRO: ABNORMAL /HPF (ref 0–4)

## 2020-01-01 PROCEDURE — 82803 BLOOD GASES ANY COMBINATION: CPT

## 2020-01-01 PROCEDURE — 99292 CRITICAL CARE ADDL 30 MIN: CPT | Performed by: THORACIC SURGERY (CARDIOTHORACIC VASCULAR SURGERY)

## 2020-01-01 PROCEDURE — 80053 COMPREHEN METABOLIC PANEL: CPT

## 2020-01-01 PROCEDURE — 85027 COMPLETE CBC AUTOMATED: CPT

## 2020-01-01 PROCEDURE — 36415 COLL VENOUS BLD VENIPUNCTURE: CPT

## 2020-01-01 PROCEDURE — 74011000258 HC RX REV CODE- 258: Performed by: THORACIC SURGERY (CARDIOTHORACIC VASCULAR SURGERY)

## 2020-01-01 PROCEDURE — C9113 INJ PANTOPRAZOLE SODIUM, VIA: HCPCS | Performed by: NURSE PRACTITIONER

## 2020-01-01 PROCEDURE — 74011250636 HC RX REV CODE- 250/636: Performed by: INTERNAL MEDICINE

## 2020-01-01 PROCEDURE — 74011000250 HC RX REV CODE- 250: Performed by: NURSE PRACTITIONER

## 2020-01-01 PROCEDURE — C1892 INTRO/SHEATH,FIXED,PEEL-AWAY: HCPCS

## 2020-01-01 PROCEDURE — 71045 X-RAY EXAM CHEST 1 VIEW: CPT

## 2020-01-01 PROCEDURE — 74011000250 HC RX REV CODE- 250: Performed by: THORACIC SURGERY (CARDIOTHORACIC VASCULAR SURGERY)

## 2020-01-01 PROCEDURE — 87070 CULTURE OTHR SPECIMN AEROBIC: CPT

## 2020-01-01 PROCEDURE — 74011636637 HC RX REV CODE- 636/637: Performed by: THORACIC SURGERY (CARDIOTHORACIC VASCULAR SURGERY)

## 2020-01-01 PROCEDURE — 74011250636 HC RX REV CODE- 250/636: Performed by: SURGERY

## 2020-01-01 PROCEDURE — 94003 VENT MGMT INPAT SUBQ DAY: CPT

## 2020-01-01 PROCEDURE — 74011000636 HC RX REV CODE- 636: Performed by: INTERNAL MEDICINE

## 2020-01-01 PROCEDURE — 85018 HEMOGLOBIN: CPT

## 2020-01-01 PROCEDURE — 90945 DIALYSIS ONE EVALUATION: CPT

## 2020-01-01 PROCEDURE — 77030011640 HC PAD GRND REM COVD -A: Performed by: THORACIC SURGERY (CARDIOTHORACIC VASCULAR SURGERY)

## 2020-01-01 PROCEDURE — 74011000250 HC RX REV CODE- 250: Performed by: INTERNAL MEDICINE

## 2020-01-01 PROCEDURE — 82375 ASSAY CARBOXYHB QUANT: CPT

## 2020-01-01 PROCEDURE — 33949 ECMO/ECLS DAILY MGMT ARTERY: CPT | Performed by: THORACIC SURGERY (CARDIOTHORACIC VASCULAR SURGERY)

## 2020-01-01 PROCEDURE — 5A1522G EXTRACORPOREAL OXYGENATION, MEMBRANE, PERIPHERAL VENO-ARTERIAL: ICD-10-PCS | Performed by: THORACIC SURGERY (CARDIOTHORACIC VASCULAR SURGERY)

## 2020-01-01 PROCEDURE — 73501 X-RAY EXAM HIP UNI 1 VIEW: CPT

## 2020-01-01 PROCEDURE — 84100 ASSAY OF PHOSPHORUS: CPT

## 2020-01-01 PROCEDURE — 93458 L HRT ARTERY/VENTRICLE ANGIO: CPT | Performed by: INTERNAL MEDICINE

## 2020-01-01 PROCEDURE — 74011250637 HC RX REV CODE- 250/637: Performed by: NURSE PRACTITIONER

## 2020-01-01 PROCEDURE — 85730 THROMBOPLASTIN TIME PARTIAL: CPT

## 2020-01-01 PROCEDURE — 96374 THER/PROPH/DIAG INJ IV PUSH: CPT

## 2020-01-01 PROCEDURE — 74011250636 HC RX REV CODE- 250/636: Performed by: NURSE ANESTHETIST, CERTIFIED REGISTERED

## 2020-01-01 PROCEDURE — C1751 CATH, INF, PER/CENT/MIDLINE: HCPCS | Performed by: THORACIC SURGERY (CARDIOTHORACIC VASCULAR SURGERY)

## 2020-01-01 PROCEDURE — 99152 MOD SED SAME PHYS/QHP 5/>YRS: CPT | Performed by: INTERNAL MEDICINE

## 2020-01-01 PROCEDURE — 82962 GLUCOSE BLOOD TEST: CPT

## 2020-01-01 PROCEDURE — 74011000250 HC RX REV CODE- 250: Performed by: PHYSICIAN ASSISTANT

## 2020-01-01 PROCEDURE — P9045 ALBUMIN (HUMAN), 5%, 250 ML: HCPCS | Performed by: NURSE ANESTHETIST, CERTIFIED REGISTERED

## 2020-01-01 PROCEDURE — 82085 ASSAY OF ALDOLASE: CPT

## 2020-01-01 PROCEDURE — 2709999900 HC NON-CHARGEABLE SUPPLY

## 2020-01-01 PROCEDURE — 77030013533 HC SEAL FBRN TISSL RDYTUSE 10ML BAXT -E: Performed by: SURGERY

## 2020-01-01 PROCEDURE — C1874 STENT, COATED/COV W/DEL SYS: HCPCS | Performed by: INTERNAL MEDICINE

## 2020-01-01 PROCEDURE — 83615 LACTATE (LD) (LDH) ENZYME: CPT

## 2020-01-01 PROCEDURE — 74011636637 HC RX REV CODE- 636/637: Performed by: NURSE PRACTITIONER

## 2020-01-01 PROCEDURE — 65620000000 HC RM CCU GENERAL

## 2020-01-01 PROCEDURE — 77030028837 HC SYR ANGI PWR INJ COEU -A: Performed by: INTERNAL MEDICINE

## 2020-01-01 PROCEDURE — 74011000258 HC RX REV CODE- 258: Performed by: NURSE ANESTHETIST, CERTIFIED REGISTERED

## 2020-01-01 PROCEDURE — 94640 AIRWAY INHALATION TREATMENT: CPT

## 2020-01-01 PROCEDURE — 76000 FLUOROSCOPY <1 HR PHYS/QHP: CPT

## 2020-01-01 PROCEDURE — C1887 CATHETER, GUIDING: HCPCS | Performed by: INTERNAL MEDICINE

## 2020-01-01 PROCEDURE — 74011000258 HC RX REV CODE- 258: Performed by: PHYSICIAN ASSISTANT

## 2020-01-01 PROCEDURE — 74011000258 HC RX REV CODE- 258: Performed by: NURSE PRACTITIONER

## 2020-01-01 PROCEDURE — 74011250636 HC RX REV CODE- 250/636: Performed by: THORACIC SURGERY (CARDIOTHORACIC VASCULAR SURGERY)

## 2020-01-01 PROCEDURE — P9035 PLATELET PHERES LEUKOREDUCED: HCPCS

## 2020-01-01 PROCEDURE — 0BJ08ZZ INSPECTION OF TRACHEOBRONCHIAL TREE, VIA NATURAL OR ARTIFICIAL OPENING ENDOSCOPIC: ICD-10-PCS | Performed by: INTERNAL MEDICINE

## 2020-01-01 PROCEDURE — 87340 HEPATITIS B SURFACE AG IA: CPT

## 2020-01-01 PROCEDURE — C1725 CATH, TRANSLUMIN NON-LASER: HCPCS | Performed by: INTERNAL MEDICINE

## 2020-01-01 PROCEDURE — 85347 COAGULATION TIME ACTIVATED: CPT

## 2020-01-01 PROCEDURE — 5A12012 PERFORMANCE OF CARDIAC OUTPUT, SINGLE, MANUAL: ICD-10-PCS | Performed by: THORACIC SURGERY (CARDIOTHORACIC VASCULAR SURGERY)

## 2020-01-01 PROCEDURE — 99223 1ST HOSP IP/OBS HIGH 75: CPT | Performed by: NURSE PRACTITIONER

## 2020-01-01 PROCEDURE — 74011250636 HC RX REV CODE- 250/636: Performed by: NURSE PRACTITIONER

## 2020-01-01 PROCEDURE — 77030002996 HC SUT SLK J&J -A: Performed by: THORACIC SURGERY (CARDIOTHORACIC VASCULAR SURGERY)

## 2020-01-01 PROCEDURE — C1892 INTRO/SHEATH,FIXED,PEEL-AWAY: HCPCS | Performed by: SURGERY

## 2020-01-01 PROCEDURE — 76060000035 HC ANESTHESIA 2 TO 2.5 HR: Performed by: THORACIC SURGERY (CARDIOTHORACIC VASCULAR SURGERY)

## 2020-01-01 PROCEDURE — P9016 RBC LEUKOCYTES REDUCED: HCPCS

## 2020-01-01 PROCEDURE — 77030013798 HC KT TRNSDUC PRSSR EDWD -B

## 2020-01-01 PROCEDURE — 77030020291 HC FLEXSEAL FMS BMS -C

## 2020-01-01 PROCEDURE — 76010000108 HC CV SURG 2 TO 2.5 HR: Performed by: THORACIC SURGERY (CARDIOTHORACIC VASCULAR SURGERY)

## 2020-01-01 PROCEDURE — 74011250637 HC RX REV CODE- 250/637: Performed by: INTERNAL MEDICINE

## 2020-01-01 PROCEDURE — 86707 HEPATITIS BE ANTIBODY: CPT

## 2020-01-01 PROCEDURE — 83735 ASSAY OF MAGNESIUM: CPT

## 2020-01-01 PROCEDURE — 99153 MOD SED SAME PHYS/QHP EA: CPT | Performed by: INTERNAL MEDICINE

## 2020-01-01 PROCEDURE — 95816 EEG AWAKE AND DROWSY: CPT | Performed by: PSYCHIATRY & NEUROLOGY

## 2020-01-01 PROCEDURE — 36430 TRANSFUSION BLD/BLD COMPNT: CPT

## 2020-01-01 PROCEDURE — 86900 BLOOD TYPING SEROLOGIC ABO: CPT

## 2020-01-01 PROCEDURE — 74011000258 HC RX REV CODE- 258: Performed by: INTERNAL MEDICINE

## 2020-01-01 PROCEDURE — 99291 CRITICAL CARE FIRST HOUR: CPT | Performed by: PSYCHIATRY & NEUROLOGY

## 2020-01-01 PROCEDURE — 85025 COMPLETE CBC W/AUTO DIFF WBC: CPT

## 2020-01-01 PROCEDURE — 99291 CRITICAL CARE FIRST HOUR: CPT | Performed by: THORACIC SURGERY (CARDIOTHORACIC VASCULAR SURGERY)

## 2020-01-01 PROCEDURE — 80047 BASIC METABLC PNL IONIZED CA: CPT

## 2020-01-01 PROCEDURE — 96375 TX/PRO/DX INJ NEW DRUG ADDON: CPT

## 2020-01-01 PROCEDURE — 82947 ASSAY GLUCOSE BLOOD QUANT: CPT

## 2020-01-01 PROCEDURE — 77030002987 HC SUT PROL J&J -B: Performed by: SURGERY

## 2020-01-01 PROCEDURE — 33999 UNLISTED PX CARDIAC SURGERY: CPT | Performed by: INTERNAL MEDICINE

## 2020-01-01 PROCEDURE — P9045 ALBUMIN (HUMAN), 5%, 250 ML: HCPCS | Performed by: THORACIC SURGERY (CARDIOTHORACIC VASCULAR SURGERY)

## 2020-01-01 PROCEDURE — 77030002996 HC SUT SLK J&J -A: Performed by: SURGERY

## 2020-01-01 PROCEDURE — 04PY03Z REMOVAL OF INFUSION DEVICE FROM LOWER ARTERY, OPEN APPROACH: ICD-10-PCS | Performed by: PHYSICIAN ASSISTANT

## 2020-01-01 PROCEDURE — B2111ZZ FLUOROSCOPY OF MULTIPLE CORONARY ARTERIES USING LOW OSMOLAR CONTRAST: ICD-10-PCS | Performed by: INTERNAL MEDICINE

## 2020-01-01 PROCEDURE — 77030038692 HC WND DEB SYS IRMX -B: Performed by: THORACIC SURGERY (CARDIOTHORACIC VASCULAR SURGERY)

## 2020-01-01 PROCEDURE — 74011636637 HC RX REV CODE- 636/637: Performed by: PHYSICIAN ASSISTANT

## 2020-01-01 PROCEDURE — 70551 MRI BRAIN STEM W/O DYE: CPT

## 2020-01-01 PROCEDURE — 77030002996 HC SUT SLK J&J -A: Performed by: INTERNAL MEDICINE

## 2020-01-01 PROCEDURE — 82550 ASSAY OF CK (CPK): CPT

## 2020-01-01 PROCEDURE — C1894 INTRO/SHEATH, NON-LASER: HCPCS | Performed by: SURGERY

## 2020-01-01 PROCEDURE — P9045 ALBUMIN (HUMAN), 5%, 250 ML: HCPCS | Performed by: INTERNAL MEDICINE

## 2020-01-01 PROCEDURE — 5A1955Z RESPIRATORY VENTILATION, GREATER THAN 96 CONSECUTIVE HOURS: ICD-10-PCS | Performed by: EMERGENCY MEDICINE

## 2020-01-01 PROCEDURE — C1880 VENA CAVA FILTER: HCPCS | Performed by: SURGERY

## 2020-01-01 PROCEDURE — 2709999900 HC NON-CHARGEABLE SUPPLY: Performed by: SURGERY

## 2020-01-01 PROCEDURE — 80048 BASIC METABOLIC PNL TOTAL CA: CPT

## 2020-01-01 PROCEDURE — 74011000250 HC RX REV CODE- 250: Performed by: ANESTHESIOLOGY

## 2020-01-01 PROCEDURE — 92928 PRQ TCAT PLMT NTRAC ST 1 LES: CPT | Performed by: INTERNAL MEDICINE

## 2020-01-01 PROCEDURE — 74011250636 HC RX REV CODE- 250/636: Performed by: PHYSICIAN ASSISTANT

## 2020-01-01 PROCEDURE — 74011000250 HC RX REV CODE- 250

## 2020-01-01 PROCEDURE — 93970 EXTREMITY STUDY: CPT

## 2020-01-01 PROCEDURE — 99232 SBSQ HOSP IP/OBS MODERATE 35: CPT | Performed by: CLINICAL NURSE SPECIALIST

## 2020-01-01 PROCEDURE — 77030008463 HC STPLR SKN PROX J&J -B: Performed by: SURGERY

## 2020-01-01 PROCEDURE — 77030031139 HC SUT VCRL2 J&J -A: Performed by: SURGERY

## 2020-01-01 PROCEDURE — 74011250636 HC RX REV CODE- 250/636

## 2020-01-01 PROCEDURE — 31500 INSERT EMERGENCY AIRWAY: CPT

## 2020-01-01 PROCEDURE — C1894 INTRO/SHEATH, NON-LASER: HCPCS | Performed by: INTERNAL MEDICINE

## 2020-01-01 PROCEDURE — 99291 CRITICAL CARE FIRST HOUR: CPT

## 2020-01-01 PROCEDURE — 92960 CARDIOVERSION ELECTRIC EXT: CPT

## 2020-01-01 PROCEDURE — 77030026918 HC ADMN ST IV BLD BD -A

## 2020-01-01 PROCEDURE — 81001 URINALYSIS AUTO W/SCOPE: CPT

## 2020-01-01 PROCEDURE — 74011000258 HC RX REV CODE- 258: Performed by: EMERGENCY MEDICINE

## 2020-01-01 PROCEDURE — 74011636637 HC RX REV CODE- 636/637: Performed by: NURSE ANESTHETIST, CERTIFIED REGISTERED

## 2020-01-01 PROCEDURE — 86923 COMPATIBILITY TEST ELECTRIC: CPT

## 2020-01-01 PROCEDURE — C1757 CATH, THROMBECTOMY/EMBOLECT: HCPCS | Performed by: SURGERY

## 2020-01-01 PROCEDURE — 74011000250 HC RX REV CODE- 250: Performed by: NURSE ANESTHETIST, CERTIFIED REGISTERED

## 2020-01-01 PROCEDURE — 76010000138 HC OR TIME 0.5 TO 1 HR: Performed by: SURGERY

## 2020-01-01 PROCEDURE — 82164 ANGIOTENSIN I ENZYME TEST: CPT

## 2020-01-01 PROCEDURE — C1892 INTRO/SHEATH,FIXED,PEEL-AWAY: HCPCS | Performed by: THORACIC SURGERY (CARDIOTHORACIC VASCULAR SURGERY)

## 2020-01-01 PROCEDURE — 36600 WITHDRAWAL OF ARTERIAL BLOOD: CPT

## 2020-01-01 PROCEDURE — 92929 HC PLC DE STNT +/-PTA MAJOR COR VESL/BRNCH  ADD LD: CPT | Performed by: INTERNAL MEDICINE

## 2020-01-01 PROCEDURE — 4A023N7 MEASUREMENT OF CARDIAC SAMPLING AND PRESSURE, LEFT HEART, PERCUTANEOUS APPROACH: ICD-10-PCS | Performed by: INTERNAL MEDICINE

## 2020-01-01 PROCEDURE — 76010000207 HC CV SURG 2.5 TO 3 HR INTENSV-TIER 1: Performed by: SURGERY

## 2020-01-01 PROCEDURE — 2709999900 HC NON-CHARGEABLE SUPPLY: Performed by: THORACIC SURGERY (CARDIOTHORACIC VASCULAR SURGERY)

## 2020-01-01 PROCEDURE — 86706 HEP B SURFACE ANTIBODY: CPT

## 2020-01-01 PROCEDURE — P9045 ALBUMIN (HUMAN), 5%, 250 ML: HCPCS | Performed by: NURSE PRACTITIONER

## 2020-01-01 PROCEDURE — 72141 MRI NECK SPINE W/O DYE: CPT

## 2020-01-01 PROCEDURE — 93306 TTE W/DOPPLER COMPLETE: CPT

## 2020-01-01 PROCEDURE — 74011250637 HC RX REV CODE- 250/637: Performed by: THORACIC SURGERY (CARDIOTHORACIC VASCULAR SURGERY)

## 2020-01-01 PROCEDURE — 03HY32Z INSERTION OF MONITORING DEVICE INTO UPPER ARTERY, PERCUTANEOUS APPROACH: ICD-10-PCS | Performed by: ANESTHESIOLOGY

## 2020-01-01 PROCEDURE — 77030028840 HC PMP VAD BLD CENTRIMAG STJU -L: Performed by: THORACIC SURGERY (CARDIOTHORACIC VASCULAR SURGERY)

## 2020-01-01 PROCEDURE — 77030004550 HC CATH ANGI DX PRF MRTM -B: Performed by: INTERNAL MEDICINE

## 2020-01-01 PROCEDURE — 02PA0RZ REMOVAL OF SHORT-TERM EXTERNAL HEART ASSIST SYSTEM FROM HEART, OPEN APPROACH: ICD-10-PCS | Performed by: SURGERY

## 2020-01-01 PROCEDURE — 74011000250 HC RX REV CODE- 250: Performed by: EMERGENCY MEDICINE

## 2020-01-01 PROCEDURE — C1769 GUIDE WIRE: HCPCS | Performed by: SURGERY

## 2020-01-01 PROCEDURE — 74011000258 HC RX REV CODE- 258: Performed by: SURGERY

## 2020-01-01 PROCEDURE — 93971 EXTREMITY STUDY: CPT

## 2020-01-01 PROCEDURE — 0B978ZZ DRAINAGE OF LEFT MAIN BRONCHUS, VIA NATURAL OR ARTIFICIAL OPENING ENDOSCOPIC: ICD-10-PCS | Performed by: INTERNAL MEDICINE

## 2020-01-01 PROCEDURE — 83036 HEMOGLOBIN GLYCOSYLATED A1C: CPT

## 2020-01-01 PROCEDURE — 74011250636 HC RX REV CODE- 250/636: Performed by: EMERGENCY MEDICINE

## 2020-01-01 PROCEDURE — 027337Z DILATION OF CORONARY ARTERY, FOUR OR MORE ARTERIES WITH FOUR OR MORE DRUG-ELUTING INTRALUMINAL DEVICES, PERCUTANEOUS APPROACH: ICD-10-PCS | Performed by: INTERNAL MEDICINE

## 2020-01-01 PROCEDURE — 0B938ZZ DRAINAGE OF RIGHT MAIN BRONCHUS, VIA NATURAL OR ARTIFICIAL OPENING ENDOSCOPIC: ICD-10-PCS | Performed by: INTERNAL MEDICINE

## 2020-01-01 PROCEDURE — 74011636637 HC RX REV CODE- 636/637: Performed by: INTERNAL MEDICINE

## 2020-01-01 PROCEDURE — 02HA3RZ INSERTION OF SHORT-TERM EXTERNAL HEART ASSIST SYSTEM INTO HEART, PERCUTANEOUS APPROACH: ICD-10-PCS | Performed by: THORACIC SURGERY (CARDIOTHORACIC VASCULAR SURGERY)

## 2020-01-01 PROCEDURE — 77030014008 HC SPNG HEMSTAT J&J -C: Performed by: SURGERY

## 2020-01-01 PROCEDURE — P9045 ALBUMIN (HUMAN), 5%, 250 ML: HCPCS

## 2020-01-01 PROCEDURE — C1769 GUIDE WIRE: HCPCS | Performed by: INTERNAL MEDICINE

## 2020-01-01 PROCEDURE — 77030002986 HC SUT PROL J&J -A: Performed by: THORACIC SURGERY (CARDIOTHORACIC VASCULAR SURGERY)

## 2020-01-01 PROCEDURE — 74011250636 HC RX REV CODE- 250/636: Performed by: RADIOLOGY

## 2020-01-01 PROCEDURE — 77030037870 HC GLD SHT PREVALON SAGE -B

## 2020-01-01 PROCEDURE — 83605 ASSAY OF LACTIC ACID: CPT

## 2020-01-01 PROCEDURE — 77030019908 HC STETH ESOPH SIMS -A: Performed by: ANESTHESIOLOGY

## 2020-01-01 PROCEDURE — 76060000032 HC ANESTHESIA 0.5 TO 1 HR: Performed by: SURGERY

## 2020-01-01 PROCEDURE — 76010000129 HC CV SURG 1.5 TO 2 HR: Performed by: THORACIC SURGERY (CARDIOTHORACIC VASCULAR SURGERY)

## 2020-01-01 PROCEDURE — 77030018836 HC SOL IRR NACL ICUM -A

## 2020-01-01 PROCEDURE — 87040 BLOOD CULTURE FOR BACTERIA: CPT

## 2020-01-01 PROCEDURE — 30233N1 TRANSFUSION OF NONAUTOLOGOUS RED BLOOD CELLS INTO PERIPHERAL VEIN, PERCUTANEOUS APPROACH: ICD-10-PCS | Performed by: THORACIC SURGERY (CARDIOTHORACIC VASCULAR SURGERY)

## 2020-01-01 PROCEDURE — 77030002986 HC SUT PROL J&J -A: Performed by: SURGERY

## 2020-01-01 PROCEDURE — 77030034689 HC VASC DIL KT W/NDL LIVA -C: Performed by: THORACIC SURGERY (CARDIOTHORACIC VASCULAR SURGERY)

## 2020-01-01 PROCEDURE — 77030029641 HC PMP CARD PERC IMPELLA CP ABIM -L: Performed by: INTERNAL MEDICINE

## 2020-01-01 PROCEDURE — 80061 LIPID PANEL: CPT

## 2020-01-01 PROCEDURE — 77030002916 HC SUT ETHLN J&J -A: Performed by: SURGERY

## 2020-01-01 PROCEDURE — 51702 INSERT TEMP BLADDER CATH: CPT

## 2020-01-01 PROCEDURE — 84478 ASSAY OF TRIGLYCERIDES: CPT

## 2020-01-01 PROCEDURE — 99223 1ST HOSP IP/OBS HIGH 75: CPT | Performed by: THORACIC SURGERY (CARDIOTHORACIC VASCULAR SURGERY)

## 2020-01-01 PROCEDURE — 02HV33Z INSERTION OF INFUSION DEVICE INTO SUPERIOR VENA CAVA, PERCUTANEOUS APPROACH: ICD-10-PCS | Performed by: ANESTHESIOLOGY

## 2020-01-01 PROCEDURE — 94002 VENT MGMT INPAT INIT DAY: CPT

## 2020-01-01 PROCEDURE — 76010000379 HC BRONCHOSCOPY DIAG/THERAPEUTIC

## 2020-01-01 PROCEDURE — 5A0221D ASSISTANCE WITH CARDIAC OUTPUT USING IMPELLER PUMP, CONTINUOUS: ICD-10-PCS | Performed by: INTERNAL MEDICINE

## 2020-01-01 PROCEDURE — 77030020089 HC KT PERC FEM ART MEDT -C: Performed by: THORACIC SURGERY (CARDIOTHORACIC VASCULAR SURGERY)

## 2020-01-01 PROCEDURE — 87350 HEPATITIS BE AG IA: CPT

## 2020-01-01 PROCEDURE — 3E0336Z INTRODUCTION OF NUTRITIONAL SUBSTANCE INTO PERIPHERAL VEIN, PERCUTANEOUS APPROACH: ICD-10-PCS | Performed by: THORACIC SURGERY (CARDIOTHORACIC VASCULAR SURGERY)

## 2020-01-01 PROCEDURE — 77030003029 HC SUT VCRL J&J -B: Performed by: THORACIC SURGERY (CARDIOTHORACIC VASCULAR SURGERY)

## 2020-01-01 PROCEDURE — 77030020256 HC SOL INJ NACL 0.9%  500ML: Performed by: SURGERY

## 2020-01-01 PROCEDURE — 76060000034 HC ANESTHESIA 1.5 TO 2 HR: Performed by: THORACIC SURGERY (CARDIOTHORACIC VASCULAR SURGERY)

## 2020-01-01 PROCEDURE — 99222 1ST HOSP IP/OBS MODERATE 55: CPT | Performed by: INTERNAL MEDICINE

## 2020-01-01 PROCEDURE — 77030036958 HC BRONCHSCOPE ASCOPE3 FLX DISP AMBU -D

## 2020-01-01 PROCEDURE — 74011000636 HC RX REV CODE- 636: Performed by: SURGERY

## 2020-01-01 PROCEDURE — 76937 US GUIDE VASCULAR ACCESS: CPT

## 2020-01-01 PROCEDURE — C1768 GRAFT, VASCULAR: HCPCS | Performed by: SURGERY

## 2020-01-01 PROCEDURE — 87635 SARS-COV-2 COVID-19 AMP PRB: CPT

## 2020-01-01 PROCEDURE — 92950 HEART/LUNG RESUSCITATION CPR: CPT

## 2020-01-01 PROCEDURE — 77030038692 HC WND DEB SYS IRMX -B: Performed by: SURGERY

## 2020-01-01 PROCEDURE — 77030018798 HC PMP KT ENTRL FED COVD -A

## 2020-01-01 PROCEDURE — 80076 HEPATIC FUNCTION PANEL: CPT

## 2020-01-01 PROCEDURE — 77030014006 HC SPNG HEMSTAT J&J -A

## 2020-01-01 PROCEDURE — 0BH17EZ INSERTION OF ENDOTRACHEAL AIRWAY INTO TRACHEA, VIA NATURAL OR ARTIFICIAL OPENING: ICD-10-PCS | Performed by: EMERGENCY MEDICINE

## 2020-01-01 PROCEDURE — 84443 ASSAY THYROID STIM HORMONE: CPT

## 2020-01-01 PROCEDURE — 82607 VITAMIN B-12: CPT

## 2020-01-01 PROCEDURE — 76060000036 HC ANESTHESIA 2.5 TO 3 HR: Performed by: SURGERY

## 2020-01-01 PROCEDURE — 76937 US GUIDE VASCULAR ACCESS: CPT | Performed by: INTERNAL MEDICINE

## 2020-01-01 PROCEDURE — C1752 CATH,HEMODIALYSIS,SHORT-TERM: HCPCS

## 2020-01-01 PROCEDURE — 74011000250 HC RX REV CODE- 250: Performed by: RADIOLOGY

## 2020-01-01 PROCEDURE — 06H03DZ INSERTION OF INTRALUMINAL DEVICE INTO INFERIOR VENA CAVA, PERCUTANEOUS APPROACH: ICD-10-PCS | Performed by: SURGERY

## 2020-01-01 PROCEDURE — 30233R1 TRANSFUSION OF NONAUTOLOGOUS PLATELETS INTO PERIPHERAL VEIN, PERCUTANEOUS APPROACH: ICD-10-PCS | Performed by: THORACIC SURGERY (CARDIOTHORACIC VASCULAR SURGERY)

## 2020-01-01 PROCEDURE — 77030020140: Performed by: THORACIC SURGERY (CARDIOTHORACIC VASCULAR SURGERY)

## 2020-01-01 DEVICE — STENT RONYX35015UX RESOLUTE ONYX 3.50X15
Type: IMPLANTABLE DEVICE | Status: FUNCTIONAL
Brand: RESOLUTE ONYX™

## 2020-01-01 DEVICE — STENT RONYX25018UX RESOLUTE ONYX 2.50X18
Type: IMPLANTABLE DEVICE | Status: FUNCTIONAL
Brand: RESOLUTE ONYX™

## 2020-01-01 DEVICE — XIENCE SIERRA™ EVEROLIMUS ELUTING CORONARY STENT SYSTEM 4.00 MM X 08 MM / RAPID-EXCHANGE
Type: IMPLANTABLE DEVICE | Status: FUNCTIONAL
Brand: XIENCE SIERRA™

## 2020-01-01 DEVICE — STENT RONYX25015UX RESOLUTE ONYX 2.50X15
Type: IMPLANTABLE DEVICE | Status: FUNCTIONAL
Brand: RESOLUTE ONYX™

## 2020-01-01 DEVICE — PATCH VASC W0.8XL8CM PERIPH BOV PERICARD N PVC N DEHP CRSS: Type: IMPLANTABLE DEVICE | Site: GROIN | Status: FUNCTIONAL

## 2020-01-01 DEVICE — XIENCE SIERRA™ EVEROLIMUS ELUTING CORONARY STENT SYSTEM 3.00 MM X 18 MM / RAPID-EXCHANGE
Type: IMPLANTABLE DEVICE | Status: FUNCTIONAL
Brand: XIENCE SIERRA™

## 2020-01-01 DEVICE — STENT RONYX30022UX RESOLUTE ONYX 3.00X22
Type: IMPLANTABLE DEVICE | Status: FUNCTIONAL
Brand: RESOLUTE ONYX™

## 2020-01-01 DEVICE — STENT RONYX20026UX RESOLUTE ONYX 2.00X26
Type: IMPLANTABLE DEVICE | Status: FUNCTIONAL
Brand: RESOLUTE ONYX™

## 2020-01-01 RX ORDER — SODIUM BICARBONATE 1 MEQ/ML
SYRINGE (ML) INTRAVENOUS
Status: COMPLETED | OUTPATIENT
Start: 2020-01-01 | End: 2020-01-01

## 2020-01-01 RX ORDER — POTASSIUM CHLORIDE 29.8 MG/ML
20 INJECTION INTRAVENOUS
Status: COMPLETED | OUTPATIENT
Start: 2020-01-01 | End: 2020-01-01

## 2020-01-01 RX ORDER — SODIUM CHLORIDE 9 MG/ML
250 INJECTION, SOLUTION INTRAVENOUS AS NEEDED
Status: DISCONTINUED | OUTPATIENT
Start: 2020-01-01 | End: 2020-01-01 | Stop reason: ALTCHOICE

## 2020-01-01 RX ORDER — SODIUM CHLORIDE 0.9 % (FLUSH) 0.9 %
5-40 SYRINGE (ML) INJECTION AS NEEDED
Status: DISCONTINUED | OUTPATIENT
Start: 2020-01-01 | End: 2020-01-01 | Stop reason: HOSPADM

## 2020-01-01 RX ORDER — SODIUM CHLORIDE 9 MG/ML
INJECTION, SOLUTION INTRAVENOUS
Status: COMPLETED | OUTPATIENT
Start: 2020-01-01 | End: 2020-01-01

## 2020-01-01 RX ORDER — SODIUM BICARBONATE 1 MEQ/ML
100 SYRINGE (ML) INTRAVENOUS ONCE
Status: COMPLETED | OUTPATIENT
Start: 2020-01-01 | End: 2020-01-01

## 2020-01-01 RX ORDER — VANCOMYCIN 2 GRAM/500 ML IN 0.9 % SODIUM CHLORIDE INTRAVENOUS
2000 ONCE
Status: COMPLETED | OUTPATIENT
Start: 2020-01-01 | End: 2020-01-01

## 2020-01-01 RX ORDER — SODIUM CHLORIDE 9 MG/ML
250 INJECTION, SOLUTION INTRAVENOUS AS NEEDED
Status: DISCONTINUED | OUTPATIENT
Start: 2020-01-01 | End: 2020-01-01

## 2020-01-01 RX ORDER — CEFAZOLIN SODIUM 1 G/3ML
INJECTION, POWDER, FOR SOLUTION INTRAMUSCULAR; INTRAVENOUS AS NEEDED
Status: DISCONTINUED | OUTPATIENT
Start: 2020-01-01 | End: 2020-01-01 | Stop reason: HOSPADM

## 2020-01-01 RX ORDER — MUPIROCIN 20 MG/G
OINTMENT TOPICAL EVERY 12 HOURS
Status: COMPLETED | OUTPATIENT
Start: 2020-01-01 | End: 2020-01-01

## 2020-01-01 RX ORDER — HEPARIN SODIUM 10000 [USP'U]/100ML
15-36 INJECTION, SOLUTION INTRAVENOUS
Status: DISCONTINUED | OUTPATIENT
Start: 2020-01-01 | End: 2020-01-01

## 2020-01-01 RX ORDER — HEPARIN SODIUM 5000 [USP'U]/100ML
4-20 INJECTION, SOLUTION INTRAVENOUS
Status: DISCONTINUED | OUTPATIENT
Start: 2020-01-01 | End: 2020-01-01

## 2020-01-01 RX ORDER — IODIXANOL 320 MG/ML
INJECTION, SOLUTION INTRAVASCULAR AS NEEDED
Status: DISCONTINUED | OUTPATIENT
Start: 2020-01-01 | End: 2020-01-01 | Stop reason: HOSPADM

## 2020-01-01 RX ORDER — GUAIFENESIN 100 MG/5ML
LIQUID (ML) ORAL AS NEEDED
Status: DISCONTINUED | OUTPATIENT
Start: 2020-01-01 | End: 2020-01-01 | Stop reason: HOSPADM

## 2020-01-01 RX ORDER — DEXTROSE MONOHYDRATE 50 MG/ML
11.4 INJECTION, SOLUTION INTRAVENOUS CONTINUOUS
Status: DISCONTINUED | OUTPATIENT
Start: 2020-01-01 | End: 2020-01-01

## 2020-01-01 RX ORDER — SODIUM BICARBONATE 1 MEQ/ML
50 SYRINGE (ML) INTRAVENOUS ONCE
Status: COMPLETED | OUTPATIENT
Start: 2020-01-01 | End: 2020-01-01

## 2020-01-01 RX ORDER — ALBUMIN HUMAN 50 G/1000ML
SOLUTION INTRAVENOUS
Status: COMPLETED
Start: 2020-01-01 | End: 2020-01-01

## 2020-01-01 RX ORDER — ALBUTEROL SULFATE 0.83 MG/ML
2.5 SOLUTION RESPIRATORY (INHALATION)
Status: DISCONTINUED | OUTPATIENT
Start: 2020-01-01 | End: 2020-01-01

## 2020-01-01 RX ORDER — MAGNESIUM SULFATE 1 G/100ML
1 INJECTION INTRAVENOUS ONCE
Status: COMPLETED | OUTPATIENT
Start: 2020-01-01 | End: 2020-01-01

## 2020-01-01 RX ORDER — CALCIUM CHLORIDE INJECTION 100 MG/ML
1 INJECTION, SOLUTION INTRAVENOUS ONCE
Status: COMPLETED | OUTPATIENT
Start: 2020-01-01 | End: 2020-01-01

## 2020-01-01 RX ORDER — FUROSEMIDE 10 MG/ML
80 INJECTION INTRAMUSCULAR; INTRAVENOUS ONCE
Status: COMPLETED | OUTPATIENT
Start: 2020-01-01 | End: 2020-01-01

## 2020-01-01 RX ORDER — ASPIRIN 300 MG/1
300 SUPPOSITORY RECTAL
Status: DISCONTINUED | OUTPATIENT
Start: 2020-01-01 | End: 2020-01-01

## 2020-01-01 RX ORDER — ROCURONIUM BROMIDE 10 MG/ML
INJECTION, SOLUTION INTRAVENOUS AS NEEDED
Status: DISCONTINUED | OUTPATIENT
Start: 2020-01-01 | End: 2020-01-01 | Stop reason: HOSPADM

## 2020-01-01 RX ORDER — POTASSIUM CHLORIDE 29.8 MG/ML
20 INJECTION INTRAVENOUS ONCE
Status: COMPLETED | OUTPATIENT
Start: 2020-01-01 | End: 2020-01-01

## 2020-01-01 RX ORDER — SODIUM CHLORIDE, SODIUM LACTATE, POTASSIUM CHLORIDE, CALCIUM CHLORIDE 600; 310; 30; 20 MG/100ML; MG/100ML; MG/100ML; MG/100ML
INJECTION, SOLUTION INTRAVENOUS
Status: DISCONTINUED | OUTPATIENT
Start: 2020-01-01 | End: 2020-01-01 | Stop reason: HOSPADM

## 2020-01-01 RX ORDER — BALSAM PERU/CASTOR OIL
OINTMENT (GRAM) TOPICAL 2 TIMES DAILY
Status: DISCONTINUED | OUTPATIENT
Start: 2020-01-01 | End: 2020-01-01 | Stop reason: HOSPADM

## 2020-01-01 RX ORDER — HEPARIN SODIUM 200 [USP'U]/100ML
INJECTION, SOLUTION INTRAVENOUS
Status: COMPLETED | OUTPATIENT
Start: 2020-01-01 | End: 2020-01-01

## 2020-01-01 RX ORDER — MORPHINE SULFATE 2 MG/ML
4 INJECTION, SOLUTION INTRAMUSCULAR; INTRAVENOUS
Status: DISCONTINUED | OUTPATIENT
Start: 2020-01-01 | End: 2020-01-01 | Stop reason: HOSPADM

## 2020-01-01 RX ORDER — HEPARIN 100 UNIT/ML
300 SYRINGE INTRAVENOUS ONCE
Status: COMPLETED | OUTPATIENT
Start: 2020-01-01 | End: 2020-01-01

## 2020-01-01 RX ORDER — ACETAMINOPHEN 325 MG/1
650 TABLET ORAL EVERY 4 HOURS
Status: DISPENSED | OUTPATIENT
Start: 2020-01-01 | End: 2020-01-01

## 2020-01-01 RX ORDER — FUROSEMIDE 10 MG/ML
INJECTION INTRAMUSCULAR; INTRAVENOUS
Status: DISPENSED
Start: 2020-01-01 | End: 2020-01-01

## 2020-01-01 RX ORDER — SODIUM CHLORIDE 450 MG/100ML
10 INJECTION, SOLUTION INTRAVENOUS CONTINUOUS
Status: DISCONTINUED | OUTPATIENT
Start: 2020-01-01 | End: 2020-01-01

## 2020-01-01 RX ORDER — AMIODARONE HYDROCHLORIDE 150 MG/3ML
INJECTION, SOLUTION INTRAVENOUS AS NEEDED
Status: DISCONTINUED | OUTPATIENT
Start: 2020-01-01 | End: 2020-01-01 | Stop reason: HOSPADM

## 2020-01-01 RX ORDER — MAGNESIUM SULFATE 1 G/100ML
1 INJECTION INTRAVENOUS AS NEEDED
Status: DISCONTINUED | OUTPATIENT
Start: 2020-01-01 | End: 2020-01-01

## 2020-01-01 RX ORDER — ALBUMIN HUMAN 50 G/1000ML
12.5 SOLUTION INTRAVENOUS
Status: DISCONTINUED | OUTPATIENT
Start: 2020-01-01 | End: 2020-01-01

## 2020-01-01 RX ORDER — AMIODARONE HYDROCHLORIDE 150 MG/3ML
INJECTION, SOLUTION INTRAVENOUS
Status: COMPLETED | OUTPATIENT
Start: 2020-01-01 | End: 2020-01-01

## 2020-01-01 RX ORDER — HEPARIN SODIUM 1000 [USP'U]/ML
INJECTION, SOLUTION INTRAVENOUS; SUBCUTANEOUS AS NEEDED
Status: DISCONTINUED | OUTPATIENT
Start: 2020-01-01 | End: 2020-01-01 | Stop reason: HOSPADM

## 2020-01-01 RX ORDER — DEXAMETHASONE SODIUM PHOSPHATE 4 MG/ML
INJECTION, SOLUTION INTRA-ARTICULAR; INTRALESIONAL; INTRAMUSCULAR; INTRAVENOUS; SOFT TISSUE AS NEEDED
Status: DISCONTINUED | OUTPATIENT
Start: 2020-01-01 | End: 2020-01-01 | Stop reason: HOSPADM

## 2020-01-01 RX ORDER — VANCOMYCIN HYDROCHLORIDE
1250 EVERY 24 HOURS
Status: DISCONTINUED | OUTPATIENT
Start: 2020-01-01 | End: 2020-01-01

## 2020-01-01 RX ORDER — CALCIUM CHLORIDE INJECTION 100 MG/ML
INJECTION, SOLUTION INTRAVENOUS
Status: DISPENSED
Start: 2020-01-01 | End: 2020-01-01

## 2020-01-01 RX ORDER — BACITRACIN 500 UNIT/G
1 PACKET (EA) TOPICAL AS NEEDED
Status: DISCONTINUED | OUTPATIENT
Start: 2020-01-01 | End: 2020-01-01

## 2020-01-01 RX ORDER — CALCIUM CHLORIDE INJECTION 100 MG/ML
INJECTION, SOLUTION INTRAVENOUS AS NEEDED
Status: DISCONTINUED | OUTPATIENT
Start: 2020-01-01 | End: 2020-01-01 | Stop reason: HOSPADM

## 2020-01-01 RX ORDER — MIDAZOLAM HYDROCHLORIDE 1 MG/ML
1 INJECTION, SOLUTION INTRAMUSCULAR; INTRAVENOUS
Status: DISCONTINUED | OUTPATIENT
Start: 2020-01-01 | End: 2020-01-01

## 2020-01-01 RX ORDER — VANCOMYCIN HYDROCHLORIDE
1250
Status: DISCONTINUED | OUTPATIENT
Start: 2020-01-01 | End: 2020-01-01

## 2020-01-01 RX ORDER — SODIUM CHLORIDE 0.9 % (FLUSH) 0.9 %
5-40 SYRINGE (ML) INJECTION EVERY 8 HOURS
Status: DISCONTINUED | OUTPATIENT
Start: 2020-01-01 | End: 2020-01-01

## 2020-01-01 RX ORDER — FENTANYL CITRATE 50 UG/ML
50 INJECTION, SOLUTION INTRAMUSCULAR; INTRAVENOUS
Status: DISCONTINUED | OUTPATIENT
Start: 2020-01-01 | End: 2020-01-01

## 2020-01-01 RX ORDER — INSULIN LISPRO 100 [IU]/ML
INJECTION, SOLUTION INTRAVENOUS; SUBCUTANEOUS
Status: DISCONTINUED | OUTPATIENT
Start: 2020-01-01 | End: 2020-01-01

## 2020-01-01 RX ORDER — ALBUMIN HUMAN 50 G/1000ML
12.5 SOLUTION INTRAVENOUS
Status: COMPLETED | OUTPATIENT
Start: 2020-01-01 | End: 2020-01-01

## 2020-01-01 RX ORDER — FUROSEMIDE 10 MG/ML
20 INJECTION INTRAMUSCULAR; INTRAVENOUS ONCE
Status: COMPLETED | OUTPATIENT
Start: 2020-01-01 | End: 2020-01-01

## 2020-01-01 RX ORDER — CALCIUM CHLORIDE INJECTION 100 MG/ML
INJECTION, SOLUTION INTRAVENOUS
Status: COMPLETED
Start: 2020-01-01 | End: 2020-01-01

## 2020-01-01 RX ORDER — POLYETHYLENE GLYCOL 3350 17 G/17G
17 POWDER, FOR SOLUTION ORAL DAILY
Status: DISCONTINUED | OUTPATIENT
Start: 2020-01-01 | End: 2020-01-01

## 2020-01-01 RX ORDER — PROPOFOL 10 MG/ML
INJECTION, EMULSION INTRAVENOUS AS NEEDED
Status: DISCONTINUED | OUTPATIENT
Start: 2020-01-01 | End: 2020-01-01 | Stop reason: HOSPADM

## 2020-01-01 RX ORDER — OXYCODONE HYDROCHLORIDE 5 MG/1
10 TABLET ORAL
Status: DISCONTINUED | OUTPATIENT
Start: 2020-01-01 | End: 2020-01-01

## 2020-01-01 RX ORDER — MIDAZOLAM HYDROCHLORIDE 1 MG/ML
INJECTION, SOLUTION INTRAMUSCULAR; INTRAVENOUS AS NEEDED
Status: DISCONTINUED | OUTPATIENT
Start: 2020-01-01 | End: 2020-01-01 | Stop reason: HOSPADM

## 2020-01-01 RX ORDER — MUPIROCIN 20 MG/G
OINTMENT TOPICAL 2 TIMES DAILY
Status: DISCONTINUED | OUTPATIENT
Start: 2020-01-01 | End: 2020-01-01 | Stop reason: SDUPTHER

## 2020-01-01 RX ORDER — HEPARIN SODIUM 5000 [USP'U]/ML
80 INJECTION, SOLUTION INTRAVENOUS; SUBCUTANEOUS AS NEEDED
Status: DISCONTINUED | OUTPATIENT
Start: 2020-01-01 | End: 2020-01-01

## 2020-01-01 RX ORDER — ALBUMIN HUMAN 50 G/1000ML
SOLUTION INTRAVENOUS AS NEEDED
Status: DISCONTINUED | OUTPATIENT
Start: 2020-01-01 | End: 2020-01-01 | Stop reason: HOSPADM

## 2020-01-01 RX ORDER — POTASSIUM CHLORIDE 29.8 MG/ML
20 INJECTION INTRAVENOUS
Status: DISPENSED | OUTPATIENT
Start: 2020-01-01 | End: 2020-01-01

## 2020-01-01 RX ORDER — AMOXICILLIN 250 MG
1 CAPSULE ORAL 2 TIMES DAILY
Status: DISCONTINUED | OUTPATIENT
Start: 2020-01-01 | End: 2020-01-01

## 2020-01-01 RX ORDER — ACETYLCYSTEINE 200 MG/ML
200 SOLUTION ORAL; RESPIRATORY (INHALATION)
Status: DISCONTINUED | OUTPATIENT
Start: 2020-01-01 | End: 2020-01-01

## 2020-01-01 RX ORDER — PHENYLEPHRINE HCL IN 0.9% NACL 0.4MG/10ML
SYRINGE (ML) INTRAVENOUS AS NEEDED
Status: DISCONTINUED | OUTPATIENT
Start: 2020-01-01 | End: 2020-01-01 | Stop reason: HOSPADM

## 2020-01-01 RX ORDER — HEPARIN SODIUM 5000 [USP'U]/ML
40 INJECTION, SOLUTION INTRAVENOUS; SUBCUTANEOUS AS NEEDED
Status: DISCONTINUED | OUTPATIENT
Start: 2020-01-01 | End: 2020-01-01

## 2020-01-01 RX ORDER — SODIUM CHLORIDE 9 MG/ML
INJECTION, SOLUTION INTRAVENOUS
Status: DISCONTINUED | OUTPATIENT
Start: 2020-01-01 | End: 2020-01-01 | Stop reason: HOSPADM

## 2020-01-01 RX ORDER — NOREPINEPHRINE BITARTRATE/D5W 8 MG/250ML
.5-16 PLASTIC BAG, INJECTION (ML) INTRAVENOUS
Status: DISCONTINUED | OUTPATIENT
Start: 2020-01-01 | End: 2020-01-01

## 2020-01-01 RX ORDER — OXYCODONE HYDROCHLORIDE 5 MG/1
5 TABLET ORAL
Status: DISCONTINUED | OUTPATIENT
Start: 2020-01-01 | End: 2020-01-01

## 2020-01-01 RX ORDER — SODIUM BICARBONATE 1 MEQ/ML
SYRINGE (ML) INTRAVENOUS AS NEEDED
Status: DISCONTINUED | OUTPATIENT
Start: 2020-01-01 | End: 2020-01-01 | Stop reason: HOSPADM

## 2020-01-01 RX ORDER — HEPARIN SODIUM 200 [USP'U]/100ML
400 INJECTION, SOLUTION INTRAVENOUS ONCE
Status: DISPENSED | OUTPATIENT
Start: 2020-01-01 | End: 2020-01-01

## 2020-01-01 RX ORDER — FACIAL-BODY WIPES
10 EACH TOPICAL DAILY PRN
Status: DISCONTINUED | OUTPATIENT
Start: 2020-01-01 | End: 2020-01-01

## 2020-01-01 RX ORDER — ROCURONIUM BROMIDE 50 MG/5 ML
50 SYRINGE (ML) INTRAVENOUS
Status: COMPLETED | OUTPATIENT
Start: 2020-01-01 | End: 2020-01-01

## 2020-01-01 RX ORDER — INSULIN LISPRO 100 [IU]/ML
INJECTION, SOLUTION INTRAVENOUS; SUBCUTANEOUS EVERY 6 HOURS
Status: DISCONTINUED | OUTPATIENT
Start: 2020-01-01 | End: 2020-01-01

## 2020-01-01 RX ORDER — SODIUM BICARBONATE 1 MEQ/ML
SYRINGE (ML) INTRAVENOUS
Status: COMPLETED
Start: 2020-01-01 | End: 2020-01-01

## 2020-01-01 RX ORDER — DEXTROSE 50 % IN WATER (D50W) INTRAVENOUS SYRINGE
12.5-25 AS NEEDED
Status: DISCONTINUED | OUTPATIENT
Start: 2020-01-01 | End: 2020-01-01

## 2020-01-01 RX ORDER — LIDOCAINE HYDROCHLORIDE 20 MG/ML
20 INJECTION, SOLUTION INFILTRATION; PERINEURAL ONCE
Status: COMPLETED | OUTPATIENT
Start: 2020-01-01 | End: 2020-01-01

## 2020-01-01 RX ORDER — FENTANYL CITRATE 50 UG/ML
INJECTION, SOLUTION INTRAMUSCULAR; INTRAVENOUS AS NEEDED
Status: DISCONTINUED | OUTPATIENT
Start: 2020-01-01 | End: 2020-01-01 | Stop reason: HOSPADM

## 2020-01-01 RX ORDER — HEPARIN SODIUM 10000 [USP'U]/100ML
500-2000 INJECTION, SOLUTION INTRAVENOUS
Status: DISCONTINUED | OUTPATIENT
Start: 2020-01-01 | End: 2020-01-01

## 2020-01-01 RX ORDER — MORPHINE SULFATE 10 MG/ML
4 INJECTION, SOLUTION INTRAMUSCULAR; INTRAVENOUS
Status: DISCONTINUED | OUTPATIENT
Start: 2020-01-01 | End: 2020-01-01

## 2020-01-01 RX ORDER — GLYCOPYRROLATE 0.2 MG/ML
0.2 INJECTION INTRAMUSCULAR; INTRAVENOUS
Status: DISCONTINUED | OUTPATIENT
Start: 2020-01-01 | End: 2020-01-01 | Stop reason: HOSPADM

## 2020-01-01 RX ORDER — HEPARIN SODIUM 1000 [USP'U]/ML
1100 INJECTION, SOLUTION INTRAVENOUS; SUBCUTANEOUS ONCE
Status: COMPLETED | OUTPATIENT
Start: 2020-01-01 | End: 2020-01-01

## 2020-01-01 RX ORDER — ONDANSETRON 2 MG/ML
INJECTION INTRAMUSCULAR; INTRAVENOUS AS NEEDED
Status: DISCONTINUED | OUTPATIENT
Start: 2020-01-01 | End: 2020-01-01 | Stop reason: HOSPADM

## 2020-01-01 RX ORDER — DOBUTAMINE HYDROCHLORIDE 200 MG/100ML
INJECTION INTRAVENOUS
Status: DISCONTINUED | OUTPATIENT
Start: 2020-01-01 | End: 2020-01-01 | Stop reason: HOSPADM

## 2020-01-01 RX ORDER — CALCIUM CHLORIDE INJECTION 100 MG/ML
INJECTION, SOLUTION INTRAVENOUS
Status: COMPLETED | OUTPATIENT
Start: 2020-01-01 | End: 2020-01-01

## 2020-01-01 RX ORDER — CHLORHEXIDINE GLUCONATE 1.2 MG/ML
10 RINSE ORAL EVERY 12 HOURS
Status: DISCONTINUED | OUTPATIENT
Start: 2020-01-01 | End: 2020-01-01 | Stop reason: HOSPADM

## 2020-01-01 RX ORDER — DEXTROSE 50 % IN WATER (D50W) INTRAVENOUS SYRINGE
Status: COMPLETED | OUTPATIENT
Start: 2020-01-01 | End: 2020-01-01

## 2020-01-01 RX ORDER — PROPOFOL 10 MG/ML
INJECTION, EMULSION INTRAVENOUS
Status: COMPLETED
Start: 2020-01-01 | End: 2020-01-01

## 2020-01-01 RX ORDER — PROPOFOL 10 MG/ML
0-50 VIAL (ML) INTRAVENOUS
Status: DISCONTINUED | OUTPATIENT
Start: 2020-01-01 | End: 2020-01-01

## 2020-01-01 RX ORDER — WATER FOR INJECTION,STERILE
VIAL (ML) INJECTION
Status: DISPENSED
Start: 2020-01-01 | End: 2020-01-01

## 2020-01-01 RX ORDER — PROPOFOL 10 MG/ML
0-50 VIAL (ML) INTRAVENOUS
Status: ACTIVE | OUTPATIENT
Start: 2020-01-01 | End: 2020-01-01

## 2020-01-01 RX ORDER — NALOXONE HYDROCHLORIDE 0.4 MG/ML
0.4 INJECTION, SOLUTION INTRAMUSCULAR; INTRAVENOUS; SUBCUTANEOUS AS NEEDED
Status: DISCONTINUED | OUTPATIENT
Start: 2020-01-01 | End: 2020-01-01

## 2020-01-01 RX ORDER — PROPOFOL 10 MG/ML
INJECTION, EMULSION INTRAVENOUS
Status: DISPENSED
Start: 2020-01-01 | End: 2020-01-01

## 2020-01-01 RX ORDER — LIDOCAINE HYDROCHLORIDE 10 MG/ML
INJECTION INFILTRATION; PERINEURAL AS NEEDED
Status: DISCONTINUED | OUTPATIENT
Start: 2020-01-01 | End: 2020-01-01 | Stop reason: HOSPADM

## 2020-01-01 RX ORDER — EPINEPHRINE 0.1 MG/ML
INJECTION INTRACARDIAC; INTRAVENOUS
Status: COMPLETED | OUTPATIENT
Start: 2020-01-01 | End: 2020-01-01

## 2020-01-01 RX ORDER — ONDANSETRON 2 MG/ML
4 INJECTION INTRAMUSCULAR; INTRAVENOUS
Status: DISCONTINUED | OUTPATIENT
Start: 2020-01-01 | End: 2020-01-01

## 2020-01-01 RX ORDER — SODIUM BICARBONATE 1 MEQ/ML
SYRINGE (ML) INTRAVENOUS
Status: DISPENSED
Start: 2020-01-01 | End: 2020-01-01

## 2020-01-01 RX ORDER — DOBUTAMINE HYDROCHLORIDE 200 MG/100ML
2 INJECTION INTRAVENOUS CONTINUOUS
Status: DISCONTINUED | OUTPATIENT
Start: 2020-01-01 | End: 2020-01-01

## 2020-01-01 RX ORDER — LORAZEPAM 2 MG/ML
2 INJECTION INTRAMUSCULAR
Status: DISCONTINUED | OUTPATIENT
Start: 2020-01-01 | End: 2020-01-01 | Stop reason: HOSPADM

## 2020-01-01 RX ORDER — INSULIN GLARGINE 100 [IU]/ML
1-50 INJECTION, SOLUTION SUBCUTANEOUS
Status: ACTIVE | OUTPATIENT
Start: 2020-01-01 | End: 2020-01-01

## 2020-01-01 RX ORDER — ROCURONIUM BROMIDE 10 MG/ML
INJECTION, SOLUTION INTRAVENOUS
Status: DISPENSED
Start: 2020-01-01 | End: 2020-01-01

## 2020-01-01 RX ORDER — ALBUMIN HUMAN 50 G/1000ML
SOLUTION INTRAVENOUS
Status: COMPLETED | OUTPATIENT
Start: 2020-01-01 | End: 2020-01-01

## 2020-01-01 RX ORDER — ACETYLCYSTEINE 200 MG/ML
SOLUTION ORAL; RESPIRATORY (INHALATION)
Status: DISPENSED
Start: 2020-01-01 | End: 2020-01-01

## 2020-01-01 RX ORDER — CALCIUM CHLORIDE INJECTION 100 MG/ML
2 INJECTION, SOLUTION INTRAVENOUS ONCE
Status: COMPLETED | OUTPATIENT
Start: 2020-01-01 | End: 2020-01-01

## 2020-01-01 RX ORDER — LANOLIN ALCOHOL/MO/W.PET/CERES
400 CREAM (GRAM) TOPICAL 2 TIMES DAILY
Status: DISCONTINUED | OUTPATIENT
Start: 2020-01-01 | End: 2020-01-01

## 2020-01-01 RX ORDER — PROTAMINE SULFATE 10 MG/ML
INJECTION, SOLUTION INTRAVENOUS AS NEEDED
Status: DISCONTINUED | OUTPATIENT
Start: 2020-01-01 | End: 2020-01-01 | Stop reason: HOSPADM

## 2020-01-01 RX ORDER — LIDOCAINE HYDROCHLORIDE AND EPINEPHRINE 10; 10 MG/ML; UG/ML
1.5 INJECTION, SOLUTION INFILTRATION; PERINEURAL ONCE
Status: COMPLETED | OUTPATIENT
Start: 2020-01-01 | End: 2020-01-01

## 2020-01-01 RX ORDER — CEFEPIME HYDROCHLORIDE 1 G/50ML
1 INJECTION, SOLUTION INTRAVENOUS EVERY 24 HOURS
Status: DISCONTINUED | OUTPATIENT
Start: 2020-01-01 | End: 2020-01-01 | Stop reason: CLARIF

## 2020-01-01 RX ORDER — HEPARIN SODIUM 1000 [USP'U]/ML
1400 INJECTION, SOLUTION INTRAVENOUS; SUBCUTANEOUS ONCE
Status: COMPLETED | OUTPATIENT
Start: 2020-01-01 | End: 2020-01-01

## 2020-01-01 RX ORDER — METRONIDAZOLE 500 MG/100ML
500 INJECTION, SOLUTION INTRAVENOUS EVERY 12 HOURS
Status: DISCONTINUED | OUTPATIENT
Start: 2020-01-01 | End: 2020-01-01

## 2020-01-01 RX ORDER — LIDOCAINE HYDROCHLORIDE 10 MG/ML
INJECTION, SOLUTION EPIDURAL; INFILTRATION; INTRACAUDAL; PERINEURAL
Status: DISPENSED
Start: 2020-01-01 | End: 2020-01-01

## 2020-01-01 RX ORDER — MAGNESIUM SULFATE 100 %
4 CRYSTALS MISCELLANEOUS AS NEEDED
Status: DISCONTINUED | OUTPATIENT
Start: 2020-01-01 | End: 2020-01-01

## 2020-01-01 RX ORDER — BUMETANIDE 0.25 MG/ML
1 INJECTION INTRAMUSCULAR; INTRAVENOUS ONCE
Status: COMPLETED | OUTPATIENT
Start: 2020-01-01 | End: 2020-01-01

## 2020-01-01 RX ORDER — GUAIFENESIN 100 MG/5ML
81 LIQUID (ML) ORAL DAILY
Status: DISCONTINUED | OUTPATIENT
Start: 2020-01-01 | End: 2020-01-01

## 2020-01-01 RX ORDER — SODIUM CHLORIDE 9 MG/ML
9 INJECTION, SOLUTION INTRAVENOUS CONTINUOUS
Status: DISCONTINUED | OUTPATIENT
Start: 2020-01-01 | End: 2020-01-01 | Stop reason: ALTCHOICE

## 2020-01-01 RX ORDER — HEPARIN SODIUM 10000 [USP'U]/100ML
INJECTION, SOLUTION INTRAVENOUS
Status: DISCONTINUED | OUTPATIENT
Start: 2020-01-01 | End: 2020-01-01

## 2020-01-01 RX ORDER — FUROSEMIDE 10 MG/ML
40 INJECTION INTRAMUSCULAR; INTRAVENOUS ONCE
Status: COMPLETED | OUTPATIENT
Start: 2020-01-01 | End: 2020-01-01

## 2020-01-01 RX ADMIN — PROPOFOL 30 MCG/KG/MIN: 10 INJECTION, EMULSION INTRAVENOUS at 13:24

## 2020-01-01 RX ADMIN — FUROSEMIDE 40 MG: 10 INJECTION, SOLUTION INTRAMUSCULAR; INTRAVENOUS at 12:14

## 2020-01-01 RX ADMIN — ALBUTEROL SULFATE 2.5 MG: 2.5 SOLUTION RESPIRATORY (INHALATION) at 02:15

## 2020-01-01 RX ADMIN — HUMAN INSULIN 20 UNITS: 100 INJECTION, SUSPENSION SUBCUTANEOUS at 10:33

## 2020-01-01 RX ADMIN — HEPARIN SODIUM 11.3 ML/HR: 5000 INJECTION, SOLUTION INTRAVENOUS at 18:46

## 2020-01-01 RX ADMIN — CHLORHEXIDINE GLUCONATE 0.12% ORAL RINSE 10 ML: 1.2 LIQUID ORAL at 21:18

## 2020-01-01 RX ADMIN — SODIUM CHLORIDE 2.1 UNITS/HR: 9 INJECTION, SOLUTION INTRAVENOUS at 18:36

## 2020-01-01 RX ADMIN — DEXMEDETOMIDINE 0.7 MCG/KG/HR: 100 INJECTION, SOLUTION, CONCENTRATE INTRAVENOUS at 00:35

## 2020-01-01 RX ADMIN — ALBUMIN (HUMAN) 12.5 G: 12.5 INJECTION, SOLUTION INTRAVENOUS at 09:35

## 2020-01-01 RX ADMIN — Medication 10 ML: at 15:28

## 2020-01-01 RX ADMIN — ROCURONIUM BROMIDE 15 MG: 10 INJECTION INTRAVENOUS at 15:40

## 2020-01-01 RX ADMIN — EPINEPHRINE 1 MG: 0.1 INJECTION, SOLUTION ENDOTRACHEAL; INTRACARDIAC; INTRAVENOUS at 15:10

## 2020-01-01 RX ADMIN — MUPIROCIN: 20 OINTMENT TOPICAL at 19:08

## 2020-01-01 RX ADMIN — ALBUTEROL SULFATE 2.5 MG: 2.5 SOLUTION RESPIRATORY (INHALATION) at 14:30

## 2020-01-01 RX ADMIN — PHENYLEPHRINE HYDROCHLORIDE 60 MCG/MIN: 10 INJECTION INTRAVENOUS at 14:36

## 2020-01-01 RX ADMIN — PROPOFOL 50 MCG/KG/MIN: 10 INJECTION, EMULSION INTRAVENOUS at 15:00

## 2020-01-01 RX ADMIN — Medication 50 MCG/HR: at 09:19

## 2020-01-01 RX ADMIN — DEXTROSE MONOHYDRATE 8 ML: 25 INJECTION, SOLUTION INTRAVENOUS at 16:37

## 2020-01-01 RX ADMIN — ALBUTEROL SULFATE 2.5 MG: 2.5 SOLUTION RESPIRATORY (INHALATION) at 01:05

## 2020-01-01 RX ADMIN — ACETYLCYSTEINE 200 MG: 200 SOLUTION ORAL; RESPIRATORY (INHALATION) at 19:39

## 2020-01-01 RX ADMIN — SODIUM CHLORIDE 40 MG: 9 INJECTION, SOLUTION INTRAMUSCULAR; INTRAVENOUS; SUBCUTANEOUS at 08:39

## 2020-01-01 RX ADMIN — MIDAZOLAM HYDROCHLORIDE 2 MG: 1 INJECTION, SOLUTION INTRAMUSCULAR; INTRAVENOUS at 18:21

## 2020-01-01 RX ADMIN — ACETYLCYSTEINE 200 MG: 200 SOLUTION ORAL; RESPIRATORY (INHALATION) at 19:28

## 2020-01-01 RX ADMIN — TICAGRELOR 90 MG: 90 TABLET ORAL at 05:30

## 2020-01-01 RX ADMIN — CHLORHEXIDINE GLUCONATE 0.12% ORAL RINSE 10 ML: 1.2 LIQUID ORAL at 21:48

## 2020-01-01 RX ADMIN — ACETYLCYSTEINE 200 MG: 200 SOLUTION ORAL; RESPIRATORY (INHALATION) at 15:01

## 2020-01-01 RX ADMIN — DOBUTAMINE HYDROCHLORIDE 3 MCG/KG/MIN: 200 INJECTION INTRAVENOUS at 16:19

## 2020-01-01 RX ADMIN — SODIUM CHLORIDE, POTASSIUM CHLORIDE, SODIUM LACTATE AND CALCIUM CHLORIDE: 600; 310; 30; 20 INJECTION, SOLUTION INTRAVENOUS at 15:24

## 2020-01-01 RX ADMIN — CHLORHEXIDINE GLUCONATE 0.12% ORAL RINSE 10 ML: 1.2 LIQUID ORAL at 09:07

## 2020-01-01 RX ADMIN — SODIUM CHLORIDE 40 MG: 9 INJECTION, SOLUTION INTRAMUSCULAR; INTRAVENOUS; SUBCUTANEOUS at 09:46

## 2020-01-01 RX ADMIN — Medication 10 ML: at 21:23

## 2020-01-01 RX ADMIN — CHLORHEXIDINE GLUCONATE 0.12% ORAL RINSE 10 ML: 1.2 LIQUID ORAL at 08:33

## 2020-01-01 RX ADMIN — PROPOFOL 40 MCG/KG/MIN: 10 INJECTION, EMULSION INTRAVENOUS at 20:15

## 2020-01-01 RX ADMIN — CHLORHEXIDINE GLUCONATE 0.12% ORAL RINSE 10 ML: 1.2 LIQUID ORAL at 20:07

## 2020-01-01 RX ADMIN — Medication 10 ML: at 22:00

## 2020-01-01 RX ADMIN — TICAGRELOR 90 MG: 90 TABLET ORAL at 04:17

## 2020-01-01 RX ADMIN — SODIUM CHLORIDE 40 MG: 9 INJECTION, SOLUTION INTRAMUSCULAR; INTRAVENOUS; SUBCUTANEOUS at 09:08

## 2020-01-01 RX ADMIN — PIPERACILLIN AND TAZOBACTAM 3.38 G: 3; .375 INJECTION, POWDER, LYOPHILIZED, FOR SOLUTION INTRAVENOUS at 01:48

## 2020-01-01 RX ADMIN — ROCURONIUM BROMIDE 50 MG: 10 INJECTION INTRAVENOUS at 08:45

## 2020-01-01 RX ADMIN — PROPOFOL 10 MCG/KG/MIN: 10 INJECTION, EMULSION INTRAVENOUS at 02:37

## 2020-01-01 RX ADMIN — ACETYLCYSTEINE 200 MG: 200 SOLUTION ORAL; RESPIRATORY (INHALATION) at 01:30

## 2020-01-01 RX ADMIN — AMIODARONE HYDROCHLORIDE 0.5 MG/MIN: 50 INJECTION, SOLUTION INTRAVENOUS at 06:00

## 2020-01-01 RX ADMIN — INSULIN LISPRO 5 UNITS: 100 INJECTION, SOLUTION INTRAVENOUS; SUBCUTANEOUS at 18:28

## 2020-01-01 RX ADMIN — SODIUM CHLORIDE 2.7 UNITS/HR: 9 INJECTION, SOLUTION INTRAVENOUS at 08:25

## 2020-01-01 RX ADMIN — MAGNESIUM OXIDE 400 MG (241.3 MG MAGNESIUM) TABLET 400 MG: TABLET at 09:22

## 2020-01-01 RX ADMIN — AMIODARONE HYDROCHLORIDE 0.5 MG/MIN: 50 INJECTION, SOLUTION INTRAVENOUS at 03:41

## 2020-01-01 RX ADMIN — SODIUM CHLORIDE 40 MG: 9 INJECTION, SOLUTION INTRAMUSCULAR; INTRAVENOUS; SUBCUTANEOUS at 10:59

## 2020-01-01 RX ADMIN — INSULIN LISPRO 3 UNITS: 100 INJECTION, SOLUTION INTRAVENOUS; SUBCUTANEOUS at 17:51

## 2020-01-01 RX ADMIN — PROPOFOL 40 MCG/KG/MIN: 10 INJECTION, EMULSION INTRAVENOUS at 06:04

## 2020-01-01 RX ADMIN — CHLORHEXIDINE GLUCONATE 0.12% ORAL RINSE 10 ML: 1.2 LIQUID ORAL at 09:27

## 2020-01-01 RX ADMIN — BIVALIRUDIN 0.26 MG/KG/HR: 250 INJECTION, POWDER, LYOPHILIZED, FOR SOLUTION INTRAVENOUS at 13:02

## 2020-01-01 RX ADMIN — ALBUTEROL SULFATE 2.5 MG: 2.5 SOLUTION RESPIRATORY (INHALATION) at 19:35

## 2020-01-01 RX ADMIN — PROPOFOL 30 MCG/KG/MIN: 10 INJECTION, EMULSION INTRAVENOUS at 23:00

## 2020-01-01 RX ADMIN — POTASSIUM CHLORIDE 20 MEQ: 29.8 INJECTION, SOLUTION INTRAVENOUS at 08:39

## 2020-01-01 RX ADMIN — CASTOR OIL AND BALSAM, PERU: 788; 87 OINTMENT TOPICAL at 17:36

## 2020-01-01 RX ADMIN — MUPIROCIN: 20 OINTMENT TOPICAL at 20:58

## 2020-01-01 RX ADMIN — INSULIN LISPRO 2 UNITS: 100 INJECTION, SOLUTION INTRAVENOUS; SUBCUTANEOUS at 00:58

## 2020-01-01 RX ADMIN — POTASSIUM CHLORIDE 20 MEQ: 29.8 INJECTION, SOLUTION INTRAVENOUS at 10:47

## 2020-01-01 RX ADMIN — ALBUTEROL SULFATE 2.5 MG: 2.5 SOLUTION RESPIRATORY (INHALATION) at 19:38

## 2020-01-01 RX ADMIN — CALCIUM CHLORIDE, MAGNESIUM CHLORIDE, DEXTROSE MONOHYDRATE, LACTIC ACID, SODIUM CHLORIDE, SODIUM BICARBONATE AND POTASSIUM CHLORIDE 2000 ML/HR: 3.68; 3.05; 22; 5.4; 6.46; 3.09; .314 INJECTION INTRAVENOUS at 18:53

## 2020-01-01 RX ADMIN — AMIODARONE HYDROCHLORIDE 0.5 MG/MIN: 50 INJECTION, SOLUTION INTRAVENOUS at 16:09

## 2020-01-01 RX ADMIN — EPINEPHRINE 1 MG: 0.1 INJECTION, SOLUTION ENDOTRACHEAL; INTRACARDIAC; INTRAVENOUS at 15:15

## 2020-01-01 RX ADMIN — ALBUMIN (HUMAN) 12.5 G: 12.5 INJECTION, SOLUTION INTRAVENOUS at 23:48

## 2020-01-01 RX ADMIN — MUPIROCIN: 20 OINTMENT TOPICAL at 18:05

## 2020-01-01 RX ADMIN — PHENYLEPHRINE HYDROCHLORIDE 100 MCG/MIN: 10 INJECTION INTRAVENOUS at 01:22

## 2020-01-01 RX ADMIN — FENTANYL CITRATE 50 MCG: 50 INJECTION, SOLUTION INTRAMUSCULAR; INTRAVENOUS at 13:32

## 2020-01-01 RX ADMIN — TICAGRELOR 90 MG: 90 TABLET ORAL at 17:21

## 2020-01-01 RX ADMIN — PROPOFOL 45 MCG/KG/MIN: 10 INJECTION, EMULSION INTRAVENOUS at 01:45

## 2020-01-01 RX ADMIN — FENTANYL CITRATE 50 MCG: 50 INJECTION, SOLUTION INTRAMUSCULAR; INTRAVENOUS at 16:11

## 2020-01-01 RX ADMIN — SODIUM BICARBONATE 50 MEQ: 84 INJECTION, SOLUTION INTRAVENOUS at 16:07

## 2020-01-01 RX ADMIN — WATER 2 G: 1 INJECTION INTRAMUSCULAR; INTRAVENOUS; SUBCUTANEOUS at 06:04

## 2020-01-01 RX ADMIN — ACETAMINOPHEN ORAL SOLUTION 650 MG: 650 SOLUTION ORAL at 18:45

## 2020-01-01 RX ADMIN — CHLORHEXIDINE GLUCONATE 0.12% ORAL RINSE 10 ML: 1.2 LIQUID ORAL at 08:10

## 2020-01-01 RX ADMIN — SODIUM CHLORIDE, PRESERVATIVE FREE 500 UNITS: 5 INJECTION INTRAVENOUS at 14:37

## 2020-01-01 RX ADMIN — PIPERACILLIN AND TAZOBACTAM 3.38 G: 3; .375 INJECTION, POWDER, LYOPHILIZED, FOR SOLUTION INTRAVENOUS at 20:21

## 2020-01-01 RX ADMIN — CASTOR OIL AND BALSAM, PERU: 788; 87 OINTMENT TOPICAL at 19:07

## 2020-01-01 RX ADMIN — SODIUM CHLORIDE 200 MG: 900 INJECTION, SOLUTION INTRAVENOUS at 15:35

## 2020-01-01 RX ADMIN — OXYCODONE 10 MG: 5 TABLET ORAL at 05:43

## 2020-01-01 RX ADMIN — ALBUMIN (HUMAN) 12.5 G: 12.5 INJECTION, SOLUTION INTRAVENOUS at 19:05

## 2020-01-01 RX ADMIN — SODIUM CHLORIDE 1 G: 900 INJECTION, SOLUTION INTRAVENOUS at 23:16

## 2020-01-01 RX ADMIN — Medication 10 ML: at 13:08

## 2020-01-01 RX ADMIN — Medication 30 MCG/KG/MIN: at 13:30

## 2020-01-01 RX ADMIN — Medication 75 MCG/HR: at 06:25

## 2020-01-01 RX ADMIN — POTASSIUM CHLORIDE 20 MEQ: 29.8 INJECTION, SOLUTION INTRAVENOUS at 18:04

## 2020-01-01 RX ADMIN — AMIODARONE HYDROCHLORIDE 0.5 MG/MIN: 50 INJECTION, SOLUTION INTRAVENOUS at 12:45

## 2020-01-01 RX ADMIN — Medication 10 ML: at 05:39

## 2020-01-01 RX ADMIN — Medication 2 MCG/MIN: at 00:36

## 2020-01-01 RX ADMIN — CALCIUM CHLORIDE, MAGNESIUM CHLORIDE, DEXTROSE MONOHYDRATE, LACTIC ACID, SODIUM CHLORIDE, SODIUM BICARBONATE AND POTASSIUM CHLORIDE 2000 ML/HR: 3.68; 3.05; 22; 5.4; 6.46; 3.09; .314 INJECTION INTRAVENOUS at 15:18

## 2020-01-01 RX ADMIN — PHENYLEPHRINE HYDROCHLORIDE 100 MCG/MIN: 10 INJECTION INTRAVENOUS at 10:00

## 2020-01-01 RX ADMIN — CALCIUM CHLORIDE, MAGNESIUM CHLORIDE, DEXTROSE MONOHYDRATE, LACTIC ACID, SODIUM CHLORIDE, SODIUM BICARBONATE AND POTASSIUM CHLORIDE 2000 ML/HR: 3.68; 3.05; 22; 5.4; 6.46; 3.09; .314 INJECTION INTRAVENOUS at 10:55

## 2020-01-01 RX ADMIN — DEXMEDETOMIDINE 0.4 MCG/KG/HR: 100 INJECTION, SOLUTION, CONCENTRATE INTRAVENOUS at 19:33

## 2020-01-01 RX ADMIN — Medication 1 MCG/MIN: at 01:22

## 2020-01-01 RX ADMIN — HUMAN INSULIN 5 UNITS: 100 INJECTION, SOLUTION SUBCUTANEOUS at 16:43

## 2020-01-01 RX ADMIN — SODIUM CHLORIDE 33 UNITS/HR: 9 INJECTION, SOLUTION INTRAVENOUS at 07:01

## 2020-01-01 RX ADMIN — ALBUMIN (HUMAN) 12.5 G: 12.5 INJECTION, SOLUTION INTRAVENOUS at 06:36

## 2020-01-01 RX ADMIN — PROPOFOL 40 MCG/KG/MIN: 10 INJECTION, EMULSION INTRAVENOUS at 00:12

## 2020-01-01 RX ADMIN — PROPOFOL 40 MCG/KG/MIN: 10 INJECTION, EMULSION INTRAVENOUS at 22:23

## 2020-01-01 RX ADMIN — HUMAN INSULIN 20 UNITS: 100 INJECTION, SUSPENSION SUBCUTANEOUS at 07:30

## 2020-01-01 RX ADMIN — MORPHINE SULFATE 4 MG: 2 INJECTION, SOLUTION INTRAMUSCULAR; INTRAVENOUS at 11:17

## 2020-01-01 RX ADMIN — PROPOFOL 30 MCG/KG/MIN: 10 INJECTION, EMULSION INTRAVENOUS at 15:00

## 2020-01-01 RX ADMIN — INSULIN LISPRO 2 UNITS: 100 INJECTION, SOLUTION INTRAVENOUS; SUBCUTANEOUS at 06:26

## 2020-01-01 RX ADMIN — PROPOFOL 30 MCG/KG/MIN: 10 INJECTION, EMULSION INTRAVENOUS at 13:30

## 2020-01-01 RX ADMIN — CALCIUM CHLORIDE, MAGNESIUM CHLORIDE, DEXTROSE MONOHYDRATE, LACTIC ACID, SODIUM CHLORIDE, SODIUM BICARBONATE AND POTASSIUM CHLORIDE 2000 ML/HR: 3.68; 3.05; 22; 5.4; 6.46; 3.09; .314 INJECTION INTRAVENOUS at 10:07

## 2020-01-01 RX ADMIN — TICAGRELOR 90 MG: 90 TABLET ORAL at 17:31

## 2020-01-01 RX ADMIN — PROPOFOL 30 MCG/KG/MIN: 10 INJECTION, EMULSION INTRAVENOUS at 18:01

## 2020-01-01 RX ADMIN — AMIODARONE HYDROCHLORIDE 150 MG: 1.5 INJECTION, SOLUTION INTRAVENOUS at 10:56

## 2020-01-01 RX ADMIN — Medication 20 ML: at 21:40

## 2020-01-01 RX ADMIN — PHENYLEPHRINE HYDROCHLORIDE 100 MCG/MIN: 10 INJECTION INTRAVENOUS at 11:17

## 2020-01-01 RX ADMIN — INSULIN LISPRO 3 UNITS: 100 INJECTION, SOLUTION INTRAVENOUS; SUBCUTANEOUS at 23:40

## 2020-01-01 RX ADMIN — MUPIROCIN: 20 OINTMENT TOPICAL at 18:41

## 2020-01-01 RX ADMIN — METRONIDAZOLE 500 MG: 500 INJECTION, SOLUTION INTRAVENOUS at 21:14

## 2020-01-01 RX ADMIN — EPINEPHRINE 9 MCG/MIN: 1 INJECTION INTRAMUSCULAR; INTRAVENOUS; SUBCUTANEOUS at 03:41

## 2020-01-01 RX ADMIN — CALCIUM CHLORIDE, MAGNESIUM CHLORIDE, DEXTROSE MONOHYDRATE, LACTIC ACID, SODIUM CHLORIDE, SODIUM BICARBONATE AND POTASSIUM CHLORIDE 2000 ML/HR: 3.68; 3.05; 22; 5.4; 6.46; 3.09; .314 INJECTION INTRAVENOUS at 18:18

## 2020-01-01 RX ADMIN — CASTOR OIL AND BALSAM, PERU: 788; 87 OINTMENT TOPICAL at 17:52

## 2020-01-01 RX ADMIN — PHENYLEPHRINE HYDROCHLORIDE 100 MCG/MIN: 10 INJECTION INTRAVENOUS at 23:43

## 2020-01-01 RX ADMIN — CALCIUM CHLORIDE, MAGNESIUM CHLORIDE, DEXTROSE MONOHYDRATE, LACTIC ACID, SODIUM CHLORIDE, SODIUM BICARBONATE AND POTASSIUM CHLORIDE 2000 ML/HR: 3.68; 3.05; 22; 5.4; 6.46; 3.09; .314 INJECTION INTRAVENOUS at 08:00

## 2020-01-01 RX ADMIN — PROPOFOL 30 MCG/KG/MIN: 10 INJECTION, EMULSION INTRAVENOUS at 09:19

## 2020-01-01 RX ADMIN — ACETYLCYSTEINE 200 MG: 200 SOLUTION ORAL; RESPIRATORY (INHALATION) at 08:01

## 2020-01-01 RX ADMIN — SODIUM BICARBONATE 50 MEQ: 84 INJECTION, SOLUTION INTRAVENOUS at 21:39

## 2020-01-01 RX ADMIN — HEPARIN SODIUM 1600 UNITS/HR: 10000 INJECTION, SOLUTION INTRAVENOUS at 09:14

## 2020-01-01 RX ADMIN — WATER 2 G: 1 INJECTION INTRAMUSCULAR; INTRAVENOUS; SUBCUTANEOUS at 23:41

## 2020-01-01 RX ADMIN — CALCIUM CHLORIDE, MAGNESIUM CHLORIDE, DEXTROSE MONOHYDRATE, LACTIC ACID, SODIUM CHLORIDE, SODIUM BICARBONATE AND POTASSIUM CHLORIDE 2000 ML/HR: 3.68; 3.05; 22; 5.4; 6.46; 3.09; .314 INJECTION INTRAVENOUS at 05:48

## 2020-01-01 RX ADMIN — INSULIN LISPRO 5 UNITS: 100 INJECTION, SOLUTION INTRAVENOUS; SUBCUTANEOUS at 13:19

## 2020-01-01 RX ADMIN — MAGNESIUM OXIDE 400 MG (241.3 MG MAGNESIUM) TABLET 400 MG: TABLET at 08:16

## 2020-01-01 RX ADMIN — HEPARIN SODIUM 2500 UNITS: 1000 INJECTION, SOLUTION INTRAVENOUS; SUBCUTANEOUS at 14:25

## 2020-01-01 RX ADMIN — OXYCODONE 5 MG: 5 TABLET ORAL at 20:07

## 2020-01-01 RX ADMIN — DOBUTAMINE HYDROCHLORIDE 3 MCG/KG/MIN: 200 INJECTION INTRAVENOUS at 20:52

## 2020-01-01 RX ADMIN — PIPERACILLIN AND TAZOBACTAM 3.38 G: 3; .375 INJECTION, POWDER, LYOPHILIZED, FOR SOLUTION INTRAVENOUS at 14:37

## 2020-01-01 RX ADMIN — SODIUM CHLORIDE, POTASSIUM CHLORIDE, SODIUM LACTATE AND CALCIUM CHLORIDE: 600; 310; 30; 20 INJECTION, SOLUTION INTRAVENOUS at 12:58

## 2020-01-01 RX ADMIN — HEPARIN SODIUM 1200 UNITS/HR: 10000 INJECTION, SOLUTION INTRAVENOUS at 00:00

## 2020-01-01 RX ADMIN — CASTOR OIL AND BALSAM, PERU: 788; 87 OINTMENT TOPICAL at 09:27

## 2020-01-01 RX ADMIN — POTASSIUM CHLORIDE 20 MEQ: 29.8 INJECTION, SOLUTION INTRAVENOUS at 21:01

## 2020-01-01 RX ADMIN — ACETAMINOPHEN 650 MG: 325 TABLET ORAL at 10:12

## 2020-01-01 RX ADMIN — AMIODARONE HYDROCHLORIDE 150 MG: 50 INJECTION, SOLUTION INTRAVENOUS at 16:22

## 2020-01-01 RX ADMIN — HEPARIN SODIUM 7500 UNITS: 1000 INJECTION, SOLUTION INTRAVENOUS; SUBCUTANEOUS at 14:00

## 2020-01-01 RX ADMIN — MUPIROCIN: 20 OINTMENT TOPICAL at 08:36

## 2020-01-01 RX ADMIN — SODIUM CHLORIDE 10 ML/HR: 450 INJECTION, SOLUTION INTRAVENOUS at 01:15

## 2020-01-01 RX ADMIN — SODIUM CHLORIDE 1 G: 900 INJECTION, SOLUTION INTRAVENOUS at 21:39

## 2020-01-01 RX ADMIN — BUMETANIDE 1 MG: 0.25 INJECTION INTRAMUSCULAR; INTRAVENOUS at 15:29

## 2020-01-01 RX ADMIN — ROCURONIUM BROMIDE 50 MG: 10 INJECTION INTRAVENOUS at 15:25

## 2020-01-01 RX ADMIN — POTASSIUM CHLORIDE 20 MEQ: 29.8 INJECTION, SOLUTION INTRAVENOUS at 19:22

## 2020-01-01 RX ADMIN — ACETAMINOPHEN 650 MG: 325 TABLET ORAL at 02:17

## 2020-01-01 RX ADMIN — PHENYLEPHRINE HYDROCHLORIDE 50 MCG/MIN: 10 INJECTION INTRAVENOUS at 20:55

## 2020-01-01 RX ADMIN — PROPOFOL 45 MCG/KG/MIN: 10 INJECTION, EMULSION INTRAVENOUS at 02:23

## 2020-01-01 RX ADMIN — INSULIN LISPRO 2 UNITS: 100 INJECTION, SOLUTION INTRAVENOUS; SUBCUTANEOUS at 11:49

## 2020-01-01 RX ADMIN — METRONIDAZOLE 500 MG: 500 INJECTION, SOLUTION INTRAVENOUS at 11:49

## 2020-01-01 RX ADMIN — FENTANYL CITRATE 50 MCG: 50 INJECTION, SOLUTION INTRAMUSCULAR; INTRAVENOUS at 21:17

## 2020-01-01 RX ADMIN — Medication 10 ML: at 16:26

## 2020-01-01 RX ADMIN — CASTOR OIL AND BALSAM, PERU: 788; 87 OINTMENT TOPICAL at 10:20

## 2020-01-01 RX ADMIN — NOREPINEPHRINE BITARTRATE 20 MCG/MIN: 1 INJECTION, SOLUTION, CONCENTRATE INTRAVENOUS at 15:24

## 2020-01-01 RX ADMIN — TICAGRELOR 90 MG: 90 TABLET ORAL at 17:00

## 2020-01-01 RX ADMIN — AMIODARONE HYDROCHLORIDE 0.5 MG/MIN: 50 INJECTION, SOLUTION INTRAVENOUS at 06:52

## 2020-01-01 RX ADMIN — ALBUMIN (HUMAN) 12.5 G: 12.5 INJECTION, SOLUTION INTRAVENOUS at 17:59

## 2020-01-01 RX ADMIN — SODIUM CHLORIDE 40 MG: 9 INJECTION, SOLUTION INTRAMUSCULAR; INTRAVENOUS; SUBCUTANEOUS at 09:22

## 2020-01-01 RX ADMIN — DEXMEDETOMIDINE 0.2 MCG/KG/HR: 100 INJECTION, SOLUTION, CONCENTRATE INTRAVENOUS at 00:17

## 2020-01-01 RX ADMIN — PROPOFOL 40 MCG/KG/MIN: 10 INJECTION, EMULSION INTRAVENOUS at 08:35

## 2020-01-01 RX ADMIN — EPINEPHRINE 1 MG: 0.1 INJECTION, SOLUTION ENDOTRACHEAL; INTRACARDIAC; INTRAVENOUS at 14:55

## 2020-01-01 RX ADMIN — ACETYLCYSTEINE 200 MG: 200 SOLUTION ORAL; RESPIRATORY (INHALATION) at 02:45

## 2020-01-01 RX ADMIN — Medication 10 ML: at 14:02

## 2020-01-01 RX ADMIN — CALCIUM CHLORIDE, MAGNESIUM CHLORIDE, DEXTROSE MONOHYDRATE, LACTIC ACID, SODIUM CHLORIDE, SODIUM BICARBONATE AND POTASSIUM CHLORIDE 2000 ML/HR: 3.68; 3.05; 22; 5.4; 6.46; 3.09; .314 INJECTION INTRAVENOUS at 11:24

## 2020-01-01 RX ADMIN — EPINEPHRINE 10 MCG/MIN: 1 INJECTION INTRAMUSCULAR; INTRAVENOUS; SUBCUTANEOUS at 18:22

## 2020-01-01 RX ADMIN — LORAZEPAM 2 MG: 2 INJECTION INTRAMUSCULAR; INTRAVENOUS at 10:49

## 2020-01-01 RX ADMIN — SODIUM BICARBONATE 50 MEQ: 84 INJECTION, SOLUTION INTRAVENOUS at 15:09

## 2020-01-01 RX ADMIN — SODIUM CHLORIDE 1 G: 900 INJECTION, SOLUTION INTRAVENOUS at 18:22

## 2020-01-01 RX ADMIN — CALCIUM CHLORIDE, MAGNESIUM CHLORIDE, DEXTROSE MONOHYDRATE, LACTIC ACID, SODIUM CHLORIDE, SODIUM BICARBONATE AND POTASSIUM CHLORIDE 2000 ML/HR: 3.68; 3.05; 22; 5.4; 6.46; 3.09; .314 INJECTION INTRAVENOUS at 03:40

## 2020-01-01 RX ADMIN — WATER 2 G: 1 INJECTION INTRAMUSCULAR; INTRAVENOUS; SUBCUTANEOUS at 23:51

## 2020-01-01 RX ADMIN — ALBUTEROL SULFATE 2.5 MG: 2.5 SOLUTION RESPIRATORY (INHALATION) at 01:34

## 2020-01-01 RX ADMIN — PROPOFOL 50 MCG/KG/MIN: 10 INJECTION, EMULSION INTRAVENOUS at 23:14

## 2020-01-01 RX ADMIN — FENTANYL CITRATE 50 MCG: 50 INJECTION, SOLUTION INTRAMUSCULAR; INTRAVENOUS at 15:24

## 2020-01-01 RX ADMIN — MAGNESIUM OXIDE 400 MG (241.3 MG MAGNESIUM) TABLET 400 MG: TABLET at 18:08

## 2020-01-01 RX ADMIN — PROPOFOL 50 MCG/KG/MIN: 10 INJECTION, EMULSION INTRAVENOUS at 12:44

## 2020-01-01 RX ADMIN — INSULIN LISPRO 2 UNITS: 100 INJECTION, SOLUTION INTRAVENOUS; SUBCUTANEOUS at 00:36

## 2020-01-01 RX ADMIN — Medication 10 ML: at 18:06

## 2020-01-01 RX ADMIN — AMIODARONE HYDROCHLORIDE 0.5 MG/MIN: 50 INJECTION, SOLUTION INTRAVENOUS at 18:09

## 2020-01-01 RX ADMIN — CEFAZOLIN 2 G: 1 INJECTION, POWDER, FOR SOLUTION INTRAMUSCULAR; INTRAVENOUS; PARENTERAL at 13:20

## 2020-01-01 RX ADMIN — ALBUMIN (HUMAN) 12.5 G: 12.5 INJECTION, SOLUTION INTRAVENOUS at 20:18

## 2020-01-01 RX ADMIN — ACETYLCYSTEINE 200 MG: 200 SOLUTION ORAL; RESPIRATORY (INHALATION) at 19:17

## 2020-01-01 RX ADMIN — ACETYLCYSTEINE 800 MG: 200 SOLUTION ORAL; RESPIRATORY (INHALATION) at 14:30

## 2020-01-01 RX ADMIN — Medication 10 ML: at 13:07

## 2020-01-01 RX ADMIN — TICAGRELOR 90 MG: 90 TABLET ORAL at 04:15

## 2020-01-01 RX ADMIN — PROPOFOL 45 MCG/KG/MIN: 10 INJECTION, EMULSION INTRAVENOUS at 04:00

## 2020-01-01 RX ADMIN — ONDANSETRON HYDROCHLORIDE 4 MG: 2 INJECTION, SOLUTION INTRAMUSCULAR; INTRAVENOUS at 15:24

## 2020-01-01 RX ADMIN — GLYCOPYRROLATE 0.2 MG: 0.2 INJECTION, SOLUTION INTRAMUSCULAR; INTRAVENOUS at 10:50

## 2020-01-01 RX ADMIN — CALCIUM CHLORIDE 1 G: 100 INJECTION, SOLUTION INTRAVENOUS; INTRAVENTRICULAR at 21:45

## 2020-01-01 RX ADMIN — PROPOFOL 50 MCG/KG/MIN: 10 INJECTION, EMULSION INTRAVENOUS at 01:00

## 2020-01-01 RX ADMIN — CALCIUM CHLORIDE, MAGNESIUM CHLORIDE, DEXTROSE MONOHYDRATE, LACTIC ACID, SODIUM CHLORIDE, SODIUM BICARBONATE AND POTASSIUM CHLORIDE 2000 ML/HR: 3.68; 3.05; 22; 5.4; 6.46; 3.09; .314 INJECTION INTRAVENOUS at 10:38

## 2020-01-01 RX ADMIN — WATER 2 G: 1 INJECTION INTRAMUSCULAR; INTRAVENOUS; SUBCUTANEOUS at 07:35

## 2020-01-01 RX ADMIN — CHLORHEXIDINE GLUCONATE 0.12% ORAL RINSE 10 ML: 1.2 LIQUID ORAL at 09:46

## 2020-01-01 RX ADMIN — ASPIRIN 81 MG CHEWABLE TABLET 81 MG: 81 TABLET CHEWABLE at 09:46

## 2020-01-01 RX ADMIN — WATER 2 G: 1 INJECTION INTRAMUSCULAR; INTRAVENOUS; SUBCUTANEOUS at 16:42

## 2020-01-01 RX ADMIN — DEXTROSE MONOHYDRATE 11.4 ML/HR: 5 INJECTION, SOLUTION INTRAVENOUS at 01:05

## 2020-01-01 RX ADMIN — ASPIRIN 81 MG CHEWABLE TABLET 81 MG: 81 TABLET CHEWABLE at 08:32

## 2020-01-01 RX ADMIN — PHENYLEPHRINE HYDROCHLORIDE 100 MCG/MIN: 10 INJECTION INTRAVENOUS at 19:17

## 2020-01-01 RX ADMIN — PIPERACILLIN AND TAZOBACTAM 3.38 G: 3; .375 INJECTION, POWDER, LYOPHILIZED, FOR SOLUTION INTRAVENOUS at 10:35

## 2020-01-01 RX ADMIN — CASTOR OIL AND BALSAM, PERU: 788; 87 OINTMENT TOPICAL at 19:50

## 2020-01-01 RX ADMIN — Medication 10 ML: at 21:12

## 2020-01-01 RX ADMIN — ASPIRIN 81 MG CHEWABLE TABLET 81 MG: 81 TABLET CHEWABLE at 09:36

## 2020-01-01 RX ADMIN — CALCIUM CHLORIDE, MAGNESIUM CHLORIDE, DEXTROSE MONOHYDRATE, LACTIC ACID, SODIUM CHLORIDE, SODIUM BICARBONATE AND POTASSIUM CHLORIDE 2000 ML/HR: 3.68; 3.05; 22; 5.4; 6.46; 3.09; .314 INJECTION INTRAVENOUS at 21:23

## 2020-01-01 RX ADMIN — HEPARIN SODIUM 15 UNITS/KG/HR: 10000 INJECTION, SOLUTION INTRAVENOUS at 18:55

## 2020-01-01 RX ADMIN — FUROSEMIDE 20 MG: 10 INJECTION, SOLUTION INTRAMUSCULAR; INTRAVENOUS at 03:59

## 2020-01-01 RX ADMIN — HEPARIN SODIUM 10000 UNITS: 1000 INJECTION, SOLUTION INTRAVENOUS; SUBCUTANEOUS at 15:43

## 2020-01-01 RX ADMIN — ALBUTEROL SULFATE 2.5 MG: 2.5 SOLUTION RESPIRATORY (INHALATION) at 19:17

## 2020-01-01 RX ADMIN — AMIODARONE HYDROCHLORIDE 0.5 MG/MIN: 50 INJECTION, SOLUTION INTRAVENOUS at 16:48

## 2020-01-01 RX ADMIN — AMIODARONE HYDROCHLORIDE 1 MG/MIN: 50 INJECTION, SOLUTION INTRAVENOUS at 10:57

## 2020-01-01 RX ADMIN — ACETYLCYSTEINE 800 MG: 200 SOLUTION ORAL; RESPIRATORY (INHALATION) at 08:06

## 2020-01-01 RX ADMIN — Medication 4 MCG/MIN: at 01:38

## 2020-01-01 RX ADMIN — SODIUM BICARBONATE 100 MEQ: 84 INJECTION, SOLUTION INTRAVENOUS at 06:02

## 2020-01-01 RX ADMIN — Medication 10 ML: at 06:15

## 2020-01-01 RX ADMIN — INSULIN LISPRO 3 UNITS: 100 INJECTION, SOLUTION INTRAVENOUS; SUBCUTANEOUS at 05:24

## 2020-01-01 RX ADMIN — ACETYLCYSTEINE 200 MG: 200 SOLUTION ORAL; RESPIRATORY (INHALATION) at 07:50

## 2020-01-01 RX ADMIN — CALCIUM CHLORIDE, MAGNESIUM CHLORIDE, DEXTROSE MONOHYDRATE, LACTIC ACID, SODIUM CHLORIDE, SODIUM BICARBONATE AND POTASSIUM CHLORIDE 2000 ML/HR: 3.68; 3.05; 22; 5.4; 6.46; 3.09; .314 INJECTION INTRAVENOUS at 03:39

## 2020-01-01 RX ADMIN — ALBUTEROL SULFATE 2.5 MG: 2.5 SOLUTION RESPIRATORY (INHALATION) at 15:01

## 2020-01-01 RX ADMIN — DEXMEDETOMIDINE 0.2 MCG/KG/HR: 100 INJECTION, SOLUTION, CONCENTRATE INTRAVENOUS at 06:04

## 2020-01-01 RX ADMIN — AMIODARONE HYDROCHLORIDE 0.5 MG/MIN: 50 INJECTION, SOLUTION INTRAVENOUS at 01:15

## 2020-01-01 RX ADMIN — PHENYLEPHRINE HYDROCHLORIDE 100 MCG/MIN: 10 INJECTION INTRAVENOUS at 15:37

## 2020-01-01 RX ADMIN — SODIUM CHLORIDE 40 MG: 9 INJECTION, SOLUTION INTRAMUSCULAR; INTRAVENOUS; SUBCUTANEOUS at 09:36

## 2020-01-01 RX ADMIN — CASTOR OIL AND BALSAM, PERU: 788; 87 OINTMENT TOPICAL at 19:58

## 2020-01-01 RX ADMIN — DEXMEDETOMIDINE 0.4 MCG/KG/HR: 100 INJECTION, SOLUTION, CONCENTRATE INTRAVENOUS at 03:17

## 2020-01-01 RX ADMIN — Medication 200 MCG: at 18:11

## 2020-01-01 RX ADMIN — Medication 10 ML: at 15:27

## 2020-01-01 RX ADMIN — CASTOR OIL AND BALSAM, PERU: 788; 87 OINTMENT TOPICAL at 09:18

## 2020-01-01 RX ADMIN — PHENYLEPHRINE HYDROCHLORIDE 150 MCG/MIN: 10 INJECTION INTRAVENOUS at 00:17

## 2020-01-01 RX ADMIN — TICAGRELOR 90 MG: 90 TABLET ORAL at 18:48

## 2020-01-01 RX ADMIN — Medication 10 ML: at 22:01

## 2020-01-01 RX ADMIN — CASTOR OIL AND BALSAM, PERU: 788; 87 OINTMENT TOPICAL at 19:00

## 2020-01-01 RX ADMIN — HUMAN INSULIN 45 UNITS: 100 INJECTION, SUSPENSION SUBCUTANEOUS at 08:09

## 2020-01-01 RX ADMIN — CHLORHEXIDINE GLUCONATE 0.12% ORAL RINSE 10 ML: 1.2 LIQUID ORAL at 22:00

## 2020-01-01 RX ADMIN — MAGNESIUM SULFATE HEPTAHYDRATE: 500 INJECTION, SOLUTION INTRAMUSCULAR; INTRAVENOUS at 18:32

## 2020-01-01 RX ADMIN — TICAGRELOR 90 MG: 90 TABLET ORAL at 06:33

## 2020-01-01 RX ADMIN — ALBUTEROL SULFATE 2.5 MG: 2.5 SOLUTION RESPIRATORY (INHALATION) at 14:55

## 2020-01-01 RX ADMIN — TICAGRELOR 90 MG: 90 TABLET ORAL at 05:17

## 2020-01-01 RX ADMIN — ROCURONIUM BROMIDE 30 MG: 10 INJECTION INTRAVENOUS at 17:50

## 2020-01-01 RX ADMIN — HUMAN INSULIN 45 UNITS: 100 INJECTION, SUSPENSION SUBCUTANEOUS at 17:26

## 2020-01-01 RX ADMIN — PROPOFOL 45 MCG/KG/MIN: 10 INJECTION, EMULSION INTRAVENOUS at 21:20

## 2020-01-01 RX ADMIN — PIPERACILLIN AND TAZOBACTAM 3.38 G: 3; .375 INJECTION, POWDER, LYOPHILIZED, FOR SOLUTION INTRAVENOUS at 10:06

## 2020-01-01 RX ADMIN — ASPIRIN 81 MG CHEWABLE TABLET 81 MG: 81 TABLET CHEWABLE at 08:41

## 2020-01-01 RX ADMIN — ALBUTEROL SULFATE 2.5 MG: 2.5 SOLUTION RESPIRATORY (INHALATION) at 08:20

## 2020-01-01 RX ADMIN — DEXMEDETOMIDINE 0.2 MCG/KG/HR: 100 INJECTION, SOLUTION, CONCENTRATE INTRAVENOUS at 05:59

## 2020-01-01 RX ADMIN — PROPOFOL 50 MCG/KG/MIN: 10 INJECTION, EMULSION INTRAVENOUS at 18:23

## 2020-01-01 RX ADMIN — PROPOFOL 25 MCG/KG/MIN: 10 INJECTION, EMULSION INTRAVENOUS at 17:46

## 2020-01-01 RX ADMIN — PROPOFOL 50 MCG/KG/MIN: 10 INJECTION, EMULSION INTRAVENOUS at 12:33

## 2020-01-01 RX ADMIN — DEXMEDETOMIDINE 0.4 MCG/KG/HR: 100 INJECTION, SOLUTION, CONCENTRATE INTRAVENOUS at 23:53

## 2020-01-01 RX ADMIN — ALBUTEROL SULFATE 2.5 MG: 2.5 SOLUTION RESPIRATORY (INHALATION) at 07:50

## 2020-01-01 RX ADMIN — ALBUMIN (HUMAN) 12.5 G: 12.5 INJECTION, SOLUTION INTRAVENOUS at 05:10

## 2020-01-01 RX ADMIN — DEXMEDETOMIDINE 0.5 MCG/KG/HR: 100 INJECTION, SOLUTION, CONCENTRATE INTRAVENOUS at 11:46

## 2020-01-01 RX ADMIN — MAGNESIUM SULFATE HEPTAHYDRATE 1 G: 1 INJECTION, SOLUTION INTRAVENOUS at 21:23

## 2020-01-01 RX ADMIN — VANCOMYCIN HYDROCHLORIDE 2000 MG: 10 INJECTION, POWDER, LYOPHILIZED, FOR SOLUTION INTRAVENOUS at 17:28

## 2020-01-01 RX ADMIN — MUPIROCIN: 20 OINTMENT TOPICAL at 09:40

## 2020-01-01 RX ADMIN — AMIODARONE HYDROCHLORIDE 1 MG/MIN: 50 INJECTION, SOLUTION INTRAVENOUS at 16:22

## 2020-01-01 RX ADMIN — MAGNESIUM SULFATE HEPTAHYDRATE: 500 INJECTION, SOLUTION INTRAMUSCULAR; INTRAVENOUS at 12:53

## 2020-01-01 RX ADMIN — HUMAN INSULIN 40 UNITS: 100 INJECTION, SUSPENSION SUBCUTANEOUS at 10:59

## 2020-01-01 RX ADMIN — MUPIROCIN: 20 OINTMENT TOPICAL at 21:19

## 2020-01-01 RX ADMIN — EPINEPHRINE 1 MG: 0.1 INJECTION, SOLUTION ENDOTRACHEAL; INTRACARDIAC; INTRAVENOUS at 14:52

## 2020-01-01 RX ADMIN — Medication 10 ML: at 06:40

## 2020-01-01 RX ADMIN — FUROSEMIDE 80 MG: 10 INJECTION, SOLUTION INTRAMUSCULAR; INTRAVENOUS at 21:27

## 2020-01-01 RX ADMIN — AMIODARONE HYDROCHLORIDE 1 MG/MIN: 50 INJECTION, SOLUTION INTRAVENOUS at 19:29

## 2020-01-01 RX ADMIN — ACETYLCYSTEINE 800 MG: 200 SOLUTION ORAL; RESPIRATORY (INHALATION) at 14:55

## 2020-01-01 RX ADMIN — CHLORHEXIDINE GLUCONATE 0.12% ORAL RINSE 10 ML: 1.2 LIQUID ORAL at 20:54

## 2020-01-01 RX ADMIN — PHENYLEPHRINE HYDROCHLORIDE 100 MCG/MIN: 10 INJECTION INTRAVENOUS at 16:37

## 2020-01-01 RX ADMIN — PHENYLEPHRINE HYDROCHLORIDE 260 MCG/MIN: 10 INJECTION INTRAVENOUS at 16:28

## 2020-01-01 RX ADMIN — PROPOFOL 20 MCG/KG/MIN: 10 INJECTION, EMULSION INTRAVENOUS at 09:41

## 2020-01-01 RX ADMIN — WATER 2 G: 1 INJECTION INTRAMUSCULAR; INTRAVENOUS; SUBCUTANEOUS at 08:05

## 2020-01-01 RX ADMIN — CHLORHEXIDINE GLUCONATE 0.12% ORAL RINSE 10 ML: 1.2 LIQUID ORAL at 20:15

## 2020-01-01 RX ADMIN — SODIUM CHLORIDE 9 ML/HR: 900 INJECTION, SOLUTION INTRAVENOUS at 19:35

## 2020-01-01 RX ADMIN — SODIUM CHLORIDE 999 ML/HR: 9 INJECTION, SOLUTION INTRAVENOUS at 15:03

## 2020-01-01 RX ADMIN — POTASSIUM CHLORIDE 20 MEQ: 29.8 INJECTION, SOLUTION INTRAVENOUS at 22:18

## 2020-01-01 RX ADMIN — MAGNESIUM SULFATE HEPTAHYDRATE 1 G: 1 INJECTION, SOLUTION INTRAVENOUS at 22:18

## 2020-01-01 RX ADMIN — ALBUMIN (HUMAN) 12.5 G: 12.5 INJECTION, SOLUTION INTRAVENOUS at 00:39

## 2020-01-01 RX ADMIN — ACETYLCYSTEINE 800 MG: 200 SOLUTION ORAL; RESPIRATORY (INHALATION) at 08:20

## 2020-01-01 RX ADMIN — PROPOFOL 45 MCG/KG/MIN: 10 INJECTION, EMULSION INTRAVENOUS at 01:20

## 2020-01-01 RX ADMIN — SODIUM CHLORIDE 250 ML: 900 INJECTION, SOLUTION INTRAVENOUS at 05:03

## 2020-01-01 RX ADMIN — CHLORHEXIDINE GLUCONATE 0.12% ORAL RINSE 10 ML: 1.2 LIQUID ORAL at 21:23

## 2020-01-01 RX ADMIN — CEFEPIME 2 G: 2 INJECTION, POWDER, FOR SOLUTION INTRAVENOUS at 11:09

## 2020-01-01 RX ADMIN — MORPHINE SULFATE 4 MG: 10 INJECTION INTRAVENOUS at 03:42

## 2020-01-01 RX ADMIN — PROPOFOL 25 MCG/KG/MIN: 10 INJECTION, EMULSION INTRAVENOUS at 14:30

## 2020-01-01 RX ADMIN — CEFAZOLIN 2 G: 1 INJECTION, POWDER, FOR SOLUTION INTRAMUSCULAR; INTRAVENOUS; PARENTERAL at 17:00

## 2020-01-01 RX ADMIN — PROPOFOL 50 MCG/KG/MIN: 10 INJECTION, EMULSION INTRAVENOUS at 21:59

## 2020-01-01 RX ADMIN — SODIUM CHLORIDE 3.4 UNITS/HR: 9 INJECTION, SOLUTION INTRAVENOUS at 04:32

## 2020-01-01 RX ADMIN — CHLORHEXIDINE GLUCONATE 0.12% ORAL RINSE 10 ML: 1.2 LIQUID ORAL at 20:13

## 2020-01-01 RX ADMIN — PROPOFOL 25 MCG/KG/MIN: 10 INJECTION, EMULSION INTRAVENOUS at 22:30

## 2020-01-01 RX ADMIN — TICAGRELOR 90 MG: 90 TABLET ORAL at 19:10

## 2020-01-01 RX ADMIN — Medication 10 ML: at 22:55

## 2020-01-01 RX ADMIN — FUROSEMIDE 80 MG: 10 INJECTION, SOLUTION INTRAMUSCULAR; INTRAVENOUS at 08:37

## 2020-01-01 RX ADMIN — ACETYLCYSTEINE 200 MG: 200 SOLUTION ORAL; RESPIRATORY (INHALATION) at 19:35

## 2020-01-01 RX ADMIN — SODIUM CHLORIDE 9.8 UNITS/HR: 9 INJECTION, SOLUTION INTRAVENOUS at 20:57

## 2020-01-01 RX ADMIN — SODIUM CHLORIDE 40 MG: 9 INJECTION, SOLUTION INTRAMUSCULAR; INTRAVENOUS; SUBCUTANEOUS at 08:36

## 2020-01-01 RX ADMIN — PIPERACILLIN AND TAZOBACTAM 3.38 G: 3; .375 INJECTION, POWDER, LYOPHILIZED, FOR SOLUTION INTRAVENOUS at 18:48

## 2020-01-01 RX ADMIN — HUMAN INSULIN 20 UNITS: 100 INJECTION, SUSPENSION SUBCUTANEOUS at 17:40

## 2020-01-01 RX ADMIN — CASTOR OIL AND BALSAM, PERU: 788; 87 OINTMENT TOPICAL at 18:08

## 2020-01-01 RX ADMIN — ALBUTEROL SULFATE 2.5 MG: 2.5 SOLUTION RESPIRATORY (INHALATION) at 08:25

## 2020-01-01 RX ADMIN — DEXMEDETOMIDINE 0.7 MCG/KG/HR: 100 INJECTION, SOLUTION, CONCENTRATE INTRAVENOUS at 12:17

## 2020-01-01 RX ADMIN — HUMAN INSULIN 40 UNITS: 100 INJECTION, SUSPENSION SUBCUTANEOUS at 16:54

## 2020-01-01 RX ADMIN — AMIODARONE HYDROCHLORIDE 0.5 MG/MIN: 50 INJECTION, SOLUTION INTRAVENOUS at 03:11

## 2020-01-01 RX ADMIN — DEXMEDETOMIDINE 0.4 MCG/KG/HR: 100 INJECTION, SOLUTION, CONCENTRATE INTRAVENOUS at 21:21

## 2020-01-01 RX ADMIN — METRONIDAZOLE 500 MG: 500 INJECTION, SOLUTION INTRAVENOUS at 22:07

## 2020-01-01 RX ADMIN — INSULIN LISPRO 7 UNITS: 100 INJECTION, SOLUTION INTRAVENOUS; SUBCUTANEOUS at 23:55

## 2020-01-01 RX ADMIN — WATER 2 G: 1 INJECTION INTRAMUSCULAR; INTRAVENOUS; SUBCUTANEOUS at 22:17

## 2020-01-01 RX ADMIN — TICAGRELOR 90 MG: 90 TABLET ORAL at 17:48

## 2020-01-01 RX ADMIN — CASTOR OIL AND BALSAM, PERU: 788; 87 OINTMENT TOPICAL at 11:43

## 2020-01-01 RX ADMIN — SODIUM BICARBONATE 50 MEQ: 84 INJECTION, SOLUTION INTRAVENOUS at 14:56

## 2020-01-01 RX ADMIN — BIVALIRUDIN 2.01 MG/KG/HR: 250 INJECTION, POWDER, LYOPHILIZED, FOR SOLUTION INTRAVENOUS at 04:45

## 2020-01-01 RX ADMIN — SODIUM CHLORIDE: 9 INJECTION, SOLUTION INTRAVENOUS at 17:00

## 2020-01-01 RX ADMIN — POTASSIUM CHLORIDE 20 MEQ: 29.8 INJECTION, SOLUTION INTRAVENOUS at 23:24

## 2020-01-01 RX ADMIN — FUROSEMIDE 80 MG: 10 INJECTION, SOLUTION INTRAMUSCULAR; INTRAVENOUS at 11:05

## 2020-01-01 RX ADMIN — Medication 120 MCG: at 17:46

## 2020-01-01 RX ADMIN — INSULIN LISPRO 5 UNITS: 100 INJECTION, SOLUTION INTRAVENOUS; SUBCUTANEOUS at 05:43

## 2020-01-01 RX ADMIN — PROPOFOL 45 MCG/KG/MIN: 10 INJECTION, EMULSION INTRAVENOUS at 08:14

## 2020-01-01 RX ADMIN — MUPIROCIN: 20 OINTMENT TOPICAL at 08:39

## 2020-01-01 RX ADMIN — Medication 10 ML: at 06:17

## 2020-01-01 RX ADMIN — CHLORHEXIDINE GLUCONATE 0.12% ORAL RINSE 10 ML: 1.2 LIQUID ORAL at 09:00

## 2020-01-01 RX ADMIN — SODIUM CHLORIDE: 450 INJECTION, SOLUTION INTRAVENOUS at 15:51

## 2020-01-01 RX ADMIN — Medication 10 ML: at 06:09

## 2020-01-01 RX ADMIN — ACETYLCYSTEINE 200 MG: 200 SOLUTION ORAL; RESPIRATORY (INHALATION) at 14:02

## 2020-01-01 RX ADMIN — Medication 10 ML: at 13:49

## 2020-01-01 RX ADMIN — CEFEPIME HYDROCHLORIDE 1 G: 1 INJECTION, POWDER, FOR SOLUTION INTRAMUSCULAR; INTRAVENOUS at 10:36

## 2020-01-01 RX ADMIN — SODIUM BICARBONATE 50 MEQ: 84 INJECTION, SOLUTION INTRAVENOUS at 21:47

## 2020-01-01 RX ADMIN — CASTOR OIL AND BALSAM, PERU: 788; 87 OINTMENT TOPICAL at 17:28

## 2020-01-01 RX ADMIN — SODIUM BICARBONATE 50 MEQ: 84 INJECTION, SOLUTION INTRAVENOUS at 15:50

## 2020-01-01 RX ADMIN — FUROSEMIDE 80 MG: 10 INJECTION, SOLUTION INTRAMUSCULAR; INTRAVENOUS at 09:23

## 2020-01-01 RX ADMIN — ACETYLCYSTEINE 200 MG: 200 SOLUTION ORAL; RESPIRATORY (INHALATION) at 13:22

## 2020-01-01 RX ADMIN — SODIUM CHLORIDE, POTASSIUM CHLORIDE, SODIUM LACTATE AND CALCIUM CHLORIDE: 600; 310; 30; 20 INJECTION, SOLUTION INTRAVENOUS at 17:38

## 2020-01-01 RX ADMIN — INSULIN LISPRO 3 UNITS: 100 INJECTION, SOLUTION INTRAVENOUS; SUBCUTANEOUS at 23:44

## 2020-01-01 RX ADMIN — CHLORHEXIDINE GLUCONATE 0.12% ORAL RINSE 10 ML: 1.2 LIQUID ORAL at 09:34

## 2020-01-01 RX ADMIN — ALBUTEROL SULFATE 2.5 MG: 2.5 SOLUTION RESPIRATORY (INHALATION) at 08:21

## 2020-01-01 RX ADMIN — EPINEPHRINE 1 MG: 0.1 INJECTION, SOLUTION ENDOTRACHEAL; INTRACARDIAC; INTRAVENOUS at 15:00

## 2020-01-01 RX ADMIN — SODIUM BICARBONATE 50 MEQ: 84 INJECTION, SOLUTION INTRAVENOUS at 16:30

## 2020-01-01 RX ADMIN — BIVALIRUDIN 0.59 MG/KG/HR: 250 INJECTION, POWDER, LYOPHILIZED, FOR SOLUTION INTRAVENOUS at 21:06

## 2020-01-01 RX ADMIN — FENTANYL CITRATE 50 MCG: 50 INJECTION, SOLUTION INTRAMUSCULAR; INTRAVENOUS at 13:26

## 2020-01-01 RX ADMIN — DEXTROSE MONOHYDRATE 9 ML: 25 INJECTION, SOLUTION INTRAVENOUS at 21:53

## 2020-01-01 RX ADMIN — CALCIUM CHLORIDE, MAGNESIUM CHLORIDE, DEXTROSE MONOHYDRATE, LACTIC ACID, SODIUM CHLORIDE, SODIUM BICARBONATE AND POTASSIUM CHLORIDE 2000 ML/HR: 3.68; 3.05; 22; 5.4; 6.46; 3.09; .314 INJECTION INTRAVENOUS at 12:53

## 2020-01-01 RX ADMIN — ALBUMIN (HUMAN) 12.5 G: 12.5 INJECTION, SOLUTION INTRAVENOUS at 07:23

## 2020-01-01 RX ADMIN — PROPOFOL 50 MCG/KG/MIN: 10 INJECTION, EMULSION INTRAVENOUS at 05:38

## 2020-01-01 RX ADMIN — AMIODARONE HYDROCHLORIDE 150 MG: 50 INJECTION, SOLUTION INTRAVENOUS at 15:12

## 2020-01-01 RX ADMIN — CHLORHEXIDINE GLUCONATE 0.12% ORAL RINSE 10 ML: 1.2 LIQUID ORAL at 08:39

## 2020-01-01 RX ADMIN — ACETYLCYSTEINE 200 MG: 200 SOLUTION ORAL; RESPIRATORY (INHALATION) at 02:16

## 2020-01-01 RX ADMIN — ALBUMIN (HUMAN) 12.5 G: 12.5 INJECTION, SOLUTION INTRAVENOUS at 21:25

## 2020-01-01 RX ADMIN — EPINEPHRINE 2 MCG/MIN: 1 INJECTION INTRAMUSCULAR; INTRAVENOUS; SUBCUTANEOUS at 16:19

## 2020-01-01 RX ADMIN — ALBUTEROL SULFATE 2.5 MG: 2.5 SOLUTION RESPIRATORY (INHALATION) at 02:45

## 2020-01-01 RX ADMIN — SODIUM BICARBONATE 50 MEQ: 84 INJECTION, SOLUTION INTRAVENOUS at 16:14

## 2020-01-01 RX ADMIN — CALCIUM CHLORIDE, MAGNESIUM CHLORIDE, DEXTROSE MONOHYDRATE, LACTIC ACID, SODIUM CHLORIDE, SODIUM BICARBONATE AND POTASSIUM CHLORIDE 2000 ML/HR: 3.68; 3.05; 22; 5.4; 6.46; 3.09; .314 INJECTION INTRAVENOUS at 20:18

## 2020-01-01 RX ADMIN — SODIUM BICARBONATE: 84 INJECTION, SOLUTION INTRAVENOUS at 00:21

## 2020-01-01 RX ADMIN — DEXTROSE MONOHYDRATE 11.4 ML/HR: 5 INJECTION, SOLUTION INTRAVENOUS at 17:17

## 2020-01-01 RX ADMIN — POTASSIUM CHLORIDE 20 MEQ: 29.8 INJECTION, SOLUTION INTRAVENOUS at 07:02

## 2020-01-01 RX ADMIN — INSULIN LISPRO 3 UNITS: 100 INJECTION, SOLUTION INTRAVENOUS; SUBCUTANEOUS at 12:00

## 2020-01-01 RX ADMIN — CALCIUM CHLORIDE, MAGNESIUM CHLORIDE, DEXTROSE MONOHYDRATE, LACTIC ACID, SODIUM CHLORIDE, SODIUM BICARBONATE AND POTASSIUM CHLORIDE 2000 ML/HR: 3.68; 3.05; 22; 5.4; 6.46; 3.09; .314 INJECTION INTRAVENOUS at 01:11

## 2020-01-01 RX ADMIN — INSULIN LISPRO 3 UNITS: 100 INJECTION, SOLUTION INTRAVENOUS; SUBCUTANEOUS at 17:20

## 2020-01-01 RX ADMIN — SODIUM CHLORIDE 40 MG: 9 INJECTION, SOLUTION INTRAMUSCULAR; INTRAVENOUS; SUBCUTANEOUS at 08:10

## 2020-01-01 RX ADMIN — MAGNESIUM OXIDE 400 MG (241.3 MG MAGNESIUM) TABLET 400 MG: TABLET at 18:48

## 2020-01-01 RX ADMIN — Medication 10 ML: at 21:55

## 2020-01-01 RX ADMIN — SODIUM CHLORIDE 21.2 UNITS/HR: 9 INJECTION, SOLUTION INTRAVENOUS at 02:44

## 2020-01-01 RX ADMIN — LIDOCAINE HYDROCHLORIDE 40 MG: 20 INJECTION, SOLUTION INFILTRATION; PERINEURAL at 14:38

## 2020-01-01 RX ADMIN — ALBUMIN (HUMAN) 12.5 G: 12.5 INJECTION, SOLUTION INTRAVENOUS at 04:14

## 2020-01-01 RX ADMIN — PIPERACILLIN AND TAZOBACTAM 3.38 G: 3; .375 INJECTION, POWDER, LYOPHILIZED, FOR SOLUTION INTRAVENOUS at 17:00

## 2020-01-01 RX ADMIN — Medication 10 ML: at 23:48

## 2020-01-01 RX ADMIN — MAGNESIUM SULFATE IN DEXTROSE 1 G: 10 INJECTION, SOLUTION INTRAVENOUS at 02:00

## 2020-01-01 RX ADMIN — HEPARIN SODIUM: 1000 INJECTION, SOLUTION INTRAVENOUS; SUBCUTANEOUS at 15:00

## 2020-01-01 RX ADMIN — INSULIN LISPRO 3 UNITS: 100 INJECTION, SOLUTION INTRAVENOUS; SUBCUTANEOUS at 23:20

## 2020-01-01 RX ADMIN — CHLORHEXIDINE GLUCONATE 0.12% ORAL RINSE 10 ML: 1.2 LIQUID ORAL at 10:58

## 2020-01-01 RX ADMIN — TICAGRELOR 90 MG: 90 TABLET ORAL at 18:11

## 2020-01-01 RX ADMIN — TICAGRELOR 90 MG: 90 TABLET ORAL at 06:25

## 2020-01-01 RX ADMIN — CASTOR OIL AND BALSAM, PERU: 788; 87 OINTMENT TOPICAL at 08:19

## 2020-01-01 RX ADMIN — OXYCODONE 10 MG: 5 TABLET ORAL at 23:46

## 2020-01-01 RX ADMIN — SODIUM CHLORIDE 40 MG: 9 INJECTION, SOLUTION INTRAMUSCULAR; INTRAVENOUS; SUBCUTANEOUS at 08:32

## 2020-01-01 RX ADMIN — CALCIUM CHLORIDE, MAGNESIUM CHLORIDE, DEXTROSE MONOHYDRATE, LACTIC ACID, SODIUM CHLORIDE, SODIUM BICARBONATE AND POTASSIUM CHLORIDE 2000 ML/HR: 3.68; 3.05; 22; 5.4; 6.46; 3.09; .314 INJECTION INTRAVENOUS at 09:08

## 2020-01-01 RX ADMIN — Medication 10 ML: at 06:33

## 2020-01-01 RX ADMIN — DEXMEDETOMIDINE 0.6 MCG/KG/HR: 100 INJECTION, SOLUTION, CONCENTRATE INTRAVENOUS at 18:44

## 2020-01-01 RX ADMIN — Medication 4 MCG/MIN: at 15:13

## 2020-01-01 RX ADMIN — POTASSIUM CHLORIDE 20 MEQ: 29.8 INJECTION, SOLUTION INTRAVENOUS at 22:11

## 2020-01-01 RX ADMIN — CASTOR OIL AND BALSAM, PERU: 788; 87 OINTMENT TOPICAL at 08:42

## 2020-01-01 RX ADMIN — PROPOFOL 25 MCG/KG/MIN: 10 INJECTION, EMULSION INTRAVENOUS at 14:40

## 2020-01-01 RX ADMIN — POTASSIUM CHLORIDE 20 MEQ: 29.8 INJECTION, SOLUTION INTRAVENOUS at 10:11

## 2020-01-01 RX ADMIN — ALBUTEROL SULFATE 2.5 MG: 2.5 SOLUTION RESPIRATORY (INHALATION) at 14:02

## 2020-01-01 RX ADMIN — MIDAZOLAM HYDROCHLORIDE 1 MG: 1 INJECTION, SOLUTION INTRAMUSCULAR; INTRAVENOUS at 16:13

## 2020-01-01 RX ADMIN — ALBUMIN (HUMAN) 12.5 G: 12.5 INJECTION, SOLUTION INTRAVENOUS at 18:47

## 2020-01-01 RX ADMIN — ASPIRIN 81 MG CHEWABLE TABLET 81 MG: 81 TABLET CHEWABLE at 08:10

## 2020-01-01 RX ADMIN — INSULIN LISPRO 2 UNITS: 100 INJECTION, SOLUTION INTRAVENOUS; SUBCUTANEOUS at 19:01

## 2020-01-01 RX ADMIN — Medication 10 ML: at 14:36

## 2020-01-01 RX ADMIN — CALCIUM CHLORIDE, MAGNESIUM CHLORIDE, DEXTROSE MONOHYDRATE, LACTIC ACID, SODIUM CHLORIDE, SODIUM BICARBONATE AND POTASSIUM CHLORIDE 2000 ML/HR: 3.68; 3.05; 22; 5.4; 6.46; 3.09; .314 INJECTION INTRAVENOUS at 16:43

## 2020-01-01 RX ADMIN — Medication 200 MCG: at 17:51

## 2020-01-01 RX ADMIN — MUPIROCIN: 20 OINTMENT TOPICAL at 09:18

## 2020-01-01 RX ADMIN — MAGNESIUM SULFATE HEPTAHYDRATE: 500 INJECTION, SOLUTION INTRAMUSCULAR; INTRAVENOUS at 20:11

## 2020-01-01 RX ADMIN — HEPARIN SODIUM 1100 UNITS: 1000 INJECTION, SOLUTION INTRAVENOUS; SUBCUTANEOUS at 18:07

## 2020-01-01 RX ADMIN — BIVALIRUDIN 0.5 MG/KG/HR: 250 INJECTION, POWDER, LYOPHILIZED, FOR SOLUTION INTRAVENOUS at 16:59

## 2020-01-01 RX ADMIN — SODIUM CHLORIDE 999 ML/HR: 9 INJECTION, SOLUTION INTRAVENOUS at 14:53

## 2020-01-01 RX ADMIN — PROPOFOL 45 MCG/KG/MIN: 10 INJECTION, EMULSION INTRAVENOUS at 06:00

## 2020-01-01 RX ADMIN — Medication 40 ML: at 05:30

## 2020-01-01 RX ADMIN — MAGNESIUM OXIDE 400 MG (241.3 MG MAGNESIUM) TABLET 400 MG: TABLET at 17:31

## 2020-01-01 RX ADMIN — WATER 2 G: 1 INJECTION INTRAMUSCULAR; INTRAVENOUS; SUBCUTANEOUS at 17:46

## 2020-01-01 RX ADMIN — INSULIN LISPRO 3 UNITS: 100 INJECTION, SOLUTION INTRAVENOUS; SUBCUTANEOUS at 12:23

## 2020-01-01 RX ADMIN — ALBUMIN (HUMAN) 12.5 G: 12.5 INJECTION, SOLUTION INTRAVENOUS at 00:09

## 2020-01-01 RX ADMIN — PIPERACILLIN AND TAZOBACTAM 3.38 G: 3; .375 INJECTION, POWDER, LYOPHILIZED, FOR SOLUTION INTRAVENOUS at 03:42

## 2020-01-01 RX ADMIN — DEXAMETHASONE SODIUM PHOSPHATE 4 MG: 4 INJECTION, SOLUTION INTRAMUSCULAR; INTRAVENOUS at 13:05

## 2020-01-01 RX ADMIN — CASTOR OIL AND BALSAM, PERU: 788; 87 OINTMENT TOPICAL at 18:53

## 2020-01-01 RX ADMIN — AMIODARONE HYDROCHLORIDE 0.5 MG/MIN: 50 INJECTION, SOLUTION INTRAVENOUS at 05:15

## 2020-01-01 RX ADMIN — PHENYLEPHRINE HYDROCHLORIDE 30 MCG/MIN: 10 INJECTION INTRAVENOUS at 19:54

## 2020-01-01 RX ADMIN — Medication 10 ML: at 14:21

## 2020-01-01 RX ADMIN — ACETYLCYSTEINE 200 MG: 200 SOLUTION ORAL; RESPIRATORY (INHALATION) at 01:05

## 2020-01-01 RX ADMIN — Medication 10 ML: at 22:50

## 2020-01-01 RX ADMIN — HUMAN INSULIN 40 UNITS: 100 INJECTION, SUSPENSION SUBCUTANEOUS at 17:00

## 2020-01-01 RX ADMIN — Medication 10 ML: at 21:26

## 2020-01-01 RX ADMIN — ALBUMIN (HUMAN) 12.5 G: 12.5 INJECTION, SOLUTION INTRAVENOUS at 22:00

## 2020-01-01 RX ADMIN — DEXMEDETOMIDINE 0.4 MCG/KG/HR: 100 INJECTION, SOLUTION, CONCENTRATE INTRAVENOUS at 08:25

## 2020-01-01 RX ADMIN — TICAGRELOR 90 MG: 90 TABLET ORAL at 18:59

## 2020-01-01 RX ADMIN — ALBUMIN (HUMAN) 12.5 G: 12.5 INJECTION, SOLUTION INTRAVENOUS at 11:03

## 2020-01-01 RX ADMIN — SODIUM BICARBONATE 50 MEQ: 84 INJECTION, SOLUTION INTRAVENOUS at 16:29

## 2020-01-01 RX ADMIN — INSULIN LISPRO 2 UNITS: 100 INJECTION, SOLUTION INTRAVENOUS; SUBCUTANEOUS at 06:32

## 2020-01-01 RX ADMIN — VANCOMYCIN HYDROCHLORIDE 2000 MG: 10 INJECTION, POWDER, LYOPHILIZED, FOR SOLUTION INTRAVENOUS at 12:24

## 2020-01-01 RX ADMIN — PROPOFOL 100 MG: 10 INJECTION, EMULSION INTRAVENOUS at 16:42

## 2020-01-01 RX ADMIN — CALCIUM CHLORIDE, MAGNESIUM CHLORIDE, DEXTROSE MONOHYDRATE, LACTIC ACID, SODIUM CHLORIDE, SODIUM BICARBONATE AND POTASSIUM CHLORIDE 2000 ML/HR: 3.68; 3.05; 22; 5.4; 6.46; 3.09; .314 INJECTION INTRAVENOUS at 21:01

## 2020-01-01 RX ADMIN — EPINEPHRINE 4 MCG/MIN: 1 INJECTION INTRAMUSCULAR; INTRAVENOUS; SUBCUTANEOUS at 03:17

## 2020-01-01 RX ADMIN — SODIUM CHLORIDE 250 ML: 900 INJECTION, SOLUTION INTRAVENOUS at 01:15

## 2020-01-01 RX ADMIN — HUMAN INSULIN 40 UNITS: 100 INJECTION, SUSPENSION SUBCUTANEOUS at 06:37

## 2020-01-01 RX ADMIN — ALBUMIN (HUMAN) 12.5 G: 12.5 INJECTION, SOLUTION INTRAVENOUS at 21:17

## 2020-01-01 RX ADMIN — CEFEPIME 2 G: 2 INJECTION, POWDER, FOR SOLUTION INTRAVENOUS at 10:00

## 2020-01-01 RX ADMIN — PROPOFOL 25 MCG/KG/MIN: 10 INJECTION, EMULSION INTRAVENOUS at 00:21

## 2020-01-01 RX ADMIN — ALBUTEROL SULFATE 2.5 MG: 2.5 SOLUTION RESPIRATORY (INHALATION) at 01:30

## 2020-01-01 RX ADMIN — TICAGRELOR 90 MG: 90 TABLET ORAL at 18:08

## 2020-01-01 RX ADMIN — CASTOR OIL AND BALSAM, PERU: 788; 87 OINTMENT TOPICAL at 08:10

## 2020-01-01 RX ADMIN — ALBUTEROL SULFATE 2.5 MG: 2.5 SOLUTION RESPIRATORY (INHALATION) at 19:28

## 2020-01-01 RX ADMIN — MAGNESIUM OXIDE 400 MG (241.3 MG MAGNESIUM) TABLET 400 MG: TABLET at 08:37

## 2020-01-01 RX ADMIN — DEXMEDETOMIDINE 0.7 MCG/KG/HR: 100 INJECTION, SOLUTION, CONCENTRATE INTRAVENOUS at 01:21

## 2020-01-01 RX ADMIN — CALCIUM CHLORIDE, MAGNESIUM CHLORIDE, DEXTROSE MONOHYDRATE, LACTIC ACID, SODIUM CHLORIDE, SODIUM BICARBONATE AND POTASSIUM CHLORIDE 2000 ML/HR: 3.68; 3.05; 22; 5.4; 6.46; 3.09; .314 INJECTION INTRAVENOUS at 23:21

## 2020-01-01 RX ADMIN — SODIUM CHLORIDE: 9 INJECTION, SOLUTION INTRAVENOUS at 15:46

## 2020-01-01 RX ADMIN — CASTOR OIL AND BALSAM, PERU: 788; 87 OINTMENT TOPICAL at 08:36

## 2020-01-01 RX ADMIN — CHLORHEXIDINE GLUCONATE 0.12% ORAL RINSE 10 ML: 1.2 LIQUID ORAL at 22:49

## 2020-01-01 RX ADMIN — CALCIUM CHLORIDE 1000 MG: 100 INJECTION, SOLUTION INTRAVENOUS at 15:47

## 2020-01-01 RX ADMIN — MIDAZOLAM HYDROCHLORIDE 2 MG: 1 INJECTION, SOLUTION INTRAMUSCULAR; INTRAVENOUS at 17:38

## 2020-01-01 RX ADMIN — METRONIDAZOLE 500 MG: 500 INJECTION, SOLUTION INTRAVENOUS at 22:48

## 2020-01-01 RX ADMIN — SODIUM CHLORIDE 40 MG: 9 INJECTION, SOLUTION INTRAMUSCULAR; INTRAVENOUS; SUBCUTANEOUS at 08:16

## 2020-01-01 RX ADMIN — EPINEPHRINE 5 MCG/MIN: 1 INJECTION INTRAMUSCULAR; INTRAVENOUS; SUBCUTANEOUS at 16:50

## 2020-01-01 RX ADMIN — CHLORHEXIDINE GLUCONATE 0.12% ORAL RINSE 10 ML: 1.2 LIQUID ORAL at 20:58

## 2020-01-01 RX ADMIN — ALBUTEROL SULFATE 2.5 MG: 2.5 SOLUTION RESPIRATORY (INHALATION) at 13:22

## 2020-01-01 RX ADMIN — HEPARIN SODIUM 1400 UNITS: 1000 INJECTION, SOLUTION INTRAVENOUS; SUBCUTANEOUS at 18:08

## 2020-01-01 RX ADMIN — ACETYLCYSTEINE 200 MG: 200 SOLUTION ORAL; RESPIRATORY (INHALATION) at 08:20

## 2020-01-01 RX ADMIN — POTASSIUM CHLORIDE 20 MEQ: 29.8 INJECTION, SOLUTION INTRAVENOUS at 12:39

## 2020-01-01 RX ADMIN — FENTANYL CITRATE 25 MCG: 50 INJECTION, SOLUTION INTRAMUSCULAR; INTRAVENOUS at 18:01

## 2020-01-01 RX ADMIN — Medication 100 MEQ: at 06:02

## 2020-01-01 RX ADMIN — SODIUM CHLORIDE 40 MG: 9 INJECTION, SOLUTION INTRAMUSCULAR; INTRAVENOUS; SUBCUTANEOUS at 08:37

## 2020-01-01 RX ADMIN — ASPIRIN 81 MG CHEWABLE TABLET 81 MG: 81 TABLET CHEWABLE at 10:59

## 2020-01-01 RX ADMIN — SODIUM CHLORIDE: 9 INJECTION, SOLUTION INTRAVENOUS at 15:37

## 2020-01-01 RX ADMIN — NOREPINEPHRINE BITARTRATE 20 MCG/MIN: 1 INJECTION, SOLUTION, CONCENTRATE INTRAVENOUS at 15:11

## 2020-01-01 RX ADMIN — CEFEPIME 2 G: 2 INJECTION, POWDER, FOR SOLUTION INTRAVENOUS at 09:49

## 2020-01-01 RX ADMIN — DEXMEDETOMIDINE 0.7 MCG/KG/HR: 100 INJECTION, SOLUTION, CONCENTRATE INTRAVENOUS at 06:07

## 2020-01-01 RX ADMIN — ONDANSETRON HYDROCHLORIDE 4 MG: 2 INJECTION, SOLUTION INTRAMUSCULAR; INTRAVENOUS at 17:25

## 2020-01-01 RX ADMIN — CALCIUM CHLORIDE, MAGNESIUM CHLORIDE, DEXTROSE MONOHYDRATE, LACTIC ACID, SODIUM CHLORIDE, SODIUM BICARBONATE AND POTASSIUM CHLORIDE 2000 ML/HR: 3.68; 3.05; 22; 5.4; 6.46; 3.09; .314 INJECTION INTRAVENOUS at 17:23

## 2020-01-01 RX ADMIN — CHLORHEXIDINE GLUCONATE 0.12% ORAL RINSE 10 ML: 1.2 LIQUID ORAL at 08:37

## 2020-01-01 RX ADMIN — CASTOR OIL AND BALSAM, PERU: 788; 87 OINTMENT TOPICAL at 08:47

## 2020-01-01 RX ADMIN — POTASSIUM CHLORIDE 20 MEQ: 29.8 INJECTION, SOLUTION INTRAVENOUS at 04:33

## 2020-01-01 RX ADMIN — CALCIUM CHLORIDE, MAGNESIUM CHLORIDE, DEXTROSE MONOHYDRATE, LACTIC ACID, SODIUM CHLORIDE, SODIUM BICARBONATE AND POTASSIUM CHLORIDE 2000 ML/HR: 3.68; 3.05; 22; 5.4; 6.46; 3.09; .314 INJECTION INTRAVENOUS at 04:59

## 2020-01-01 RX ADMIN — ASPIRIN 81 MG CHEWABLE TABLET 81 MG: 81 TABLET CHEWABLE at 09:37

## 2020-01-01 RX ADMIN — ROCURONIUM BROMIDE 35 MG: 10 INJECTION INTRAVENOUS at 15:51

## 2020-01-01 RX ADMIN — ALBUMIN (HUMAN) 12.5 G: 12.5 INJECTION, SOLUTION INTRAVENOUS at 01:45

## 2020-01-01 RX ADMIN — MUPIROCIN: 20 OINTMENT TOPICAL at 09:08

## 2020-01-01 RX ADMIN — AMIODARONE HYDROCHLORIDE 0.5 MG/MIN: 50 INJECTION, SOLUTION INTRAVENOUS at 20:45

## 2020-01-01 RX ADMIN — SODIUM CHLORIDE 40 MCG/MIN: 900 INJECTION, SOLUTION INTRAVENOUS at 16:00

## 2020-01-01 RX ADMIN — PHENYLEPHRINE HYDROCHLORIDE 100 MCG/MIN: 10 INJECTION INTRAVENOUS at 04:35

## 2020-01-01 RX ADMIN — MORPHINE SULFATE 4 MG: 2 INJECTION, SOLUTION INTRAMUSCULAR; INTRAVENOUS at 10:42

## 2020-01-01 RX ADMIN — HUMAN INSULIN 40 UNITS: 100 INJECTION, SUSPENSION SUBCUTANEOUS at 08:38

## 2020-01-01 RX ADMIN — Medication 10 ML: at 21:04

## 2020-01-01 RX ADMIN — ACETYLCYSTEINE 200 MG: 200 SOLUTION ORAL; RESPIRATORY (INHALATION) at 01:34

## 2020-01-01 RX ADMIN — CALCIUM CHLORIDE, MAGNESIUM CHLORIDE, DEXTROSE MONOHYDRATE, LACTIC ACID, SODIUM CHLORIDE, SODIUM BICARBONATE AND POTASSIUM CHLORIDE 2000 ML/HR: 3.68; 3.05; 22; 5.4; 6.46; 3.09; .314 INJECTION INTRAVENOUS at 09:00

## 2020-01-01 RX ADMIN — FENTANYL CITRATE 50 MCG: 50 INJECTION, SOLUTION INTRAMUSCULAR; INTRAVENOUS at 14:31

## 2020-01-01 RX ADMIN — CALCIUM CHLORIDE, MAGNESIUM CHLORIDE, DEXTROSE MONOHYDRATE, LACTIC ACID, SODIUM CHLORIDE, SODIUM BICARBONATE AND POTASSIUM CHLORIDE 2000 ML/HR: 3.68; 3.05; 22; 5.4; 6.46; 3.09; .314 INJECTION INTRAVENOUS at 23:06

## 2020-01-01 RX ADMIN — Medication 2 MCG/MIN: at 16:25

## 2020-01-01 RX ADMIN — ALBUMIN (HUMAN) 12.5 G: 12.5 INJECTION, SOLUTION INTRAVENOUS at 21:12

## 2020-01-01 RX ADMIN — ACETYLCYSTEINE 200 MG: 200 SOLUTION ORAL; RESPIRATORY (INHALATION) at 08:25

## 2020-01-01 RX ADMIN — INSULIN LISPRO 2 UNITS: 100 INJECTION, SOLUTION INTRAVENOUS; SUBCUTANEOUS at 20:07

## 2020-01-01 RX ADMIN — MAGNESIUM SULFATE HEPTAHYDRATE: 500 INJECTION, SOLUTION INTRAMUSCULAR; INTRAVENOUS at 19:01

## 2020-01-01 RX ADMIN — Medication 10 ML: at 04:01

## 2020-01-01 RX ADMIN — CHLORHEXIDINE GLUCONATE 0.12% ORAL RINSE 10 ML: 1.2 LIQUID ORAL at 21:04

## 2020-01-01 RX ADMIN — PROPOFOL 25 MCG/KG/MIN: 10 INJECTION, EMULSION INTRAVENOUS at 18:58

## 2020-01-01 RX ADMIN — POTASSIUM CHLORIDE 20 MEQ: 29.8 INJECTION, SOLUTION INTRAVENOUS at 03:14

## 2020-01-01 RX ADMIN — Medication 2 MCG/MIN: at 13:56

## 2020-01-01 RX ADMIN — PHENYLEPHRINE HYDROCHLORIDE 60 MCG/MIN: 10 INJECTION INTRAVENOUS at 06:10

## 2020-01-01 RX ADMIN — VANCOMYCIN HYDROCHLORIDE 1250 MG: 10 INJECTION, POWDER, LYOPHILIZED, FOR SOLUTION INTRAVENOUS at 23:34

## 2020-01-01 RX ADMIN — PROPOFOL 45 MCG/KG/MIN: 10 INJECTION, EMULSION INTRAVENOUS at 09:06

## 2020-01-01 RX ADMIN — FENTANYL CITRATE 50 MCG: 50 INJECTION, SOLUTION INTRAMUSCULAR; INTRAVENOUS at 18:22

## 2020-01-01 RX ADMIN — CALCIUM CHLORIDE, MAGNESIUM CHLORIDE, DEXTROSE MONOHYDRATE, LACTIC ACID, SODIUM CHLORIDE, SODIUM BICARBONATE AND POTASSIUM CHLORIDE 2000 ML/HR: 3.68; 3.05; 22; 5.4; 6.46; 3.09; .314 INJECTION INTRAVENOUS at 06:42

## 2020-01-01 RX ADMIN — MUPIROCIN: 20 OINTMENT TOPICAL at 08:16

## 2020-01-01 RX ADMIN — FENTANYL CITRATE 25 MCG: 50 INJECTION, SOLUTION INTRAMUSCULAR; INTRAVENOUS at 17:55

## 2020-01-01 RX ADMIN — DEXTROSE MONOHYDRATE 11.9 ML/HR: 50 INJECTION, SOLUTION INTRAVENOUS at 12:26

## 2020-01-01 RX ADMIN — CASTOR OIL AND BALSAM, PERU: 788; 87 OINTMENT TOPICAL at 10:59

## 2020-01-01 RX ADMIN — SODIUM CHLORIDE 7.4 UNITS/HR: 9 INJECTION, SOLUTION INTRAVENOUS at 02:07

## 2020-01-01 RX ADMIN — HUMAN INSULIN 20 UNITS: 100 INJECTION, SUSPENSION SUBCUTANEOUS at 22:06

## 2020-01-01 RX ADMIN — SODIUM BICARBONATE 50 MEQ: 84 INJECTION, SOLUTION INTRAVENOUS at 21:57

## 2020-01-01 RX ADMIN — CALCIUM CHLORIDE 1 G: 100 INJECTION, SOLUTION INTRAVENOUS; INTRAVENTRICULAR at 07:29

## 2020-01-01 RX ADMIN — CALCIUM CHLORIDE 1 G: 100 INJECTION, SOLUTION INTRAVENOUS at 14:54

## 2020-01-01 RX ADMIN — BIVALIRUDIN 0.89 MG/KG/HR: 250 INJECTION, POWDER, LYOPHILIZED, FOR SOLUTION INTRAVENOUS at 00:23

## 2020-01-01 RX ADMIN — EPINEPHRINE 1 MG: 0.1 INJECTION, SOLUTION ENDOTRACHEAL; INTRACARDIAC; INTRAVENOUS at 15:06

## 2020-01-01 RX ADMIN — Medication 10 ML: at 15:21

## 2020-01-01 RX ADMIN — Medication 10 ML: at 05:04

## 2020-01-01 RX ADMIN — POTASSIUM CHLORIDE 20 MEQ: 29.8 INJECTION, SOLUTION INTRAVENOUS at 22:40

## 2020-01-01 RX ADMIN — ALBUTEROL SULFATE 2.5 MG: 2.5 SOLUTION RESPIRATORY (INHALATION) at 08:01

## 2020-01-01 RX ADMIN — CASTOR OIL AND BALSAM, PERU: 788; 87 OINTMENT TOPICAL at 09:13

## 2020-01-01 RX ADMIN — ALBUMIN (HUMAN) 12.5 G: 12.5 INJECTION, SOLUTION INTRAVENOUS at 21:43

## 2020-01-01 RX ADMIN — CALCIUM CHLORIDE, MAGNESIUM CHLORIDE, DEXTROSE MONOHYDRATE, LACTIC ACID, SODIUM CHLORIDE, SODIUM BICARBONATE AND POTASSIUM CHLORIDE 2000 ML/HR: 3.68; 3.05; 22; 5.4; 6.46; 3.09; .314 INJECTION INTRAVENOUS at 01:30

## 2020-01-01 RX ADMIN — SODIUM CHLORIDE 10 ML/HR: 450 INJECTION, SOLUTION INTRAVENOUS at 19:29

## 2020-01-01 RX ADMIN — ALBUMIN (HUMAN) 250 ML: 2.5 SOLUTION INTRAVENOUS at 14:26

## 2020-01-01 RX ADMIN — BIVALIRUDIN 0.07 MG/KG/HR: 250 INJECTION, POWDER, LYOPHILIZED, FOR SOLUTION INTRAVENOUS at 12:23

## 2020-01-01 RX ADMIN — BIVALIRUDIN 1.34 MG/KG/HR: 250 INJECTION, POWDER, LYOPHILIZED, FOR SOLUTION INTRAVENOUS at 02:50

## 2020-01-01 RX ADMIN — Medication 10 ML: at 17:28

## 2020-01-01 RX ADMIN — CALCIUM CHLORIDE, MAGNESIUM CHLORIDE, DEXTROSE MONOHYDRATE, LACTIC ACID, SODIUM CHLORIDE, SODIUM BICARBONATE AND POTASSIUM CHLORIDE 2000 ML/HR: 3.68; 3.05; 22; 5.4; 6.46; 3.09; .314 INJECTION INTRAVENOUS at 13:30

## 2020-01-01 RX ADMIN — Medication 10 ML: at 05:25

## 2020-01-01 RX ADMIN — PHENYLEPHRINE HYDROCHLORIDE 100 MCG/MIN: 10 INJECTION INTRAVENOUS at 05:06

## 2020-01-01 RX ADMIN — SODIUM CHLORIDE 10 ML/HR: 450 INJECTION, SOLUTION INTRAVENOUS at 23:26

## 2020-01-01 RX ADMIN — PROPOFOL 25 MCG/KG/MIN: 10 INJECTION, EMULSION INTRAVENOUS at 10:06

## 2020-01-01 RX ADMIN — EPINEPHRINE 1 MG: 0.1 INJECTION, SOLUTION ENDOTRACHEAL; INTRACARDIAC; INTRAVENOUS at 14:49

## 2020-01-01 RX ADMIN — DEXMEDETOMIDINE 0.4 MCG/KG/HR: 100 INJECTION, SOLUTION, CONCENTRATE INTRAVENOUS at 11:04

## 2020-01-01 RX ADMIN — PROPOFOL 25 MCG/KG/MIN: 10 INJECTION, EMULSION INTRAVENOUS at 07:34

## 2020-01-01 RX ADMIN — ALBUMIN (HUMAN) 12.5 G: 12.5 INJECTION, SOLUTION INTRAVENOUS at 03:16

## 2020-01-01 RX ADMIN — EPINEPHRINE 5 MCG/MIN: 1 INJECTION INTRAMUSCULAR; INTRAVENOUS; SUBCUTANEOUS at 10:06

## 2020-01-01 RX ADMIN — CEFEPIME HYDROCHLORIDE 1 G: 1 INJECTION, POWDER, FOR SOLUTION INTRAMUSCULAR; INTRAVENOUS at 09:48

## 2020-01-01 RX ADMIN — ROCURONIUM BROMIDE 50 MG: 10 INJECTION INTRAVENOUS at 12:54

## 2020-01-01 RX ADMIN — INSULIN LISPRO 3 UNITS: 100 INJECTION, SOLUTION INTRAVENOUS; SUBCUTANEOUS at 05:29

## 2020-01-01 RX ADMIN — PROPOFOL 30 MCG/KG/MIN: 10 INJECTION, EMULSION INTRAVENOUS at 03:36

## 2020-01-01 RX ADMIN — PROPOFOL 50 MCG/KG/MIN: 10 INJECTION, EMULSION INTRAVENOUS at 08:38

## 2020-01-01 RX ADMIN — ALBUMIN (HUMAN) 250 ML: 2.5 SOLUTION INTRAVENOUS at 16:55

## 2020-01-01 RX ADMIN — CEFAZOLIN 2 G: 1 INJECTION, POWDER, FOR SOLUTION INTRAMUSCULAR; INTRAVENOUS; PARENTERAL at 17:05

## 2020-01-01 RX ADMIN — DEXTROSE MONOHYDRATE 25 G: 25 INJECTION, SOLUTION INTRAVENOUS at 14:54

## 2020-01-01 RX ADMIN — MAGNESIUM SULFATE HEPTAHYDRATE 1 G: 1 INJECTION, SOLUTION INTRAVENOUS at 13:00

## 2020-01-01 RX ADMIN — ALBUMIN (HUMAN) 12.5 G: 12.5 INJECTION, SOLUTION INTRAVENOUS at 09:08

## 2020-01-01 RX ADMIN — TICAGRELOR 90 MG: 90 TABLET ORAL at 06:20

## 2020-01-01 RX ADMIN — Medication 6 MCG/MIN: at 02:19

## 2020-01-01 RX ADMIN — ALBUMIN (HUMAN) 12.5 G: 12.5 INJECTION, SOLUTION INTRAVENOUS at 16:00

## 2020-01-01 RX ADMIN — ALBUTEROL SULFATE 2.5 MG: 2.5 SOLUTION RESPIRATORY (INHALATION) at 08:06

## 2020-01-01 RX ADMIN — SODIUM BICARBONATE 50 MEQ: 84 INJECTION, SOLUTION INTRAVENOUS at 15:58

## 2020-01-01 RX ADMIN — HUMAN INSULIN 40 UNITS: 100 INJECTION, SUSPENSION SUBCUTANEOUS at 19:10

## 2020-01-01 RX ADMIN — INSULIN LISPRO 5 UNITS: 100 INJECTION, SOLUTION INTRAVENOUS; SUBCUTANEOUS at 05:13

## 2020-01-01 RX ADMIN — METRONIDAZOLE 500 MG: 500 INJECTION, SOLUTION INTRAVENOUS at 11:12

## 2020-01-01 RX ADMIN — METRONIDAZOLE 500 MG: 500 INJECTION, SOLUTION INTRAVENOUS at 11:13

## 2020-01-01 RX ADMIN — ACETAMINOPHEN 650 MG: 325 TABLET ORAL at 18:05

## 2020-01-01 RX ADMIN — DEXMEDETOMIDINE 0.7 MCG/KG/HR: 100 INJECTION, SOLUTION, CONCENTRATE INTRAVENOUS at 06:29

## 2020-01-01 RX ADMIN — BIVALIRUDIN 2.01 MG/KG/HR: 250 INJECTION, POWDER, LYOPHILIZED, FOR SOLUTION INTRAVENOUS at 06:20

## 2020-01-01 RX ADMIN — CALCIUM CHLORIDE 2 G: 100 INJECTION, SOLUTION INTRAVENOUS; INTRAVENTRICULAR at 17:31

## 2020-01-01 RX ADMIN — Medication 80 MCG: at 17:43

## 2020-01-01 RX ADMIN — POTASSIUM CHLORIDE 20 MEQ: 29.8 INJECTION, SOLUTION INTRAVENOUS at 01:53

## 2020-01-01 RX ADMIN — PROPOFOL 40 MCG/KG/MIN: 10 INJECTION, EMULSION INTRAVENOUS at 18:50

## 2020-01-01 RX ADMIN — Medication 10 ML: at 05:12

## 2020-01-01 RX ADMIN — CHLORHEXIDINE GLUCONATE 0.12% ORAL RINSE 10 ML: 1.2 LIQUID ORAL at 20:21

## 2020-01-01 RX ADMIN — SODIUM CHLORIDE 15 ML: 900 INJECTION, SOLUTION INTRAVENOUS at 02:25

## 2020-01-01 RX ADMIN — SODIUM CHLORIDE: 450 INJECTION, SOLUTION INTRAVENOUS at 15:31

## 2020-01-01 RX ADMIN — POTASSIUM CHLORIDE 20 MEQ: 29.8 INJECTION, SOLUTION INTRAVENOUS at 08:20

## 2020-01-01 RX ADMIN — ROCURONIUM BROMIDE 50 MG: 10 INJECTION INTRAVENOUS at 13:00

## 2020-01-01 RX ADMIN — TICAGRELOR 90 MG: 90 TABLET ORAL at 05:24

## 2020-01-01 RX ADMIN — Medication 10 ML: at 06:53

## 2020-01-01 RX ADMIN — HUMAN INSULIN 40 UNITS: 100 INJECTION, SUSPENSION SUBCUTANEOUS at 19:01

## 2020-01-01 RX ADMIN — PROTAMINE SULFATE 15 MG: 10 INJECTION, SOLUTION INTRAVENOUS at 15:08

## 2020-01-01 RX ADMIN — Medication 120 MCG: at 18:06

## 2020-01-01 RX ADMIN — SODIUM BICARBONATE 50 MEQ: 84 INJECTION, SOLUTION INTRAVENOUS at 05:39

## 2020-01-01 RX ADMIN — ACETAMINOPHEN ORAL SOLUTION 650 MG: 650 SOLUTION ORAL at 20:21

## 2020-01-01 RX ADMIN — PROPOFOL 45 MCG/KG/MIN: 10 INJECTION, EMULSION INTRAVENOUS at 11:49

## 2020-01-01 RX ADMIN — LIDOCAINE HYDROCHLORIDE,EPINEPHRINE BITARTRATE 15 MG: 10; .01 INJECTION, SOLUTION INFILTRATION; PERINEURAL at 00:00

## 2020-01-01 RX ADMIN — SODIUM BICARBONATE 100 MEQ: 84 INJECTION, SOLUTION INTRAVENOUS at 18:48

## 2020-01-01 RX ADMIN — EPINEPHRINE 10 MCG/MIN: 1 INJECTION INTRAMUSCULAR; INTRAVENOUS; SUBCUTANEOUS at 15:30

## 2020-01-01 RX ADMIN — CASTOR OIL AND BALSAM, PERU: 788; 87 OINTMENT TOPICAL at 19:10

## 2020-01-01 RX ADMIN — CALCIUM CHLORIDE INJECTION 2 G: 100 INJECTION, SOLUTION INTRAVENOUS at 17:31

## 2020-01-01 RX ADMIN — POTASSIUM CHLORIDE 20 MEQ: 29.8 INJECTION, SOLUTION INTRAVENOUS at 03:49

## 2020-01-01 RX ADMIN — CASTOR OIL AND BALSAM, PERU: 788; 87 OINTMENT TOPICAL at 10:27

## 2020-01-01 RX ADMIN — ASPIRIN 81 MG CHEWABLE TABLET 81 MG: 81 TABLET CHEWABLE at 08:16

## 2020-01-01 RX ADMIN — FENTANYL CITRATE 50 MCG: 50 INJECTION, SOLUTION INTRAMUSCULAR; INTRAVENOUS at 02:19

## 2020-01-01 RX ADMIN — TICAGRELOR 90 MG: 90 TABLET ORAL at 05:03

## 2020-01-01 RX ADMIN — CALCIUM CHLORIDE, MAGNESIUM CHLORIDE, DEXTROSE MONOHYDRATE, LACTIC ACID, SODIUM CHLORIDE, SODIUM BICARBONATE AND POTASSIUM CHLORIDE 2000 ML/HR: 3.68; 3.05; 22; 5.4; 6.46; 3.09; .314 INJECTION INTRAVENOUS at 12:16

## 2020-01-01 RX ADMIN — Medication 10 ML: at 06:34

## 2020-09-10 PROBLEM — I46.9 CARDIAC ARREST (HCC): Status: ACTIVE | Noted: 2020-01-01

## 2020-09-10 NOTE — ANESTHESIA PROCEDURE NOTES
Arterial Line Placement Start time: 9/10/2020 3:20 PM 
End time: 9/10/2020 4:31 PM 
Performed by: Shadia Spann MD 
Authorized by: Shadia Spann MD  
 
Pre-Procedure Indications:  Arterial pressure monitoring and blood sampling Preanesthetic Checklist: patient identified, risks and benefits discussed, anesthesia consent, site marked, patient being monitored, timeout performed and patient being monitored Procedure:  
Prep:  Chlorhexidine Seldinger Technique?: Yes Orientation:  Right Location:  Radial artery Catheter size:  20 G Number of attempts:  1 Assessment:  
Post-procedure:  Line secured and sterile dressing applied Patient Tolerance:  Patient tolerated the procedure well with no immediate complications

## 2020-09-10 NOTE — Clinical Note
Lesion located in the Distal RCA. Balloon inflated using multiple inflations inflation technique. Lesion #1: Pressure = 12 aurea; Duration = 15 sec.

## 2020-09-10 NOTE — Clinical Note
Lesion: Located in the Proximal LAD. Multiple inflations used. First inflation pressure = 12 aurea; inflation time: 20 sec. Second inflation pressure: 12 aurea; inflation time: 14 sec.

## 2020-09-10 NOTE — ED NOTES
Chato Monica brother of patient arrived- Dr. Jose A Girard aware will come to update on plan of care

## 2020-09-10 NOTE — ANESTHESIA PREPROCEDURE EVALUATION
Relevant Problems No relevant active problems Anesthetic History No history of anesthetic complications Review of Systems / Medical History Patient summary reviewed, nursing notes reviewed and pertinent labs reviewed Pulmonary Within defined limits Neuro/Psych Within defined limits Cardiovascular Within defined limits Hypertension Dysrhythmias Past MI and CAD Exercise tolerance: >4 METS Comments: Cardiac arrest  
GI/Hepatic/Renal 
Within defined limits GERD Endo/Other Within defined limits Diabetes Other Findings Physical Exam 
 
Airway Mallampati: II 
TM Distance: 4 - 6 cm Neck ROM: normal range of motion Mouth opening: Normal 
 
 Cardiovascular Regular rate and rhythm,  S1 and S2 normal,  no murmur, click, rub, or gallop Dental 
No notable dental hx Pulmonary Breath sounds clear to auscultation Abdominal 
GI exam deferred Other Findings Anesthetic Plan ASA: 5 Anesthesia type: general 
 
Monitoring Plan: Arterial line, CVP and BIS Post procedure ventilation Induction: Intravenous Anesthetic plan and risks discussed with: Patient

## 2020-09-10 NOTE — Clinical Note
TRANSFER - OUT REPORT:     Verbal report given to: pankaj. Report consisted of patient's Situation, Background, Assessment and   Recommendations(SBAR). Opportunity for questions and clarification was provided. Patient transported with a Registered Nurse.

## 2020-09-10 NOTE — BRIEF OP NOTE
BRIEF OP NOTE Pre-Op Diagnosis: CARDIAC ARREST Post-Op Diagnosis: Cardiac Arrest   
 
Procedure: VA EXTRACORPOREAL MEMBRANE OXYGENATION (ECMO) R CFA, L CFV Placement of distal perfusion cannula Surgeon: Tha Abarca Assistant(s): MONALISA Lacy, Dr. George Hammonds Anesthesia: General  
 
Infusions: Epi, norepi Estimated Blood Loss: 50 mL Complications: See full operative note Findings: CARDIAC ARREST MONALISA Lacy

## 2020-09-10 NOTE — Clinical Note
Lesion: Located in the Proximal LAD. Stent deployed. Multiple inflations used. First inflation pressure = 12 aurea; inflation time: 15 sec.

## 2020-09-10 NOTE — Clinical Note
Impella inserted. Impella insertion site: LFA, at the left ventricle. Performance level set at/to = P9. Patient is in cardiogenic shock: Yes. Post procedure Impella status: patient remains on Impella. Post procedure site action: sheaths sewn in.

## 2020-09-10 NOTE — Clinical Note
Lesion located in the Mid Cx. Balloon inflated using multiple inflations inflation technique. Lesion #1: Pressure = 14 aurea; Duration = 15 sec. Inflation 2: Pressure = 14 aurea; Duration = 15 sec.

## 2020-09-10 NOTE — Clinical Note
Lesion located in the Ostium Diag 1. Balloon inflated using multiple inflations inflation technique. Lesion #1: Pressure = 12 aurea; Duration = 15 sec.

## 2020-09-10 NOTE — Clinical Note
Lesion located in the Proximal LAD. Balloon inflated using multiple inflations inflation technique. Lesion #1: Pressure = 5 aurea; Duration = 15 sec. Inflation 2: Pressure = 12 aurea; Duration = 10 sec. Inflation 3: Pressure = 14 aurea; Duration = 15 sec.

## 2020-09-10 NOTE — Clinical Note
Lesion: Located in the Mid Cx. Multiple inflations used. First inflation pressure = 12 aurea; inflation time: 20 sec.

## 2020-09-10 NOTE — ED NOTES
Arrival by EMS in full cardiac arrest.  BLS crew only. CPR in progress. LMA noted on arrival and respirations assisted via BVM. Patient cyanotic from upper chest to head. No pulses on arrival.  Apneic. Unresponsive. Pupils fixed and dilated. Patient in and out of cardiac arrest.  ACLS protocol followed. See code documentation for medications and shocks. 1449 Faint pulse noted femoral on pulse check. 1503 Faint pulse noted on pulse check with rhythm change 1514 Faint pulse noted on pulse check. Cardiac activity noted by ultrasound by Dr. Tomeka Scott. 1 liter NS completed during cardiac arrest.  2nd liter was infusing on route to OR. To OR with Cardiology and OR staff at [phone].

## 2020-09-10 NOTE — PROGRESS NOTES
Spiritual Care Assessment/Progress Note [address] 
 
 
NAME: Luca Chance      MRN: [id number] AGE: 60 y.o. SEX: male Mandaen Affiliation: [facility]  
Language: [place] 9/10/2020     Total Time (in minutes): 27 Spiritual Assessment begun in \[facility]\"" EMERGENCY DEPT through conversation with: 
  
    []Patient        [x] Family    [] Friend(s) Reason for Consult: Code Blue/99 Spiritual beliefs: (Please include comment if needed) 
   [] Identifies with a tiffany tradition:     
   [] Supported by a tiffany community:        
   [] Claims no spiritual orientation:       
   [] Seeking spiritual identity:            
   [] Adheres to an individual form of spirituality:       
   [x] Not able to assess:                   
 
    
Identified resources for coping:  
   [] Prayer                           
   [] Music                  [] Guided Imagery 
   [] Family/friends                 [] Pet visits [] Devotional reading                         [x] Unknown 
   [] Other:                                       
 
 
Interventions offered during this visit: (See comments for more details) Family/Friend(s): Affirmation of emotions/emotional suffering, Life review/legacy, Prayer (assurance of), Initial Assessment Plan of Care: 
 
 [x] Support spiritual and/or cultural needs  
 [] Support AMD and/or advance care planning process    
 [] Support grieving process 
 [] Coordinate Rites and/or Rituals  
 [] Coordination with community clergy [] No spiritual needs identified at this time 
 [] Detailed Plan of Care below (See Comments)  [] Make referral to Music Therapy 
[] Make referral to Pet Therapy    
[] Make referral to Addiction services 
[] Make referral to [facility] 
[] Make referral to Spiritual Care Partner 
[] No future visits requested       
[x] Follow up visits as needed Comments: Provided support for this pt's supervisee in [id number] [place] [facility] ED11. Facilitated life review to assess potential support needs. Pt's supervisee offered review of this medical event as it progressed to this ED admission. Provided emotional support through reflective listening as pt's supervisee processed the depth of emotion the supervisee was experiencing as a result of witnessing this event develop. Attempted to ascertain a next-of-kin contact for this pt from supervisee. Supervisee has contacted employer to attempt to secure contact information. Informed pt's supervisee team was working with pt, but team may not be able to release medical information to supervisee. Pt's supervisee indicated his wife was an RN and he understood this protocol. Assured pt's supervisee of prayers and affirmed ongoing availability of support. Ree Hawkins MDiv. Staff  Request  Support/Spiritual Care Services via [address] [place] [address]

## 2020-09-10 NOTE — ED NOTES
Pastoral care spoke with co-workers, co-workers did not have numbers for Next of Kin, they are going to job site to find family numbers

## 2020-09-10 NOTE — ANESTHESIA PROCEDURE NOTES
Central Line Placement Start time: 9/10/2020 3:51 PM 
End time: 9/10/2020 4:11 PM 
Performed by: Sunny Marsh MD 
Authorized by: Sunny Marsh MD  
 
Indications: vascular access, central pressure monitoring and need for vasopressors Preanesthetic Checklist: patient identified, risks and benefits discussed, anesthesia consent, site marked, patient being monitored and timeout performed Pre-procedure: All elements of maximal sterile barrier technique followed? Yes   
2% Chlorhexidine for cutaneous antisepsis, Hand hygiene performed prior to catheter insertion and Ultrasound guidance Sterile Ultrasound Technique followed?: Yes Procedure:  
Prep:  Chlorhexidine Orientation:  Left Catheter type:  Triple lumen Catheter size:  9 Fr Catheter length:  16 cm Number of attempts:  1 Successful placement: Yes Assessment:  
Post-procedure:  Catheter secured, sterile dressing applied and sterile dressing with CHG applied Assessment:  Blood return through all ports and free fluid flow Insertion:  Uncomplicated Patient tolerance:  Patient tolerated the procedure well with no immediate complications PA catheter inserted .

## 2020-09-10 NOTE — Clinical Note
Lesion: Located in the Distal RCA. Multiple inflations used. First inflation pressure = 14 aurea; inflation time: 20 sec.

## 2020-09-10 NOTE — Clinical Note
Sheath inserted for Impella use: Impella sheath. Sheath inserted. Post procedure site action: sheaths sewn in. Impella site assessment: hemostasis obtained.

## 2020-09-10 NOTE — Clinical Note
Lesion: Located in the Mid LAD. Multiple inflations used. First inflation pressure = 12 aurea; inflation time: 20 sec.

## 2020-09-10 NOTE — Clinical Note
Lesion located in the Proximal Diag 1. Balloon inflated using multiple inflations inflation technique. Lesion #1: Pressure = 12 aurea; Duration = 15 sec.    [id number]

## 2020-09-10 NOTE — ANESTHESIA PROCEDURE NOTES
Central Line Placement Start time: 9/10/2020 3:32 PM 
End time: 9/10/2020 3:44 PM 
Performed by: Lachelle Peacock MD 
Authorized by: Lachelle Peacock MD  
 
Indications: vascular access, central pressure monitoring and need for vasopressors Preanesthetic Checklist: patient identified, risks and benefits discussed, anesthesia consent, site marked, patient being monitored and timeout performed Pre-procedure: All elements of maximal sterile barrier technique followed? Yes   
2% Chlorhexidine for cutaneous antisepsis, Hand hygiene performed prior to catheter insertion and Ultrasound guidance Sterile Ultrasound Technique followed?: Yes Procedure:  
Prep:  Chlorhexidine Orientation:  Right Catheter type:  Triple lumen Catheter size:  9 Fr Catheter length:  16 cm Number of attempts:  1 Successful placement: Yes Assessment:  
Post-procedure:  Catheter secured, sterile dressing applied and sterile dressing with CHG applied Assessment:  Blood return through all ports and free fluid flow Insertion:  Uncomplicated Patient tolerance:  Patient tolerated the procedure well with no immediate complications Placed by Dr Dallas Butcher while cpr being done

## 2020-09-10 NOTE — PERIOP NOTES
TRANSFER - OUT REPORT: 
 
Verbal report given to  Merna Lamas on Alisa Ling  being transferred to CCU for routine post - op Report consisted of patients Situation, Background, Assessment and  
Recommendations(SBAR). Information from the following report(s) SBAR, OR Summary, Procedure Summary and MAR was reviewed with the receiving nurse. Lines:  
Triple Lumen (Active) Triple Lumen 09/10/20 Left (Active) Arterial Line 09/10/20 Right Radial artery (Active) Opportunity for questions and clarification was provided. Patient transported with: 
 Monitor O2 @ 10 liters Registered Nurse PA Perfussion CRNA

## 2020-09-10 NOTE — Clinical Note
Lesion located in the Mid RCA. Balloon inflated using multiple inflations inflation technique. Lesion #1: Pressure = 12 aurea; Duration = 20 sec.

## 2020-09-10 NOTE — Clinical Note
Lesion: Located in the Distal RCA. Single technique and multiple inflations used. First inflation pressure = 12 aurea; inflation time: 20 sec. Second inflation pressure: 14 aurea; inflation time: 20 sec.

## 2020-09-10 NOTE — H&P
CSS 
 History and Physical 
 
Subjective:  
 **pt taken emergently to OR, information obtained from chart review. Rochelle East is a 60 y.o. male who was referred for cardiac evaluation by Dr. Jacob Carbajal for cardiac arrest. Pt was driving and had cardiac arrest.  
 
He has a medical history of DM, HLD, GERD, HTN, neuropathy. He is a current smoker, occasionally drinks alcohol. Unknown family history. Past Medical History:  
Diagnosis Date  Diabetes ([address]  Elevated cholesterol  GERD (gastroesophageal reflux disease)   
 helps with meds upsetting stomach  Hypertension  Low vitamin D level  Neuropathy   
 rt lower extremity Past Surgical History:  
Procedure Laterality Date  CARDIAC SURG PROCEDURE UNLIST    
 heart cath. normal  
 HX CERVICAL DISKECTOMY 2015  HX COLONOSCOPY  2015  HX HERNIA REPAIR    
 umbilical  
 HX ORTHOPAEDIC    
 reattached tendon and muscle rt shoulder Social History Tobacco Use  Smoking status: Current Every Day Smoker Packs/day: 1.00 Substance Use Topics  Alcohol use: Yes Comment: rarely No family history on file. Prior to Admission medications Medication Sig Start Date End Date Taking? Authorizing Provider  
metFORMIN (GLUCOPHAGE) 500 mg tablet  12/9/16   Provider, Historical  
vardenafil (LEVITRA) 20 mg tablet Take 20 mg by mouth as needed. Provider, Historical  
glimepiride (AMARYL) 4 mg tablet Take 4 mg by mouth two (2) times a day. Provider, Historical  
ergocalciferol (VITAMIN D2) 50,000 unit capsule Take 50,000 Units by mouth every seven (7) days. Provider, Historical  
aspirin delayed-release 81 mg tablet Take 81 mg by mouth daily. Provider, Historical  
omeprazole (PRILOSEC) 20 mg capsule Take 20 mg by mouth daily. Provider, Historical  
rosuvastatin (CRESTOR) 20 mg tablet Take 20 mg by mouth nightly.     Provider, Historical  
 fenofibric acid (TRILIPIX) 135 mg capsule Take 135 mg by mouth nightly. Provider, Historical  
lisinopril (PRINIVIL, ZESTRIL) 20 mg tablet Take 20 mg by mouth two (2) times a day. Provider, Historical  
metFORMIN (GLUCOPHAGE) 1,000 mg tablet Take 2,000 mg by mouth two (2) times a day. Provider, Historical  
   
No Known Allergies Review of Systems: unable to complete dt cardiac arrest. 
Consititutional: Denies fever or chills. Eyes:  Denies use of glasses or vision problems(cataracts). ENT:  Denies hearing or swallowing difficulty. CV: Denies CP, claudication, HTN. Resp: Denies dyspnea, productive cough. : Denies dialysis or kidney problems. GI: Denies ulcers, esophageal strictures, liver problems. M/S: Denies joint or bone problems, or implanted artificial hardware. Skin: Denies varicose veins, edema. Neuro: Denies strokes, or TIAs. Psych: Denies anxiety or depression. Endocrine: Denies thyroid problems or diabetes. Heme/Lymphatic: Denies easy bruising or lymphedema. Objective:  
 
VS:  
Visit Vitals BP (!) 170/104 Pulse (!) 45 Physical Exam:  Unable to obtain pt taken to OR. General appearance: alert, cooperative, no distress Head: normocephalic, without obvious abnormality; atraumatic Eyes: conjunctivae/corneas clear; EOM's intact. Nose: nares normal; no drainage. Neck: no carotid bruit and no JVD Lungs: clear to auscultation bilaterally Heart: regular rate and rhythm; no murmur Abdomen: soft, non-tender; bowel sounds normal 
Extremities: moves all extremities; no weakness. Skin: Skin color normal; No varicose veins or edema. Neurologic: Grossly normal   
 
Labs: No results for input(s): WBC, HGB, HCT, PLT, NA, K, BUN, CREA, GLU, GLUCPOC, INR, HGBEXT, HCTEXT, PLTEXT, INREXT, HGBEXT, HCTEXT, PLTEXT, INREXT in the last 72 hours. No lab exists for component: [place] Assessment:  
 
Active Problems: 
  Cardiac arrest ([facility] [address] (9/10/2020) Plan: 1. Cardiac arrest: Pt taken to OR for emergent [facility] ECMO 2. Hx DM: diabetes management consult 3. Hx HTN: no antihypertensives until appropriate 4. GERD Signed By: Sky Haro NP September 10, 2020

## 2020-09-10 NOTE — Clinical Note
Mallampati: Unable to assess. ASA: Class 5 - moribund patient, not expected to live without the procedure.

## 2020-09-10 NOTE — Clinical Note
Lesion: Located in the Ostium RCA. Multiple inflations used. First inflation pressure = 15 aurea; inflation time: 20 sec.

## 2020-09-10 NOTE — Clinical Note
Lesion located in the Proximal Diag 1. Balloon inflated using multiple inflations inflation technique. Lesion #1: Pressure = 12 aurea; Duration = 15 sec.

## 2020-09-10 NOTE — ED TRIAGE NOTES
Arrival via EMS in full cardiac arrest.  Witnessed cardiac arrest by BLS. Went to move him and patient became unresponsive. BLS protocol initiated by EMS with LMA and chest compressions. Per EMS patient had not felt well all day. Shortly before becoming unresponsive complained of shortness of breath.

## 2020-09-10 NOTE — CONSULTS
Consult NAME: Pamella Hillman :  1960 MRN:  [id number] Date/Time:  9/10/2020 6:40 PM 
 
Patient PCP: Sasha Pritchett MD 
 
Consult requested by: Dr Salma Aguilar Primary cardiologist: MARISEL 
________________________________________________________________________ Assessment: 1. Refractory cardiac arrest, initial rhythm appeared to be PEA arrest  Status post ECMO placement for hemodynamic support 2. Possible PE, or ACS 3. Respiratory failure status post intubation 4. Severe metabolic acidosis 5. Diabetes 6. Hypertension 7. Hyperlipidemia Plan: 1. Mr Cristiana Ramos  Presented with cardiac arrest.  Apparently he has shortness of breath this morning. It could be possible PE. In emergency room when he had ROSC  For few seconds, echocardiogram showed dilated RV. Due to refractory cardiac arrest cord shock was activated. Discussed with ED physician and CT surgery, Dr. Neelam Velázquez. Patient was urgently taken for ECMO placement. Patient remained on [place] device in the emergency room during ACLS and while ECMO was being placed. 2.  Presentation could be PE, he will be on heparin drip while he has ECMO. We discussed about further options for PE treatment but unclear benefit at this time. 3.   Will await for neurological recovery 4. Obtain EKG 5. Vent management per critical care team  
6. Bicarb drip for significant metabolic acidosis ICU care Patient is  Critically ill. Prognosis is guarded. Discussed with patient's brother about his critical condition. We will continue full support for now , while waiting for neurological recovery. []        High complexity decision making was performed Subjective: CHIEF COMPLAINT: cardiac arrest  
 
HISTORY OF PRESENT ILLNESS:    
Patrice Oliva is a 60 y.o. Male who  Apparently has difficulty breathing and was driving to the hospital.  His breathing got worse. He called EMS.   Upon arrival  Of EMS he collapsed and has cardiac arrest.  Unknown initial rhythm but appear to be PA arrest.  He was brought to emergency room within 5 minutes. He was placed on [place] device and CPR was continued. He was given several doses of epi,  During ACLS also had VFib arrest requiring shock, he was intubated in the emergency room. No spontaneous ROSC. Cardiology consulted for shock protocol. He did have ROSC only for few seconds. Then again PEA arrest. Shock protocol activated. Discussed with Dr Tawanna Herrera. Patient 60year old and  Has a pretty good functional status. Has witnessed cardiac arrest.  Decision was made to proceed with ECMO. When he achieved ROSC for few seconds echocardiogram showed dilated RV possible PE. Patient continued on [place] device,   Transferred to OR  And ECMO cannulas placed and  Started on reperfusion. His blood pressure was slightly improved. He remained unresponsive. He will be admitted to ICU for further management. We were asked to consult for work up and evaluation of the above problems. Past Medical History:  
Diagnosis Date  Diabetes [address] [address]  Elevated cholesterol  GERD (gastroesophageal reflux disease)   
 helps with meds upsetting stomach  Hypertension  Low vitamin D level  Neuropathy   
 rt lower extremity Past Surgical History:  
Procedure Laterality Date  CARDIAC SURG PROCEDURE UNLIST    
 heart cath. normal  
 HX CERVICAL DISKECTOMY 2015  HX COLONOSCOPY  2015  HX HERNIA REPAIR    
 umbilical  
 HX ORTHOPAEDIC    
 reattached tendon and muscle rt shoulder No Known Allergies Meds:  See below Social History Tobacco Use  Smoking status: Current Every Day Smoker Packs/day: 1.00 Substance Use Topics  Alcohol use: Yes Comment: rarely  
 
 family history unable to obtain, patient unresponsive REVIEW OF SYSTEMS:    
    T unable to obtain as patient is unresponsive Objective:  
  
Physical Exam: Last 24hrs VS reviewed since prior progress note. Most recent are: 
 
Visit Vitals BP (!) 83/45 Pulse 100 Temp (!) 93.4 °F (34.1 °C) Resp 22 Wt 99.7 kg (219 lb 12.8 oz) SpO2 97% BMI 32.46 kg/m² Intake/Output Summary (Last 24 hours) at 9/10/2020 1840 Last data filed at 9/10/2020 1730 Gross per 24 hour Intake 4000 ml Output 250 ml Net 3750 ml Examination:  
 
General:   Unresponsive, intubated HEENT:  Intubated, MMM Neck: Supple, JVP- l appreciated RS: intubated CVS:  Unable to assess Abd: Soft, non tender, non distended Lower extremity:  cold to touch, Edema- None Skin:  Cold, no significant bruises CNS:  unresponsive Data:  
  
Telemetry: 
 
EKG: 
Ordered, will review when available Prior to Admission medications Medication Sig Start Date End Date Taking? Authorizing Provider  
metFORMIN (GLUCOPHAGE) 500 mg tablet  12/9/16   Provider, Historical  
vardenafil (LEVITRA) 20 mg tablet Take 20 mg by mouth as needed. Provider, Historical  
glimepiride (AMARYL) 4 mg tablet Take 4 mg by mouth two (2) times a day. Provider, Historical  
ergocalciferol (VITAMIN D2) 50,000 unit capsule Take 50,000 Units by mouth every seven (7) days. Provider, Historical  
aspirin delayed-release 81 mg tablet Take 81 mg by mouth daily. Provider, Historical  
omeprazole (PRILOSEC) 20 mg capsule Take 20 mg by mouth daily. Provider, Historical  
rosuvastatin (CRESTOR) 20 mg tablet Take 20 mg by mouth nightly. Provider, Historical  
fenofibric acid (TRILIPIX) 135 mg capsule Take 135 mg by mouth nightly. Provider, Historical  
lisinopril (PRINIVIL, ZESTRIL) 20 mg tablet Take 20 mg by mouth two (2) times a day. Provider, Historical  
metFORMIN (GLUCOPHAGE) 1,000 mg tablet Take 2,000 mg by mouth two (2) times a day. Provider, Historical  
 
 
Recent Results (from the past 24 hour(s)) POC LACTIC ACID  Collection Time: 09/10/20  2:58 PM  
 Result Value Ref Range Lactic Acid (POC) 13.80 (HH) 0.40 - 2.00 mmol/L  
POC EG7 Collection Time: 09/10/20  5:42 PM  
Result Value Ref Range Calcium, ionized (POC) 1.27 1.12 - 1.32 mmol/L  
 FIO2 (POC) 40 % pH (POC) 7.21 (LL) 7.35 - 7.45    
 pCO2 (POC) 38.3 35.0 - 45.0 MMHG  
 pO2 (POC) 87 80 - 100 MMHG  
 HCO3 (POC) 16.0 (L) 22 - 26 MMOL/L Base deficit (POC) 12 mmol/L  
 sO2 (POC) 96 92 - 97 % Site OTHER Device: VENT Mode ASSIST CONTROL Set Rate 10 bpm  
 PEEP/CPAP (POC) 12 cmH2O  
 PIP (POC) 10 Allens test (POC) N/A Inspiratory Time 0.80 sec Specimen type (POC) ARTERIAL Patient temp. 93.2 Total resp. rate 10 POC EG7 Collection Time: 09/10/20  5:47 PM  
Result Value Ref Range Calcium, ionized (POC) 1.31 1.12 - 1.32 mmol/L  
 FIO2 (POC) 40 % pH (POC) 7.23 (LL) 7.35 - 7.45    
 pCO2 (POC) 38.2 35.0 - 45.0 MMHG  
 pO2 (POC) 51 (L) 80 - 100 MMHG  
 HCO3 (POC) 16.5 (L) 22 - 26 MMOL/L Base deficit (POC) 12 mmol/L  
 sO2 (POC) 85 (L) 92 - 97 % Site OTHER Device: VENT Mode ASSIST CONTROL Set Rate 10 bpm  
 PEEP/CPAP (POC) 12 cmH2O  
 PIP (POC) 10 Allens test (POC) N/A Inspiratory Time 0.80 sec Specimen type (POC) ARTERIAL Patient temp. 93.3 Total resp. rate 10 POC EG7 Collection Time: 09/10/20  5:52 PM  
Result Value Ref Range Calcium, ionized (POC) 1.30 1.12 - 1.32 mmol/L  
 FIO2 (POC) 40 % pH (POC) 7.25 (L) 7.35 - 7.45    
 pCO2 (POC) 38.4 35.0 - 45.0 MMHG  
 pO2 (POC) 588 (H) 80 - 100 MMHG  
 HCO3 (POC) 17.5 (L) 22 - 26 MMOL/L Base deficit (POC) 10 mmol/L  
 sO2 (POC) 100 (H) 92 - 97 % Site OTHER Device: VENT Mode ASSIST CONTROL Set Rate 10 bpm  
 PEEP/CPAP (POC) 12 cmH2O  
 PIP (POC) 10 Allens test (POC) N/A Inspiratory Time 0.80 sec Specimen type (POC) ARTERIAL Patient temp. 93.3 Total resp. rate 10 POC EG7  Collection Time: 09/10/20  6:00 PM  
 Result Value Ref Range Calcium, ionized (POC) 1.30 1.12 - 1.32 mmol/L  
 FIO2 (POC) 40 % pH (POC) 7.24 (LL) 7.35 - 7.45    
 pCO2 (POC) 37.4 35.0 - 45.0 MMHG  
 pO2 (POC) 275 (H) 80 - 100 MMHG  
 HCO3 (POC) 16.6 (L) 22 - 26 MMOL/L Base deficit (POC) 11 mmol/L  
 sO2 (POC) 100 (H) 92 - 97 % Site OTHER Device: VENT Mode ASSIST CONTROL Set Rate 10 bpm  
 PEEP/CPAP (POC) 12 cmH2O  
 PIP (POC) 10 Allens test (POC) N/A Inspiratory Time 0.80 sec Specimen type (POC) ARTERIAL Patient temp. 93.3 Total resp. rate 10    
CBC WITH AUTOMATED DIFF Collection Time: 09/10/20  6:09 PM  
Result Value Ref Range WBC 26.1 (H) 4.1 - 11.1 K/uL  
 RBC 4.59 4. 10 - 5.70 M/uL  
 HGB 15.6 12.1 - 17.0 g/dL HCT 46.2 36.6 - 50.3 % .7 (H) 80.0 - 99.0 FL  
 MCH 34.0 26.0 - 34.0 PG  
 MCHC 33.8 30.0 - 36.5 g/dL  
 RDW 13.5 11.5 - 14.5 % PLATELET 144 228 - 272 K/uL MPV 11.3 8.9 - 12.9 FL  
 NRBC 0.2 (H) 0  WBC ABSOLUTE NRBC 0.06 (H) 0.00 - 0.01 K/uL NEUTROPHILS PENDING % LYMPHOCYTES PENDING % MONOCYTES PENDING % EOSINOPHILS PENDING % BASOPHILS PENDING % IMMATURE GRANULOCYTES PENDING %  
 ABS. NEUTROPHILS PENDING K/UL  
 ABS. LYMPHOCYTES PENDING K/UL  
 ABS. MONOCYTES PENDING K/UL  
 ABS. EOSINOPHILS PENDING K/UL  
 ABS. BASOPHILS PENDING K/UL  
 ABS. IMM. GRANS. PENDING K/UL  
 DF PENDING   
GLUCOSE, POC Collection Time: 09/10/20  6:28 PM  
Result Value Ref Range Glucose (POC) 445 (H) 65 - 100 mg/dL Performed by Tino Garcia MD

## 2020-09-10 NOTE — Clinical Note
Lesion located in the Ostium LM. Balloon inflated using multiple inflations inflation technique. Lesion #1: Pressure = 14 aurea; Duration = 15 sec. Inflation 2: Pressure = 0 aurea; Duration = 0 sec.

## 2020-09-10 NOTE — Clinical Note
Lesion: Located in the Ostium Diag 1. Multiple inflations used. First inflation pressure = 12 aurea; inflation time: 20 sec.

## 2020-09-11 NOTE — PROGRESS NOTES
[facility] Pulmonary, Critical Care, and Sleep Medicine Name: Gianna Erickson MRN: [id number] : 1960 Hospital: [facility] Date: 2020 Critical Care IMPRESSION:  
· Acute hypoxic respiratory failure · Prolonged out of hospital cardiac arrest 
· Cause uncertain; PE is a strong possibility · Cardiogenic shock · Acute renal failure · Shock liver · Severe metabolic acidosis · Hypernatremia · DM · Tobacco use RECOMMENDATIONS:  
· Ventilator support at minimal settings · ECMO per cardiac surgery · Pressors/inotropes per cardiac surgery · LE dopplers · Unable to do CT imaging due to hemodynamic instability/ECMO · Need to r/o COVID while PE is being considered · IV fluids · Insulin drip · Serial neuro exams · Heparin · PUD prophylaxis · Discussed with cardiac surgical team 
· Critically ill with high risk for further decompensation/death Subjective/History:  
 
Patient presented yesterday afternoon with witnessed out of hospital cardiac arrest.  He was feeling bad yesterday, was driving, pulled his car over and called EMS. Had witnessed PEA arrest, ultimately with nearly 60 minutes of CPR, at times converted to VT. He was started on the \"code shock\" protocol, and now is on ECMO and multiple pressors. Currently intubated/sedated and unable to provide any history at this time. Past Medical History:  
Diagnosis Date  Diabetes ([address]  Elevated cholesterol  GERD (gastroesophageal reflux disease)   
 helps with meds upsetting stomach  Hypertension  Low vitamin D level  Neuropathy   
 rt lower extremity Past Surgical History:  
Procedure Laterality Date  CARDIAC SURG PROCEDURE UNLIST    
 heart cath. normal  
 HX CERVICAL DISKECTOMY 2015  HX COLONOSCOPY  2015  HX HERNIA REPAIR    
 umbilical  
 HX ORTHOPAEDIC    
 reattached tendon and muscle rt shoulder Prior to Admission medications Medication Sig Start Date End Date Taking? Authorizing Provider  
metFORMIN (GLUCOPHAGE) 500 mg tablet  12/9/16   Provider, Historical  
vardenafil (LEVITRA) 20 mg tablet Take 20 mg by mouth as needed. Provider, Historical  
glimepiride (AMARYL) 4 mg tablet Take 4 mg by mouth two (2) times a day. Provider, Historical  
ergocalciferol (VITAMIN D2) 50,000 unit capsule Take 50,000 Units by mouth every seven (7) days. Provider, Historical  
aspirin delayed-release 81 mg tablet Take 81 mg by mouth daily. Provider, Historical  
omeprazole (PRILOSEC) 20 mg capsule Take 20 mg by mouth daily. Provider, Historical  
rosuvastatin (CRESTOR) 20 mg tablet Take 20 mg by mouth nightly. Provider, Historical  
fenofibric acid (TRILIPIX) 135 mg capsule Take 135 mg by mouth nightly. Provider, Historical  
lisinopril (PRINIVIL, ZESTRIL) 20 mg tablet Take 20 mg by mouth two (2) times a day. Provider, Historical  
metFORMIN (GLUCOPHAGE) 1,000 mg tablet Take 2,000 mg by mouth two (2) times a day. Provider, Historical  
 
Current Facility-Administered Medications Medication Dose Route Frequency  propofoL (DIPRIVAN) 10 mg/mL injection  sterile water (preservative free) injection  sodium chloride (NS) flush 5-40 mL  5-40 mL IntraVENous Q8H  
 0.45% sodium chloride infusion  10 mL/hr IntraVENous CONTINUOUS  
 0.9% sodium chloride infusion  9 mL/hr IntraVENous CONTINUOUS  
 acetaminophen (TYLENOL) tablet 650 mg  650 mg Oral Q4H  
 mupirocin (BACTROBAN) 2 % ointment   Both Nostrils BID  ceFAZolin (ANCEF) 2 g in sterile water (preservative free) 20 mL IV syringe  2 g IntraVENous Q6H  
 chlorhexidine (PERIDEX) 0.12 % mouthwash 10 mL  10 mL Oral Q12H  
 magnesium oxide (MAG-OX) tablet 400 mg  400 mg Oral BID  senna-docusate (PERICOLACE) 8.6-50 mg per tablet 1 Tab  1 Tab Oral BID  polyethylene glycol (MIRALAX) packet 17 g  17 g Oral DAILY  pantoprazole (PROTONIX) 40 mg in 0.9% sodium chloride 10 mL injection  40 mg IntraVENous DAILY  heparin (porcine) 25,000 units in 0.45% saline 250 ml infusion  15-36 Units/kg/hr IntraVENous TITRATE  EPINEPHrine (ADRENALIN) 5 mg in 0.9% sodium chloride 250 mL infusion  0-10 mcg/min IntraVENous TITRATE  
 NOREPINephrine (LEVOPHED) 8 mg in 5% dextrose 250mL (32 mcg/mL) infusion  0.5-16 mcg/min IntraVENous TITRATE  PHENYLephrine (DARRYL-SYNEPHRINE) 30 mg in 0.9% sodium chloride 250 mL infusion   mcg/min IntraVENous TITRATE  propofol (DIPRIVAN) 10 mg/mL infusion  0-50 mcg/kg/min IntraVENous TITRATE  dexmedeTOMidine (PRECEDEX) 400 mcg in 0.9% sodium chloride 100 mL infusion  0.2-0.7 mcg/kg/hr IntraVENous TITRATE  insulin regular (NOVOLIN R, HUMULIN R) 100 Units in 0.9% sodium chloride 100 mL infusion  1-50 Units/hr IntraVENous TITRATE  insulin lispro (HUMALOG) injection   SubCUTAneous AC&HS  
 amiodarone (CORDARONE) 375 mg/250 mL D5W infusion  0.5-1 mg/min IntraVENous TITRATE  balsam peru-castor oiL (VENELEX) ointment   Topical BID  DOBUTamine (DOBUTREX) 500 mg/250 mL (2,000 mcg/mL) infusion  3 mcg/kg/min IntraVENous CONTINUOUS  propofoL (DIPRIVAN) 10 mg/mL injection  sodium bicarbonate (8.4%) 150 mEq in sterile water 1,000 mL infusion   IntraVENous CONTINUOUS No Known Allergies Social History Tobacco Use  Smoking status: Current Every Day Smoker Packs/day: 1.00 Substance Use Topics  Alcohol use: Yes Comment: rarely No family history on file. Review of Systems: 
Review of systems not obtained due to patient factors. Objective:  
Vital Signs:   
Visit Vitals BP (!) 78/62 Pulse (!) 103 Temp (!) 95.4 °F (35.2 °C) Resp 15 Wt 99.7 kg (219 lb 12.8 oz) SpO2 98% BMI 32.46 kg/m² O2 Device: Endotracheal tube, Ventilator Temp (24hrs), Av.3 °F (35.2 °C), Min:93.2 °F (34 °C), Max:97.7 °F (36.5 °C) Intake/Output: Last shift:      No intake/output data recorded. Last 3 shifts: 09/09 1901 - 09/11 0700 In: 8477.2 [I.V.:8477.2] Out: 2787 [WRTIX:9659] Intake/Output Summary (Last 24 hours) at 9/11/2020 1609 Last data filed at 9/11/2020 0700 Gross per 24 hour Intake 8477.24 ml Output 2787 ml Net 5690.24 ml Hemodynamics:  
PAP:   CO:    
Wedge:   CI:    
CVP:  CVP (mmHg): 14 mmHg (09/11/20 0700) SVR:    
  PVR:    
 
Ventilator Settings: 
Mode Rate Tidal Volume Pressure FiO2 PEEP Pressure control        28 % 10 cm H20 Peak airway pressure: 20 cm H2O Minute ventilation: 6.79 l/min Physical Exam: 
 
General:  Intubated, sedated Head:  Normocephalic, without obvious abnormality, atraumatic. Eyes:  Conjunctivae/corneas clear. Pupils reactive and equal  
Nose: Nares normal. Septum midline. Mucosa normal.    
Throat: ETT in place Neck: Supple, symmetrical, trachea midline Back:   Symmetric, no curvature. ROM normal.  
Lungs:   Clear to auscultation bilaterally. Chest wall:  No tenderness or deformity. Heart:  Regular rate and rhythm Abdomen:   Soft, non-tender. Bowel sounds normal.   
Extremities: Extremities normal, atraumatic, no cyanosis or clubbing Skin: Skin color, texture, turgor normal. No rashes or lesions Neurologic: Sedated Data:  
 
Current Facility-Administered Medications Medication Dose Route Frequency  propofoL (DIPRIVAN) 10 mg/mL injection  sterile water (preservative free) injection  sodium chloride (NS) flush 5-40 mL  5-40 mL IntraVENous Q8H  
 0.45% sodium chloride infusion  10 mL/hr IntraVENous CONTINUOUS  
 0.9% sodium chloride infusion  9 mL/hr IntraVENous CONTINUOUS  
 acetaminophen (TYLENOL) tablet 650 mg  650 mg Oral Q4H  
 mupirocin (BACTROBAN) 2 % ointment   Both Nostrils BID  ceFAZolin (ANCEF) 2 g in sterile water (preservative free) 20 mL IV syringe  2 g IntraVENous Q6H  
  chlorhexidine (PERIDEX) 0.12 % mouthwash 10 mL  10 mL Oral Q12H  
 magnesium oxide (MAG-OX) tablet 400 mg  400 mg Oral BID  senna-docusate (PERICOLACE) 8.6-50 mg per tablet 1 Tab  1 Tab Oral BID  polyethylene glycol (MIRALAX) packet 17 g  17 g Oral DAILY  pantoprazole (PROTONIX) 40 mg in 0.9% sodium chloride 10 mL injection  40 mg IntraVENous DAILY  heparin (porcine) 25,000 units in 0.45% saline 250 ml infusion  15-36 Units/kg/hr IntraVENous TITRATE  EPINEPHrine (ADRENALIN) 5 mg in 0.9% sodium chloride 250 mL infusion  0-10 mcg/min IntraVENous TITRATE  
 NOREPINephrine (LEVOPHED) 8 mg in 5% dextrose 250mL (32 mcg/mL) infusion  0.5-16 mcg/min IntraVENous TITRATE  PHENYLephrine (DARRYL-SYNEPHRINE) 30 mg in 0.9% sodium chloride 250 mL infusion   mcg/min IntraVENous TITRATE  propofol (DIPRIVAN) 10 mg/mL infusion  0-50 mcg/kg/min IntraVENous TITRATE  dexmedeTOMidine (PRECEDEX) 400 mcg in 0.9% sodium chloride 100 mL infusion  0.2-0.7 mcg/kg/hr IntraVENous TITRATE  insulin regular (NOVOLIN R, HUMULIN R) 100 Units in 0.9% sodium chloride 100 mL infusion  1-50 Units/hr IntraVENous TITRATE  insulin lispro (HUMALOG) injection   SubCUTAneous AC&HS  
 amiodarone (CORDARONE) 375 mg/250 mL D5W infusion  0.5-1 mg/min IntraVENous TITRATE  balsam peru-castor oiL (VENELEX) ointment   Topical BID  DOBUTamine (DOBUTREX) 500 mg/250 mL (2,000 mcg/mL) infusion  3 mcg/kg/min IntraVENous CONTINUOUS  propofoL (DIPRIVAN) 10 mg/mL injection  sodium bicarbonate (8.4%) 150 mEq in sterile water 1,000 mL infusion   IntraVENous CONTINUOUS Labs: 
Recent Labs  
  09/11/20 
0543 09/10/20 
2356 09/10/20 
1809 WBC 22.5* 27.3* 26.1* HGB 11.9* 14.0 15.6 HCT 34.2* 40.9 46.2 * 156 201 Recent Labs  
  09/11/20 
0543 09/10/20 
2356 09/10/20 
2141 09/10/20 
1809 * 148*  --  145  
K 3.0* 2.9*  --  3.1*  
* 112*  --  108 CO2 26 25  --  17* * 482* 580* 526* BUN 30* 28*  --  22* CREA 2.20* 2.19*  --  1.89* CA 9.6 10.4*  --  9.3 MG 1.9 1.8  --  2.3 ALB 3.0*  --   --  2.7* TBILI 0.9  --   --  0.7 *  --   --  247* Recent Labs  
  09/11/20 
0634 09/11/20 
0536 09/11/20 
0453 PHI 7.40 7.39 7.38  
PCO2I 39.7 39.5 37.5 PO2I 79* 76* 82 HCO3I 24.3 23.8 22.1 FIO2I 28 28 28 Imaging: 
I have personally reviewed the patients radiographs and have reviewed the reports: 
Low lung volumes, no acute process Total critical care time exclusive of procedures: 50 minutes Kristopher Hernandez MD

## 2020-09-11 NOTE — PROGRESS NOTES
Left IJ Mac dressing saturated with blood. Dressing changed using sterile technique. Dobutamine gtt stopped at 1040. Tolerated without incidence. Will decrease epi drip from 8 to 7 as instructed. 1240  Heparin gtt decreased from 15 to 13 for PTT 84.5.  
 
1315  Epi down to 6. Propofol down to 25. Left IJ MAC catheter site continues to have slow ooze of blood. Dressing reinforced. 1530 left IJ dressing changed. surgifoam applied. Echo completed. Damaso Lee, at bedside. [phone]  Dr. Shamir Weir at bedside. Will increase epi back to 5, and wean darci as ordered.

## 2020-09-11 NOTE — OP NOTES
[facility] 
OPERATIVE REPORT Name:  Erin Del Toro 
MR#:  [id number] :  1960 ACCOUNT #:  [de-identified] DATE OF SERVICE:  09/10/2020 PREOPERATIVE DIAGNOSES: 
1. Cardiac arrest. 
2.  Severe right ventricular dilatation and right ventricular dysfunction. 3.  Other medical history unknown. POSTOPERATIVE DIAGNOSES: 
1. Cardiac arrest. 
2.  Severe right ventricular dilatation and right ventricular dysfunction. 3.  Other medical history unknown. PROCEDURE PERFORMED: 
1. Insertion of percutaneous peripheral venoarterial extracorporeal membrane oxygenation. 2.  Initiation of peripheral percutaneous venoarterial extracorporeal membrane oxygenation. SURGEON:  Judi Farnsworth MD 
 
ASSISTANT:  Rakel Sweet PA-C. The assistance of a PA was required due to the critical nature and the emergent nature of the surgery and experience of the PA. Ketan Sandoval MD (Interventional Cardiologist) who responded to the code and assisted with ECMO cannulation. ANESTHESIA:  General endotracheal. 
 
ANESTHESIOLOGIST:  Marquez Barrera MD 
 
COMPLICATIONS:  None. SPECIMENS REMOVED:  None. IMPLANTS:  #25 right common femoral vein cannula, #19 right common femoral artery cannula, and then 6-Ecuadorean distal perfusion catheter in the right superficial femoral artery. ESTIMATED BLOOD LOSS:  None. PROCEDURE:  The patient is a 27-year-old gentleman, who was reportedly having shortness of breath and chest discomfort this morning and took himself to the emergency room. On the way to the emergency room, he pulled over and then had a sudden cardiac arrest.  The arrest was witnessed and he had immediate CPR. The CPR continued into the emergency room where a CARMELA device was placed and continued. There was brief return of circulation and then return to cardiac arrest.  He was transported immediately into the operating room. He was prepped and draped in a sterile fashion. The femoral artery was imaged with an ultrasound and accessed percutaneously. We then advanced a wire. We then dilated up to a 19-Mauritian arterial cannula using Seldinger technique. We then flushed the ECMO cannula with heparin and connected the arterial limb of the ECMO circuit. We then accessed the left common femoral vein percutaneously and placed a wire into the right atrium. We then dilated up with Seldinger technique to a size 25 Mauritian venous cannula. We then deaired this and connected it to the venous limb of the ECMO cannula. We then initiated extracorporeal membrane oxygenation, had immediate rise in saturations, and improvement of blood pressure. We then used ultrasound to identify the superficial femoral artery. We were able to access this, although it was quite small. We advanced the 6-Mauritian sheath into the superficial femoral artery and connected this to the arterial limb of the circuit. We then paused the CARMELA device and noted ventricular fibrillation. We then performed one cardioversion at 200 Joules and this converted him to a sinus rhythm at 68 beats per minute. We then performed a transesophageal echocardiogram, which showed no wall motion abnormality on the left side, but severe hypokinesis of the right ventricular free wall. The RV was however decompressed. His PaO2 and CO2 on the ECMO circuit were more than adequate. His base deficit however was -25 in the beginning and -15 by the time we finished our initial resuscitation. He was in deep shock. The patient was then transported to the cardiac ICU for ongoing support. MD MOHSEN [id number] 
D:  09/10/2020 17:01 
T:  09/11/2020 0:09 
JOB #:  [id number]

## 2020-09-11 NOTE — PROGRESS NOTES
Patient awake,reaching for ET tube. Nodded yes to question. Propofol increased to 40. 1mg versed given. Now quiet. 1635   Blood sugar 79. Insulin drip stopped. 8cc D50% given per protocol. 1735  PTT drawn. 1800  pO2 56 on ABG. FiO2 on vent increased to 40% per perfusionist.  O2 sat increased from 94% to 98%. Potassium 20 meq given for K+ 3.5. No stool on this shift. 1830  Lab called,requested new tube for PTT. New sample sent 295 [facility]  Dr. Jonnie Norris updated at bedside. Toby down to 30. Will try to keep map less than 80 as ordered. 1900 ok to warm patient(more than 24 post event). brandee haider applied for temp 94.8.

## 2020-09-11 NOTE — PROGRESS NOTES
[facility] ICU Progress Note Admit Date: 9/10/2020 POD:  1 Day Post-Op Procedure:  Procedure(s): EXTRACORPOREAL MEMBRANE OXYGENATION (ECMO) Subjective:  
Pt seen with Dr. Tawanna Herrera. On VA ECMO 100% fiO2 sweep 7. On dobut 3, epi 8, darci 60. Bicarb gtt 125, amio 0.5, insulin gtt. On precedex 0.2 and propofol 30. Vent PC 10 PEEP 10 fiO2 28%. Objective:  
Vitals: 
Blood pressure (!) 87/56, pulse 87, temperature (!) 95.9 °F (35.5 °C), resp. rate 14, weight 219 lb 12.8 oz (99.7 kg), SpO2 97 %. Temp (24hrs), Av.4 °F (35.2 °C), Min:93.2 °F (34 °C), Max:97.7 °F (36.5 °C) Hemodynamics: 
 CO:   
 CI:   
 CVP: CVP (mmHg): 12 mmHg (20) SVR:   
 PAP Systolic:   
 PAP Diastolic:   
 PVR:   
 UA64:   
 SCV02:    
 
ECMO: 
RMP: 4600 Flow 5.9 L FiO2 100% Sweep 7 EKG/Rhythm:  ST 
 
Extubation Date / Time: remains on vent Ventilator: 
Ventilator Volumes Vt Exhaled (Machine Breath) (ml): 324 ml (20) Ve Observed (l/min): 3.7 l/min (20) Oxygen Therapy: 
Oxygen Therapy O2 Sat (%): 97 % (20) Pulse via Oximetry: 87 beats per minute (20) O2 Device: Endotracheal tube (20) O2 Temperature: (HME) (20) FIO2 (%): 28 % (20 0855) CXR:  
CXR Results  (Last 48 hours) 20 0550  XR CHEST PORT Final result Impression:  IMPRESSION: No acute findings. Narrative:  EXAM: XR CHEST PORT INDICATION: postop heart COMPARISON: September 10 FINDINGS: A portable AP radiograph of the chest was obtained at 0538 hours. The  
endotracheal tube, nasogastric tube, and vascular sheaths are stable. The  
patient is status post cervical spine surgery. The patient is on a cardiac  
monitor. The lungs are clear. The cardiac and mediastinal contours and pulmonary  
vascularity are normal.  The bones and soft tissues are grossly within normal  
limits. 09/10/20 1803  XR CHEST PORT Final result Impression:  IMPRESSION:   
1. Tubes and lines as above. No definite acute cardiopulmonary process. Narrative:  EXAM:  XR CHEST PORT INDICATION:   postop heart COMPARISON: Chest radiograph 12/11/2015. FINDINGS: AP radiograph of the chest was obtained. Satisfactory position of endotracheal tube in the midthoracic trachea. Gastric  
decompression tube is seen coursing inferiorly out of field of view. There  
appear to be bilateral IJ MAC present. There appears to be an ECMO catheter  
noted at the inferior cavoatrial junction. Low lung volumes without evidence of  
focal consolidation, pleural effusion, or pneumothorax. Borderline cardiomegaly. No acute osseous abnormality. 09/10/20 1630  XR CHEST SNGL V Final result Impression:  IMPRESSION: Fluoroscopic guidance was provided for the referring clinician. Fluoroscopy time is 18.1s. Narrative:  Compliance only. Admission Weight: Last Weight Weight: 219 lb 12.8 oz (99.7 kg) Weight: 219 lb 12.8 oz (99.7 kg) Intake / Output / Drain: 
Current Shift: 09/11 0701 - 09/11 1900 In: -  
Out: 250 [Urine:250] Last 24 hrs.:  
 
Intake/Output Summary (Last 24 hours) at 9/11/2020 6962 Last data filed at 9/11/2020 5428 Gross per 24 hour Intake 8477.24 ml Output 3137 ml Net 5340.24 ml EXAM: 
General:   Sedated Lungs:   Clear to auscultation bilaterally. Incision:  Bilateral groin CDI Heart:  Regular rate and rhythm, S1, S2 normal, no murmur, click, rub or gallop. Abdomen:   Soft, non-tender. Bowel sounds hypoactive. No masses,  No organomegaly. Extremities:  No edema. PPP. Neurologic:  sedated Labs:  
Recent Labs  
  09/11/20 
8591  09/11/20 
0543 WBC  --   --  22.5* HGB  --   --  11.9*  
HCT  --   --  34.2*  
PLT  --   --  122* NA  --   --  150* K  --   --  3.0*  
 BUN  --   --  30* CREA  --   --  2.20* GLU  --   --  342* GLUCPOC 332*   < >  --   
 < > = values in this interval not displayed. Assessment:  
 
Active Problems: 
  Cardiac arrest ([facility] [address] (9/10/2020) Plan/Recommendations/Medical Decision Makin. Cardiac arrest s/p VA ECMO: Cont ECMO support. Wean dobut gtt off today. Then can wean Epi as able for MAP >65. Cont ancef for post op antibiotic. Possible PE -on heparin gtt 2. DM type II: A1C 8.9, remains hyperglycemic. On insulin gtt, diabetes management consult. 3. Acute hypoxic respiratory failure: Pt complained of SOB prior to cardiac arrest. Possible PE? Cont VA ECMO/vent support, rule out covid 4. CAMACHO: Cr 2.2, monitor UO, avoid nephrotoxic meds 5. Shock liver: Improving, monitor 6. Hypernatremia: monitor 7. Hypokalemia: Replete per orders 8. Diarrhea: Flexiseal in place, monitor 9. Dispo: Pt remains critically ill. Keep in ICU.   
 
Signed By: Maria Elena Kenny NP

## 2020-09-11 NOTE — PROGRESS NOTES
OT Note: 
 
Chart reviewed and discussed with PT who participated in interdisciplinary rounds. Pt critically ill, on ECMO/vent and medically unstable.  Will complete order and can be re-consulted when appropriate for OT eval.  
 
Dandre Doyle, OTR/L

## 2020-09-11 NOTE — ED PROVIDER NOTES
EMERGENCY DEPARTMENT HISTORY AND PHYSICAL EXAM 
 
 
Date: 9/10/2020 Patient Name: Mireya Jarquin History of Presenting Illness Chief Complaint Patient presents with  Cardiac arrest  
 
 
History Provided By: EMS 
 
HPI: Mireya Jarquin, 60 y.o. male with a past medical history significant for known past medical history presents to the ED with cc of severe cardiac arrest.  By report, patient was in the car and had pulled over to the side of the road. 911 arrived with a BLS unit and reported patients that he was very short of breath and \"did not feel well\". As the patient was being loaded into the car patient seemed to be lightheaded and dizzy. In route patient became unresponsive and lost pulses. Patient had an eye gel placed for noninvasive ventilation and CPR was initiated. No epinephrine was given in route since patient arrested less than 5 minutes before arrival.  No other history was available at the time arrival.  History severely limited due to patient's severe presentation. There are no other complaints, changes, or physical findings at this time. PCP: Wai Page MD 
 
No current facility-administered medications on file prior to encounter. Current Outpatient Medications on File Prior to Encounter Medication Sig Dispense Refill  metFORMIN (GLUCOPHAGE) 500 mg tablet  vardenafil (LEVITRA) 20 mg tablet Take 20 mg by mouth as needed.  glimepiride (AMARYL) 4 mg tablet Take 4 mg by mouth two (2) times a day.  ergocalciferol (VITAMIN D2) 50,000 unit capsule Take 50,000 Units by mouth every seven (7) days.  aspirin delayed-release 81 mg tablet Take 81 mg by mouth daily.  omeprazole (PRILOSEC) 20 mg capsule Take 20 mg by mouth daily.  rosuvastatin (CRESTOR) 20 mg tablet Take 20 mg by mouth nightly.  fenofibric acid (TRILIPIX) 135 mg capsule Take 135 mg by mouth nightly.  lisinopril (PRINIVIL, ZESTRIL) 20 mg tablet Take 20 mg by mouth two (2) times a day.  metFORMIN (GLUCOPHAGE) 1,000 mg tablet Take 2,000 mg by mouth two (2) times a day. Past History Past Medical History: 
Past Medical History:  
Diagnosis Date  Diabetes ([address]  Elevated cholesterol  GERD (gastroesophageal reflux disease)   
 helps with meds upsetting stomach  Hypertension  Low vitamin D level  Neuropathy   
 rt lower extremity Past Surgical History: 
Past Surgical History:  
Procedure Laterality Date  CARDIAC SURG PROCEDURE UNLIST    
 heart cath. normal  
 HX CERVICAL DISKECTOMY 2015  HX COLONOSCOPY  2015  HX HERNIA REPAIR    
 umbilical  
 HX ORTHOPAEDIC    
 reattached tendon and muscle rt shoulder Family History: No family history on file. Social History: 
Social History Tobacco Use  Smoking status: Current Every Day Smoker Packs/day: 1.00 Substance Use Topics  Alcohol use: Yes Comment: rarely  Drug use: Not on file Allergies: 
No Known Allergies Review of Systems Review of Systems Unable to perform ROS: Acuity of condition Physical Exam  
Physical Exam 
Constitutional:   
   Comments: Unresponsive, eyes on place, GCS of 3 HENT:  
   Head: Normocephalic and atraumatic. Nose: Nose normal.  
   Mouth/Throat:  
   Mouth: Mucous membranes are moist.  
Eyes:  
   Pupils: Pupils are equal, round, and reactive to light. Neck: Musculoskeletal: Neck rigidity present. Comments: He has diffuse ecchymosis in the supraclavicular region extending along the anterior aspect of the neck. Cardiovascular:  
   Comments: Patient is pulseless and apneic Pulmonary:  
   Comments: Patient is being actively bagged at a rate of 12 breaths/min Abdominal:  
   General: Abdomen is flat. Palpations: Abdomen is soft. Musculoskeletal: Normal range of motion. Skin: 
   Comments: Pale Neurological:  
   Comments: GCS of 3, no spontaneous movements Diagnostic Study Results Labs - Recent Results (from the past 24 hour(s)) POC CHEM8 Collection Time: 09/10/20  2:55 PM  
Result Value Ref Range Calcium, ionized (POC) 1.17 1.12 - 1.32 mmol/L Sodium (POC) 139 136 - 145 mmol/L Potassium (POC) 3.5 3.5 - 5.1 mmol/L Chloride (POC) 105 98 - 107 mmol/L  
 CO2 (POC) 17 (L) 21 - 32 mmol/L Anion gap (POC) 22 (H) 10 - 20 mmol/L Glucose (POC) 386 (H) 65 - 100 mg/dL BUN (POC) 22 (H) 9 - 20 mg/dL Creatinine (POC) 1.0 0.6 - 1.3 mg/dL GFRAA, POC >60 >60 ml/min/1.73m2 GFRNA, POC >60 >60 ml/min/1.73m2 Hematocrit (POC) 53 (H) 36.6 - 50.3 % Comment Comment Not Indicated. POC LACTIC ACID Collection Time: 09/10/20  2:58 PM  
Result Value Ref Range Lactic Acid (POC) 13.80 (HH) 0.40 - 2.00 mmol/L  
POC EG7 Collection Time: 09/10/20  5:42 PM  
Result Value Ref Range Calcium, ionized (POC) 1.27 1.12 - 1.32 mmol/L  
 FIO2 (POC) 40 % pH (POC) 7.21 (LL) 7.35 - 7.45    
 pCO2 (POC) 38.3 35.0 - 45.0 MMHG  
 pO2 (POC) 87 80 - 100 MMHG  
 HCO3 (POC) 16.0 (L) 22 - 26 MMOL/L Base deficit (POC) 12 mmol/L  
 sO2 (POC) 96 92 - 97 % Site OTHER Device: VENT Mode ASSIST CONTROL Set Rate 10 bpm  
 PEEP/CPAP (POC) 12 cmH2O  
 PIP (POC) 10 Allens test (POC) N/A Inspiratory Time 0.80 sec Specimen type (POC) ARTERIAL Patient temp. 93.2 Total resp. rate 10 POC EG7 Collection Time: 09/10/20  5:47 PM  
Result Value Ref Range Calcium, ionized (POC) 1.31 1.12 - 1.32 mmol/L  
 FIO2 (POC) 40 % pH (POC) 7.23 (LL) 7.35 - 7.45    
 pCO2 (POC) 38.2 35.0 - 45.0 MMHG  
 pO2 (POC) 51 (L) 80 - 100 MMHG  
 HCO3 (POC) 16.5 (L) 22 - 26 MMOL/L Base deficit (POC) 12 mmol/L  
 sO2 (POC) 85 (L) 92 - 97 % Site OTHER Device: VENT Mode ASSIST CONTROL Set Rate 10 bpm  
 PEEP/CPAP (POC) 12 cmH2O  
 PIP (POC) 10 Allens test (POC) N/A Inspiratory Time 0.80 sec Specimen type (POC) ARTERIAL Patient temp. 93.3 Total resp. rate 10 POC EG7 Collection Time: 09/10/20  5:52 PM  
Result Value Ref Range Calcium, ionized (POC) 1.30 1.12 - 1.32 mmol/L  
 FIO2 (POC) 40 % pH (POC) 7.25 (L) 7.35 - 7.45    
 pCO2 (POC) 38.4 35.0 - 45.0 MMHG  
 pO2 (POC) 588 (H) 80 - 100 MMHG  
 HCO3 (POC) 17.5 (L) 22 - 26 MMOL/L Base deficit (POC) 10 mmol/L  
 sO2 (POC) 100 (H) 92 - 97 % Site OTHER Device: VENT Mode ASSIST CONTROL Set Rate 10 bpm  
 PEEP/CPAP (POC) 12 cmH2O  
 PIP (POC) 10 Allens test (POC) N/A Inspiratory Time 0.80 sec Specimen type (POC) ARTERIAL Patient temp. 93.3 Total resp. rate 10 POC EG7 Collection Time: 09/10/20  6:00 PM  
Result Value Ref Range Calcium, ionized (POC) 1.30 1.12 - 1.32 mmol/L  
 FIO2 (POC) 40 % pH (POC) 7.24 (LL) 7.35 - 7.45    
 pCO2 (POC) 37.4 35.0 - 45.0 MMHG  
 pO2 (POC) 275 (H) 80 - 100 MMHG  
 HCO3 (POC) 16.6 (L) 22 - 26 MMOL/L Base deficit (POC) 11 mmol/L  
 sO2 (POC) 100 (H) 92 - 97 % Site OTHER Device: VENT Mode ASSIST CONTROL Set Rate 10 bpm  
 PEEP/CPAP (POC) 12 cmH2O  
 PIP (POC) 10 Allens test (POC) N/A Inspiratory Time 0.80 sec Specimen type (POC) ARTERIAL Patient temp. 93.3 Total resp. rate 10 METABOLIC PANEL, COMPREHENSIVE Collection Time: 09/10/20  6:09 PM  
Result Value Ref Range Sodium 145 136 - 145 mmol/L Potassium 3.1 (L) 3.5 - 5.1 mmol/L Chloride 108 97 - 108 mmol/L  
 CO2 17 (L) 21 - 32 mmol/L Anion gap 20 (H) 5 - 15 mmol/L Glucose 526 (H) 65 - 100 mg/dL BUN 22 (H) 6 - 20 MG/DL Creatinine 1.89 (H) 0.70 - 1.30 MG/DL  
 BUN/Creatinine ratio 12 12 - 20 GFR est AA 44 (L) >60 ml/min/1.73m2 GFR est non-AA 37 (L) >60 ml/min/1.73m2 Calcium 9.3 8.5 - 10.1 MG/DL  Bilirubin, total 0.7 0.2 - 1.0 MG/DL  
 ALT (SGPT) 247 (H) 12 - 78 U/L  
 AST (SGOT) 300 (H) 15 - 37 U/L Alk. phosphatase 120 (H) 45 - 117 U/L Protein, total 5.6 (L) 6.4 - 8.2 g/dL Albumin 2.7 (L) 3.5 - 5.0 g/dL Globulin 2.9 2.0 - 4.0 g/dL A-G Ratio 0.9 (L) 1.1 - 2.2 MAGNESIUM Collection Time: 09/10/20  6:09 PM  
Result Value Ref Range Magnesium 2.3 1.6 - 2.4 mg/dL PTT Collection Time: 09/10/20  6:09 PM  
Result Value Ref Range aPTT >130.0 (HH) 22.1 - 32.0 sec  
 aPTT, therapeutic range     58.0 - 77.0 SECS  
CBC WITH AUTOMATED DIFF Collection Time: 09/10/20  6:09 PM  
Result Value Ref Range WBC 26.1 (H) 4.1 - 11.1 K/uL  
 RBC 4.59 4. 10 - 5.70 M/uL  
 HGB 15.6 12.1 - 17.0 g/dL HCT 46.2 36.6 - 50.3 % .7 (H) 80.0 - 99.0 FL  
 MCH 34.0 26.0 - 34.0 PG  
 MCHC 33.8 30.0 - 36.5 g/dL  
 RDW 13.5 11.5 - 14.5 % PLATELET 665 198 - 285 K/uL MPV 11.3 8.9 - 12.9 FL  
 NRBC 0.2 (H) 0  WBC ABSOLUTE NRBC 0.06 (H) 0.00 - 0.01 K/uL NEUTROPHILS 77 (H) 32 - 75 % BAND NEUTROPHILS 10 % LYMPHOCYTES 6 (L) 12 - 49 % MONOCYTES 2 (L) 5 - 13 % EOSINOPHILS 2 0 - 7 % BASOPHILS 0 0 - 1 % METAMYELOCYTES 2 % MYELOCYTES 1 % IMMATURE GRANULOCYTES 0 0.0 - 0.5 % ABS. NEUTROPHILS 22.7 (H) 1.8 - 8.0 K/UL  
 ABS. LYMPHOCYTES 1.6 0.8 - 3.5 K/UL  
 ABS. MONOCYTES 0.5 0.0 - 1.0 K/UL  
 ABS. EOSINOPHILS 0.5 (H) 0.0 - 0.4 K/UL  
 ABS. BASOPHILS 0.0 0.0 - 0.1 K/UL  
 ABS. IMM. GRANS. 0.0 0.00 - 0.04 K/UL  
 DF MANUAL    
 RBC COMMENTS MACROCYTOSIS 1+ GLUCOSE, POC Collection Time: 09/10/20  6:28 PM  
Result Value Ref Range Glucose (POC) 445 (H) 65 - 100 mg/dL Performed by Syed Carey RN   
POC EG7 Collection Time: 09/10/20  7:25 PM  
Result Value Ref Range Calcium, ionized (POC) 1.23 1.12 - 1.32 mmol/L  
 FIO2 (POC) 40 %  pH (POC) 7.31 (L) 7.35 - 7.45    
 pCO2 (POC) 41.3 35.0 - 45.0 MMHG  
 pO2 (POC) 337 (H) 80 - 100 MMHG  
 HCO3 (POC) 21.0 (L) 22 - 26 MMOL/L  
 Base deficit (POC) 5 mmol/L  
 sO2 (POC) 100 (H) 92 - 97 % Site DRAWN FROM ARTERIAL LINE Device: VENT Mode ASSIST CONTROL Set Rate 10 bpm  
 PEEP/CPAP (POC) 12 cmH2O  
 PIP (POC) 10 Allens test (POC) N/A Inspiratory Time 0.80 sec Specimen type (POC) ARTERIAL    
GLUCOSE, POC Collection Time: 09/10/20  8:32 PM  
Result Value Ref Range Glucose (POC) 388 (H) 65 - 100 mg/dL Performed by [employer] POC EG7 Collection Time: 09/10/20  8:36 PM  
Result Value Ref Range Calcium, ionized (POC) 1.23 1.12 - 1.32 mmol/L  
 FIO2 (POC) 40 % pH (POC) 7.30 (L) 7.35 - 7.45    
 pCO2 (POC) 42.3 35.0 - 45.0 MMHG  
 pO2 (POC) 499 (H) 80 - 100 MMHG  
 HCO3 (POC) 21.0 (L) 22 - 26 MMOL/L Base deficit (POC) 5 mmol/L  
 sO2 (POC) 100 (H) 92 - 97 % Site DRAWN FROM ARTERIAL LINE Device: VENT Mode ASSIST CONTROL Set Rate 10 bpm  
 PEEP/CPAP (POC) 12 cmH2O  
 PIP (POC) 10 Allens test (POC) N/A Inspiratory Time 0.80 sec Specimen type (POC) ARTERIAL    
GLUCOSTABILIZER Collection Time: 09/10/20  8:46 PM  
Result Value Ref Range Glucose 388 mg/dL Insulin order 9.8 units/hour Insulin adminstered 9.8 units/hour Multiplier 0.030 Low target 95 mg/dL High target 130 mg/dL D50 order 0.0 ml  
 D50 administered 0.00 ml Minutes until next BG 60 min Order initials mjo Administered initials mjo GLSCOM Comments PTT Collection Time: 09/10/20  9:30 PM  
Result Value Ref Range aPTT 35.1 (H) 22.1 - 32.0 sec  
 aPTT, therapeutic range     58.0 - 77.0 SECS  
GLUCOSE, POC Collection Time: 09/10/20  9:34 PM  
Result Value Ref Range Glucose (POC) 530 (H) 65 - 100 mg/dL Performed by Brent Blocker Evaline Tiana Collection Time: 09/10/20  9:38 PM  
Result Value Ref Range Glucose 530 mg/dL Insulin order 18.8 units/hour Insulin adminstered 18.8 units/hour Multiplier 0.040 Low target 95 mg/dL High target 130 mg/dL D50 order 0.0 ml  
 D50 administered 0.00 ml Minutes until next BG 60 min Order initials abdoulaye Administered initials abdoulaye GLSCOM Comments POC EG7 Collection Time: 09/10/20  9:39 PM  
Result Value Ref Range Calcium, ionized (POC) 1.14 1.12 - 1.32 mmol/L  
 FIO2 (POC) 28 % pH (POC) 7.30 (L) 7.35 - 7.45    
 pCO2 (POC) 35.8 35.0 - 45.0 MMHG  
 pO2 (POC) 419 (H) 80 - 100 MMHG  
 HCO3 (POC) 17.6 (L) 22 - 26 MMOL/L Base deficit (POC) 9 mmol/L  
 sO2 (POC) 100 (H) 92 - 97 % Site DRAWN FROM ARTERIAL LINE Device: VENT Mode ASSIST CONTROL Set Rate 10 bpm  
 PEEP/CPAP (POC) 12 cmH2O  
 PIP (POC) 10 Allens test (POC) N/A Inspiratory Time 0.80 sec Specimen type (POC) ARTERIAL    
GLUCOSE, RANDOM Collection Time: 09/10/20  9:41 PM  
Result Value Ref Range Glucose 580 (H) 65 - 100 mg/dL POC EG7 Collection Time: 09/10/20 10:34 PM  
Result Value Ref Range Calcium, ionized (POC) 1.50 (H) 1.12 - 1.32 mmol/L  
 FIO2 (POC) 28 % pH (POC) 7.36 7.35 - 7.45    
 pCO2 (POC) 33.0 (L) 35.0 - 45.0 MMHG  
 pO2 (POC) 161 (H) 80 - 100 MMHG  
 HCO3 (POC) 18.6 (L) 22 - 26 MMOL/L Base deficit (POC) 7 mmol/L  
 sO2 (POC) 99 (H) 92 - 97 % Site DRAWN FROM ARTERIAL LINE Device: VENT Mode ASSIST CONTROL Set Rate 10 bpm  
 PEEP/CPAP (POC) 10 cmH2O  
 PIP (POC) 10 Allens test (POC) N/A Inspiratory Time 0.80 sec Specimen type (POC) ARTERIAL    
GLUCOSE, POC Collection Time: 09/10/20 10:35 PM  
Result Value Ref Range Glucose (POC) 416 (H) 65 - 100 mg/dL Performed by Nico Awad Collection Time: 09/10/20 10:42 PM  
Result Value Ref Range Glucose 416 mg/dL Insulin order 14.2 units/hour Insulin adminstered 14.2 units/hour Multiplier 0.040 Low target 95 mg/dL High target 130 mg/dL D50 order 0.0 ml  
 D50 administered 0.00 ml Minutes until next BG 60 min Order initials mjo Administered initials mjo GLSCOM Comments GLUCOSE, POC Collection Time: 09/10/20 11:34 PM  
Result Value Ref Range Glucose (POC) 450 (H) 65 - 100 mg/dL Performed by Vitaliy Perez POC EG7 Collection Time: 09/10/20 11:36 PM  
Result Value Ref Range Calcium, ionized (POC) 1.52 (H) 1.12 - 1.32 mmol/L  
 FIO2 (POC) 28 % pH (POC) 7.34 (L) 7.35 - 7.45    
 pCO2 (POC) 43.5 35.0 - 45.0 MMHG  
 pO2 (POC) 504 (H) 80 - 100 MMHG  
 HCO3 (POC) 23.7 22 - 26 MMOL/L Base deficit (POC) 2 mmol/L  
 sO2 (POC) 100 (H) 92 - 97 % Site DRAWN FROM ARTERIAL LINE Device: VENT Mode ASSIST CONTROL Set Rate 10 bpm  
 PEEP/CPAP (POC) 10 cmH2O  
 PIP (POC) 10 Allens test (POC) N/A Inspiratory Time 0.80 sec Specimen type (POC) ARTERIAL    
GLUCOSTABILIZER Collection Time: 09/10/20 11:36 PM  
Result Value Ref Range Glucose 450 mg/dL Insulin order 19.5 units/hour Insulin adminstered 19.5 units/hour Multiplier 0.050 Low target 95 mg/dL High target 130 mg/dL D50 order 0.0 ml  
 D50 administered 0.00 ml Minutes until next BG 60 min Order initials mjo Administered initials mjo GLSCOM Comments Radiologic Studies -  
XR CHEST PORT Final Result IMPRESSION:   
1. Tubes and lines as above. No definite acute cardiopulmonary process. XR CHEST SNGL V Final Result IMPRESSION: Fluoroscopic guidance was provided for the referring clinician. Fluoroscopy time is 18.1s. XR FLUOROSCOPY UNDER 60 MINUTES    (Results Pending) XR CHEST PORT    (Results Pending) XR CHEST PORT    (Results Pending) XR CHEST PORT    (Results Pending) XR ABD (KUB)    (Results Pending) CT Results  (Last 48 hours) None CXR Results  (Last 48 hours) 09/10/20 1803  XR CHEST PORT Final result Impression:  IMPRESSION:   
1. Tubes and lines as above. No definite acute cardiopulmonary process. Narrative:  EXAM:  XR CHEST PORT INDICATION:   postop heart COMPARISON: Chest radiograph 12/11/2015. FINDINGS: AP radiograph of the chest was obtained. Satisfactory position of endotracheal tube in the midthoracic trachea. Gastric  
decompression tube is seen coursing inferiorly out of field of view. There  
appear to be bilateral IJ MAC present. There appears to be an ECMO catheter  
noted at the inferior cavoatrial junction. Low lung volumes without evidence of  
focal consolidation, pleural effusion, or pneumothorax. Borderline cardiomegaly. No acute osseous abnormality. 09/10/20 1630  XR CHEST SNGL V Final result Impression:  IMPRESSION: Fluoroscopic guidance was provided for the referring clinician. Fluoroscopy time is 18.1s. Narrative:  Compliance only. Medical Decision Making I am the first provider for this patient. I reviewed the vital signs, available nursing notes, past medical history, past surgical history, family history and social history. Vital Signs-Reviewed the patient's vital signs. Patient Vitals for the past 12 hrs: 
 Temp Pulse Resp BP SpO2  
09/10/20 2300 97.2 °F (36.2 °C) 93 20  99 % 09/10/20 2256  92 20  100 % 09/10/20 2230 96.9 °F (36.1 °C) (!) 104 13  98 % 09/10/20 2215 (!) 96.7 °F (35.9 °C) (!) 111 18  98 % 09/10/20 2200 (!) 96.5 °F (35.8 °C) (!) 115 10 (!) 71/44   
09/10/20 2145 (!) 96.2 °F (35.7 °C) (!) 116 10    
09/10/20 2130 (!) 95.9 °F (35.5 °C) 93 10    
09/10/20 2115 (!) 95.6 °F (35.3 °C) 86 10 (!) 43/24   
09/10/20 2100 (!) 95.3 °F (35.2 °C) 77 9 (!) 57/40   
09/10/20 2045 (!) 95 °F (35 °C) 71 10    
09/10/20 2030 (!) 94.7 °F (34.8 °C) 73 10    
09/10/20 2015 (!) 94.4 °F (34.7 °C) 73 17    
09/10/20 2000 (!) 94.2 °F (34.6 °C) 78 20 (!) 81/54   
09/10/20 1945 (!) 94 °F (34.4 °C) 89 20    
09/10/20 1930 (!) 93.9 °F (34.4 °C) 92 23    
09/10/20 1924  92 24  100 % 09/10/20 1915 (!) 93.8 °F (34.3 °C) 89 23    
09/10/20 1900 (!) 93.8 °F (34.3 °C) 87 21 104/82   
09/10/20 1845  91 22    
09/10/20 1830 (!) 93.5 °F (34.2 °C) 97 24    
09/10/20 1815 (!) 93.4 °F (34.1 °C) 100 22    
09/10/20 1800 (!) 93.3 °F (34.1 °C) 100 22 (!) 83/45 95 % 09/10/20 1745 (!) 93.2 °F (34 °C)      
09/10/20 1733  97 (!) 7 (!) 80/56   
09/10/20 1730 (!) 93.8 °F (34.3 °C) 80 (!) 6  96 % 09/10/20 1727  99 10  97 % 09/10/20 1725 97 °F (36.1 °C) (!) 104 14 99/45 100 % 09/10/20 1451  (!) 45     
09/10/20 1450    (!) 170/104  Records Reviewed: Nursing records and medical records reviewed MDM: 
Patient presents pulseless in cardiac arrest.  CPR immediately started and IV attempted for ACLS Medications. DDx: ACS, cardiogenic shock, cardiac Tamponade, Septic shock, tension PTX, PE, Hyperkalemia, hypokalemia, hypoglycemia, acidosis, hypovolemia, hypothermia, hypoxic respiratory failure, drug/toxin toxicity. Provider Notes (Medical Decision Making):  
26-year-old male that presented with witnessed cardiac arrest that was initially found to be a PEA arrest.  Patient had continuous unseen CPR and CPR throughout his course to the ER. On arrival patient received multiple rounds of epinephrine, sodium bicarbonate, IV amiodarone, and other medications per ACLS protocols. Patient had continuous CPR throughout stay in the ER. Bedside ultrasound showed a dilated RV and severely impaired ejection fraction. Dr. Unique Rosa, the cardiologist, was at the bedside throughout the majority of the procedure. He contacted our cardiothoracic surgeon, Dr. Kd Gutierrez, who agreed that the patient met Koshal criteria and took the patient to the operating room for emergent ECMO. I have updated the brother by phone about the patient's current condition.   I personally accompanied the patient to the operating room to ensure continuous CPR and monitoring of patient's airway until the case, handed off to Dr. Kd Camacho and the anesthesia team in the operating room. ED Course:  
Initial assessment performed. The patients presenting problems have been discussed, and they are in agreement with the care plan formulated and outlined with them. I have encouraged them to ask questions as they arise throughout their visit. Procedure Note - Orotracheal Intubation:  
3 PM 
Performed by: Dr. Manjeet Maria Indication for procedure: respiratory failure RSI performed. The patient was sedated with 0 mg of no sedating medicines and orotracheally intubated with a 7.5 cuffed Cambodian endotracheal tube using a 4 glide scope blade blade with direct visualization. Tube was advanced to 23 cm at the teeth. ETT location confirmed by bilateral, symmetric breath sounds, good end-tidal CO2 detector color change  and no breath sounds over stomach. Number of attempts: 1 Complications: none Cricoid pressure was applied. The procedure took 1-15 minutes, and pt tolerated well. Procedure Note - External defibrillation:  
310 Performed by myself. Cardioversion was indicated for a rhythm of Ventricular fibrillation and performed 3 times with a maximum delivered energy of 200 joules, 
biphasic. Adequate procedural sedation was attained, see moderate sedation record. Patient's rhythm was PEA at the end of the procedure. The procedure took 1-15 minutes, and pt tolerated well. CPR procedure I personally supervised CPR procedure for approximately 20 minutes. We followed ACLS protocols. See nursing notes for actual dosing. Critical Care: 
I have spent a 35 minutes of critical care time in evaluating and treating this patient. This includes time spent at bedside, time with family and decision makers, documentation, review of labs and imaging, and/or consultation with specialists. It does not include time spent on separately billed procedures. This patient presents with a critical illness or injury that acutely impairs one or more vital organ systems such that there is a high probability of imminent or life threatening deterioration in the patient's condition. This case involved decision making of high complexity to assess, manipulate, and support vital organ system failure and/or to prevent further life threatening deterioration of the patient's condition. Failure to initiate these interventions on an urgent basis would likely result in sudden, clinically significant or life threatening deterioration in the patient's condition. Abnormal findings supporting critical care: Cardiac arrest 
Interventions to support critical care: Continuous CPR, intubation, greater than 60 minutes of bedside management Failure to intervene may result in: Further deterioration and/or death Disposition: 
Admit to Dr. Keith Mercer service and Dr. Shefali Hurt service DISCHARGE PLAN: 
1. Current Discharge Medication List  
  
 
2. Follow-up Information None 3. Return to ED if worse Diagnosis Clinical Impression: 1. Cardiac arrest ([facility] [address] 2. Acute respiratory failure with hypoxia and hypercapnia (HCC) Attestations: 
 
Aurelio Sandhoff, MD 
 
Please note that this dictation was completed with [employer] the computer voice recognition software. Quite often unanticipated grammatical, syntax, homophones, and other interpretive errors are inadvertently transcribed by the computer software. Please disregard these errors. Please excuse any errors that have escaped final proofreading. Thank you.

## 2020-09-11 NOTE — PROGRESS NOTES
1725- TRANSFER - IN REPORT: 
 
Verbal report received from CRNA; OR RNs(name) on Shavonne Loud  being received from [facility] [employer] [employer] Room(unit) for routine post - op Report consisted of patients Situation, Background, Assessment and  
Recommendations(SBAR). Information from the following report(s) SBAR, Kardex, Intake/Output, Recent Results, Cardiac Rhythm NSR and Alarm Parameters  was reviewed with the receiving nurse. Opportunity for questions and clarification was provided. Assessment completed upon patients arrival to unit and care assumed. Primary Nurse Lily Bhandari RN and Stephy Michael RN performed a dual skin assessment on this patient No impairment noted Randolph score is 12 Admission assessment completed; see flow sheet for details. Pt minimally responsive; tremorous BUE; no movement to BLE; PERRLA sluggish. Currently in NSR; BP labile. Lungs are diminished; oral care completed. ABD is semi-soft; obese; BS hypoactive; OGT to suction; moderate amount of red/pink gastric secretions. Gallego catheter in place; adequate UOP. Skin is cool and dry; pale. MONALISA Valdez at the bedside; assessing pulses; unable to doppler either a Pedal or PT pulse to either foot. Will continue to assess pulses with doppler. Hypothermic; Henrique hugger placed. ECMO cannulation sites to right and left groin with small amounts of bloody drainage; Rt grin site with a palpable hematoma; semi-soft to touch; will monitor site. CXR and LUB completed 1805- Pt spontaneously opened eyes; nystagmus noted; however pt appeared to focus on this RN's face intermittently; however no command following. Propofol & Precedex started as ordered. 1815- Weaning Epi at this time; monitoring pulsatility and maintaining MAP~70-80 as ordered by MD.  
 
BG elevated; Insulin gtt ordered. Awaiting Rx delivery 1826- EKG completed; transmitted and sent to Dr. Mariusz Eason. Pt bathed and placed on TAPs; left supine at this time. 18- Spoke with pt's brother via telephone 1935- Bedside and Verbal shift change report given to [address] [place] [address] (oncoming nurse) by Blair Landin RN (offgoing nurse). Report included the following information SBAR, Kardex, Intake/Output, Recent Results, Cardiac Rhythm NSR and Alarm Parameters .

## 2020-09-11 NOTE — PROGRESS NOTES
0815   Placed in isolation until COVID ruled out. PTT sent. Insulin drip increased to 35.4. Dobutamine decreased from 3 to 2.  
 
0830   ABG done. flexiseal remains in place with watery light brown stool.   
0930  Albumin given per perfusionist.

## 2020-09-11 NOTE — PROGRESS NOTES
ECMO Management Note (CPT 67157) Blood flow: 4.2 LPM 
 
MAP: 75 ABG: see most recent result in Epic Anticoagulation: Heparin ACT: Using PTT - therapeutic Cannula site (extremity): dry, no issue Other:  Improved pulsatility overnight. Gases corrected. Liver and kidneys functioning. Neuro status still unclear - not yet weaned sedation. COVID rule out. Expected course of 4-6 days with RV rest and anticoagulation to clear presumed PE and recover RV function.

## 2020-09-11 NOTE — PROGRESS NOTES
Chart reviewed and discussed during interdisciplinary rounds. Pt critically ill, on ECMO/vent and medically unstable.  Will complete order and can be re-consulted when appropriate for PT eval.

## 2020-09-11 NOTE — DISCHARGE INSTRUCTIONS
Smoking Cessation Program:   This is a free, phone/text/email based, smoking cessation program. The program is individualized to meet each patient's needs. To enroll use this link [email or web address]

## 2020-09-11 NOTE — ANESTHESIA POSTPROCEDURE EVALUATION
Procedure(s): EXTRACORPOREAL MEMBRANE OXYGENATION (ECMO). general 
 
Anesthesia Post Evaluation Patient location during evaluation: PACU Note status: Adequate. Level of consciousness: responsive to verbal stimuli and sleepy but conscious Pain management: satisfactory to patient Airway patency: patent Anesthetic complications: no 
Cardiovascular status: acceptable Respiratory status: acceptable Hydration status: acceptable Comments: +Post-Anesthesia Evaluation and Assessment Patient: Margaret Tapia MRN: [id number]  SSN: [id number] YOB: 1960  Age: 60 y.o. Sex: male Cardiovascular Function/Vital Signs BP 93/67   Pulse 68   Temp (!) 35.2 °C (95.3 °F)   Resp 11   Wt 99.7 kg (219 lb 12.8 oz)   SpO2 98%   BMI 32.46 kg/m² Patient is status post Procedure(s): EXTRACORPOREAL MEMBRANE OXYGENATION (ECMO). Nausea/Vomiting: Controlled. Postoperative hydration reviewed and adequate. Pain: 
Pain Scale 1: Behavioral Pain Scale (BPS) (09/11/20 0800) Managed. Neurological Status: At baseline. Mental Status and Level of Consciousness: Arousable. Pulmonary Status:  
O2 Device: Endotracheal tube (09/11/20 0855) Adequate oxygenation and airway patent. Complications related to anesthesia: None Post-anesthesia assessment completed. No concerns. I have evaluated the patient and the patient is stable and ready to be discharged from PACU . Signed By: Jarvis Gutierrez MD  
 9/11/2020 INITIAL Post-op Vital signs:  
Vitals Value Taken Time BP 96/71 9/11/2020  2:00 PM  
Temp 35.2 °C (95.3 °F) 9/11/2020  2:03 PM  
Pulse 66 9/11/2020  2:03 PM  
Resp 12 9/11/2020  2:03 PM  
SpO2 98 % 9/11/2020  2:03 PM  
Vitals shown include unvalidated device data.

## 2020-09-11 NOTE — PROGRESS NOTES
Shift Summery 0700: Pulsatilely improvement of BP tonight. Multiple runs of potassium and magnesium given. UO WNL. Fecal management system used due to diarrhea. High Insulin drip requirement (Epi drip 7-10 mcg/min). Dobutamine drip remains at 3 mcg/kg/min. Heparin drip started and maintained at 15 units/kg/hr (Discussed each PTT result with night pharmacist and decided to maintain drip at rate to reach goal PTT). Weaned Toby drip as tolerated. Report given to oncoming shift.

## 2020-09-11 NOTE — CARDIO/PULMONARY
Cardiac Rehab Note: chart review Consult has been acknowledged Cardiac arrest 
 
Patient not seen at this time due to current condition as indicated in chart. On vent and droplet plus precautions. Smoking history assessed. Patient is a current smoker. Smoking Cessation Program link has been added to the AVS. Patient not seen at bedside due to risk of possible COVID-19 exposure and to preserve PPE (now required for all staff in patient areas). Patient will be contacted to schedule intake appointment as indicated. CP Rehab will follow.

## 2020-09-11 NOTE — PROGRESS NOTES
Progress Note 9/11/2020 4:56 PM 
NAME: Rochelle East MRN:  [id number] Admit Diagnosis: Cardiac arrest ([facility] [address] [I46.9] Assessment:   
  
1. Refractory cardiac arrest, initial rhythm appeared to be PEA arrest  Status post ECMO placement for hemodynamic support 2. Probable PE, vs ACS 3. Respiratory failure status post intubation 4. Severe metabolic acidosis 5. CAMACHO 6. Shock liver 7. Diabetes 8. Hypertension 9. Hyperlipidemia 
   
  
            Plan: 1. Continue ECMO support, weaning pressor support 2. Presentation could be PE, (has RV enlargement on bedside echo in ER) he will be on heparin drip while he has ECMO. 3.  EKG is not consistent with STEMI, no cardiac cath for now, on hep gtt 4. Check duplex for DVT 5. Vent management per critical care team  
6. Awaiting for neurological recovery 7. Echo reviewed, biventricular systolic dysfunction on ECMO Called and updated brother about critical condition, Told him that he is recovering little but still critically ill, need to await neurological recovery  
   
  
 []? High complexity decision making was performed 
  
 
  
 
Subjective: HPI: 
Unresponsive Intubated and sedated Hemodynamically improving and needed less support from vasopressors EKG showed STT changes but not STEMI, cont with anticoagulation ROS: unresponsive Objective:  
  
Physical Exam: 
 
Last 24hrs VS reviewed since prior progress note. Most recent are: 
 
Visit Vitals BP (!) 85/71 Pulse 65 Temp (!) 95 °F (35 °C) Resp 13 Ht 5' 9\" (1.753 m) Wt 99.3 kg (219 lb) SpO2 99% BMI 32.34 kg/m² Intake/Output Summary (Last 24 hours) at 9/11/2020 1656 Last data filed at 9/11/2020 1500 Gross per 24 hour Intake 6477.24 ml Output 3512 ml Net 2965.24 ml General: intubated and sedated Neck: Supple Respiratory: on vent Cardiovascular: Regular rate rhythm, S1S2  
 Abdomen: soft,   non distended Neuro:  Sedated and intubated Skin:   dry Extremity: no edema Data Review Telemetry: normal sinus rhythm EKG:  
NSR, Diffuse STT abn, no ST elevation, prolonged QT Lab Data Personally Reviewed: 
 
Recent Labs  
  09/11/20 
1242 09/11/20 
0543 WBC 19.1* 22.5* HGB 11.0* 11.9*  
HCT 31.9* 34.2*  
* 122* Recent Labs  
  09/11/20 
1426 09/11/20 
1029 09/11/20 
2695 APTT 80.0* 84.5* 77.1* Recent Labs  
  09/11/20 
1401 09/11/20 
0543 09/10/20 
2356 * 150* 148* K 3.6 3.0* 2.9*  
* 113* 112* CO2 34* 26 25 BUN 31* 30* 28* CREA 2.05* 2.20* 2.19* * 342* 482* CA 9.4 9.6 10.4* MG 2.2 1.9 1.8 No results for input(s): CPK, CKNDX, TROIQ in the last 72 hours. No lab exists for component: CPKMB No results found for: CHOL, CHOLX, CHLST, CHOLV, HDL, HDLP, LDL, LDLC, DLDLP, TGLX, TRIGL, TRIGP, CHHD, CHHDX Recent Labs  
  09/11/20 
1401 09/11/20 
0543 09/10/20 
1809 AP 46 48 120* TP 5.1* 5.0* 5.6* ALB 3.0* 3.0* 2.7*  
GLOB 2.1 2.0 2.9 No results for input(s): PH, PCO2, PO2 in the last 72 hours. Medications Personally Reviewed: 
 
Current Facility-Administered Medications Medication Dose Route Frequency  albumin human 5% (BUMINATE) solution 12.5 g  12.5 g IntraVENous Q2H PRN  
 sterile water (preservative free) injection  ceFAZolin (ANCEF) 2 g in sterile water (preservative free) 20 mL IV syringe  2 g IntraVENous Q8H  
 sodium chloride (NS) flush 5-40 mL  5-40 mL IntraVENous Q8H  
 sodium chloride (NS) flush 5-40 mL  5-40 mL IntraVENous PRN  
 0.45% sodium chloride infusion  10 mL/hr IntraVENous CONTINUOUS  
 0.9% sodium chloride infusion  9 mL/hr IntraVENous CONTINUOUS  
 acetaminophen (TYLENOL) tablet 650 mg  650 mg Oral Q4H  
 oxyCODONE IR (ROXICODONE) tablet 5 mg  5 mg Oral Q4H PRN  
 oxyCODONE IR (ROXICODONE) tablet 10 mg  10 mg Oral Q4H PRN  
  morphine 10 mg/ml injection 4 mg  4 mg IntraVENous Q2H PRN  
 naloxone (NARCAN) injection 0.4 mg  0.4 mg IntraVENous PRN  
 mupirocin (BACTROBAN) 2 % ointment   Both Nostrils BID  ondansetron (ZOFRAN) injection 4 mg  4 mg IntraVENous Q4H PRN  
 albuterol (PROVENTIL VENTOLIN) nebulizer solution 2.5 mg  2.5 mg Nebulization Q4H PRN  
 midazolam (VERSED) injection 1 mg  1 mg IntraVENous Q1H PRN  chlorhexidine (PERIDEX) 0.12 % mouthwash 10 mL  10 mL Oral Q12H  
 magnesium oxide (MAG-OX) tablet 400 mg  400 mg Oral BID  calcium chloride 1 g in 0.9% sodium chloride 250 mL IVPB  1 g IntraVENous PRN  
 bisacodyL (DULCOLAX) suppository 10 mg  10 mg Rectal DAILY PRN  
 senna-docusate (PERICOLACE) 8.6-50 mg per tablet 1 Tab  1 Tab Oral BID  polyethylene glycol (MIRALAX) packet 17 g  17 g Oral DAILY  ELECTROLYTE REPLACEMENT NOTE: Nurse to review Serum Potassium and Magnesuim levels and Initiate Electrolyte Replacement Protocol as needed  1 Each Other PRN  
 magnesium sulfate 1 g/100 ml IVPB (premix or compounded)  1 g IntraVENous PRN  pantoprazole (PROTONIX) 40 mg in 0.9% sodium chloride 10 mL injection  40 mg IntraVENous DAILY  heparin (porcine) 25,000 units in 0.45% saline 250 ml infusion  15-36 Units/kg/hr IntraVENous TITRATE  EPINEPHrine (ADRENALIN) 5 mg in 0.9% sodium chloride 250 mL infusion  0-10 mcg/min IntraVENous TITRATE  
 NOREPINephrine (LEVOPHED) 8 mg in 5% dextrose 250mL (32 mcg/mL) infusion  0.5-16 mcg/min IntraVENous TITRATE  PHENYLephrine (DARRYL-SYNEPHRINE) 30 mg in 0.9% sodium chloride 250 mL infusion   mcg/min IntraVENous TITRATE  propofol (DIPRIVAN) 10 mg/mL infusion  0-50 mcg/kg/min IntraVENous TITRATE  dexmedeTOMidine (PRECEDEX) 400 mcg in 0.9% sodium chloride 100 mL infusion  0.2-0.7 mcg/kg/hr IntraVENous TITRATE  insulin regular (NOVOLIN R, HUMULIN R) 100 Units in 0.9% sodium chloride 100 mL infusion  1-50 Units/hr IntraVENous TITRATE  glucose chewable tablet 16 g  4 Tab Oral PRN  
 dextrose (D50W) injection syrg 12.5-25 g  12.5-25 g IntraVENous PRN  
 glucagon (GLUCAGEN) injection 1 mg  1 mg IntraMUSCular PRN  
 insulin lispro (HUMALOG) injection   SubCUTAneous AC&HS  insulin glargine (LANTUS) injection 1-50 Units  1-50 Units SubCUTAneous ONCE PRN  
 alteplase (CATHFLO) 1 mg in sterile water (preservative free) 1 mL injection  1 mg InterCATHeter PRN  
 bacitracin 500 unit/gram packet 1 Packet  1 Packet Topical PRN  
 amiodarone (CORDARONE) 375 mg/250 mL D5W infusion  0.5-1 mg/min IntraVENous TITRATE  balsam peru-castor oiL (VENELEX) ointment   Topical BID  heparin (porcine) injection 4,000 Units  40 Units/kg IntraVENous PRN Or  
 heparin (porcine) injection 8,000 Units  80 Units/kg IntraVENous PRN  
 DOBUTamine (DOBUTREX) 500 mg/250 mL (2,000 mcg/mL) infusion  3 mcg/kg/min IntraVENous CONTINUOUS Valentin Barlow MD

## 2020-09-12 NOTE — PROGRESS NOTES
Brief Procedure Note Patient: Nayana Murphy MRN: [id number]  SSN: [id number] YOB: 1960  Age: 60 y.o. Sex: male Date of Procedure: 9/12/2020 Pre-procedure Diagnosis: Cardiogenic Shock Post-procedure Diagnosis: LM and 3v CAD Procedure: Ultrasound-guided vascular access, LHC, cors, Impella CP insertion, PTCA and PC of the LAD w/ 3 RAFFY, PTCA and PCI of the diagonal w/ 1 RAFFY, PTCA and PCI of the OM w/ a RAFFY, PTCA/PCI of the LM w/ a RAFFY, PTCA and PCI of the RCA w/ 3 RAFFY. Performed By: Hillary Orozco III, DO Anesthesia: Deep Sedation Estimated Blood Loss: Less than 10 mL Specimens:  None Findings: as above Complications: None Implants: Impella CP, 2 Ximena RAFFY, 7 Jerome RAFFY Recommendations: Continue medical therapy. I discussed w/ the brother and Dr. Josesito Hook. Signed By: Hillary Orozco III, DO September 12, 2020

## 2020-09-12 NOTE — PROGRESS NOTES
[facility] Name: Shavonne Browning MRN: [id number] : 1960 Hospital: [facility] Date: 2020 Critical Care IMPRESSION:  
· Acute hypoxic respiratory failure · Prolonged out of hospital cardiac arrest 
· Cause uncertain; PE is a strong possibility · Cardiogenic shock · Acute renal failure · Shock liver · Severe metabolic acidosis · Hypernatremia · DM · Tobacco use RECOMMENDATIONS:  
· Ventilator support at minimal settings · ECMO per cardiac surgery · Pressors/inotropes per cardiac surgery · LE dopplers pending · Unable to do CT imaging due to hemodynamic instability/ECMO · Need to r/o COVID while PE is being considered--still pending. Precautions can be removed if negative · IV fluids--cautious use · Insulin drip · Will get renal involved given hyperkalemia; non oliguric · Serial neuro exams · Heparin gtt · PUD prophylaxis · Low treshold to initiate antibiotics · Critically ill with high risk for further decompensation/death. CCT 35 minutes Subjective/History:  
 
Patient presented yesterday afternoon with witnessed out of hospital cardiac arrest.  He was feeling bad yesterday, was driving, pulled his car over and called EMS. Had witnessed PEA arrest, ultimately with nearly 60 minutes of CPR, at times converted to VT. He was started on the \"code shock\" protocol, and now is on ECMO and multiple pressors. Currently intubated/sedated and unable to provide any history at this time. : BP labile overnight. Issues with TV on vent as well and ultimately changed to PC 18 due to high peak pressures on VC. Intubated and sedated. Past Medical History:  
Diagnosis Date  Diabetes ([address]  Elevated cholesterol  GERD (gastroesophageal reflux disease)   
 helps with meds upsetting stomach  Hypertension  Low vitamin D level  Neuropathy rt lower extremity Past Surgical History:  
Procedure Laterality Date  CARDIAC SURG PROCEDURE UNLIST    
 heart cath. normal  
 HX CERVICAL DISKECTOMY 2015  HX COLONOSCOPY  2015  HX HERNIA REPAIR    
 umbilical  
 HX ORTHOPAEDIC    
 reattached tendon and muscle rt shoulder Prior to Admission medications Medication Sig Start Date End Date Taking? Authorizing Provider  
metFORMIN (GLUCOPHAGE) 500 mg tablet  12/9/16   Provider, Historical  
vardenafil (LEVITRA) 20 mg tablet Take 20 mg by mouth as needed. Provider, Historical  
glimepiride (AMARYL) 4 mg tablet Take 4 mg by mouth two (2) times a day. Provider, Historical  
ergocalciferol (VITAMIN D2) 50,000 unit capsule Take 50,000 Units by mouth every seven (7) days. Provider, Historical  
aspirin delayed-release 81 mg tablet Take 81 mg by mouth daily. Provider, Historical  
omeprazole (PRILOSEC) 20 mg capsule Take 20 mg by mouth daily. Provider, Historical  
rosuvastatin (CRESTOR) 20 mg tablet Take 20 mg by mouth nightly. Provider, Historical  
fenofibric acid (TRILIPIX) 135 mg capsule Take 135 mg by mouth nightly. Provider, Historical  
lisinopril (PRINIVIL, ZESTRIL) 20 mg tablet Take 20 mg by mouth two (2) times a day. Provider, Historical  
metFORMIN (GLUCOPHAGE) 1,000 mg tablet Take 2,000 mg by mouth two (2) times a day. Provider, Historical  
 
Current Facility-Administered Medications Medication Dose Route Frequency  ceFAZolin (ANCEF) 2 g in sterile water (preservative free) 20 mL IV syringe  2 g IntraVENous Q8H  
 heparin (porcine) 25,000 units in 0.45% saline 250 ml infusion  15-36 Units/kg/hr IntraVENous TITRATE  sodium chloride (NS) flush 5-40 mL  5-40 mL IntraVENous Q8H  
 0.45% sodium chloride infusion  10 mL/hr IntraVENous CONTINUOUS  
 0.9% sodium chloride infusion  9 mL/hr IntraVENous CONTINUOUS  
 mupirocin (BACTROBAN) 2 % ointment   Both Nostrils BID  
  chlorhexidine (PERIDEX) 0.12 % mouthwash 10 mL  10 mL Oral Q12H  
 magnesium oxide (MAG-OX) tablet 400 mg  400 mg Oral BID  senna-docusate (PERICOLACE) 8.6-50 mg per tablet 1 Tab  1 Tab Oral BID  polyethylene glycol (MIRALAX) packet 17 g  17 g Oral DAILY  pantoprazole (PROTONIX) 40 mg in 0.9% sodium chloride 10 mL injection  40 mg IntraVENous DAILY  EPINEPHrine (ADRENALIN) 5 mg in 0.9% sodium chloride 250 mL infusion  0-10 mcg/min IntraVENous TITRATE  
 NOREPINephrine (LEVOPHED) 8 mg in 5% dextrose 250mL (32 mcg/mL) infusion  0.5-16 mcg/min IntraVENous TITRATE  PHENYLephrine (DARRYL-SYNEPHRINE) 30 mg in 0.9% sodium chloride 250 mL infusion   mcg/min IntraVENous TITRATE  propofol (DIPRIVAN) 10 mg/mL infusion  0-50 mcg/kg/min IntraVENous TITRATE  dexmedeTOMidine (PRECEDEX) 400 mcg in 0.9% sodium chloride 100 mL infusion  0.2-0.7 mcg/kg/hr IntraVENous TITRATE  insulin regular (NOVOLIN R, HUMULIN R) 100 Units in 0.9% sodium chloride 100 mL infusion  1-50 Units/hr IntraVENous TITRATE  insulin lispro (HUMALOG) injection   SubCUTAneous AC&HS  
 amiodarone (CORDARONE) 375 mg/250 mL D5W infusion  0.5-1 mg/min IntraVENous TITRATE  balsam peru-castor oiL (VENELEX) ointment   Topical BID  DOBUTamine (DOBUTREX) 500 mg/250 mL (2,000 mcg/mL) infusion  3 mcg/kg/min IntraVENous CONTINUOUS No Known Allergies Social History Tobacco Use  Smoking status: Current Every Day Smoker Packs/day: 1.00 Substance Use Topics  Alcohol use: Yes Comment: rarely No family history on file. Review of Systems: 
Review of systems not obtained due to patient factors. Objective:  
Vital Signs:   
Visit Vitals BP (!) 82/68 Pulse 80 Temp 98.7 °F (37.1 °C) Resp 9 Ht 5' 9\" (1.753 m) Wt 99.3 kg (219 lb) SpO2 100% BMI 32.34 kg/m² O2 Device: Ventilator Temp (24hrs), Av °F (35.6 °C), Min:94.8 °F (34.9 °C), Max:99.4 °F (37.4 °C) Intake/Output: Last shift:      No intake/output data recorded. Last 3 shifts: 09/10 1901 - 09/12 0700 In: 8020.9 [I.V.:7990.9] Out: 6643 [Urine:2895; Drains:100] Intake/Output Summary (Last 24 hours) at 9/12/2020 6013 Last data filed at 9/12/2020 0700 Gross per 24 hour Intake 3725.51 ml Output 1635 ml Net 2090.51 ml Hemodynamics:  
PAP:   CO:    
Wedge:   CI:    
CVP:  CVP (mmHg): 18 mmHg (09/12/20 0500) SVR:    
  PVR:    
 
Ventilator Settings: 
Mode Rate Tidal Volume Pressure FiO2 PEEP Pressure control   400 ml  10 cm H2O 40 % 10 cm H20 Peak airway pressure: 29 cm H2O Minute ventilation: 3.31 l/min Physical Exam: 
 
General:  Intubated, sedated Head:  Normocephalic, without obvious abnormality, atraumatic. Eyes:  Conjunctivae/corneas clear. Pupils reactive and equal  
Nose: Nares normal. Septum midline. Mucosa normal.    
Throat: ETT in place Neck: Supple, symmetrical, trachea midline Back:   Symmetric, no curvature. ROM normal.  
Lungs:   Clear to auscultation bilaterally. Chest wall:  No tenderness or deformity. Heart:  Regular rate and rhythm Abdomen:   Soft, non-tender. Bowel sounds normal.   
Extremities: Extremities normal, atraumatic, no cyanosis or clubbing Skin: Skin color, texture, turgor normal. No rashes or lesions Neurologic: Sedated Data:  
 
Current Facility-Administered Medications Medication Dose Route Frequency  ceFAZolin (ANCEF) 2 g in sterile water (preservative free) 20 mL IV syringe  2 g IntraVENous Q8H  
 heparin (porcine) 25,000 units in 0.45% saline 250 ml infusion  15-36 Units/kg/hr IntraVENous TITRATE  sodium chloride (NS) flush 5-40 mL  5-40 mL IntraVENous Q8H  
 0.45% sodium chloride infusion  10 mL/hr IntraVENous CONTINUOUS  
 0.9% sodium chloride infusion  9 mL/hr IntraVENous CONTINUOUS  
 mupirocin (BACTROBAN) 2 % ointment   Both Nostrils BID  chlorhexidine (PERIDEX) 0.12 % mouthwash 10 mL  10 mL Oral Q12H  magnesium oxide (MAG-OX) tablet 400 mg  400 mg Oral BID  senna-docusate (PERICOLACE) 8.6-50 mg per tablet 1 Tab  1 Tab Oral BID  polyethylene glycol (MIRALAX) packet 17 g  17 g Oral DAILY  pantoprazole (PROTONIX) 40 mg in 0.9% sodium chloride 10 mL injection  40 mg IntraVENous DAILY  EPINEPHrine (ADRENALIN) 5 mg in 0.9% sodium chloride 250 mL infusion  0-10 mcg/min IntraVENous TITRATE  
 NOREPINephrine (LEVOPHED) 8 mg in 5% dextrose 250mL (32 mcg/mL) infusion  0.5-16 mcg/min IntraVENous TITRATE  PHENYLephrine (DARRYL-SYNEPHRINE) 30 mg in 0.9% sodium chloride 250 mL infusion   mcg/min IntraVENous TITRATE  propofol (DIPRIVAN) 10 mg/mL infusion  0-50 mcg/kg/min IntraVENous TITRATE  dexmedeTOMidine (PRECEDEX) 400 mcg in 0.9% sodium chloride 100 mL infusion  0.2-0.7 mcg/kg/hr IntraVENous TITRATE  insulin regular (NOVOLIN R, HUMULIN R) 100 Units in 0.9% sodium chloride 100 mL infusion  1-50 Units/hr IntraVENous TITRATE  insulin lispro (HUMALOG) injection   SubCUTAneous AC&HS  
 amiodarone (CORDARONE) 375 mg/250 mL D5W infusion  0.5-1 mg/min IntraVENous TITRATE  balsam peru-castor oiL (VENELEX) ointment   Topical BID  DOBUTamine (DOBUTREX) 500 mg/250 mL (2,000 mcg/mL) infusion  3 mcg/kg/min IntraVENous CONTINUOUS Labs: 
Recent Labs  
  09/12/20 
0351 09/12/20 0042 09/11/20 2015 WBC 21.1* 21.1* 19.0* HGB 10.0* 10.6* 10.4* HCT 29.5* 30.5* 29.7*  
* 107* 96* Recent Labs  
  09/12/20 
0351 09/12/20 
0042 09/11/20 2015 09/11/20 
1401 09/11/20 
0543 * 150* 150*   < > 150* 150*  
K 5.2* 5.0 3.6   < > 3.6 3.0*  
* 117* 116*   < > 115* 113* CO2 27 29 31   < > 34* 26 * 118* 114*   < > 166* 342* BUN 34* 32* 32*   < > 31* 30* CREA 2.21* 2.23* 1.99*   < > 2.05* 2.20* CA 8.6 8.9 8.7   < > 9.4 9.6 MG 2.1 2.0 1.7  --  2.2 1.9 PHOS 3.8  --   --   --   --   --   
ALB 2.8*  --   --   --  3.0* 3.0*  
 TBILI 0.8  --   --   --  1.0 0.9 ALT 84*  --   --   --  128* 153*  
 < > = values in this interval not displayed. Recent Labs  
  09/12/20 
0527 09/12/20 
0343 09/12/20 
0134 PHI 7.47* 7.48* 7.55* PCO2I 35.9 32.4* 30.5* PO2I 86 116* 130* HCO3I 25.9 23.8 26.4*  
FIO2I 40 40 40 Imaging: 
I have personally reviewed the patients radiographs and have reviewed the reports: 
Low lung volumes, no acute process Total critical care time exclusive of procedures: 50 minutes Asaf Arzate,

## 2020-09-12 NOTE — CONSULTS
Consultation Note NAME: Toya Arias :  1960 MRN:  [id number] Date/Time:  2020 4:33 PM 
 
I have been asked to see this patient by Dr. Kaylie Brito  for advice/opinion re: CKD/CAMACHO. Assessment :    Plan: 
CKD/CAMACHO S/P arrest 
Shock Hypernatremia Acute resp. Failure 
 
hyperkalemia Pt is currently in cath lab. I discussed with his RN. Will leave full consult in AM.  Concern is for worsening CAMACHO S/P dye load with cath. May need CVVHD. I suspect he has underlying CKD from DM/HTN. CAMACHO likely related to shock. Subjective: CHIEF COMPLAINT:  Intubated. HISTORY OF PRESENT ILLNESS:    
Hua Santos is a 60 y.o.   male who has a history of DM, HTN admitted S/P arrest.  Review of records shows that he had a fairly normal creatinine in . Apparently while driving he had an arrest and was taken to the ER  He was resuscitated and intubated. He had ECMO placed. Admitting creatinine was 1.89 and it has increased to 2.2 today. His potassium was elevated but has improved. Past Medical History:  
Diagnosis Date  Diabetes ([address]  Elevated cholesterol  GERD (gastroesophageal reflux disease)   
 helps with meds upsetting stomach  Hypertension  Low vitamin D level  Neuropathy   
 rt lower extremity Past Surgical History:  
Procedure Laterality Date  CARDIAC SURG PROCEDURE UNLIST    
 heart cath. normal  
 HX CERVICAL DISKECTOMY 2015  HX COLONOSCOPY  2015  HX HERNIA REPAIR    
 umbilical  
 HX ORTHOPAEDIC    
 reattached tendon and muscle rt shoulder Social History Tobacco Use  Smoking status: Current Every Day Smoker Packs/day: 1.00 Substance Use Topics  Alcohol use: Yes Comment: rarely No family history on file. No Known Allergies Prior to Admission medications Medication Sig Start Date End Date Taking?  Authorizing Provider  
metFORMIN (GLUCOPHAGE) 500 mg tablet  16   Provider, Historical  
 vardenafil (LEVITRA) 20 mg tablet Take 20 mg by mouth as needed. Provider, Historical  
glimepiride (AMARYL) 4 mg tablet Take 4 mg by mouth two (2) times a day. Provider, Historical  
ergocalciferol (VITAMIN D2) 50,000 unit capsule Take 50,000 Units by mouth every seven (7) days. Provider, Historical  
aspirin delayed-release 81 mg tablet Take 81 mg by mouth daily. Provider, Historical  
omeprazole (PRILOSEC) 20 mg capsule Take 20 mg by mouth daily. Provider, Historical  
rosuvastatin (CRESTOR) 20 mg tablet Take 20 mg by mouth nightly. Provider, Historical  
fenofibric acid (TRILIPIX) 135 mg capsule Take 135 mg by mouth nightly. Provider, Historical  
lisinopril (PRINIVIL, ZESTRIL) 20 mg tablet Take 20 mg by mouth two (2) times a day. Provider, Historical  
metFORMIN (GLUCOPHAGE) 1,000 mg tablet Take 2,000 mg by mouth two (2) times a day. Provider, Historical  
 
REVIEW OF SYSTEMS:   
 []  Unable to obtain reliable ROS due to  [] mental status  [] sedated   [] intubated 
 [] Total of 12 systems reviewed as follows: 
Constitutional: negative fever, negative chills, negative weight loss Eyes:   negative visual changes ENT:   negative sore throat, tongue or lip swelling Respiratory:  negative cough, negative dyspnea Cards:  negative for chest pain, palpitations, lower extremity edema GI:   negative for nausea, vomiting, diarrhea, and abdominal pain :  negative for frequency, dysuria Integument:  negative for rash and pruritus Heme:  negative for easy bruising and gum/nose bleeding Musculoskel: negative for myalgias,  back pain and muscle weakness Neuro:  negative for headaches, dizziness, vertigo Psych:  negative for feelings of anxiety, depression Travel?: none Objective: VITALS:   
Visit Vitals BP (!) 80/57 Pulse 79 Temp 97.7 °F (36.5 °C) Resp 10 Ht 5' 9\" (1.753 m) Wt 99.3 kg (219 lb) SpO2 98% BMI 32.34 kg/m² PHYSICAL EXAM: 
 Gen:  []  WD []  WN  [] cachectic []  thin []  obese []  disheveled 
           []  ill apearing  []   Critical  []   Chronic    []  No acute distress HEENT:   [] NC/AT/PERRLA/EOMI 
  [] pink conjunctivae      [] pale conjunctivae PERRL  [] yes  [] no      [] moist mucosa    [] dry mucosa 
  hearing intact to voice [] yes  [] No 
              
NECK:   supple [] yes  [] no        masses [] yes  [] No 
             thyroid  []  non tender  []  tender RESP:   [] CTA bilaterally/no wheezing/rhonchi/rales/crackles [] rhonchi bilaterally - no dullness  [] wheezing   [] rhonchi   [] crackles  
  use of accessory muscles [] yes [] no CARD:   []  regular rate and rhythm/No murmurs/rubs/gallops 
  murmur  [] yes ()  [] no      Rubs  [] yes  [] no       Gallops [] yes  [] no 
  Rate []  regular  []  irregular        carotid bruits  [] Right  []  Left LE edema [] yes  [] no           JVP  []  yes   []  no 
 
ABD:    [] soft/non distended/non tender/+bowel sounds/no HSM []  Rigid    tenderness [] yes [] no   Liver enlargement  []  yes []  no  
             Spleen enlargement  []  yes []  no     distended []  yes [] no  
  bowel sound  [] hypoactive   [] hyperactive LYMPH:    Neck []  yes []  no       Axillae []  yes []  no SKIN:   Rashes []  yes   []  no    Ulcers []  yes   []  no 
             [] tight to palpitation    skin turgor []  good  [] poor  [] decreased Cyanosis/clubbing []  yes []  no 
 
NEUR:   [] cranial nerves II-XII grossly intact    
  [] Cranial nerves deficit 
               []  facial droop    []  slurred speech   [] aphasic  
  [] Strength normal     []  weakness  []  LUE  []   RUE/ []  LLE  []   RLE 
  follows commands  []  yes []  no        
 
PSYCH:   insight [] poor [] good   Alert and Oriented to  [] person  [] place  []  time  
                 [] depressed [] anxious [] agitated  [] lethargic [] stuporous  [] sedated LAB DATA REVIEWED:   
Recent Labs  
  09/12/20 
1218 09/12/20 
6425 WBC 18.3* 19.0* HGB 8.3* 8.9* HCT 23.9* 26.3*  
PLT 93* 96* Recent Labs  
  09/12/20 
1218 09/12/20 
0807 09/12/20 
0351 * 150* 149*  
K 4.8 5.2* 5.2*  
* 116* 116* CO2 28 29 27 BUN 36* 35* 34* CREA 2.20* 2.15* 2.21* * 133* 116* CA 8.0* 8.3* 8.6 MG 2.1 2.1 2.1 PHOS  --   --  3.8 Recent Labs  
  09/12/20 
0351 09/11/20 
1401 09/11/20 
0543 ALT 84* 128* 153* AP 44* 46 48 TBILI 0.8 1.0 0.9 ALB 2.8* 3.0* 3.0*  
GLOB 2.2 2.1 2.0 Recent Labs  
  09/12/20 
0807 09/11/20 
1426 09/11/20 
1029 APTT 33.9* 80.0* 84.5* No results for input(s): FE, TIBC, PSAT, FERR in the last 72 hours. No results for input(s): PH, PCO2, PO2 in the last 72 hours. No results for input(s): CPK, CKMB in the last 72 hours. No lab exists for component: TROPONINI Lab Results Component Value Date/Time Glucose (POC) 104 (H) 09/12/2020 01:26 PM  
 Glucose (POC) 122 (H) 09/12/2020 12:15 PM  
 Glucose (POC) 121 (H) 09/12/2020 11:27 AM  
 Glucose (POC) 118 (H) 09/12/2020 10:22 AM  
 Glucose (POC) 138 (H) 09/12/2020 09:09 AM  
 
 
Procedures: see electronic medical records for all procedures/Xrays and details which were not copied into this note but were reviewed prior to creation of Plan.   
________________________________________________________________________ 
  
 
___________________________________________________ Consulting Physician: Marley Tobar MD

## 2020-09-12 NOTE — PROGRESS NOTES
Discussed w/ Dr. Rodney Schaffer. Agree w/ proceeding with C and coronary angiogram +/- pulmonary angiogram. 
Discussed w/ patients brother Javier Madden @ [phone] and all risks, benefits, and alternatives were discussed and the NOK wishes to proceed. Will have nursing obtain phone consent.

## 2020-09-12 NOTE — PROGRESS NOTES
POD2 s/p VA ECMO for C-shock Nodded appropriately yesterday. Sedated on propofol and dex Improved pulsatility since yesterday morning. TTE shows reduced biventricular function. On [email or web address], Toby was weaned off yesterday Renal function at 2.1, Na 150 K 5.2 ; liver functioning well On heparin for A/C [employer] flowing well. Sweep down to 5.5 CXR clear; low vent settings. Plan: 
Wean flow slightly to allow more intrinsic pulsatility Will add 40 iv lasix x1 and monitor output - should help with hypernatremia and hyperkalemia; appreciate nephro input Switch to bivalrudin given low platelet count Sedation lightened to assess neuro status - then maintain sedation Suture placed around LIJ for bleeding at insertion site Monday weaning trial with bedside AMINATA - consider LHC if global biv dysfunction

## 2020-09-12 NOTE — PROGRESS NOTES
Progress Note 9/12/2020 4:56 PM 
NAME: Gordo Smyth MRN:  [id number] Admit Diagnosis: Cardiac arrest ([facility] [address] [I46.9] Assessment:   
  
1. Refractory cardiac arrest, initial rhythm appeared to be PEA arrest  Status post ECMO placement for hemodynamic support 2. Probable PE, vs ACS 3. Respiratory failure status post intubation 4. Severe metabolic acidosis 5. CAMACHO 6. Shock liver 7. Diabetes 8. Hypertension 9. Hyperlipidemia 9/12:  No significant change. Weaning pressors. COVID-19 negative. BiV dysfunction less fitting with initial thoughts. Would be nice to know status of coronaries. Will discuss w/ Dr. Maria Del Carmen Greenberg. 
 
   
  
            Plan: 1. Continue ECMO support, weaning pressor support 2. Presentation could be PE, (has RV enlargement on bedside echo in ER) he will be on heparin drip while he has ECMO. 3.  EKG is not consistent with STEMI, no cardiac cath for now, on hep gtt 4. Check duplex for DVT 5. Vent management per critical care team  
6. Awaiting for neurological recovery 7. Echo reviewed, biventricular systolic dysfunction on ECMO Called and updated brother about critical condition, Told him that he is recovering little but still critically ill, need to await neurological recovery  
   
  
 []? High complexity decision making was performed 
  
 
  
 
Subjective: HPI: 
Unresponsive Intubated and sedated Hemodynamically improving and needed less support from vasopressors EKG showed STT changes but not STEMI, cont with anticoagulation ROS: unresponsive Objective:  
  
Physical Exam: 
 
Last 24hrs VS reviewed since prior progress note. Most recent are: 
 
Visit Vitals BP (!) 82/59 Pulse 78 Temp 98.1 °F (36.7 °C) Resp 8 Ht 5' 9\" (1.753 m) Wt 99.3 kg (219 lb) SpO2 96% BMI 32.34 kg/m² Intake/Output Summary (Last 24 hours) at 9/12/2020 9355 Last data filed at 9/12/2020 0900 Gross per 24 hour Intake 4119.74 ml Output 1565 ml Net 2554.74 ml General: intubated and sedated Neck: Supple Respiratory: on vent Cardiovascular: Regular rate rhythm, S1S2 Abdomen: soft,   non distended Neuro:  Sedated and intubated Skin:   dry Extremity: no edema Data Review Telemetry: normal sinus rhythm EKG:  
NSR, Diffuse STT abn, no ST elevation, prolonged QT Lab Data Personally Reviewed: 
 
Recent Labs  
  09/12/20 
2219 09/12/20 
8765 WBC 19.0* 21.1* HGB 8.9* 10.0* HCT 26.3* 29.5* PLT 96* 109* Recent Labs  
  09/12/20 
0807 09/11/20 
1426 09/11/20 
1029 APTT 33.9* 80.0* 84.5* Recent Labs  
  09/12/20 
0807 09/12/20 
0351 09/12/20 
0042 * 149* 150*  
K 5.2* 5.2* 5.0  
* 116* 117* CO2 29 27 29 BUN 35* 34* 32* CREA 2.15* 2.21* 2.23* * 116* 118* CA 8.3* 8.6 8.9 MG 2.1 2.1 2.0 No results for input(s): CPK, CKNDX, TROIQ in the last 72 hours. No lab exists for component: CPKMB No results found for: CHOL, CHOLX, CHLST, CHOLV, HDL, HDLP, LDL, LDLC, DLDLP, TGLX, TRIGL, TRIGP, CHHD, CHHDX Recent Labs  
  09/12/20 
0351 09/11/20 
1401 09/11/20 
0543 AP 44* 46 48  
TP 5.0* 5.1* 5.0* ALB 2.8* 3.0* 3.0*  
GLOB 2.2 2.1 2.0 No results for input(s): PH, PCO2, PO2 in the last 72 hours. Medications Personally Reviewed: 
 
Current Facility-Administered Medications Medication Dose Route Frequency  ceFAZolin (ANCEF) 2 g in sterile water (preservative free) 20 mL IV syringe  2 g IntraVENous Q8H  
 albumin human 5% (BUMINATE) solution 12.5 g  12.5 g IntraVENous Q2H PRN  
 heparin (porcine) 25,000 units in 0.45% saline 250 ml infusion  15-36 Units/kg/hr IntraVENous TITRATE  sodium chloride (NS) flush 5-40 mL  5-40 mL IntraVENous Q8H  
 sodium chloride (NS) flush 5-40 mL  5-40 mL IntraVENous PRN  
 0.45% sodium chloride infusion  10 mL/hr IntraVENous CONTINUOUS  
  0.9% sodium chloride infusion  9 mL/hr IntraVENous CONTINUOUS  
 oxyCODONE IR (ROXICODONE) tablet 5 mg  5 mg Oral Q4H PRN  
 oxyCODONE IR (ROXICODONE) tablet 10 mg  10 mg Oral Q4H PRN  
 morphine 10 mg/ml injection 4 mg  4 mg IntraVENous Q2H PRN  
 naloxone (NARCAN) injection 0.4 mg  0.4 mg IntraVENous PRN  
 mupirocin (BACTROBAN) 2 % ointment   Both Nostrils BID  ondansetron (ZOFRAN) injection 4 mg  4 mg IntraVENous Q4H PRN  
 albuterol (PROVENTIL VENTOLIN) nebulizer solution 2.5 mg  2.5 mg Nebulization Q4H PRN  
 midazolam (VERSED) injection 1 mg  1 mg IntraVENous Q1H PRN  chlorhexidine (PERIDEX) 0.12 % mouthwash 10 mL  10 mL Oral Q12H  
 magnesium oxide (MAG-OX) tablet 400 mg  400 mg Oral BID  calcium chloride 1 g in 0.9% sodium chloride 250 mL IVPB  1 g IntraVENous PRN  
 bisacodyL (DULCOLAX) suppository 10 mg  10 mg Rectal DAILY PRN  
 senna-docusate (PERICOLACE) 8.6-50 mg per tablet 1 Tab  1 Tab Oral BID  polyethylene glycol (MIRALAX) packet 17 g  17 g Oral DAILY  ELECTROLYTE REPLACEMENT NOTE: Nurse to review Serum Potassium and Magnesuim levels and Initiate Electrolyte Replacement Protocol as needed  1 Each Other PRN  
 magnesium sulfate 1 g/100 ml IVPB (premix or compounded)  1 g IntraVENous PRN  pantoprazole (PROTONIX) 40 mg in 0.9% sodium chloride 10 mL injection  40 mg IntraVENous DAILY  EPINEPHrine (ADRENALIN) 5 mg in 0.9% sodium chloride 250 mL infusion  0-10 mcg/min IntraVENous TITRATE  
 NOREPINephrine (LEVOPHED) 8 mg in 5% dextrose 250mL (32 mcg/mL) infusion  0.5-16 mcg/min IntraVENous TITRATE  PHENYLephrine (DARRYL-SYNEPHRINE) 30 mg in 0.9% sodium chloride 250 mL infusion   mcg/min IntraVENous TITRATE  propofol (DIPRIVAN) 10 mg/mL infusion  0-50 mcg/kg/min IntraVENous TITRATE  dexmedeTOMidine (PRECEDEX) 400 mcg in 0.9% sodium chloride 100 mL infusion  0.2-0.7 mcg/kg/hr IntraVENous TITRATE  insulin regular (NOVOLIN R, HUMULIN R) 100 Units in 0.9% sodium chloride 100 mL infusion  1-50 Units/hr IntraVENous TITRATE  glucose chewable tablet 16 g  4 Tab Oral PRN  
 dextrose (D50W) injection syrg 12.5-25 g  12.5-25 g IntraVENous PRN  
 glucagon (GLUCAGEN) injection 1 mg  1 mg IntraMUSCular PRN  
 insulin lispro (HUMALOG) injection   SubCUTAneous AC&HS  alteplase (CATHFLO) 1 mg in sterile water (preservative free) 1 mL injection  1 mg InterCATHeter PRN  
 bacitracin 500 unit/gram packet 1 Packet  1 Packet Topical PRN  
 amiodarone (CORDARONE) 375 mg/250 mL D5W infusion  0.5-1 mg/min IntraVENous TITRATE  balsam peru-castor oiL (VENELEX) ointment   Topical BID  DOBUTamine (DOBUTREX) 500 mg/250 mL (2,000 mcg/mL) infusion  3 mcg/kg/min IntraVENous CONTINUOUS Khadar Cost III, DO

## 2020-09-12 NOTE — PROGRESS NOTES
ECMO Management Note (CPT 30390) 
 
  Blood flow: 4.2 LPM 
  
MAP: 69 
  
ABG: see most recent result in Epic 
  
Anticoagulation: Heparin 
  
ACT: Using PTT 
  
Cannula site (extremity): dry, no issue 
  
Other:  Improved pulsatility overnight. TTE yesterday shows RV function hypokinetic but improved from previous. LV globally hypokinetic but no WMA.

## 2020-09-12 NOTE — PROGRESS NOTES
Patient ventilator reading low tidal volume 100's. Moved ET tube to 25cm at teeth. Patient still ringing out low tidal volume. Increased ITime to 1.10 and PC to 12, tidal volumes in the 230's-2450\"s 
 
2223: Patient's tidal volume keeps dropping low. Suctioned patient which thick Pink tinged, White thick sections came out. Placed on AC 10  but Peak Pressures were in the 40's. Changed back to Pressure Control but PC 15 ITime 1.31. Tidal Volumes were 250's but it dropped to 180's when I came out the room. 2331:Patient's tidal volume keeps dropping. Changed to AC 10 . Patient tidal volume stable.

## 2020-09-12 NOTE — PROCEDURES
Chandanella viewed w/ echo at the bedside. Appeared deep at 5.1cm Under sterile technique with echo-guidance this was withdrawn to 3.8cm but then went back into the aorta. Next, it was readvanced to 4.7cm w/ better flows. Will leave here for now and relook tomorrow.

## 2020-09-12 NOTE — PROGRESS NOTES
I asked Dr. Nasim Roper to perform a LHC and PA angiogram 
 
He is stable for transfer to cath lab. Updated his brother. We may be able to get some answers, and potentially intervene.

## 2020-09-12 NOTE — PROGRESS NOTES
0715 Report from David Tyler. [address] Unable to palpate or doppler R pulses. 5394 ABG drawn and run. 
0825  [facility] notified of inablity to get R pulses. 7401 [facility] neg per Dr Bienvenido Connorsfield may remove isolation. 1150 increased PEEP  to 10 for decreased sats. fiO2 increased to 50 
1155 Bival orders being placed by Herve Quinteros, Pharmacy. No need to to initial PTT as one was drawn this AM 
1249  U/S at bedside for venous dopplers 1410 Patient off unit to [facility]. 1430 Care assumed by 2740 [facility] staff. 80 Dr Brewer Situ renal in to assess patient patient off unit to [facility] updated via phone on status. 1657 Report called from [facility], 9 stents placed. To remain on angiomax at 9.9 until 1900 per Dr Anitra Darling. 1752 Impella repositioned to 99.5 by Dr Anitra Darling. Left groin bloody Dr Anitra Darling aware. 1900 Changed bival to impella dose. Report given to Bettina Michael. Per  [facility] transfuse 1 unit [facility] for hgb<8 
2005 Per Dr Anitra Darling to follow [employer] Rep MJISS'F recommendation for ACT vs PTT with Bival.   
2008 Per James farrell to titrate bival by PTT.

## 2020-09-13 NOTE — PROCEDURES
[facility] Bronchoscopy Report Procedure: bronchoscopy. Indication: increased secretions in the ETT Consent/Treatment: Informed consent was obtained from the  family after risks, benefits and alternatives were explained. Timeout verified the correct patient and correct procedure. Anesthesia: Moderate ( conscious ) sedation was administered by the endoscopy nurse and supervised by the endoscopist. The following parameters were monitored: oxygen saturation, heart rate, blood pressure, respiratory rate, EKG, adequacy of pulmonary ventilation and response to care. Total physician intraservice time was 15 min. Procedure Details:  
-- The bronchoscope was introduced through the ETT. -- The trachea and peterson were completely inspected and were found to be normal. 
-- The right-sided endobronchial anatomy was completely inspected and thick brown secretions suctioned. -- The leht-sided endobronchial anatomy was completely inspected and thick brown secretions suctioned. Specimens: none. Complications: none. Estimated Blood Loss: none.  
 
Plan: need therapeutic bronchoscopy in am. 
 
Mg Bañuelos MD
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
## 2020-09-13 NOTE — PROGRESS NOTES
Acute cardiogenic shock Acute kidney injury Hemorrhage Acute hypotension Severe multivessel CAD Called for brisk bleeding from groin and hemoglobin of 6.1 with new hypotension due to bleeding around Impella site. Arrived at bedside and placed a new pursestring suture around the Impella site and femoral venous cannulation site. I then held pressure over the insertion site for approximately 20 minutes while a pack of platelets infused and until it was completed. At this time he was also acutely hypotensive and lost pulsatility due to the acute blood loss. I supervised administration of 3 pRBCs directly into the ECMO circuit for rapid resuscitation. His blood pressure and pulsatility responded. A new dressing was applied and bleeding issues resolved. Total critical care time - 45 minutes (CPT 59629)  
  
I personally spent the above critical care time.  This is time spent at this critically ill patient's bedside / unit / floor actively involved in patient care as well as the coordination of care and discussions with the patient's family.  This does not include any procedural time which has been billed separately.

## 2020-09-13 NOTE — PROGRESS NOTES
Progress Note 9/13/2020 4:56 PM 
NAME: Shavonne Browning MRN:  [id number] Admit Diagnosis: Cardiac arrest ([facility] [address] [I46.9] Assessment:   
  
1. Refractory cardiac arrest, initial rhythm appeared to be PEA arrest  Status post ECMO placement for hemodynamic support 2. Cardiogenic Shock 3. Anemia 4. Thrombocytopenia 5. CAD w/ LM and 3v CAD 6. Probable PE, vs ACS 7. Respiratory failure status post intubation 8. Severe metabolic acidosis 9. CAMACHO 10. Shock liver 11. Diabetes 12. Hypertension 13. Hyperlipidemia 9/12:  No significant change. Weaning pressors. COVID-19 negative. BiV dysfunction less fitting with initial thoughts. Would be nice to know status of coronaries. Will discuss w/ Dr. Popeye Khanna. 9/13:  Taken to cath lab 9/12 w/ LM and 3v CAD s/p Impella CP in LCFA and 9 RAFFY (3 LAD, 1 diagonal, 1 OM1, 1 LM, 3 RCA). Some oozing overnight from Impella site; sutured; resolved. Epi @ 4; levophed off. Transfused PRBC and FFP. HgB 7.6 this AM; will give 2 more units. sCr stable. Weaning flows on ECMO with hopes to decannulate later today. Continue Impella support through this. Continue ASA and ticagrelor. Once decannulated will look to repeat limited echo whilst on Impella.   
 
   
  
            Plan: 1. Continue ECMO support, weaning pressor support 2. Presentation could be PE, (has RV enlargement on bedside echo in ER) he will be on heparin drip while he has ECMO. 3.  EKG is not consistent with STEMI, no cardiac cath for now, on hep gtt 4. Check duplex for DVT 5. Vent management per critical care team  
6. Awaiting for neurological recovery 7. Echo reviewed, biventricular systolic dysfunction on ECMO Called and updated brother about critical condition, Told him that he is recovering little but still critically ill, need to await neurological recovery  
   
  
 []?         High complexity decision making was performed 
  
 
  
 
 Subjective: HPI: 
Unresponsive Intubated and sedated Hemodynamically improving and needed less support from vasopressors EKG showed STT changes but not STEMI, cont with anticoagulation ROS: unresponsive Objective:  
  
Physical Exam: 
 
Last 24hrs VS reviewed since prior progress note. Most recent are: 
 
Visit Vitals BP (!) 88/54 Pulse 77 Temp 97.1 °F (36.2 °C) Resp 10 Ht 5' 9\" (1.753 m) Wt 99.3 kg (219 lb) SpO2 94% BMI 32.34 kg/m² Intake/Output Summary (Last 24 hours) at 9/13/2020 2268 Last data filed at 9/13/2020 0800 Gross per 24 hour Intake 6522.45 ml Output 4645 ml Net 1877.45 ml General: intubated and sedated Neck: Supple Respiratory: on vent Cardiovascular: Regular rate rhythm, S1S2 Abdomen: soft,   non distended Neuro:  Sedated and intubated Skin:   dry Extremity: no edema Data Review Telemetry: normal sinus rhythm EKG:  
NSR, Diffuse STT abn, no ST elevation, prolonged QT Lab Data Personally Reviewed: 
 
Recent Labs  
  09/13/20 0754 09/13/20 0359 WBC 10.9 12.8* HGB 7.6* 8.4* HCT 21.5* 24.0*  
PLT 51* 68* Recent Labs  
  09/13/20 0032 09/12/20 2211 09/12/20 2019 APTT 70.1* 43.7* 37.9* Recent Labs  
  09/13/20 0359 09/13/20 0032 09/12/20 2019 * 148* 148* K 4.0 4.1 4.0  
* 114* 114* CO2 26 24 26 BUN 35* 35* 36* CREA 2.12* 2.14* 2.16* GLU 94 129* 131* CA 8.5 8.5 8.5 MG 2.1 2.0 2.2 No results for input(s): CPK, CKNDX, TROIQ in the last 72 hours. No lab exists for component: CPKMB Lab Results Component Value Date/Time Triglyceride 420 (H) 09/12/2020 08:07 AM  
 
 
Recent Labs  
  09/13/20 
0359 09/12/20 0351 09/11/20 
1401 AP 28* 44* 46  
TP 5.0* 5.0* 5.1* ALB 3.2* 2.8* 3.0*  
GLOB 1.8* 2.2 2.1 No results for input(s): PH, PCO2, PO2 in the last 72 hours. Medications Personally Reviewed: 
 
Current Facility-Administered Medications Medication Dose Route Frequency  sodium bicarbonate 8.4 % (1 mEq/mL) injection  0.9% sodium chloride infusion 250 mL  250 mL IntraVENous PRN  
 bivalirudin (ANGIOMAX) 250 mg in 0.9% sodium chloride (MBP/ADV) 50 mL  0.02-2.5 mg/kg/hr IntraVENous TITRATE  heparin 25,000 units in dextrose 500 mL infusion  4-20 mL/hr Other TITRATE  ticagrelor (BRILINTA) tablet 90 mg  90 mg Per NG tube Q12H  aspirin chewable tablet 81 mg  81 mg Per NG tube DAILY  0.9% sodium chloride infusion 250 mL  250 mL IntraVENous PRN  
 0.9% sodium chloride infusion 250 mL  250 mL IntraVENous PRN  
 0.9% sodium chloride infusion 250 mL  250 mL IntraVENous PRN  
 calcium chloride 100 mg/mL (10 %) injection  0.9% sodium chloride infusion 250 mL  250 mL IntraVENous PRN  
 0.9% sodium chloride infusion 250 mL  250 mL IntraVENous PRN  
 ceFAZolin (ANCEF) 2 g in sterile water (preservative free) 20 mL IV syringe  2 g IntraVENous Q8H  
 albumin human 5% (BUMINATE) solution 12.5 g  12.5 g IntraVENous Q2H PRN  
 sodium chloride (NS) flush 5-40 mL  5-40 mL IntraVENous Q8H  
 sodium chloride (NS) flush 5-40 mL  5-40 mL IntraVENous PRN  
 0.45% sodium chloride infusion  10 mL/hr IntraVENous CONTINUOUS  
 0.9% sodium chloride infusion  9 mL/hr IntraVENous CONTINUOUS  
 oxyCODONE IR (ROXICODONE) tablet 5 mg  5 mg Oral Q4H PRN  
 oxyCODONE IR (ROXICODONE) tablet 10 mg  10 mg Oral Q4H PRN  
 morphine 10 mg/ml injection 4 mg  4 mg IntraVENous Q2H PRN  
 naloxone (NARCAN) injection 0.4 mg  0.4 mg IntraVENous PRN  
 mupirocin (BACTROBAN) 2 % ointment   Both Nostrils BID  ondansetron (ZOFRAN) injection 4 mg  4 mg IntraVENous Q4H PRN  
 albuterol (PROVENTIL VENTOLIN) nebulizer solution 2.5 mg  2.5 mg Nebulization Q4H PRN  
 midazolam (VERSED) injection 1 mg  1 mg IntraVENous Q1H PRN  chlorhexidine (PERIDEX) 0.12 % mouthwash 10 mL  10 mL Oral Q12H  
 magnesium oxide (MAG-OX) tablet 400 mg  400 mg Oral BID  
  bisacodyL (DULCOLAX) suppository 10 mg  10 mg Rectal DAILY PRN  
 [Held by provider] senna-docusate (PERICOLACE) 8.6-50 mg per tablet 1 Tab  1 Tab Oral BID  [Held by provider] polyethylene glycol (MIRALAX) packet 17 g  17 g Oral DAILY  ELECTROLYTE REPLACEMENT NOTE: Nurse to review Serum Potassium and Magnesuim levels and Initiate Electrolyte Replacement Protocol as needed  1 Each Other PRN  
 magnesium sulfate 1 g/100 ml IVPB (premix or compounded)  1 g IntraVENous PRN  pantoprazole (PROTONIX) 40 mg in 0.9% sodium chloride 10 mL injection  40 mg IntraVENous DAILY  EPINEPHrine (ADRENALIN) 5 mg in 0.9% sodium chloride 250 mL infusion  0-10 mcg/min IntraVENous TITRATE  
 NOREPINephrine (LEVOPHED) 8 mg in 5% dextrose 250mL (32 mcg/mL) infusion  0.5-16 mcg/min IntraVENous TITRATE  PHENYLephrine (DARRYL-SYNEPHRINE) 30 mg in 0.9% sodium chloride 250 mL infusion   mcg/min IntraVENous TITRATE  propofol (DIPRIVAN) 10 mg/mL infusion  0-50 mcg/kg/min IntraVENous TITRATE  dexmedeTOMidine (PRECEDEX) 400 mcg in 0.9% sodium chloride 100 mL infusion  0.2-0.7 mcg/kg/hr IntraVENous TITRATE  insulin regular (NOVOLIN R, HUMULIN R) 100 Units in 0.9% sodium chloride 100 mL infusion  1-50 Units/hr IntraVENous TITRATE  glucose chewable tablet 16 g  4 Tab Oral PRN  
 dextrose (D50W) injection syrg 12.5-25 g  12.5-25 g IntraVENous PRN  
 glucagon (GLUCAGEN) injection 1 mg  1 mg IntraMUSCular PRN  
 insulin lispro (HUMALOG) injection   SubCUTAneous AC&HS  alteplase (CATHFLO) 1 mg in sterile water (preservative free) 1 mL injection  1 mg InterCATHeter PRN  
 bacitracin 500 unit/gram packet 1 Packet  1 Packet Topical PRN  
 amiodarone (CORDARONE) 375 mg/250 mL D5W infusion  0.5-1 mg/min IntraVENous TITRATE  balsam peru-castor oiL (VENELEX) ointment   Topical BID  DOBUTamine (DOBUTREX) 500 mg/250 mL (2,000 mcg/mL) infusion  3 mcg/kg/min IntraVENous CONTINUOUS Marval Buys III, DO

## 2020-09-13 NOTE — PROGRESS NOTES
0700 Bedside report from [facility], [address] [place] [address]. Patient Impella at P2, Patient ECMO at 3.2L and 2850 RPMs. Left groin which had been oozing excessively overnight now clean, dry and intact. 0913 Suctioned large amount of tan, foul smelling secretions from patient's ETT. 4933 Dr Kirti Lebron at bedside about position alarm increased to P4 per Dr Kirti Lebron. 1149 Time out for bronch. [address] continues to bleed dressing changed covered with surgical fibrillar to promote hemostasis 0 Dr Kirti Lebron paged about impella placement alarm. 36 Discussed with Dr Kirti Lebron how the ECMO is shifting the impella placement causing frequent persistent alarming. The even with displacement alarm when changed to P4 the impella has a crisp LV waveform. Per Dr Kirti Lebron can disable placement alarm until am.  Check LV waveform with impella checks.  
1905 Report to Henri Holcomb RN

## 2020-09-13 NOTE — PROGRESS NOTES
[id number] Bedside and verbal report received from [place], [place]. Patient assessment completed. Pt intubated and sedated, Epi, Amiodarone, Levophed, Angiomax infusing @ 0.075 mg/kg/hr, and Insulin infusing. Left pedal and tibial pulses doppler, right tibial pulses doppler, right pedal pulses absent. Impella and Ecmo in place. Left femoral entry site bleeding excessively, saturated through bed pads. Dressing removed, Quick Clot and sterile guaze applied to help control bleeding. New dressing quickly saturated, Dr. Tha Abarca notified of current event, Angiomax on hold. [id number] Dr. Tha Abarca at pt bedside to assess pt, stiches applied, bleeding controlled. Patient transfused rbc's and ffp. 
 
2739-1733 Reassessment completed. Patient stable, no additional bleeding noted, Impella and Ecmo remains infusing w/o complications, Levophed titrated down from 8 mcg/min to 4 mcg/min. CBC sent, hgb~9.1. Angiomax increased to 0.14 mg/kg/hr. Pulses remain unchanged. 6474-2220 Angiomax therapeutic. Total of 4 rbc's and 1 ffp infused. Left groin site remains intact, dressing dry. Levophed on stand-by.

## 2020-09-13 NOTE — PROGRESS NOTES
09/13/20 8995 ABCDEF Bundle SBT Safety Screen Passed No  
SBT Screen Reason for Failure FiO2 > 50%;PEEP > 7. 5;Myocardial ischemia; Vasopressor use

## 2020-09-13 NOTE — PROGRESS NOTES
ECMO Management Note (CPT 68271) 
  
  
Blood flow: 4.0 LPM 
  
MAP: 72 
  
ABG: see most recent result in Epic 
  
Anticoagulation: Bivalrudin 
  
ACT: Using PTT 
  
Cannula site (extremity): additional pursestrings placed around impella and venous cannulation site; dry since that time. 
  
Other:  AMINATA tmrw with ECMO weaning trial 
 Coffee ground emesis

## 2020-09-13 NOTE — PROGRESS NOTES
Acute cardiogenic shock Acute kidney injury Hemorrhage Acute hypotension Severe multivessel CAD Acute hypoxic and hypercarbic respiratory failure Anemia from acute hemorrhage 
  
 
POD3 s/p VA ECMO for C-shock, post-procedure day 1 s/p LHC + PCI 
 
LHC yesterday revealed ostial LM and severe MVCAD. Treated with multiple PCI and placement of Impella CP. Pulsatility improved last 24 hours, vasoactive drips weaned Sedated on propofol and dex 
  
Renal function at 2.0, Na 150 K 4.3 ; liver functioning well. Appreciate nephrology input 
  
On bival for A/C Circuit flowing well. Sweep down to 2 
  
CXR clear but thick tan secretions and coarse breath sounds. Will arrange bronchoscopy today with Dr. Anette Krabbe. Empiric zosyn started. 
  
No further bleeding issues from impella site Plan: 
 
AMINATA tmrw AM with weaning trial - goal to decannulate VA ECMO and use Impella CP for support Total critical care time - 80 minutes (CPT [id number], 99292x1)  
  
I personally spent the above critical care time.  This is time spent at this critically ill patient's bedside / unit / floor actively involved in patient care as well as the coordination of care and discussions with the patient's family.  This does not include any procedural time which has been billed separately.

## 2020-09-14 NOTE — PROGRESS NOTES
4580- Bedside and Verbal shift change report given to Juventino Finley (oncoming nurse) by Ryan Vasquez (offgoing nurse). Report included the following information SBAR, Kardex, Intake/Output, Recent Results, Cardiac Rhythm NSR and Alarm Parameters . 8721- Shift assessment completed; see flow sheet for details. Pt minimally responsive; PERRLA; no movement to BUE and BLE with stimuli; brow frowning with noxious stimuli; cough and gag to suction. Currently in NSR; BP stable at this time; remains on pressors. Lungs are coarse with scattered rhonchi; moderate amounts of bloody oral secretions; tan ETT secretions. ABD is semi-soft; BS hypoactive; OGT to suction. Gallego catheter in place; adequate UOP. Skin is cool and dry; oozing noted from ECMO cannulation and double lumen MAC sites; dressing changed. Impella flow remains at P2. ECMO flow~4LPM 
 
0920- Moderate amount of chatter in ECMO lines; PRN Albumin given. 1025- Weaned Levo gtt as BP tolerated. Cannulation & MAC sites continue to ooze; gauze packing in place 1210- Reassessment completed; see flow sheet for details. No acute changes in pt assessment. Spoke with pt's brother Tiana Swartz via telephone; obtained consent for Bronchoscopy; update given on pt's current status 1242- Time out completed; Bronchoscopy done by Dr. Ricardo Culp; see progress note for details. Angiomax started via Impella purge; pt currently has no systemic anticoagulation. PTT obtained prior to starting Angiomax via purge. 1545- Pt down to the OR at this time for ECMO decannulation; consent obtained from pt's brother. This RN and OR staff accompanied pt to the OR 
 
[phone]- Pt returned from the OR following ECMO decannulation. Currently has impella placed in left groin; dry dressing in place. Previous ECMO cannulation sites are also CDI. BP labile; remains on Levo and Dobutamine.  Dr. Zoila Mathur at the bedside; requesting Toby gtt and to wean Levo off; Dobutamine decreased to 3mcg due to tachycardia; HR~120. MAC sites CDI; dry dressings place. Skin assessment completed; slow to sarah redness to buttocks; Venelex applied to area. Remains on propofol & precedex gtt as sedatives. Impella alarming stating \"suction\"; Impella decreased to P5 from P6 and PRN Albumin given; will monitor. Labs drawn at this time; awaiting results. 1945- Toby gtt started; will titrate to maintain MAP>65; will wean Levo as BP tolerates. Keep Dobutamine at 3mcg overnight. Bedside and Verbal shift change report given to [address] (oncoming nurse) by Bentley Cabot RN (offgoing nurse). Report included the following information SBAR, Kardex, Intake/Output, Recent Results, Cardiac Rhythm Sinus tach and Alarm Parameters .

## 2020-09-14 NOTE — PROGRESS NOTES
ECMO Management Note (CPT 52791) 
  
  
Blood flow: 4.5 LPM 
  
MAP: 75 
  
ABG: see most recent result in Epic 
  
Anticoagulation: Bivalrudin - on hold 
  
ACT: Using PTT 
  
Cannula site (extremity): dry 
  
Other:  Decannulate today

## 2020-09-14 NOTE — DIABETES MGMT
[address] INITIAL NOTE Presentation Trina Reardon is a 60 y.o. male admitted from the ER 9/10/20 upon arriving in full arrest. EMS reported patient complaint of difficulty breathing prior to arrival => called EMS on the way to the hospital => cardiac arrest => ACLS. Initially bradycardic and hypertensive. GFR 44/serum creatinine 1.89. Lactic acid 10.1. Admission  with AG 20. A1c of 8.4%. Started on Kaykay Gayer 9/10/20 at 8pm.  
HX: 
Past Medical History:  
Diagnosis Date  Diabetes ([facility] [address]  Elevated cholesterol  GERD (gastroesophageal reflux disease)   
 helps with meds upsetting stomach  Hypertension  Low vitamin D level  Neuropathy   
 rt lower extremity CAD DX: Cardiac arrest. Cardiogenic shock. ARF. Metabolic acidosis. [place]: Emergent ECMO 9/10/20. Impella. Insulin via Kaykay Gayer Clinical care measures: 
 C ventilatory support via ET @ 70% Sedation via Propofol & Precedex BP management via levo OG to drain Current clinical course has been complicated by cardiogenic shock post cardiac arrest, ARF, CAMACHO on CKD, hypernatremia, and hyperkalemia. Has high insulin needs: 
9/11/20 325/24 hours 9/12/20 157/24 hours 9/13/20 110/24 hours Treated with ECMO & Impella. Plan to go to OR today for ECMO decannulation after bronchoscopy. Diabetes: Patient has known Type 2 diabetes, treated with non-insulin agents PTA. Family history unknown for diabetes at this time. Consulted by Provider for advanced diabetes nursing assessment and care, specifically related to  
[x] Transitioning off Kaykay Gayer [x] Inpatient management strategy Diabetes-related medical history - Patient can not address Diabetes medication history Drug class Currently in use Discontinued Never used Biguanide Glucophage 1000mg twice daily DDP-4 inhibitor Sulfonylurea Glimiperide 4mg twice daily Thiazolidinedione GLP-1 RA SGLT-2 inhibitors Basal insulin Fixed Dose  Combinations Bolus insulin Subjective NA. Objective Physical exam 
General Unresponsive. Positive cough & gag reflex per nursing. On Precedex & Propofol Vital Signs Low grade fever. NSR. Normotensive on levo. Visit Vitals /67 Pulse 77 Temp 99.7 °F (37.6 °C) Resp 10 Ht 5' 9\" (1.753 m) Wt 99.3 kg (219 lb) SpO2 92% BMI 32.34 kg/m² Laboratory Lab Results Component Value Date/Time Hemoglobin A1c 8.4 (H) 09/11/2020 05:43 AM  
 
No results found for: LDL, LDLC, DLDLP Lab Results Component Value Date/Time Creatinine (POC) 1.0 09/10/2020 02:55 PM  
 Creatinine 2.70 (H) 09/14/2020 08:56 AM  
 
Lab Results Component Value Date/Time Sodium 149 (H) 09/14/2020 08:56 AM  
 Potassium 3.8 09/14/2020 08:56 AM  
 Chloride 116 (H) 09/14/2020 08:56 AM  
 CO2 29 09/14/2020 08:56 AM  
 Anion gap 4 (L) 09/14/2020 08:56 AM  
 Glucose 104 (H) 09/14/2020 08:56 AM  
 BUN 45 (H) 09/14/2020 08:56 AM  
 Creatinine 2.70 (H) 09/14/2020 08:56 AM  
 BUN/Creatinine ratio 17 09/14/2020 08:56 AM  
 GFR est AA 29 (L) 09/14/2020 08:56 AM  
 GFR est non-AA 24 (L) 09/14/2020 08:56 AM  
 Calcium 8.5 09/14/2020 08:56 AM  
 Bilirubin, total 1.1 (H) 09/13/2020 03:59 AM  
 Alk. phosphatase 28 (L) 09/13/2020 03:59 AM  
 Protein, total 5.0 (L) 09/13/2020 03:59 AM  
 Albumin 3.2 (L) 09/13/2020 03:59 AM  
 Globulin 1.8 (L) 09/13/2020 03:59 AM  
 A-G Ratio 1.8 09/13/2020 03:59 AM  
 ALT (SGPT) 28 09/13/2020 03:59 AM  
 
Lab Results Component Value Date/Time ALT (SGPT) 28 09/13/2020 03:59 AM  
 
Factors affecting BG pattern Factor Dose Comments Nutrition: 
NPO 
OG to drain Critically ill state - To OR today Drugs: 
Vasopressor load Levo drip Infection  WBC 10.3 Pain  Unresponsive. BPS 3. Other: Cardiogenic shock Kidney function  Poor insulin delivery GFR 24/ serum creatinine 2.6 Blood glucose pattern Assessment and Plan Nursing Diagnosis Risk for unstable blood glucose pattern Nursing Intervention Domain 4897 Decision-making Support Nursing Interventions Examined current inpatient diabetes control Explored factors facilitating and impeding inpatient management Evaluation This  male, with Type 2 diabetes, has achieved inpatient blood glucose target of 100-180mg/dl on Glucostabilizer. Inpatient blood glucose management has been impacted by 
[x] Kidney dysfunction 
[x]  Pressor support In light of procedures planned today, would continue Glucostabilizer. Recommendations Recommend: 
 
Continuing Blanca Bless in light of multiple lifesaving measures underway today Billing Code(s) I personally reviewed chart, notes, data and current medications in the medical record, and examined the patient at bedside before making care recommendations. [x] [id number] IP subsequent hospital care - 30 minutes [facility] Access via [address] [place] [address]

## 2020-09-14 NOTE — PROGRESS NOTES
09/14/20 6727 ABCDEF Bundle SBT Safety Screen Passed No  
SBT Screen Reason for Failure FiO2 > 50%;PEEP > 7. 5;Vasopressor use

## 2020-09-14 NOTE — ANESTHESIA PROCEDURE NOTES
AMINATA 
 
 
 
Procedure Details: probe placement, image aquisition & interpretation Site marked Risks and benefits discussed with the patient and plans are to proceed Procedure Note Performed by: Erin Be MD 
Authorized by: Erin Be MD  
 
 
Indications: assessment of ascending aorta and assessment of surgical repair Modalities: 2D, CF, CWD, contrast, PWD Probe Type: multiplane Insertion: atraumatic Patient Status: intubated Echocardiographic and Doppler Measurements Aorta  Size  Diam(cm)  Dissection PlaqueThick(mm)  Plaque Mobile Ascending normal  No 0-3 No  
 Arch normal  No >3 No  
 Descending normal  No >3 No  
 
 
 
 Valves  Annulus  Stenosis  Area/Grad  Regurg  Leaflet Morph  Leaflet Motion Aortic normal none  1+ normal normal  
 Mitral dilated   2+ normal normal  
 Tricuspid normal none  2+ normal normal  
 
 
 
 Atria  Size  SEC (smoke)  Thrombus  Tumor  Device Rt Atrium dilated No No No No  
 Lt Atrium normal No No No No  
 
Interatrial Septum Morphology: normal 
 
Interventricular Septum Morphology: normal 
 
Ventricle  Cavity Size  Cavity Dimension Hypertrophy  Thrombus  Gloal FXN  EF  
 RV normal  No no moderately impaired LV normal  Yes No mildly impaired 45 Regional Function 
(1 = normal, 2 = mildly hypokinetic, 3 = severely hypokinetic, 4 = akinetic, 5 = dyskinetic) LAV - [place] ME LAV = 0  ME LAV = 90  ME LAV = 130 Basal Sept:2 Basal Ant:1 Basal Post:1 Mid Sept:2 Mid Ant:1 Mid Post:1 Apical Sept:2 Apical Ant:2 Basal Ant Sept:1 Basal Lat:1 Basal Inf:2 Mid Ant Sept:2 Mid Lat:1 Mid Inf:2 Apical Lat:2 Apical Inf:2 Pericardium: normal 
 
Post Intervention Follow-up Study Ventricular Global Function: improved Ventricular Regional Function: improved Valve  Function  Regurgitation  Area Aortic no change Mitral no change Tricuspid no change Prosthetic Complications: None Comments: Pre AMINATA 
 LV 45 % with significant improvement compared to AMINATA before ecmo. Ant and lateral walls are moving normally now. RV is mod dysfunctional but not dilated. Impella is in place with mild AI. Mod MR noted. Ecmo turned down with no change in LV or Rv function. Inotropic support added. Lv function improved and LV is hyperdynamic . Rv function also improved. Now is only mildly dysfunctional .  
MR 2+. Impella still in place.

## 2020-09-14 NOTE — PROGRESS NOTES
[facility] [place] [facility] Name: Adamaris Turner MRN: [id number] : 1960 Hospital: [facility] Date: 2020 Critical Care IMPRESSION:  
· Acute hypoxic respiratory failure on vent · Prolonged out of hospital cardiac arrest 
· Cause uncertain; PE is a strong possibility · Diffuse ASCVD s.p PCI 9/15/20 · Cardiogenic shock on impella, ECMO · Acute renal failure · Shock liver · Severe metabolic acidosis POA · Hypernatremia · DM · Tobacco use RECOMMENDATIONS:  
· Ventilator support at minimal settings--now on 70% FiO2 and 10 PEEP 
· ECMO per cardiac surgery--possible decanulation today · Pressors/inotropes per cardiac surgery · Unable to do CT imaging due to hemodynamic instability/ECMO--that being said given findings on C seems much less likely that PE was the culprit · COVID negative · IV fluids--cautious use · Insulin drip · Renal following · Serial neuro exams · Heparin changed to bival 
· PUD prophylaxis · Low treshold to initiate antibiotics · Critically ill with high risk for further decompensation/death. CCT  45  minutes Subjective/History:  
 
Patient presented yesterday afternoon with witnessed out of hospital cardiac arrest.  He was feeling bad yesterday, was driving, pulled his car over and called EMS. Had witnessed PEA arrest, ultimately with nearly 60 minutes of CPR, at times converted to VT. He was started on the \"code shock\" protocol, and now is on ECMO and multiple pressors. Currently intubated/sedated and unable to provide any history at this time. : BP labile overnight. Issues with TV on vent as well and ultimately changed to PC 18 due to high peak pressures on VC. Intubated and sedated. : Eventful afternoon. Taken to the cath lab where he was found to have left main and multivessel CAD; 9 stents placed as well as an impella. Starting oozing from impella site overnight and had to have additional sutures placed. Was also transfused several units of blood product. Remains intubated and sedated this morning. 9/14  On vent. 70% PEEP 10. PCV 18 AC 10. More bleeding and transfusions  overnight. ECMO. CO 4.7 3800 RPM. Sweep 2. Imeplla P 2. corase on exam. On IV Bivalirudin Discussed with Dr. Mayra Rojas. Still vent dependent. Seen eariler this AM on rounds. Called brother Alcides Perez by phone: Wenceslao Jerez [phone] . Discussed indication for another therapeutic bronchoscopy. Explained previous findings. Therapeutic scope will arrive today. Hope to perform [rior to OR and removal of ECMO lines. Brother sgrees to proceed. ,now Past Medical History:  
Diagnosis Date  Diabetes ([facility] [address]  Elevated cholesterol  GERD (gastroesophageal reflux disease)   
 helps with meds upsetting stomach  Hypertension  Low vitamin D level  Neuropathy   
 rt lower extremity Past Surgical History:  
Procedure Laterality Date  CARDIAC SURG PROCEDURE UNLIST    
 heart cath. normal  
 HX CERVICAL DISKECTOMY 2015  HX COLONOSCOPY  2015  HX HERNIA REPAIR    
 umbilical  
 HX ORTHOPAEDIC    
 reattached tendon and muscle rt shoulder Prior to Admission medications Medication Sig Start Date End Date Taking? Authorizing Provider  
metFORMIN (GLUCOPHAGE) 500 mg tablet  12/9/16   Provider, Historical  
vardenafil (LEVITRA) 20 mg tablet Take 20 mg by mouth as needed. Provider, Historical  
glimepiride (AMARYL) 4 mg tablet Take 4 mg by mouth two (2) times a day. Provider, Historical  
ergocalciferol (VITAMIN D2) 50,000 unit capsule Take 50,000 Units by mouth every seven (7) days. Provider, Historical  
aspirin delayed-release 81 mg tablet Take 81 mg by mouth daily. Provider, Historical  
omeprazole (PRILOSEC) 20 mg capsule Take 20 mg by mouth daily.     Provider, Historical  
 rosuvastatin (CRESTOR) 20 mg tablet Take 20 mg by mouth nightly. Provider, Historical  
fenofibric acid (TRILIPIX) 135 mg capsule Take 135 mg by mouth nightly. Provider, Historical  
lisinopril (PRINIVIL, ZESTRIL) 20 mg tablet Take 20 mg by mouth two (2) times a day. Provider, Historical  
metFORMIN (GLUCOPHAGE) 1,000 mg tablet Take 2,000 mg by mouth two (2) times a day. Provider, Historical  
 
Current Facility-Administered Medications Medication Dose Route Frequency  mupirocin (BACTROBAN) 2 % ointment   Both Nostrils Q12H  piperacillin-tazobactam (ZOSYN) 3.375 g in 0.9% sodium chloride (MBP/ADV) 100 mL  3.375 g IntraVENous Q8H  
 bivalirudin (ANGIOMAX) 250 mg in 0.9% sodium chloride (MBP/ADV) 50 mL  0.02-2.5 mg/kg/hr IntraVENous TITRATE  heparin 25,000 units in dextrose 500 mL infusion  4-20 mL/hr Other TITRATE  ticagrelor (BRILINTA) tablet 90 mg  90 mg Per NG tube Q12H  aspirin chewable tablet 81 mg  81 mg Per NG tube DAILY  sodium chloride (NS) flush 5-40 mL  5-40 mL IntraVENous Q8H  
 0.45% sodium chloride infusion  10 mL/hr IntraVENous CONTINUOUS  chlorhexidine (PERIDEX) 0.12 % mouthwash 10 mL  10 mL Oral Q12H  
 magnesium oxide (MAG-OX) tablet 400 mg  400 mg Oral BID  [Held by provider] senna-docusate (PERICOLACE) 8.6-50 mg per tablet 1 Tab  1 Tab Oral BID  [Held by provider] polyethylene glycol (MIRALAX) packet 17 g  17 g Oral DAILY  pantoprazole (PROTONIX) 40 mg in 0.9% sodium chloride 10 mL injection  40 mg IntraVENous DAILY  EPINEPHrine (ADRENALIN) 5 mg in 0.9% sodium chloride 250 mL infusion  0-10 mcg/min IntraVENous TITRATE  
 NOREPINephrine (LEVOPHED) 8 mg in 5% dextrose 250mL (32 mcg/mL) infusion  0.5-16 mcg/min IntraVENous TITRATE  PHENYLephrine (DARRYL-SYNEPHRINE) 30 mg in 0.9% sodium chloride 250 mL infusion   mcg/min IntraVENous TITRATE  propofol (DIPRIVAN) 10 mg/mL infusion  0-50 mcg/kg/min IntraVENous TITRATE  dexmedeTOMidine (PRECEDEX) 400 mcg in 0.9% sodium chloride 100 mL infusion  0.2-0.7 mcg/kg/hr IntraVENous TITRATE  insulin regular (NOVOLIN R, HUMULIN R) 100 Units in 0.9% sodium chloride 100 mL infusion  1-50 Units/hr IntraVENous TITRATE  insulin lispro (HUMALOG) injection   SubCUTAneous AC&HS  
 amiodarone (CORDARONE) 375 mg/250 mL D5W infusion  0.5-1 mg/min IntraVENous TITRATE  balsam peru-castor oiL (VENELEX) ointment   Topical BID  DOBUTamine (DOBUTREX) 500 mg/250 mL (2,000 mcg/mL) infusion  3 mcg/kg/min IntraVENous CONTINUOUS No Known Allergies Social History Tobacco Use  Smoking status: Current Every Day Smoker Packs/day: 1.00 Substance Use Topics  Alcohol use: Yes Comment: rarely No family history on file. Review of Systems: 
Review of systems not obtained due to patient factors. Objective:  
Vital Signs:   
Visit Vitals BP (!) 88/53 Pulse 73 Temp 99.7 °F (37.6 °C) Resp 14 Ht 5' 9\" (1.753 m) Wt 99.3 kg (219 lb) SpO2 97% BMI 32.34 kg/m² O2 Device: Endotracheal tube, Ventilator Temp (24hrs), Av.5 °F (37.5 °C), Min:97.5 °F (36.4 °C), Max:100.1 °F (37.8 °C) Intake/Output:  
Last shift:      No intake/output data recorded. Last 3 shifts:  1901 -  0700 In: 9063.9 [I.V.:5614.9] Out: 1230 [VWTAV:6519] Intake/Output Summary (Last 24 hours) at 2020 0800 Last data filed at 2020 0700 Gross per 24 hour Intake 4166.78 ml Output 3180 ml Net 986.78 ml Hemodynamics:  
PAP:   CO:    
Wedge:   CI:    
CVP:  CVP (mmHg): 17 mmHg (20 0700) SVR:    
  PVR:    
 
Ventilator Settings: 
Mode Rate Tidal Volume Pressure FiO2 PEEP Assist control   400 ml  10 cm H2O 70 % 10 cm H20 Peak airway pressure: 29 cm H2O Minute ventilation: 6.41 l/min Physical Exam: 
 
General:  Intubated, sedated Head:  Normocephalic, without obvious abnormality, atraumatic. Eyes:  Conjunctivae/corneas clear. Pupils reactive and equal  
Nose: Nares normal. Septum midline. Mucosa normal.    
Throat: ETT in place Neck: Supple, symmetrical, trachea midline Back:   Symmetric, no curvature. ROM normal.  
Lungs:   Clear to auscultation bilaterally. Chest wall:  No tenderness or deformity. Heart:  Regular rate and rhythm Abdomen:   Soft, non-tender. Bowel sounds normal.   
Extremities: Extremities normal, atraumatic, no cyanosis or clubbing Skin: Skin color, texture, turgor normal. No rashes or lesions Neurologic: Sedated Data:  
 
Current Facility-Administered Medications Medication Dose Route Frequency  mupirocin (BACTROBAN) 2 % ointment   Both Nostrils Q12H  piperacillin-tazobactam (ZOSYN) 3.375 g in 0.9% sodium chloride (MBP/ADV) 100 mL  3.375 g IntraVENous Q8H  
 bivalirudin (ANGIOMAX) 250 mg in 0.9% sodium chloride (MBP/ADV) 50 mL  0.02-2.5 mg/kg/hr IntraVENous TITRATE  heparin 25,000 units in dextrose 500 mL infusion  4-20 mL/hr Other TITRATE  ticagrelor (BRILINTA) tablet 90 mg  90 mg Per NG tube Q12H  aspirin chewable tablet 81 mg  81 mg Per NG tube DAILY  sodium chloride (NS) flush 5-40 mL  5-40 mL IntraVENous Q8H  
 0.45% sodium chloride infusion  10 mL/hr IntraVENous CONTINUOUS  chlorhexidine (PERIDEX) 0.12 % mouthwash 10 mL  10 mL Oral Q12H  
 magnesium oxide (MAG-OX) tablet 400 mg  400 mg Oral BID  [Held by provider] senna-docusate (PERICOLACE) 8.6-50 mg per tablet 1 Tab  1 Tab Oral BID  [Held by provider] polyethylene glycol (MIRALAX) packet 17 g  17 g Oral DAILY  pantoprazole (PROTONIX) 40 mg in 0.9% sodium chloride 10 mL injection  40 mg IntraVENous DAILY  EPINEPHrine (ADRENALIN) 5 mg in 0.9% sodium chloride 250 mL infusion  0-10 mcg/min IntraVENous TITRATE  
 NOREPINephrine (LEVOPHED) 8 mg in 5% dextrose 250mL (32 mcg/mL) infusion  0.5-16 mcg/min IntraVENous TITRATE  PHENYLephrine (DARRYL-SYNEPHRINE) 30 mg in 0.9% sodium chloride 250 mL infusion   mcg/min IntraVENous TITRATE  propofol (DIPRIVAN) 10 mg/mL infusion  0-50 mcg/kg/min IntraVENous TITRATE  dexmedeTOMidine (PRECEDEX) 400 mcg in 0.9% sodium chloride 100 mL infusion  0.2-0.7 mcg/kg/hr IntraVENous TITRATE  insulin regular (NOVOLIN R, HUMULIN R) 100 Units in 0.9% sodium chloride 100 mL infusion  1-50 Units/hr IntraVENous TITRATE  insulin lispro (HUMALOG) injection   SubCUTAneous AC&HS  
 amiodarone (CORDARONE) 375 mg/250 mL D5W infusion  0.5-1 mg/min IntraVENous TITRATE  balsam peru-castor oiL (VENELEX) ointment   Topical BID  DOBUTamine (DOBUTREX) 500 mg/250 mL (2,000 mcg/mL) infusion  3 mcg/kg/min IntraVENous CONTINUOUS Labs: 
Recent Labs  
  09/14/20 0425 09/14/20 
0016 09/13/20 
1834 WBC 10.3 9.6 9.8 HGB 8.4* 7.9* 8.4* HCT 25.2* 23.1* 24.3*  
PLT 61* 49* 51* Recent Labs  
  09/14/20 
0425 09/14/20 
0016 09/13/20 
1956 09/13/20 
1452  09/13/20 
0359  09/12/20 
0351  09/11/20 
1401 * 149* 148* 150*   < > 148*   < > 149*   < > 150*  
K 3.8 3.8 4.0 4.3   < > 4.0   < > 5.2*   < > 3.6 * 117* 116* 117*   < > 114*   < > 116*   < > 115* CO2 27 28 27 26   < > 26   < > 27   < > 34* * 115* 104* 86   < > 94   < > 116*   < > 166* BUN 42* 42* 40* 38*   < > 35*   < > 34*   < > 31* CREA 2.56* 2.49* 2.36* 2.25*   < > 2.12*   < > 2.21*   < > 2.05* CA 8.4* 8.4* 8.5 8.4*   < > 8.5   < > 8.6   < > 9.4 MG 2.1 2.0  --  2.0   < > 2.1   < > 2.1   < > 2.2 PHOS  --   --   --   --   --   --   --  3.8  --   --   
ALB  --   --   --   --   --  3.2*  --  2.8*  --  3.0* TBILI  --   --   --   --   --  1.1*  --  0.8  --  1.0 ALT  --   --   --   --   --  28  --  84*  --  128*  
 < > = values in this interval not displayed. Recent Labs  
  09/14/20 
3246 09/14/20 
0506 09/14/20 
5149 PHI 7.41 7.40 7.40 PCO2I 40.9 41.3 39.8 PO2I 118* 118* 120* HCO3I 25.7 25.6 24.4 FIO2I 70 70 70 Imaging: 
I have personally reviewed the patients radiographs and have reviewed the reports: 
Low lung volumes, no acute process Total critical care time exclusive of procedures: 50 minutes Keyana Mata MD

## 2020-09-14 NOTE — PROGRESS NOTES
Progress Note 9/14/2020 4:56 PM 
NAME: Chrissy Puente MRN:  [id number] Admit Diagnosis: Cardiac arrest ([facility] [address] [I46.9] Assessment:   
  
1. Refractory cardiac arrest, initial rhythm appeared to be PEA arrest  Status post ECMO placement for hemodynamic support 2. Cardiogenic Shock 3. Biventricular failure 4. CAD w/ LM and 3v CAD, with MAKEDA 3 flow at baseline, underwent multivessel PCI 5. Probable PE vs ACS 6. Respiratory failure status post intubation 7. Severe metabolic acidosis, improved 8. Non occlusive DVT in Rt femoral artery, may be related to ECMO cannula, on [facility] 9. Anemia 10. Thrombocytopenia 11. CAMACHO 12. Shock liver 13. Diabetes 14. Hypertension 15. Hyperlipidemia 
  
   
  
            Plan:    
1. Weaning on vasopressor, only on low dose levophed 2. AMINATA and depending on result possible ECMO decannulation today, continue with impella support 3. Presentation could be PE, ACS 4. On Bival gtt 4. On ASA, ticagrelor 5. Vent mx per critical care team  
6. On Abx 7. Awaiting for neurological recovery, wean sedation as tolerates Critically ill, prognosis guarded, hemodynamics has improves, awaiting neuro recovery  
  
   
  
 [x]? High complexity decision making was performed 
  
 
  
 
Subjective: HPI: 
Remain intubated and sedated Underwent left heart catheterization which shows  Left main and three-vessel coronary artery disease status post multivessel PCI, he had MAKEDA 3 flow in all vessels Hemodynamics improved Remain on sedation ROS: unresponsive Objective:  
  
Physical Exam: 
 
Last 24hrs VS reviewed since prior progress note. Most recent are: 
 
Visit Vitals BP (!) 88/53 Pulse 73 Temp 99.7 °F (37.6 °C) Resp 14 Ht 5' 9\" (1.753 m) Wt 99.3 kg (219 lb) SpO2 97% BMI 32.34 kg/m² Intake/Output Summary (Last 24 hours) at 9/14/2020 5901 Last data filed at 9/14/2020 0700 Gross per 24 hour Intake 3909.33 ml Output 3180 ml Net 729.33 ml General: intubated and sedated Neck: Supple Respiratory: on vent Cardiovascular: Regular rate rhythm, S1S2 Abdomen: soft,   non distended Neuro:  Sedated and intubated Skin:   dry Extremity: no edema Data Review Telemetry: normal sinus rhythm EKG:  
NSR, Diffuse STT abn, no ST elevation, prolonged QT Lab Data Personally Reviewed: 
 
Recent Labs  
  09/14/20 
0425 09/14/20 
0016 WBC 10.3 9.6 HGB 8.4* 7.9*  
HCT 25.2* 23.1*  
PLT 61* 49* Recent Labs  
  09/14/20 
4991 09/14/20 
0334 09/14/20 
0016 APTT 34.5* 31.0 37.3* Recent Labs  
  09/14/20 
0425 09/14/20 
0016 09/13/20 
1956 09/13/20 
1452 * 149* 148* 150*  
K 3.8 3.8 4.0 4.3 * 117* 116* 117* CO2 27 28 27 26 BUN 42* 42* 40* 38* CREA 2.56* 2.49* 2.36* 2.25* * 115* 104* 86  
CA 8.4* 8.4* 8.5 8.4* MG 2.1 2.0  --  2.0 No results for input(s): CPK, CKNDX, TROIQ in the last 72 hours. No lab exists for component: CPKMB Lab Results Component Value Date/Time Triglyceride 420 (H) 09/12/2020 08:07 AM  
 
 
Recent Labs  
  09/13/20 
0359 09/12/20 
0351 09/11/20 
1401 AP 28* 44* 46  
TP 5.0* 5.0* 5.1* ALB 3.2* 2.8* 3.0*  
GLOB 1.8* 2.2 2.1 No results for input(s): PH, PCO2, PO2 in the last 72 hours. Medications Personally Reviewed: 
 
Current Facility-Administered Medications Medication Dose Route Frequency  rocuronium 10 mg/mL injection  0.9% sodium chloride infusion 250 mL  250 mL IntraVENous PRN  
 0.9% sodium chloride infusion 250 mL  250 mL IntraVENous PRN  
 mupirocin (BACTROBAN) 2 % ointment   Both Nostrils Q12H  furosemide (LASIX) injection 80 mg  80 mg IntraVENous ONCE  
 bivalirudin (ANGIOMAX) 250 mg in dextrose 5% 250 mL infusion  4-20 mL/hr Other TITRATE  piperacillin-tazobactam (ZOSYN) 3.375 g in 0.9% sodium chloride (MBP/ADV) 100 mL  3.375 g IntraVENous Q8H  
  [Held by provider] bivalirudin (ANGIOMAX) 250 mg in 0.9% sodium chloride (MBP/ADV) 50 mL  0.02-2.5 mg/kg/hr IntraVENous TITRATE  ticagrelor (BRILINTA) tablet 90 mg  90 mg Per NG tube Q12H  aspirin chewable tablet 81 mg  81 mg Per NG tube DAILY  albumin human 5% (BUMINATE) solution 12.5 g  12.5 g IntraVENous Q2H PRN  
 sodium chloride (NS) flush 5-40 mL  5-40 mL IntraVENous Q8H  
 sodium chloride (NS) flush 5-40 mL  5-40 mL IntraVENous PRN  
 0.45% sodium chloride infusion  10 mL/hr IntraVENous CONTINUOUS  
 oxyCODONE IR (ROXICODONE) tablet 5 mg  5 mg Oral Q4H PRN  
 oxyCODONE IR (ROXICODONE) tablet 10 mg  10 mg Oral Q4H PRN  
 morphine 10 mg/ml injection 4 mg  4 mg IntraVENous Q2H PRN  
 naloxone (NARCAN) injection 0.4 mg  0.4 mg IntraVENous PRN  
 ondansetron (ZOFRAN) injection 4 mg  4 mg IntraVENous Q4H PRN  
 albuterol (PROVENTIL VENTOLIN) nebulizer solution 2.5 mg  2.5 mg Nebulization Q4H PRN  
 midazolam (VERSED) injection 1 mg  1 mg IntraVENous Q1H PRN  chlorhexidine (PERIDEX) 0.12 % mouthwash 10 mL  10 mL Oral Q12H  
 magnesium oxide (MAG-OX) tablet 400 mg  400 mg Oral BID  
 bisacodyL (DULCOLAX) suppository 10 mg  10 mg Rectal DAILY PRN  
 [Held by provider] senna-docusate (PERICOLACE) 8.6-50 mg per tablet 1 Tab  1 Tab Oral BID  [Held by provider] polyethylene glycol (MIRALAX) packet 17 g  17 g Oral DAILY  ELECTROLYTE REPLACEMENT NOTE: Nurse to review Serum Potassium and Magnesuim levels and Initiate Electrolyte Replacement Protocol as needed  1 Each Other PRN  
 magnesium sulfate 1 g/100 ml IVPB (premix or compounded)  1 g IntraVENous PRN  pantoprazole (PROTONIX) 40 mg in 0.9% sodium chloride 10 mL injection  40 mg IntraVENous DAILY  EPINEPHrine (ADRENALIN) 5 mg in 0.9% sodium chloride 250 mL infusion  0-10 mcg/min IntraVENous TITRATE  
 NOREPINephrine (LEVOPHED) 8 mg in 5% dextrose 250mL (32 mcg/mL) infusion  0.5-16 mcg/min IntraVENous TITRATE  PHENYLephrine (DARRYL-SYNEPHRINE) 30 mg in 0.9% sodium chloride 250 mL infusion   mcg/min IntraVENous TITRATE  propofol (DIPRIVAN) 10 mg/mL infusion  0-50 mcg/kg/min IntraVENous TITRATE  dexmedeTOMidine (PRECEDEX) 400 mcg in 0.9% sodium chloride 100 mL infusion  0.2-0.7 mcg/kg/hr IntraVENous TITRATE  insulin regular (NOVOLIN R, HUMULIN R) 100 Units in 0.9% sodium chloride 100 mL infusion  1-50 Units/hr IntraVENous TITRATE  glucose chewable tablet 16 g  4 Tab Oral PRN  
 dextrose (D50W) injection syrg 12.5-25 g  12.5-25 g IntraVENous PRN  
 glucagon (GLUCAGEN) injection 1 mg  1 mg IntraMUSCular PRN  
 insulin lispro (HUMALOG) injection   SubCUTAneous AC&HS  alteplase (CATHFLO) 1 mg in sterile water (preservative free) 1 mL injection  1 mg InterCATHeter PRN  
 bacitracin 500 unit/gram packet 1 Packet  1 Packet Topical PRN  
 balsam peru-castor oiL (VENELEX) ointment   Topical BID  DOBUTamine (DOBUTREX) 500 mg/250 mL (2,000 mcg/mL) infusion  3 mcg/kg/min IntraVENous CONTINUOUS Hakeem Quispe MD

## 2020-09-14 NOTE — ANESTHESIA PREPROCEDURE EVALUATION
Relevant Problems No relevant active problems Anesthetic History No history of anesthetic complications Review of Systems / Medical History Patient summary reviewed, nursing notes reviewed and pertinent labs reviewed Pulmonary Within defined limits Neuro/Psych Within defined limits Cardiovascular Within defined limits Hypertension CHF: NYHA Classification IV Dysrhythmias Past MI, CAD, cardiac stents and hyperlipidemia Exercise tolerance: <4 METS 
  
GI/Hepatic/Renal 
Within defined limits GERD Endo/Other Within defined limits Diabetes Morbid obesity Other Findings Physical Exam 
 
Airway Mallampati: II 
TM Distance: 4 - 6 cm Neck ROM: normal range of motion Mouth opening: Normal 
Intubated Cardiovascular Regular rate and rhythm,  S1 and S2 normal,  no murmur, click, rub, or gallop Dental 
No notable dental hx Pulmonary Breath sounds clear to auscultation Abdominal 
GI exam deferred Other Findings Anesthetic Plan ASA: 4 Anesthesia type: general 
 
Monitoring Plan: Arterial line, BIS, CVP, [place] and AMINATA Post procedure ventilation Induction: Intravenous Anesthetic plan and risks discussed with: Patient

## 2020-09-14 NOTE — PROGRESS NOTES
2152 Insulin gtt stopped, BS 78. Dextrose (D50W) 9 ml given per protocol. 2209 Repeat . Insulin gtt restarted @ 2.7 units/hr 
2220 PTT 39.7. Bivalirudin gtt increased by 50%. Now infusing @ 0.89 mg/kg/hr Kringlan 66 weaned to 4 mcg/min 2254 Propofol weaned to 45 mcg/kg/min 56 Dr. Sienna Gonzalez called due to critical Platelet level of 49. Order received to give 6 pack of platelets and one unit of PRBC's followed by Lasix 20 mg IV. 0115 [facility]'s hung. Rey Roblero in Blood bank will call when Platelets arrive  
5337 PTT 37.3. Bivalirudin gtt increased by 50%. Now infusing @ 01.34 mg/kg/hr 
0325 Platelets hung 0335 Blood transfusion completed 2435 Platelet transfusion completed 0359 Lasix 20 mg IV given as ordered. 0531 Levophed gtt weaned to 3 mcg/min 
0442 PTT 31. Bivalirudin gtt increased by 50%. Now infusing @ 02.01 mg/kg/hr 
0500 Bilateral MAC sites continue to bleed. Blood coming from mouth and nose. 7198 PTT drawn and sent to lab. 
0700 Bedside and Verbal shift change report given to [facility]. Report included the following information SBAR, Kardex, ED Summary, OR Summary, Procedure Summary, Intake/Output, MAR, Recent Results and Cardiac Rhythm NSR. Kwaku Madera

## 2020-09-14 NOTE — PROGRESS NOTES
Bedside bronch performed by Dr. Tania Kinsey at [phone]. Disposable scope used size regular. VS Stable Sats 95% HR 80.

## 2020-09-14 NOTE — PROCEDURES
[facility] Pulmonary, Critical Care, and Sleep Medicine Name: Godfrey Castillo MRN: [id number] : 1960 Hospital: [address] Date: 2020 Bronchoscopy Report Procedure: Therapeutic bronchoscopy. Indication: Mucus Plugging Consent/Treatment: Informed consent was obtained from the  family after risks, benefits and alternatives were explained. Timeout verified the correct patient and correct procedure. Anesthesia:  
Patient on ventilator and receiving  propofol, fentanyl, and dexmedetomidine Procedure Details:  
-- The bronchoscope was introduced through an endotracheal tube. -- The vocal cords were not seen by this method. -- The visualized portions of the trachea and peterson were completely inspected and were found to be normal. 
-- The right-sided endobronchial anatomy was completely inspected and was found to be normal. 
-- The left-sided endobronchial anatomy was completely inspected and was found to be normal.  
 
There were scattered thin yellow-white secretions which were easily suctioned and cleared until all airways were clear Specimens:  
none (already had been sent for culture) Complications: none Estimated Blood Loss: Minimal 
 
Mira Isaac MD

## 2020-09-14 NOTE — PROGRESS NOTES
NAME: Estela Woods :  1960 MRN:  [id number] Assessment   :                                               Plan: 
CKD/CAMACHO S/P arrest 
Shock Hypernatremia Acute resp. Failure 
  
hyperkalemia 
  Creatinine creeping up despite significant dye load  and diuresis. Fairly good UO. No acute need for HD. 
  
I suspect he has underlying CKD from DM/HTN.   
CAMACHO likely related to shock. Critically ill at significant risk of decompensation. D/W RN  
  
 
 
Subjective: Chief Complaint:  intubated Review of Systems:  UTO - intubated. Symptom Y/N Comments  Symptom Y/N Comments Fever/Chills    Chest Pain Poor Appetite    Edema Cough    Abdominal Pain Sputum    Joint Pain SOB/FINLEY    Pruritis/Rash Nausea/vomit    Tolerating PT/OT Diarrhea    Tolerating Diet Constipation    Other Could not obtain due to:   
 
Objective: VITALS:  
Last 24hrs VS reviewed since prior progress note. Most recent are: 
Visit Vitals /67 Pulse 77 Temp 99.7 °F (37.6 °C) Resp 10 Ht 5' 9\" (1.753 m) Wt 99.3 kg (219 lb) SpO2 92% BMI 32.34 kg/m² Intake/Output Summary (Last 24 hours) at 2020 1001 Last data filed at 2020 0900 Gross per 24 hour Intake 3708.71 ml Output 3455 ml Net 253.71 ml Telemetry Reviewed: PHYSICAL EXAM: 
General: NAD Lab Data Reviewed: (see below) Medications Reviewed: (see below) PMH/SH reviewed - no change compared to H&P 
________________________________________________________________________ Care Plan discussed with: 
Patient Family RN Care Manager Consultant:     
 
  Comments >50% of visit spent in counseling and coordination of care    
 
________________________________________________________________________ Amalia Mike MD  
 
 Procedures: see electronic medical records for all procedures/Xrays and details which 
were not copied into this note but were reviewed prior to creation of Plan. LABS: 
Recent Labs  
  09/14/20 
0856 09/14/20 
0425 WBC 9.6 10.3 HGB 8.2* 8.4* HCT 24.2* 25.2*  
PLT 62* 61* Recent Labs  
  09/14/20 
0856 09/14/20 
0425 09/14/20 
0016  09/12/20 
9228 * 149* 149*   < > 149*  
K 3.8 3.8 3.8   < > 5.2*  
* 116* 117*   < > 116* CO2 29 27 28   < > 27 BUN 45* 42* 42*   < > 34* CREA 2.70* 2.56* 2.49*   < > 2.21* * 117* 115*   < > 116* CA 8.5 8.4* 8.4*   < > 8.6 MG 2.0 2.1 2.0   < > 2.1 PHOS  --   --   --   --  3.8  
 < > = values in this interval not displayed. Recent Labs  
  09/13/20 
0359 09/12/20 
0351 09/11/20 
1401 AP 28* 44* 46  
TP 5.0* 5.0* 5.1* ALB 3.2* 2.8* 3.0*  
GLOB 1.8* 2.2 2.1 Recent Labs  
  09/14/20 
0856 09/14/20 
2616 09/14/20 
0334 APTT 47.6* 34.5* 31.0 No results for input(s): FE, TIBC, PSAT, FERR in the last 72 hours. No results found for: FOL, RBCF No results for input(s): PH, PCO2, PO2 in the last 72 hours. No results for input(s): CPK, CKMB in the last 72 hours. No lab exists for component: TROPONINI No components found for: [place] Lab Results Component Value Date/Time  Color YELLOW/STRAW 09/12/2020 05:32 PM  
 Appearance CLEAR 09/12/2020 05:32 PM  
 Specific gravity 1.022 09/12/2020 05:32 PM  
 Specific gravity 1.020 12/11/2015 09:31 AM  
 pH (UA) 7.5 09/12/2020 05:32 PM  
 Protein Negative 09/12/2020 05:32 PM  
 Glucose Negative 09/12/2020 05:32 PM  
 Ketone Negative 09/12/2020 05:32 PM  
 Bilirubin Negative 09/12/2020 05:32 PM  
 Urobilinogen 0.2 09/12/2020 05:32 PM  
 Nitrites Negative 09/12/2020 05:32 PM  
 Leukocyte Esterase Negative 09/12/2020 05:32 PM  
 Epithelial cells FEW 09/12/2020 05:32 PM  
 Bacteria Negative 09/12/2020 05:32 PM  
 WBC 0-4 09/12/2020 05:32 PM  
 RBC 10-20 09/12/2020 05:32 PM  
 
 
 MEDICATIONS: 
Current Facility-Administered Medications Medication Dose Route Frequency  0.9% sodium chloride infusion 250 mL  250 mL IntraVENous PRN  
 0.9% sodium chloride infusion 250 mL  250 mL IntraVENous PRN  
 mupirocin (BACTROBAN) 2 % ointment   Both Nostrils Q12H  
 bivalirudin (ANGIOMAX) 250 mg in dextrose 5% 250 mL infusion  4-20 mL/hr Other TITRATE  rocuronium 10 mg/mL injection  piperacillin-tazobactam (ZOSYN) 3.375 g in 0.9% sodium chloride (MBP/ADV) 100 mL  3.375 g IntraVENous Q8H  
 [Held by provider] bivalirudin (ANGIOMAX) 250 mg in 0.9% sodium chloride (MBP/ADV) 50 mL  0.02-2.5 mg/kg/hr IntraVENous TITRATE  ticagrelor (BRILINTA) tablet 90 mg  90 mg Per NG tube Q12H  aspirin chewable tablet 81 mg  81 mg Per NG tube DAILY  albumin human 5% (BUMINATE) solution 12.5 g  12.5 g IntraVENous Q2H PRN  
 sodium chloride (NS) flush 5-40 mL  5-40 mL IntraVENous Q8H  
 sodium chloride (NS) flush 5-40 mL  5-40 mL IntraVENous PRN  
 0.45% sodium chloride infusion  10 mL/hr IntraVENous CONTINUOUS  
 oxyCODONE IR (ROXICODONE) tablet 5 mg  5 mg Oral Q4H PRN  
 oxyCODONE IR (ROXICODONE) tablet 10 mg  10 mg Oral Q4H PRN  
 morphine 10 mg/ml injection 4 mg  4 mg IntraVENous Q2H PRN  
 naloxone (NARCAN) injection 0.4 mg  0.4 mg IntraVENous PRN  
 ondansetron (ZOFRAN) injection 4 mg  4 mg IntraVENous Q4H PRN  
 albuterol (PROVENTIL VENTOLIN) nebulizer solution 2.5 mg  2.5 mg Nebulization Q4H PRN  
 midazolam (VERSED) injection 1 mg  1 mg IntraVENous Q1H PRN  chlorhexidine (PERIDEX) 0.12 % mouthwash 10 mL  10 mL Oral Q12H  
 magnesium oxide (MAG-OX) tablet 400 mg  400 mg Oral BID  
 bisacodyL (DULCOLAX) suppository 10 mg  10 mg Rectal DAILY PRN  
 [Held by provider] senna-docusate (PERICOLACE) 8.6-50 mg per tablet 1 Tab  1 Tab Oral BID  [Held by provider] polyethylene glycol (MIRALAX) packet 17 g  17 g Oral DAILY  ELECTROLYTE REPLACEMENT NOTE: Nurse to review Serum Potassium and Magnesuim levels and Initiate Electrolyte Replacement Protocol as needed  1 Each Other PRN  
 magnesium sulfate 1 g/100 ml IVPB (premix or compounded)  1 g IntraVENous PRN  pantoprazole (PROTONIX) 40 mg in 0.9% sodium chloride 10 mL injection  40 mg IntraVENous DAILY  EPINEPHrine (ADRENALIN) 5 mg in 0.9% sodium chloride 250 mL infusion  0-10 mcg/min IntraVENous TITRATE  
 NOREPINephrine (LEVOPHED) 8 mg in 5% dextrose 250mL (32 mcg/mL) infusion  0.5-16 mcg/min IntraVENous TITRATE  PHENYLephrine (DARRYL-SYNEPHRINE) 30 mg in 0.9% sodium chloride 250 mL infusion   mcg/min IntraVENous TITRATE  propofol (DIPRIVAN) 10 mg/mL infusion  0-50 mcg/kg/min IntraVENous TITRATE  dexmedeTOMidine (PRECEDEX) 400 mcg in 0.9% sodium chloride 100 mL infusion  0.2-0.7 mcg/kg/hr IntraVENous TITRATE  insulin regular (NOVOLIN R, HUMULIN R) 100 Units in 0.9% sodium chloride 100 mL infusion  1-50 Units/hr IntraVENous TITRATE  glucose chewable tablet 16 g  4 Tab Oral PRN  
 dextrose (D50W) injection syrg 12.5-25 g  12.5-25 g IntraVENous PRN  
 glucagon (GLUCAGEN) injection 1 mg  1 mg IntraMUSCular PRN  
 insulin lispro (HUMALOG) injection   SubCUTAneous AC&HS  alteplase (CATHFLO) 1 mg in sterile water (preservative free) 1 mL injection  1 mg InterCATHeter PRN  
 bacitracin 500 unit/gram packet 1 Packet  1 Packet Topical PRN  
 balsam peru-castor oiL (VENELEX) ointment   Topical BID  DOBUTamine (DOBUTREX) 500 mg/250 mL (2,000 mcg/mL) infusion  3 mcg/kg/min IntraVENous CONTINUOUS

## 2020-09-14 NOTE — PROGRESS NOTES
[facility] ICU Progress Note Admit Date: 9/10/2020 POD:  4 Day Post-Op Procedure:  Procedure(s): EXTRACORPOREAL MEMBRANE OXYGENATION (ECMO) Subjective:  
Pt seen with Dr. Carmela Carvalho. On VA ECMO 100% fiO2 sweep 2. Impella CP P2 flow 1.6 L. On Vent PC 18 70% fiO2 PEEP 10. On levo 3, insulin gtt, precedex 0.4, propofol 40. Bival gtt systemically and heparin through purge. Objective:  
Vitals: 
Blood pressure (!) 88/53, pulse 73, temperature 99.7 °F (37.6 °C), resp. rate 14, height 5' 9\" (1.753 m), weight 219 lb (99.3 kg), SpO2 97 %. Temp (24hrs), Av.5 °F (37.5 °C), Min:97.5 °F (36.4 °C), Max:100.1 °F (37.8 °C) Hemodynamics: 
 CO:   
 CI:   
 CVP: CVP (mmHg): 17 mmHg (20 07) SVR:   
 PAP Systolic:   
 PAP Diastolic:   
 PVR:   
 JY38:   
 SCV02:    
 
ECMO: 
RMP: 3800 Flow 4.8 L FiO2 100% Sweep 2 EKG/Rhythm:  NSR Extubation Date / Time: remains on vent Ventilator: 
Ventilator Volumes Vt Set (ml): 400 ml (20 0135) Vt Exhaled (Machine Breath) (ml): 562 ml (20 07) Ve Observed (l/min): 6.41 l/min (20) Oxygen Therapy: 
Oxygen Therapy O2 Sat (%): 97 % (20 07) Pulse via Oximetry: 73 beats per minute (20 0700) O2 Device: Endotracheal tube;Ventilator (20 0400) O2 Temperature: 98.2 °F (36.8 °C) (20 1558) FIO2 (%): 70 % (20 07) CXR:  
CXR Results  (Last 48 hours) 20 0525  XR CHEST PORT Final result Impression:  IMPRESSION: Bilateral pulmonary haziness, increased on the right. Narrative:  INDICATION:  Heart failure EXAM: CXR Portable. FINDINGS: Portable chest shows support lines/devices show no significant change  
since yesterday. There is no apparent pneumothorax. Lungs show bilateral  
pulmonary haziness, increased on the right. Heart size is obscured. There is no  
midline shift.  
   
  
 20 0523  XR CHEST PORT Final result Impression:  IMPRESSION: Interval Impella device placement. Findings otherwise stable. Narrative:  EXAM: XR CHEST PORT INDICATION: postop heart COMPARISON: 2020 FINDINGS: A portable AP radiograph of the chest was obtained at 0442 hours. There is interval placement of a cardiac output assistance device in the aorta,  
extending to the aortic root. Endotracheal tube, NG tube and catheters overlying  
the medial clavicles bilaterally appear unchanged. No pneumothorax or pleural  
effusion is shown. There is no consolidation or pulmonary edema. Cardiac and  
mediastinal contours are stable. Admission Weight: Last Weight Weight: 219 lb 12.8 oz (99.7 kg) Weight: 219 lb (99.3 kg) Intake / Output / Drain: 
Current Shift: No intake/output data recorded. Last 24 hrs.:  
 
Intake/Output Summary (Last 24 hours) at 2020 0942 Last data filed at 2020 0700 Gross per 24 hour Intake 4166.78 ml Output 3180 ml Net 986.78 ml EXAM: 
General:   Sedated Lungs:   Coarse to auscultation bilaterally. Incision:  Bilateral groin CDI Heart:  Regular rate and rhythm, S1, S2 normal, no murmur, click, rub or gallop. Abdomen:   Soft, non-tender. Bowel sounds hypoactive. No masses,  No organomegaly. Extremities:  No edema. PPP. Neurologic:  sedated Labs:  
Recent Labs  
  20 
4806  20 
0425 WBC  --   --  10.3 HGB  --   --  8.4* HCT  --   --  25.2*  
PLT  --   --  61* NA  --   --  149* K  --   --  3.8 BUN  --   --  42* CREA  --   --  2.56* GLU  --   --  117* GLUCPOC 111*   < >  --   
 < > = values in this interval not displayed. Assessment:  
 
Active Problems: 
  Cardiac arrest ([facility] [address] (9/10/2020) Plan/Recommendations/Medical Decision Makin.  Cardiac arrest/cardiogenic shock s/p VA ECMO: Get AMINATA today to assess RV/LV function. Possibly decannulate ECMO today. Cont impella CP -change purge to bival dt low plt. Hold systemic bival dt plans for decannulation. 2. CAD s/p Stents: Stents placed 9/12. On ASA, brilinta. Cont. No BB/statin until appropriate. 2. DM type II: A1C 8.9. On insulin gtt, diabetes management consult. 3. Acute hypoxic respiratory failure: Pt complained of SOB prior to cardiac arrest. Possible PE however had diffuse CAD so more likely cardiac event than PE. Thick secretions yesterday from bronch, needs repeat bronch today. On zosyn for possible PNA. Sputum culture 9/15 NGTD. Give 80 IV lasix today. 4. CAMACHO: Cr 2.56, monitor UO, avoid nephrotoxic meds, renal following. 5. Shock liver: Improving, monitor 6. Hypernatremia: improving, monitor 7. Hypokalemia: Replete per orders 8. Diarrhea: Flexiseal in place, monitor 9. Post op anemia st acute blood loss: s/p PRBCs, monitor. 10. Thrombocytopenia: S/p Plt transfusion. Change heparin to bival, monitor Plt 11. Dispo: Pt remains critically ill. Keep in ICU. Plans for possible ECMO decannulation today.   
 
Signed By: Chaparro Madrid NP

## 2020-09-14 NOTE — BRIEF OP NOTE
BRIEF OP NOTE Pre-Op Diagnosis: ECMO Post-Op Diagnosis: ECMO Procedure:  REMOVAL OF VA ECMO VIA  RT FEMORAL CUT DOWN FOR ARTERIAL CANNULA Surgeon: Armando Hamilton MD 
 
Assistant(s): Catherine Trinh PA-C Anesthesia: General  
 
Infusions:  Precedex, propofol, insulin, dobutamine, levo, darci Estimated Blood Loss: 250cc Specimens: * No specimens in log * Complications:  none Findings: See full operative note. Implants: * No implants in log *

## 2020-09-15 NOTE — PROGRESS NOTES
09/15/20 0054 Vent Settings FIO2 (%) 100 % CMV Rate Set 10 PEEP/VENT (cm H2O) 14 cm H20 Flow Trigger 2 Vent Method/Mode Ventilation Method Conventional  
Ventilator Mode Assist control;Pressure control Patient had periods of desaturations to the 60s with low blood pressure with maps in low 50s. Patient was ventilated with Ambu bag and then placed on these settings once saturations reached 94%. Pt continues to be monitored closely.

## 2020-09-15 NOTE — PROGRESS NOTES
0715: Bedside and verbal shift report received from night shift nurse GISSEL Sinclair. 
 
0800: Assessment performed and documented. Pt is sedated and ventilated at this time. Pt is minimally responsive. No response to BUE or BLE with stimuli. PERRLA intact. Brow frowning with suctioning and noxious stimuli. Positive cough and gag with suction. Pt is currently on pressure control on ventilator, FiO2 at 80%, Peep of 10, Rate 10, and Pressure support of 18. Lungs are coarse with crackles. Pt currently sinus tach on monitor, BP stable, remains on Levo at 2 mcg, Toby at 100 mcg, and Dobutamine at 2 mcg. Pt has bloody drainage from nose and bloody secretions with oral and ETT suction. ABD is semi-soft, bowel sounds are hypoactive. Pt has OGT in place to continuous suction. Some bloody drainage noted, cleared with flushing. Gallego in place draining clear urine with sediment. Pt has right neck abrasion, redness on buttocks that blanches, and bilateral groin sites. Bilateral MACs remain in place with old drainage noted. [place] catheter in place at 55 cm. Impella in place at 96, flow remains at P4. Pt has PIV to abigail AC's and right ART line. Precedex remains at 0.4 mcg, Propofol at 25 mcg, and pt remains on insulin drip with gluco-stabilizer and Q2hr blood sugar checks. 1300: Levo decreased to 1 mcg. 
 
1700: Pt noted to have dropped O2 sats to mid 80s. BP stable. Pt noted to be tachycardic in the 140s and 150s. RR above 30. RN and precepting RN attempted to get EKG, pt broke rate before EKG could be done. Pt was nasally, orally, and inline suctioned through ETT. Blood tinged sputum obtained from ETT, bloody secretions from mouth. Large clots obtained from the nasal passages. Post suctioning, pt's O2 sats gradually came back up and RR decreased. Pt's Propofol increased to 35 mcg and Precedex increased to 0.5 mcg during this time. 1900: Bedside and verbal report given to oncoming night shift nurse Juan Carlos Dukes RN.

## 2020-09-15 NOTE — PROGRESS NOTES
Pharmacy Automatic Renal Dosing Protocol - Antimicrobials Indication for Antimicrobials: ? Aspiration PNA Current Regimen of Each Antimicrobial:  
Vancomycin 2g X1, then 1,250 mg q 36 hours (9/15; day #1 Metronidazole 250 mg q 8 hours (9/15; day #1 Cefepime 2g q 24 hours (9/15; day #1 Previous Antimicrobial Therapy:  
Cefazolin 2g IV q8h ; started 9/10; day 3 Pip/tazo 3.375g q8h (Start Date ; Day 2) Significant Cultures:  
 RCx bronch - ng pending Radiology / Imaging results: (X-ray, CT scan or MRI):  
9/15: CXR: Improved aeration in the bilateral airspace disease. María Sleet Paralysis, amputations, malnutrition: none noted Labs: 
Recent Labs  
  09/15/20 
0527 09/15/20 
0006 204 20 
1850 CREA 3.32* 3.12*  --  2.99* BUN 51* 49*  --  48* WBC 10.7 11.7* 12.3* 10.7 Temp (24hrs), Av.9 °F (38.3 °C), Min:98.5 °F (36.9 °C), Max:102.4 °F (39.1 °C) Creatinine Clearance (mL/min) or Dialysis:  
Estimated Creatinine Clearance: 27.5 mL/min (A) (based on SCr of 3.32 mg/dL (H)). Estimated Creatinine Clearance (using IBW):23.7 mL/min Impression/Plan: D/C zosyn Start cefepime, vancomycin and metronidazole Will adjust the metronidazole to 500 mg q 12 hours Vancomycin 2g X1, then 1,250 mg q 36 hours for an estimated trough of 15 and AUC of 539 Scr trending up WBC trending down Febrile Antimicrobial stop date pending Pharmacy will follow daily and adjust medications as appropriate for renal function and/or serum levels. Thank you, Tere Luong, PHARMD

## 2020-09-15 NOTE — PROGRESS NOTES
Comprehensive Nutrition Assessment Type and Reason for Visit: Initial, Consult Nutrition Recommendations/Plan:  
TPN recommendations: 
 Day 1) Continue TPN as ordered today Day 2) If BG <200mg/dL and electrolytes WNL, advance to 63mL/h Day 3) If BG <200mg/dL and electrolytes WNL, advance to Goal Rate of 83mL/h (provides 1753kcals/100gPro/398gDextrose) No need for lipids while on propofol Nutrition Assessment:   Pt admitted s/p cardiac arrest.  PMH: HTN, CKD, DM, GERD. Consult received, chart reviewed. Pt is intubated and sedated with propofol @ 15mL/h, which provides 396 kcals daily. Impella in place, plans to remove today. ECMO stopped yesterday. Pt is NPO day 5. Unable to start TF before this 2' HOB <30 degrees 2' Impella. Toby at 100, levo at 2, dobutamine at 2. Not an enteral feeding candidate 2' pressor requirements. Cardiac surgery team ordered TPN to start tonight. Provided recommendations above which at goal rate + propofol will meet 85% kcal and 100% protein needs. Unable to determine malnutrition status at this time. Malnutrition Assessment: 
Malnutrition Status:   UTD Estimated Daily Nutrient Needs: 
Energy (kcal):  PSU 2982 (MSJ 2363) Protein (g):  95-110g (1.3-1.5gPro/kg IBW) Fluid (ml/day):  1800mL Nutrition Related Findings:  Meds: cefepime, humalog, magox, flagyl, protonix, vancomycin; Drips: propofol, insulin, precedex, toby, dobutamine, levo. Edema: +1-BUE, trace-BLE. Flexiseal.   
 
Wounds:   
Surgical wound Current Nutrition Therapies:  
Current Parenteral Nutrition Orders: · Type and Formula:   D20, 5% AA · Lipids:  none · Duration:  Continuous · Rate/Volume:  42mL/h · Current PN Order Provides:  887kcals/50gPro/202gDextrose · Goal PN Orders Provides:  1753kcals/100gPro/398gDextrose Anthropometric Measures: 
· Height:  5' 9\" (175.3 cm) · Current Body Wt:  103.2 kg (227 lb 8.2 oz) · Ideal Body Wt:  160 lbs:  142.2 % · BMI Category:  Obese class 1 (BMI 30.0-34. 9) Nutrition Diagnosis:  
· Inadequate protein-energy intake related to cognitive or neurological impairment, other (specify)(respiratory failure) as evidenced by NPO or clear liquid status due to medical condition Nutrition Interventions:  
Food and/or Nutrient Delivery: Start parenteral nutrition Nutrition Education and Counseling: No recommendations at this time Coordination of Nutrition Care: Continued inpatient monitoring, Interdisciplinary rounds Goals: Pt will tolerate TPN initiation with BG <200mg/dL and electrolytes WNL in 2-4 days. Nutrition Monitoring and Evaluation:  
Food/Nutrient Intake Outcomes: Parenteral nutrition intake/tolerance Physical Signs/Symptoms Outcomes: Biochemical data, Fluid status or edema, Weight, Hemodynamic status, GI status Discharge Planning: Too soon to determine Electronically signed by Peterson Alonso RD, [address] [place]  on 9/15/2020 at 12:53 PM 
 
Contact: [facility]-[id number]

## 2020-09-15 NOTE — PROGRESS NOTES
3009- Blood cultures and lactic obtained 1310- Spoke with CTS~TRISTAN Frederick regarding elevated temp despite ice packs; inquired about Tylenol; verbal order placed. Repeat labs due at 1400; then again after impella removal; then daily. 1415- Attempting to wean Levo while maintaining MAP>65-70. Impella weaned to P3 due to continual suction alarms; CVP~12; will monitor hemodynamics at P3 and then reassess if fluid bolus is needed since we are trying to diuresis 1735- Pt's O2 sat decreasing; mid-80s; HR elevated 739N; systolic BP 287B. In line suctioning and oral suctioning completed with no change in O2 sats; NT suctioning completed; moderate amount of bloody clots obtained; O2 sats gradually increasing; 93-94%; will monitor. 0- This RN agrees with assessment and progress note documented by HOMER Jordan RN

## 2020-09-15 NOTE — PROGRESS NOTES
Progress Note 9/15/2020 4:56 PM 
NAME: Clara Torres MRN:  [id number] Admit Diagnosis: Cardiac arrest ([facility] [address] [I46.9] Assessment:   
  
1. Refractory cardiac arrest, initial rhythm appeared to be PEA arrest  Status post ECMO placement for hemodynamic support 2. Cardiogenic Shock 3. Biventricular failure 4. CAD w/ LM and 3v CAD, with MAKEDA 3 flow at baseline, underwent multivessel PCI 5. Probable PE vs ACS 6. Respiratory failure status post intubation 7. Severe metabolic acidosis, improved 8. Non occlusive DVT in Rt femoral artery, may be related to ECMO cannula, on [facility] 9. Anemia 10. Fever, Sepsis 11. Thrombocytopenia 12. CAMACHO 13. Shock liver 14. Diabetes 15. Hypertension 16. Hyperlipidemia 
  
   
  
            Plan: 1. On vasopressor, and impella, s/p decannulation of ECMO, weaning impella and likely d/c later today 2. Fever, concern for sepsis, on Zosyn and rogers was added 3. Worsening of renal function, has good UOP, volume overload, getting lasix prn  
4 Unclear cause of Cardiac arrest, Presentation could be PE, ACS, s/p multivessel PCI for cardiogenic shock 5. On ASA, ticagrelor 6. Vent mx per critical care team  
7. Awaiting for neurological recovery, wean sedation as tolerates Critically ill, prognosis guarded, hemodynamics has improves, awaiting neuro recovery  
  
   
  
 [x]? High complexity decision making was performed 
  
 
  
 
Subjective: HPI: 
Remain intubated and sedated S/p ECMO Decannulation Has fever, concern for sepsis CAMACHO over CKD On Vasopressors, remains stable when impella weaned down Has episode of hypoxia, bradycardia and hypotension ROS: unresponsive / sedated Objective:  
  
Physical Exam: 
 
Last 24hrs VS reviewed since prior progress note. Most recent are: 
 
Visit Vitals BP (!) 96/58 Pulse (!) 102 Temp (!) 102 °F (38.9 °C) Resp 27 Ht 5' 9\" (1.753 m) Wt 99.3 kg (219 lb) SpO2 100% BMI 32.34 kg/m² Intake/Output Summary (Last 24 hours) at 9/15/2020 5702 Last data filed at 9/15/2020 0800 Gross per 24 hour Intake 5806.31 ml Output 4395 ml Net 1411.31 ml General: intubated and sedated Neck: Supple Respiratory: on vent Cardiovascular: Regular rate rhythm, S1S2 Abdomen: soft,   non distended Neuro:  Sedated and intubated Skin:   dry Extremity: no edema Data Review Telemetry: ST 
 
EKG:  
NSR, Diffuse STT abn, no ST elevation, prolonged QT Lab Data Personally Reviewed: 
 
Recent Labs  
  09/15/20 
0527 09/15/20 
0006 WBC 10.7 11.7* HGB 8.1* 7.7* HCT 24.8* 22.9*  
PLT 62* 57* Recent Labs  
  09/15/20 
0351 09/15/20 
0006 09/14/20 2054 APTT 53.6* 74.3* 77.7* Recent Labs  
  09/15/20 
0527 09/15/20 
0006 09/14/20 
1850 * 149* 149*  
K 4.2 3.9 3.8 * 116* 116* CO2 24 26 26 BUN 51* 49* 48* CREA 3.32* 3.12* 2.99* * 106* 127* CA 7.8* 7.7* 8.1*  
MG 2.2 2.2 1.8 No results for input(s): CPK, CKNDX, TROIQ in the last 72 hours. No lab exists for component: CPKMB Lab Results Component Value Date/Time Triglyceride 420 (H) 09/12/2020 08:07 AM  
 
 
Recent Labs  
  09/15/20 
0527 09/14/20 
1201 09/13/20 
0359 AP 49 42* 28*  
TP 5.6* 5.4* 5.0* ALB 3.0* 3.0* 3.2*  
GLOB 2.6 2.4 1.8* No results for input(s): PH, PCO2, PO2 in the last 72 hours. Medications Personally Reviewed: 
 
Current Facility-Administered Medications Medication Dose Route Frequency  0.9% sodium chloride infusion 250 mL  250 mL IntraVENous PRN  
 dextrose 5% infusion  11.4 mL/hr IntraVENous CONTINUOUS  
 furosemide (LASIX) injection 80 mg  80 mg IntraVENous ONCE  
 mupirocin (BACTROBAN) 2 % ointment   Both Nostrils Q12H  
 bivalirudin (ANGIOMAX) 250 mg in dextrose 5% 250 mL infusion  4-20 mL/hr Other TITRATE  PHENYLephrine (DARRYL-SYNEPHRINE) 30 mg in 0.9% sodium chloride 250 mL infusion   mcg/min IntraVENous TITRATE  piperacillin-tazobactam (ZOSYN) 3.375 g in 0.9% sodium chloride (MBP/ADV) 100 mL  3.375 g IntraVENous Q8H  
 [Held by provider] bivalirudin (ANGIOMAX) 250 mg in 0.9% sodium chloride (MBP/ADV) 50 mL  0.02-2.5 mg/kg/hr IntraVENous TITRATE  ticagrelor (BRILINTA) tablet 90 mg  90 mg Per NG tube Q12H  aspirin chewable tablet 81 mg  81 mg Per NG tube DAILY  albumin human 5% (BUMINATE) solution 12.5 g  12.5 g IntraVENous Q2H PRN  
 sodium chloride (NS) flush 5-40 mL  5-40 mL IntraVENous Q8H  
 sodium chloride (NS) flush 5-40 mL  5-40 mL IntraVENous PRN  
 0.45% sodium chloride infusion  10 mL/hr IntraVENous CONTINUOUS  
 oxyCODONE IR (ROXICODONE) tablet 5 mg  5 mg Oral Q4H PRN  
 oxyCODONE IR (ROXICODONE) tablet 10 mg  10 mg Oral Q4H PRN  
 morphine 10 mg/ml injection 4 mg  4 mg IntraVENous Q2H PRN  
 naloxone (NARCAN) injection 0.4 mg  0.4 mg IntraVENous PRN  
 ondansetron (ZOFRAN) injection 4 mg  4 mg IntraVENous Q4H PRN  
 albuterol (PROVENTIL VENTOLIN) nebulizer solution 2.5 mg  2.5 mg Nebulization Q4H PRN  chlorhexidine (PERIDEX) 0.12 % mouthwash 10 mL  10 mL Oral Q12H  
 magnesium oxide (MAG-OX) tablet 400 mg  400 mg Oral BID  
 bisacodyL (DULCOLAX) suppository 10 mg  10 mg Rectal DAILY PRN  
 [Held by provider] senna-docusate (PERICOLACE) 8.6-50 mg per tablet 1 Tab  1 Tab Oral BID  [Held by provider] polyethylene glycol (MIRALAX) packet 17 g  17 g Oral DAILY  ELECTROLYTE REPLACEMENT NOTE: Nurse to review Serum Potassium and Magnesuim levels and Initiate Electrolyte Replacement Protocol as needed  1 Each Other PRN  
 magnesium sulfate 1 g/100 ml IVPB (premix or compounded)  1 g IntraVENous PRN  pantoprazole (PROTONIX) 40 mg in 0.9% sodium chloride 10 mL injection  40 mg IntraVENous DAILY  EPINEPHrine (ADRENALIN) 5 mg in 0.9% sodium chloride 250 mL infusion  0-10 mcg/min IntraVENous TITRATE  NOREPINephrine (LEVOPHED) 8 mg in 5% dextrose 250mL (32 mcg/mL) infusion  0.5-16 mcg/min IntraVENous TITRATE  propofol (DIPRIVAN) 10 mg/mL infusion  0-50 mcg/kg/min IntraVENous TITRATE  dexmedeTOMidine (PRECEDEX) 400 mcg in 0.9% sodium chloride 100 mL infusion  0.2-0.7 mcg/kg/hr IntraVENous TITRATE  insulin regular (NOVOLIN R, HUMULIN R) 100 Units in 0.9% sodium chloride 100 mL infusion  1-50 Units/hr IntraVENous TITRATE  glucose chewable tablet 16 g  4 Tab Oral PRN  
 dextrose (D50W) injection syrg 12.5-25 g  12.5-25 g IntraVENous PRN  
 glucagon (GLUCAGEN) injection 1 mg  1 mg IntraMUSCular PRN  
 insulin lispro (HUMALOG) injection   SubCUTAneous AC&HS  alteplase (CATHFLO) 1 mg in sterile water (preservative free) 1 mL injection  1 mg InterCATHeter PRN  
 bacitracin 500 unit/gram packet 1 Packet  1 Packet Topical PRN  
 balsam peru-castor oiL (VENELEX) ointment   Topical BID  DOBUTamine (DOBUTREX) 500 mg/250 mL (2,000 mcg/mL) infusion  3 mcg/kg/min IntraVENous CONTINUOUS Aliyah Chun MD

## 2020-09-15 NOTE — PROGRESS NOTES
NAME: Gianna Erickson :  1960 MRN:  [id number] Assessment   :                                               Plan: 
CKD/CAMACHO S/P arrest 
Shock Hypernatremia Acute resp. Failure 
  
hyperkalemia 
  Creatinine continues to worsen S/P significant dye load  and diuresis. Fairly good UO. No acute need for HD. 
  
I suspect he has underlying CKD from DM/HTN.   
CAMACHO likely related to shock. Critically ill at significant risk of decompensation. D/W RN and surgery.  
  
 
 
Subjective: Chief Complaint:  intubated Review of Systems:  UTO - intubated. Symptom Y/N Comments  Symptom Y/N Comments Fever/Chills    Chest Pain Poor Appetite    Edema Cough    Abdominal Pain Sputum    Joint Pain SOB/FINLEY    Pruritis/Rash Nausea/vomit    Tolerating PT/OT Diarrhea    Tolerating Diet Constipation    Other Could not obtain due to:   
 
Objective: VITALS:  
Last 24hrs VS reviewed since prior progress note. Most recent are: 
Visit Vitals BP (!) 106/52 Pulse (!) 104 Temp (!) 102 °F (38.9 °C) Resp 27 Ht 5' 9\" (1.753 m) Wt 99.3 kg (219 lb) SpO2 100% BMI 32.34 kg/m² Intake/Output Summary (Last 24 hours) at 9/15/2020 3227 Last data filed at 9/15/2020 0800 Gross per 24 hour Intake 5806.31 ml Output 4270 ml Net 1536.31 ml Telemetry Reviewed: PHYSICAL EXAM: 
General: NAD Lab Data Reviewed: (see below) Medications Reviewed: (see below) PMH/SH reviewed - no change compared to H&P 
________________________________________________________________________ Care Plan discussed with: 
Patient Family RN Care Manager Consultant:     
 
  Comments >50% of visit spent in counseling and coordination of care    
 
________________________________________________________________________ Kirstin Childress MD  
 
Procedures: see electronic medical records for all procedures/Xrays and details which 
were not copied into this note but were reviewed prior to creation of Plan. LABS: 
Recent Labs  
  09/15/20 
0527 09/15/20 
0006 WBC 10.7 11.7* HGB 8.1* 7.7* HCT 24.8* 22.9*  
PLT 62* 57* Recent Labs  
  09/15/20 
0527 09/15/20 
0006 09/14/20 
1850 * 149* 149*  
K 4.2 3.9 3.8 * 116* 116* CO2 24 26 26 BUN 51* 49* 48* CREA 3.32* 3.12* 2.99* * 106* 127* CA 7.8* 7.7* 8.1*  
MG 2.2 2.2 1.8 Recent Labs  
  09/15/20 
0527 09/14/20 
1201 09/13/20 
0359 AP 49 42* 28*  
TP 5.6* 5.4* 5.0* ALB 3.0* 3.0* 3.2*  
GLOB 2.6 2.4 1.8* Recent Labs  
  09/15/20 
0351 09/15/20 
0006 09/14/20 2054 APTT 53.6* 74.3* 77.7* No results for input(s): FE, TIBC, PSAT, FERR in the last 72 hours. No results found for: FOL, RBCF No results for input(s): PH, PCO2, PO2 in the last 72 hours. No results for input(s): CPK, CKMB in the last 72 hours. No lab exists for component: TROPONINI No components found for: [place] Lab Results Component Value Date/Time Color YELLOW/STRAW 09/12/2020 05:32 PM  
 Appearance CLEAR 09/12/2020 05:32 PM  
 Specific gravity 1.022 09/12/2020 05:32 PM  
 Specific gravity 1.020 12/11/2015 09:31 AM  
 pH (UA) 7.5 09/12/2020 05:32 PM  
 Protein Negative 09/12/2020 05:32 PM  
 Glucose Negative 09/12/2020 05:32 PM  
 Ketone Negative 09/12/2020 05:32 PM  
 Bilirubin Negative 09/12/2020 05:32 PM  
 Urobilinogen 0.2 09/12/2020 05:32 PM  
 Nitrites Negative 09/12/2020 05:32 PM  
 Leukocyte Esterase Negative 09/12/2020 05:32 PM  
 Epithelial cells FEW 09/12/2020 05:32 PM  
 Bacteria Negative 09/12/2020 05:32 PM  
 WBC 0-4 09/12/2020 05:32 PM  
 RBC 10-20 09/12/2020 05:32 PM  
 
 
MEDICATIONS: 
Current Facility-Administered Medications Medication Dose Route Frequency  0.9% sodium chloride infusion 250 mL  250 mL IntraVENous PRN  
  dextrose 5% infusion  11.4 mL/hr IntraVENous CONTINUOUS  
 cefepime (MAXIPIME) 2 g in 0.9% sodium chloride (MBP/ADV) 100 mL  2 g IntraVENous Q24H  
 metroNIDAZOLE 250 mg  in sodium chloride IVPB  250 mg IntraVENous Q8H  
 TPN ADULT - CENTRAL   IntraVENous CONTINUOUS  
 mupirocin (BACTROBAN) 2 % ointment   Both Nostrils Q12H  
 bivalirudin (ANGIOMAX) 250 mg in dextrose 5% 250 mL infusion  4-20 mL/hr Other TITRATE  PHENYLephrine (DARRYL-SYNEPHRINE) 30 mg in 0.9% sodium chloride 250 mL infusion   mcg/min IntraVENous TITRATE  [Held by provider] bivalirudin (ANGIOMAX) 250 mg in 0.9% sodium chloride (MBP/ADV) 50 mL  0.02-2.5 mg/kg/hr IntraVENous TITRATE  ticagrelor (BRILINTA) tablet 90 mg  90 mg Per NG tube Q12H  aspirin chewable tablet 81 mg  81 mg Per NG tube DAILY  albumin human 5% (BUMINATE) solution 12.5 g  12.5 g IntraVENous Q2H PRN  
 sodium chloride (NS) flush 5-40 mL  5-40 mL IntraVENous Q8H  
 sodium chloride (NS) flush 5-40 mL  5-40 mL IntraVENous PRN  
 0.45% sodium chloride infusion  10 mL/hr IntraVENous CONTINUOUS  
 oxyCODONE IR (ROXICODONE) tablet 5 mg  5 mg Oral Q4H PRN  
 oxyCODONE IR (ROXICODONE) tablet 10 mg  10 mg Oral Q4H PRN  
 morphine 10 mg/ml injection 4 mg  4 mg IntraVENous Q2H PRN  
 naloxone (NARCAN) injection 0.4 mg  0.4 mg IntraVENous PRN  
 ondansetron (ZOFRAN) injection 4 mg  4 mg IntraVENous Q4H PRN  
 albuterol (PROVENTIL VENTOLIN) nebulizer solution 2.5 mg  2.5 mg Nebulization Q4H PRN  chlorhexidine (PERIDEX) 0.12 % mouthwash 10 mL  10 mL Oral Q12H  
 magnesium oxide (MAG-OX) tablet 400 mg  400 mg Oral BID  
 bisacodyL (DULCOLAX) suppository 10 mg  10 mg Rectal DAILY PRN  
 [Held by provider] senna-docusate (PERICOLACE) 8.6-50 mg per tablet 1 Tab  1 Tab Oral BID  [Held by provider] polyethylene glycol (MIRALAX) packet 17 g  17 g Oral DAILY  ELECTROLYTE REPLACEMENT NOTE: Nurse to review Serum Potassium and Magnesuim levels and Initiate Electrolyte Replacement Protocol as needed  1 Each Other PRN  
 magnesium sulfate 1 g/100 ml IVPB (premix or compounded)  1 g IntraVENous PRN  pantoprazole (PROTONIX) 40 mg in 0.9% sodium chloride 10 mL injection  40 mg IntraVENous DAILY  EPINEPHrine (ADRENALIN) 5 mg in 0.9% sodium chloride 250 mL infusion  0-10 mcg/min IntraVENous TITRATE  
 NOREPINephrine (LEVOPHED) 8 mg in 5% dextrose 250mL (32 mcg/mL) infusion  0.5-16 mcg/min IntraVENous TITRATE  propofol (DIPRIVAN) 10 mg/mL infusion  0-50 mcg/kg/min IntraVENous TITRATE  dexmedeTOMidine (PRECEDEX) 400 mcg in 0.9% sodium chloride 100 mL infusion  0.2-0.7 mcg/kg/hr IntraVENous TITRATE  insulin regular (NOVOLIN R, HUMULIN R) 100 Units in 0.9% sodium chloride 100 mL infusion  1-50 Units/hr IntraVENous TITRATE  glucose chewable tablet 16 g  4 Tab Oral PRN  
 dextrose (D50W) injection syrg 12.5-25 g  12.5-25 g IntraVENous PRN  
 glucagon (GLUCAGEN) injection 1 mg  1 mg IntraMUSCular PRN  
 insulin lispro (HUMALOG) injection   SubCUTAneous AC&HS  alteplase (CATHFLO) 1 mg in sterile water (preservative free) 1 mL injection  1 mg InterCATHeter PRN  
 bacitracin 500 unit/gram packet 1 Packet  1 Packet Topical PRN  
 balsam peru-castor oiL (VENELEX) ointment   Topical BID  DOBUTamine (DOBUTREX) 500 mg/250 mL (2,000 mcg/mL) infusion  3 mcg/kg/min IntraVENous CONTINUOUS

## 2020-09-15 NOTE — DIABETES MGMT
[address] [facility] [address] INITIAL NOTE Presentation Meryle Ohm is a 60 y.o. male admitted from the ER 9/10/20 upon arriving in full arrest. EMS reported patient complaint of difficulty breathing prior to arrival => called EMS on the way to the hospital => cardiac arrest => ACLS. Initially bradycardic and hypertensive. GFR 44/serum creatinine 1.89. Lactic acid 10.1. Admission  with AG 20. A1c of 8.4%. Started on [employer] 9/10/20 at 8pm.  
HX: 
Past Medical History:  
Diagnosis Date  Diabetes ([facility] [address]  Elevated cholesterol  GERD (gastroesophageal reflux disease)   
 helps with meds upsetting stomach  Hypertension  Low vitamin D level  Neuropathy   
 rt lower extremity CAD DX: Cardiac arrest. Cardiogenic shock. ARF. Metabolic acidosis. TX: Emergent ECMO 9/10/20 (now out on 9/14). Impella. Insulin via [employer] Clinical care measures: 
 AC ventilatory support via ET @ 70% Sedation via Propofol & Precedex BP management via levo and darci OG to drain IVF Dobutamine gtt 
 TPN (to start tonight) Current clinical course has been complicated by cardiogenic shock post cardiac arrest, ARF, CAMACHO on CKD, hypernatremia, and hyperkalemia. Has high insulin needs: 
9/11/20 325/24 hours 9/12/20 157/24 hours 9/13/20 110/24 hours 9/14/20 86/24 hours 9/15/20 ECMO decannulation yesterday and plans to take Impella out today. Febrile last night (102). Blood cultures and a lactic acid were sent. Sputum culture is negative. Per cardiac surgery plan to keep patient on GS today and start TPN today (insulin was added to infusion), will transition off GS tomorrow and determine basal dose. Diabetes: Patient has known Type 2 diabetes, treated with non-insulin agents PTA. Family history unknown for diabetes at this time.  Consulted by Provider for advanced diabetes nursing assessment and care, specifically related to  
 [x] Transitioning off [place] [x] Inpatient management strategy Diabetes-related medical history - Patient can not address Diabetes medication history Drug class Currently in use Discontinued Never used Biguanide Glucophage 1000mg twice daily DDP-4 inhibitor Sulfonylurea Glimiperide 4mg twice daily Thiazolidinedione GLP-1 RA SGLT-2 inhibitors Basal insulin Fixed Dose  Combinations Bolus insulin Subjective NA. Objective Physical exam 
General Unresponsive. Positive cough & gag reflex per nursing. On Precedex & Propofol Vital Signs fever. NSR. Normotensive on levo and darci Visit Vitals /61 Pulse (!) 101 Temp (!) 101.5 °F (38.6 °C) Resp 26 Ht 5' 9\" (1.753 m) Wt 103.2 kg (227 lb 8.2 oz) SpO2 100% BMI 33.60 kg/m² Laboratory Lab Results Component Value Date/Time Hemoglobin A1c 8.4 (H) 09/11/2020 05:43 AM  
 
No results found for: LDL, LDLC, DLDLP Lab Results Component Value Date/Time Creatinine (POC) 1.0 09/10/2020 02:55 PM  
 Creatinine 3.41 (H) 09/15/2020 09:04 AM  
 
Lab Results Component Value Date/Time Sodium 148 (H) 09/15/2020 09:04 AM  
 Potassium 4.0 09/15/2020 09:04 AM  
 Chloride 116 (H) 09/15/2020 09:04 AM  
 CO2 25 09/15/2020 09:04 AM  
 Anion gap 7 09/15/2020 09:04 AM  
 Glucose 96 09/15/2020 09:04 AM  
 BUN 52 (H) 09/15/2020 09:04 AM  
 Creatinine 3.41 (H) 09/15/2020 09:04 AM  
 BUN/Creatinine ratio 15 09/15/2020 09:04 AM  
 GFR est AA 22 (L) 09/15/2020 09:04 AM  
 GFR est non-AA 19 (L) 09/15/2020 09:04 AM  
 Calcium 7.5 (L) 09/15/2020 09:04 AM  
 Bilirubin, total 1.3 (H) 09/15/2020 05:27 AM  
 Alk. phosphatase 49 09/15/2020 05:27 AM  
 Protein, total 5.6 (L) 09/15/2020 05:27 AM  
 Albumin 3.0 (L) 09/15/2020 05:27 AM  
 Globulin 2.6 09/15/2020 05:27 AM  
 A-G Ratio 1.2 09/15/2020 05:27 AM  
 ALT (SGPT) 11 (L) 09/15/2020 05:27 AM  
 
Lab Results Component Value Date/Time ALT (SGPT) 11 (L) 09/15/2020 05:27 AM  
 
Factors affecting BG pattern Factor Dose Comments Nutrition: 
NPO 
OG to drain NPO- TPN to start tonight @ 42mL (20u/L) Drugs: 
Vasopressor load Levo and Toby drip Infection  WBC 11.5 Pain  Responds to pain. BPS 3. Other: Cardiogenic shock Kidney function Dobutamine gtt Worse than yesterday Poor insulin delivery GFR 19/ serum creatinine 3.41 Blood glucose pattern Assessment and Plan Nursing Diagnosis Risk for unstable blood glucose pattern Nursing Intervention Domain 5254 Decision-making Support Nursing Interventions Examined current inpatient diabetes control Explored factors facilitating and impeding inpatient management Evaluation This  male, with Type 2 diabetes, has achieved inpatient blood glucose target of 100-180mg/dl on Glucostabilizer. Inpatient blood glucose management has been impacted by 
[x] Kidney dysfunction 
[x]  Pressor support Today per cardiac surgery, he will have his Impella removed and remain on GS for today. Plans to transition off GS tomorrow. TPN will be started tonight and insulin has already been added to the infusion. (201.6g with 20 units/L). This will be a 1:10 ratio. Will continue to monitor his insulin needs to determine an appropriate basal dose for tomorrow's transition off GS. Recommendations Recommend: 
 
Continuing [address] until tomorrow and will transition off with a basal dose Agree with 20units/L of insulin in TPN infusion to start this evening Billing Code(s) I personally reviewed chart, notes, data and current medications in the medical record, and examined the patient at bedside before making care recommendations. [x] [id number]  subsequent hospital care - 30 minutes Feng Mustafa MSN, RN, ACCNS-AG in collaboration with Sergio Dupree DNP, CNS Feng Must[facility] Access via [address] [place] [address]

## 2020-09-15 NOTE — OP NOTES
[address] 
OPERATIVE REPORT Name:  Huong Cabrera 
MR#:  [id number] :  1960 ACCOUNT #:  [de-identified] DATE OF SERVICE:  2020 PREOPERATIVE DIAGNOSES: 
1. Refractory cardiac arrest status post cannulation for venoarterial extracorporeal membrane oxygenation. 2.  Non-ST segment elevation myocardial infarction, status post multivessel percutaneous coronary intervention. POSTOPERATIVE DIAGNOSES: 
1. Refractory cardiac arrest status post cannulation for venoarterial extracorporeal membrane oxygenation. 2.  Non-ST segment elevation myocardial infarction, status post multivessel percutaneous coronary intervention. PROCEDURES PERFORMED: 
1. Right femoral artery cut down. 2.  Reconstruction of right common femoral artery. 3.  Decannulation of peripheral venoarterial ECMO. SURGEON:  Tal Mckinley MD 
 
ASSISTANT:  Alessandra Ackerman PA-C. The assistance of a PA was required due to the complex nature of the surgery and the critical nature of the patient. ANESTHESIA:  General endotracheal. 
 
ANESTHESIOLOGIST:  Yuni Castaneda MD 
 
COMPLICATIONS:  None. SPECIMENS REMOVED:  None. IMPLANTS:  None. ESTIMATED BLOOD LOSS:  100 mL. DETAILS:  The patient is a 26-year-old gentleman who has had significant ventricular recovery since revascularization in the cath lab on Saturday. He was able to have his ECMO device removed today. PROCEDURE:  He was prepped and draped in a sterile fashion. Time-out was performed. A vertical incision was made in continuity with his arterial cannulation site. The common femoral artery was exposed. We were able to dissect above and below the insertion site and identify clearly the common femoral, superficial and profunda arteries. We were able to isolate the insertion site with vessel loops and angled vascular clamps.   We then weaned down the ECMO and turned off the Impella slightly and removed the arterial cannula. The biventricular function on the echo looked quite good actually. We then clamped above and below the insertion site and having control of this, we were able to put several interrupted 5-0 sutures across the arterial insertion site. I was very careful to get intima on each bite. We then tied down these sites. We then released the vascular clamps and we had hemostasis. I then removed the distal perfusion catheter from the superficial femoral artery. There was significant amount of oozing from this even with longstanding manual pressure, so I placed a pursestring suture around the site and tied it which achieved hemostasis. We also removed the femoral venous cannula with a large pursestring and manual pressure. The right groin was then irrigated copiously with Irrisept antibiotic irrigation and closed in multiple layers. The patient was then safely transported back to the CCU. MD MOHSEN [email or web address] 
D:  09/14/2020 17:40 
T:  09/15/2020 1:40 
JOB #:  [id number]

## 2020-09-15 NOTE — PROGRESS NOTES
The patient was visited in the [address] unit to make a follow-up routine visit. Patient is unable to communicate at this time. Spoke with patient's nurse and learned that the patient's brother visits regularly. Requested nurse to advise brother that  is available to provide spiritual support. Rev. Whitman Nageotte, EdD MDiv  For [facility] Page [phone])

## 2020-09-15 NOTE — PROGRESS NOTES
2000 Phenylephrine gtt being titrated to wean off Levophed as ordered. 2018 Albumin 5% 250 ml hung to infuse over one hour due to low BP and suction alarm 2118 Albumin 5% 250 ml hung to infuse over one hour due to low BP and suction alarm 2158 Phenylephrine gtt infusing @ 150 mcg/min and Levophed gtt has been stopped. 2200 Trying to increase P level every hour but unsuccessful due to suction alarm. Remains @ P4. 
2258 Propofol reduced to 20 mcg/kg/min to help with BP. 
2348 Albumin 5% 250 ml hung to infuse over one hour due to low BP and suction alarm 2350 Propofol increased back to 25 mcg/kg/min due to Abdominal breathing 0033 One unit of PRBC's ordered for hgb 7.7. Need new sample for type and cross. Blood drawn and sent to lab. 0040 Dr. Maria Elena Rodriguez called due to drop in sat's to 50's,SVO2 39, increased bleeding and low BP. Order to stop Bi-kaden and run D5 through the impella. Levophed gtt has been restarted. 0050 Bival discontinued 0225 One unit of PRBC's hung 
0400Trying to increase P level every hour but unsuccessful due to suction alarm. Remains @ P4. Continues to bleed from Right MAC, Orally and nasally. 0430 Blood transfusion completed. 0645 Levophed gtt @ 3 mcg/min, Phenylephrine gtt @ 100 mcg/min, Dobutamine @ 2 mcg/kg/min, Precedex @ 0.4 mcg/kg/hr, Propofol @ 25 mcg/kg/min, Insulin gtt and 1/2 ns @ 10 ml/hr. 0700 Bedside and Verbal shift change report given to [facility]. Report included the following information SBAR, Kardex, Procedure Summary, Intake/Output, MAR, Recent Results and Cardiac Rhythm ST.

## 2020-09-15 NOTE — INTERDISCIPLINARY ROUNDS
Interdisciplinary team rounds were held 9/15/2020 with the following team members:Care Management, Diabetes Treatment Specialist, Nursing, Nutrition, Pharmacy, Physical Therapy, Physician, Respiratory Therapy and Clinical Coordinator. Plan of care discussed. See clinical pathway and/or care plan for interventions and desired outcomes.

## 2020-09-15 NOTE — PROGRESS NOTES
[facility] ICU Progress Note Admit Date: 9/10/2020 
 
9/10/20: VA ECMO placed 20: PCI 
20: VA ECMO decannulated Subjective:  
Pt seen with Dr. Luke Polk. Tmas 102,  Impella CP P4 flow 1.9 L. On Vent PC 18 80% fiO2 PEEP 10. On levo 2, darci 100, dobut 2, insulin gtt, precedex 0.4, propofol 25. D5 through purge. Objective:  
Vitals: 
Blood pressure (!) 106/52, pulse (!) 104, temperature (!) 102 °F (38.9 °C), resp. rate 27, height 5' 9\" (1.753 m), weight 219 lb (99.3 kg), SpO2 100 %. Temp (24hrs), Av.9 °F (38.3 °C), Min:98.5 °F (36.9 °C), Max:102.4 °F (39.1 °C) Hemodynamics: 
 CO: CO (l/min): 7.4 l/min CI: CI (l/min/m2): 3.4 l/min/m2 CVP: CVP (mmHg): 11 mmHg (09/15/20 0700) SVR:   
 PAP Systolic: PAP Systolic: 57 (00/48/49 5020) PAP Diastolic: PAP Diastolic: 24 (55/23/92 7608) PVR:   
 SV02: SVO2 (%): 62 % (09/15/20 0700) SCV02:    
 
 
EKG/Rhythm:  Sinus tach Extubation Date / Time: remains on vent Ventilator: 
Ventilator Volumes Vt Set (ml): 400 ml (20 0135) Vt Exhaled (Machine Breath) (ml): 497 ml (09/15/20 0756) Ve Observed (l/min): 12.6 l/min (09/15/20 0756) Oxygen Therapy: 
Oxygen Therapy O2 Sat (%): 100 % (09/15/20 0756) Pulse via Oximetry: 104 beats per minute (09/15/20 0700) O2 Device: Endotracheal tube;Ventilator (09/15/20 0400) O2 Temperature: 98.2 °F (36.8 °C) (20 1558) FIO2 (%): 70 % (09/15/20 0803) CXR:  
CXR Results  (Last 48 hours) 09/15/20 0736  XR CHEST PORT Final result Impression:  IMPRESSION: Improved aeration in the bilateral airspace disease. Ricka Distad Narrative:  EXAM:  XR CHEST PORT INDICATION:  respiratory failure COMPARISON:  2020 FINDINGS: A portable AP radiograph of the chest was obtained at 732 hours. ET  
tube has been retracted to the thoracic inlet. Left IJ sheath is unchanged. [place] catheter tip overlies main pulmonary artery/right pulmonary artery bifurcation. Left ventricular assist device is unchanged. .  There is improved  
aeration bilaterally. Bryon Carlisle Heart size is upper limits of normal.. .   
   
  
 09/14/20 0525  XR CHEST PORT Final result Impression:  IMPRESSION: Bilateral pulmonary haziness, increased on the right. Narrative:  INDICATION:  Heart failure EXAM: CXR Portable. FINDINGS: Portable chest shows support lines/devices show no significant change  
since yesterday. There is no apparent pneumothorax. Lungs show bilateral  
pulmonary haziness, increased on the right. Heart size is obscured. There is no  
midline shift. Admission Weight: Last Weight Weight: 219 lb 12.8 oz (99.7 kg) Weight: 219 lb (99.3 kg) Intake / Output / Levern Cruel: 
Current Shift: 09/15 0701 - 09/15 1900 In: -  
Out: 200 [Urine:175; Drains:25] Last 24 hrs.:  
 
Intake/Output Summary (Last 24 hours) at 9/15/2020 0900 Last data filed at 9/15/2020 0800 Gross per 24 hour Intake 5806.31 ml Output 4270 ml Net 1536.31 ml EXAM: 
General:   Sedated Lungs:   Coarse to auscultation bilaterally. Incision:  Bilateral groin CDI Heart:  Regular rate and rhythm, S1, S2 normal, no murmur, click, rub or gallop. Abdomen:   Soft, non-tender. Bowel sounds hypoactive. No masses,  No organomegaly. Extremities:  2+ edema. PPP. Neurologic:  sedated Labs:  
Recent Labs  
  09/15/20 
0813  09/15/20 
3867 WBC  --   --  10.7 HGB  --   --  8.1* HCT  --   --  24.8* PLT  --   --  62* NA  --   --  148* K  --   --  4.2 BUN  --   --  51* CREA  --   --  3.32* GLU  --   --  110* GLUCPOC 96   < >  --   
 < > = values in this interval not displayed. Assessment:  
 
Active Problems: 
  Cardiac arrest ([facility] [address] (9/10/2020) Plan/Recommendations/Medical Decision Making: 1. Cardiac arrest/cardiogenic shock s/p VA ECMO and Impella CP s/p ECMO decannulation: Cont dobut 2, wean levo and darci for MAP >65. D5 through purge. Plan for impella removal today. 2. CAD s/p Stents: Stents placed 9/12. On ASA, brilinta. Cont. No BB/statin until appropriate. 2. DM type II: A1C 8.9. On insulin gtt, diabetes management consult. Will start insulin in TPN today. 3. Acute hypoxic respiratory failure: Pt complained of SOB prior to cardiac arrest. Possible PE however had diffuse CAD so more likely cardiac event than PE. Sputum cx NGTD. On zosyn for possible PNA. Sputum culture 9/15 NGTD. Give 80 IV lasix today. 4. CAMACHO: Cr 3.32, monitor UO, avoid nephrotoxic meds, renal following. 5. Shock liver: Improving, monitor 6. Hypernatremia: improving, monitor 7. Hypokalemia: resolved 8. Diarrhea: improving, Flexiseal in place, monitor 9. Post op anemia st acute blood loss: s/p PRBCs, monitor. 10. Thrombocytopenia: S/p Plt transfusion. Monitor. 11. Fever: Tmax 102, send BC. On zosyn, start vanc. Monitor. 12. Nutrition: Start TPN, on propofol. 13. Dispo: Pt remains critically ill. Keep in ICU. Plans for impella removal later today.  
 
Signed By: Lalo Amador NP

## 2020-09-15 NOTE — PROGRESS NOTES
[facility] Name: Alisa Ling MRN: [id number] : 1960 Hospital: [address] Date: 9/15/2020 Critical Care IMPRESSION:  
· Acute hypoxic respiratory failure on vent · Prolonged out of hospital cardiac arrest 
· Bilateral air space disease · Cause uncertain; PE is a strong possibility · Diffuse ASCVD s.p PCI 9/15/20 · Cardiogenic shock on impella, ECMO · Acute renal failure · Shock liver · Severe metabolic acidosis POA · Hypernatremia · DM · Tobacco use RECOMMENDATIONS:  
· Ventilator support at minimal settings--now on 80% FiO2 and 10 PEEP 
· ECMO per cardiac surgery--possible decanulation today · Pressors/inotropes per cardiac surgery · Unable to do CT imaging due to hemodynamic instability/ECMO--that being said given findings on LHC seems much less likely that PE was the culprit · COVID negative · Add vancomycin, change abx · IV fluids--cautious use · Insulin drip · Renal following · Serial neuro exams · bival on hold · PUD prophylaxis · Critically ill with high risk for further decompensation/death. CCT  45  minutes Subjective/History:  
 
Patient presented yesterday afternoon with witnessed out of hospital cardiac arrest.  He was feeling bad yesterday, was driving, pulled his car over and called EMS. Had witnessed PEA arrest, ultimately with nearly 60 minutes of CPR, at times converted to VT. He was started on the \"code shock\" protocol, and now is on ECMO and multiple pressors. Currently intubated/sedated and unable to provide any history at this time. : BP labile overnight. Issues with TV on vent as well and ultimately changed to PC 18 due to high peak pressures on VC. Intubated and sedated. : Eventful afternoon. Taken to the cath lab where he was found to have left main and multivessel CAD; 9 stents placed as well as an impella. Starting oozing from impella site overnight and had to have additional sutures placed. Was also transfused several units of blood product. Remains intubated and sedated this morning. 9/14  On vent. 70% PEEP 10. PCV 18 AC 10. More bleeding and transfusions  overnight. ECMO. CO 4.7 3800 RPM. Sweep 2. Imeplla P 2. coarse on exam. On IV Bivalirudin Discussed with Dr. Nikhil Kyle. Still vent dependent. Seen rianna this AM on rounds. Called brothahmet Lugo by phone: Naheed Babb [phone] . Discussed indication for another therapeutic bronchoscopy. Explained previous findings. Therapeutic scope will arrive today. Hope to perform [rior to OR and removal of ECMO lines. Brother sgrees to proceed. 9/15 bronch the other day and then removal of chest tube. Fever overnight with more bleeding and additional transfusion of 2  Units PRBCS in OR and another unit [facility] after.  stopped IV bivalirudin. Bleeding has now slowed. Now on IV Toby 100 Levo 3 Dobutamine 2. Vent 80% PEEP 10. Imprella at P4. CXR reviewed- some improvement, cultures from bronch negative. Nasal drainage and bleeding down Past Medical History:  
Diagnosis Date  Diabetes ([facility] [address]  Elevated cholesterol  GERD (gastroesophageal reflux disease)   
 helps with meds upsetting stomach  Hypertension  Low vitamin D level  Neuropathy   
 rt lower extremity Past Surgical History:  
Procedure Laterality Date  CARDIAC SURG PROCEDURE UNLIST    
 heart cath. normal  
 HX CERVICAL DISKECTOMY 2015  HX COLONOSCOPY  2015  HX HERNIA REPAIR    
 umbilical  
 HX ORTHOPAEDIC    
 reattached tendon and muscle rt shoulder Prior to Admission medications Medication Sig Start Date End Date Taking? Authorizing Provider  
metFORMIN (GLUCOPHAGE) 500 mg tablet  12/9/16   Provider, Historical  
vardenafil (LEVITRA) 20 mg tablet Take 20 mg by mouth as needed.     Provider, Historical  
 glimepiride (AMARYL) 4 mg tablet Take 4 mg by mouth two (2) times a day. Provider, Historical  
ergocalciferol (VITAMIN D2) 50,000 unit capsule Take 50,000 Units by mouth every seven (7) days. Provider, Historical  
aspirin delayed-release 81 mg tablet Take 81 mg by mouth daily. Provider, Historical  
omeprazole (PRILOSEC) 20 mg capsule Take 20 mg by mouth daily. Provider, Historical  
rosuvastatin (CRESTOR) 20 mg tablet Take 20 mg by mouth nightly. Provider, Historical  
fenofibric acid (TRILIPIX) 135 mg capsule Take 135 mg by mouth nightly. Provider, Historical  
lisinopril (PRINIVIL, ZESTRIL) 20 mg tablet Take 20 mg by mouth two (2) times a day. Provider, Historical  
metFORMIN (GLUCOPHAGE) 1,000 mg tablet Take 2,000 mg by mouth two (2) times a day. Provider, Historical  
 
Current Facility-Administered Medications Medication Dose Route Frequency  dextrose 5% infusion  11.4 mL/hr IntraVENous CONTINUOUS  
 mupirocin (BACTROBAN) 2 % ointment   Both Nostrils Q12H  
 bivalirudin (ANGIOMAX) 250 mg in dextrose 5% 250 mL infusion  4-20 mL/hr Other TITRATE  PHENYLephrine (DARRYL-SYNEPHRINE) 30 mg in 0.9% sodium chloride 250 mL infusion   mcg/min IntraVENous TITRATE  piperacillin-tazobactam (ZOSYN) 3.375 g in 0.9% sodium chloride (MBP/ADV) 100 mL  3.375 g IntraVENous Q8H  
 [Held by provider] bivalirudin (ANGIOMAX) 250 mg in 0.9% sodium chloride (MBP/ADV) 50 mL  0.02-2.5 mg/kg/hr IntraVENous TITRATE  ticagrelor (BRILINTA) tablet 90 mg  90 mg Per NG tube Q12H  aspirin chewable tablet 81 mg  81 mg Per NG tube DAILY  sodium chloride (NS) flush 5-40 mL  5-40 mL IntraVENous Q8H  
 0.45% sodium chloride infusion  10 mL/hr IntraVENous CONTINUOUS  chlorhexidine (PERIDEX) 0.12 % mouthwash 10 mL  10 mL Oral Q12H  
 magnesium oxide (MAG-OX) tablet 400 mg  400 mg Oral BID  [Held by provider] senna-docusate (PERICOLACE) 8.6-50 mg per tablet 1 Tab  1 Tab Oral BID  
  [Held by provider] polyethylene glycol (MIRALAX) packet 17 g  17 g Oral DAILY  pantoprazole (PROTONIX) 40 mg in 0.9% sodium chloride 10 mL injection  40 mg IntraVENous DAILY  EPINEPHrine (ADRENALIN) 5 mg in 0.9% sodium chloride 250 mL infusion  0-10 mcg/min IntraVENous TITRATE  
 NOREPINephrine (LEVOPHED) 8 mg in 5% dextrose 250mL (32 mcg/mL) infusion  0.5-16 mcg/min IntraVENous TITRATE  propofol (DIPRIVAN) 10 mg/mL infusion  0-50 mcg/kg/min IntraVENous TITRATE  dexmedeTOMidine (PRECEDEX) 400 mcg in 0.9% sodium chloride 100 mL infusion  0.2-0.7 mcg/kg/hr IntraVENous TITRATE  insulin regular (NOVOLIN R, HUMULIN R) 100 Units in 0.9% sodium chloride 100 mL infusion  1-50 Units/hr IntraVENous TITRATE  insulin lispro (HUMALOG) injection   SubCUTAneous AC&HS  
 balsam peru-castor oiL (VENELEX) ointment   Topical BID  DOBUTamine (DOBUTREX) 500 mg/250 mL (2,000 mcg/mL) infusion  3 mcg/kg/min IntraVENous CONTINUOUS No Known Allergies Social History Tobacco Use  Smoking status: Current Every Day Smoker Packs/day: 1.00 Substance Use Topics  Alcohol use: Yes Comment: rarely History reviewed. No pertinent family history. Review of Systems: 
Review of systems not obtained due to patient factors. Objective:  
Vital Signs:   
Visit Vitals BP (!) 106/52 Pulse (!) 104 Temp (!) 102 °F (38.9 °C) Resp 27 Ht 5' 9\" (1.753 m) Wt 99.3 kg (219 lb) SpO2 100% BMI 32.34 kg/m² O2 Device: Endotracheal tube, Ventilator Temp (24hrs), Av.9 °F (38.3 °C), Min:98.5 °F (36.9 °C), Max:102.4 °F (39.1 °C) Intake/Output:  
Last shift:      09/15 0701 - 09/15 1900 In: -  
Out: 200 [Urine:175; Drains:25] Last 3 shifts: 1901 - 09/15 0700 In: 7884.3 [I.V.:5831.8] Out: 9197 [[id number]] Intake/Output Summary (Last 24 hours) at 9/15/2020 7144 Last data filed at 9/15/2020 0800 Gross per 24 hour Intake 5806.31 ml Output 4270 ml Net 1536.31 ml  
 Hemodynamics:  
PAP: PAP Systolic: 57 (25/08/39 1571) CO: CO (l/min): 7.4 l/min (09/15/20 0700) Wedge:   CI: CI (l/min/m2): 3.4 l/min/m2 (09/15/20 0700) CVP:  CVP (mmHg): 11 mmHg (09/15/20 0700) SVR:    
  PVR:    
 
Ventilator Settings: 
Mode Rate Tidal Volume Pressure FiO2 PEEP Assist control   400 ml  10 cm H2O 70 % 10 cm H20 Peak airway pressure: 28 cm H2O Minute ventilation: 12.6 l/min Physical Exam: 
 
General:  Intubated, sedated Head:  Normocephalic, without obvious abnormality, atraumatic. Eyes:  Conjunctivae/corneas clear. Pupils reactive and equal  
Nose: Nares normal. Septum midline. Mucosa normal.    
Throat: ETT in place Neck: Supple, symmetrical, trachea midline Back:   Symmetric, no curvature. ROM normal.  
Lungs:   Clear to auscultation bilaterally. Chest wall:  No tenderness or deformity. Heart:  Regular rate and rhythm Abdomen:   Soft, non-tender. Bowel sounds normal.   
Extremities: Extremities normal, atraumatic, no cyanosis or clubbing Skin: Skin color, texture, turgor normal. No rashes or lesions Neurologic: Sedated Data:  
 
Current Facility-Administered Medications Medication Dose Route Frequency  dextrose 5% infusion  11.4 mL/hr IntraVENous CONTINUOUS  
 mupirocin (BACTROBAN) 2 % ointment   Both Nostrils Q12H  
 bivalirudin (ANGIOMAX) 250 mg in dextrose 5% 250 mL infusion  4-20 mL/hr Other TITRATE  PHENYLephrine (DARRYL-SYNEPHRINE) 30 mg in 0.9% sodium chloride 250 mL infusion   mcg/min IntraVENous TITRATE  piperacillin-tazobactam (ZOSYN) 3.375 g in 0.9% sodium chloride (MBP/ADV) 100 mL  3.375 g IntraVENous Q8H  
 [Held by provider] bivalirudin (ANGIOMAX) 250 mg in 0.9% sodium chloride (MBP/ADV) 50 mL  0.02-2.5 mg/kg/hr IntraVENous TITRATE  ticagrelor (BRILINTA) tablet 90 mg  90 mg Per NG tube Q12H  aspirin chewable tablet 81 mg  81 mg Per NG tube DAILY  sodium chloride (NS) flush 5-40 mL  5-40 mL IntraVENous Q8H  
  0.45% sodium chloride infusion  10 mL/hr IntraVENous CONTINUOUS  chlorhexidine (PERIDEX) 0.12 % mouthwash 10 mL  10 mL Oral Q12H  
 magnesium oxide (MAG-OX) tablet 400 mg  400 mg Oral BID  [Held by provider] senna-docusate (PERICOLACE) 8.6-50 mg per tablet 1 Tab  1 Tab Oral BID  [Held by provider] polyethylene glycol (MIRALAX) packet 17 g  17 g Oral DAILY  pantoprazole (PROTONIX) 40 mg in 0.9% sodium chloride 10 mL injection  40 mg IntraVENous DAILY  EPINEPHrine (ADRENALIN) 5 mg in 0.9% sodium chloride 250 mL infusion  0-10 mcg/min IntraVENous TITRATE  
 NOREPINephrine (LEVOPHED) 8 mg in 5% dextrose 250mL (32 mcg/mL) infusion  0.5-16 mcg/min IntraVENous TITRATE  propofol (DIPRIVAN) 10 mg/mL infusion  0-50 mcg/kg/min IntraVENous TITRATE  dexmedeTOMidine (PRECEDEX) 400 mcg in 0.9% sodium chloride 100 mL infusion  0.2-0.7 mcg/kg/hr IntraVENous TITRATE  insulin regular (NOVOLIN R, HUMULIN R) 100 Units in 0.9% sodium chloride 100 mL infusion  1-50 Units/hr IntraVENous TITRATE  insulin lispro (HUMALOG) injection   SubCUTAneous AC&HS  
 balsam peru-castor oiL (VENELEX) ointment   Topical BID  DOBUTamine (DOBUTREX) 500 mg/250 mL (2,000 mcg/mL) infusion  3 mcg/kg/min IntraVENous CONTINUOUS Labs: 
Recent Labs  
  09/15/20 
0527 09/15/20 
0006 09/14/20 
2054 WBC 10.7 11.7* 12.3* HGB 8.1* 7.7* 8.5* HCT 24.8* 22.9* 25.4* PLT 62* 57* 58* Recent Labs  
  09/15/20 
0527 09/15/20 
0006 09/14/20 
1850 09/14/20 
1201  09/13/20 
0359 * 149* 149* 149*   < > 148* K 4.2 3.9 3.8 3.6   < > 4.0  
* 116* 116* 115*   < > 114* CO2 24 26 26 27   < > 26 * 106* 127* 105*   < > 94 BUN 51* 49* 48* 46*   < > 35* CREA 3.32* 3.12* 2.99* 2.80*   < > 2.12* CA 7.8* 7.7* 8.1* 8.7   < > 8.5 MG 2.2 2.2 1.8 2.1   < > 2.1 ALB 3.0*  --   --  3.0*  --  3.2* TBILI 1.3*  --   --  1.6*  --  1.1* ALT 11*  --   --  13  --  28  
 < > = values in this interval not displayed. Recent Labs  
  09/15/20 
0812 09/14/20 2009 09/14/20 
1517 PHI 7.35 7.31* 7.40 PCO2I 39.6 47.5* 43.8 PO2I 70* 90 188* HCO3I 22.1 23.7 27.1*  
FIO2I 70 100 80 Imaging: 
I have personally reviewed the patients radiographs and have reviewed the reports: 
Low lung volumes, no acute process Total critical care time exclusive of procedures: 50 minutes Levi Sheppard MD

## 2020-09-16 NOTE — ANESTHESIA PREPROCEDURE EVALUATION
Relevant Problems No relevant active problems Anesthetic History No history of anesthetic complications Review of Systems / Medical History Patient summary reviewed, nursing notes reviewed and pertinent labs reviewed Pulmonary Within defined limits Neuro/Psych Within defined limits Cardiovascular Within defined limits Hypertension Angina CHF Dysrhythmias Past MI, CAD and cardiac stents Exercise tolerance: >4 METS 
  
GI/Hepatic/Renal 
Within defined limits GERD Endo/Other Diabetes Obesity Other Findings Physical Exam 
 
Airway Mallampati: II 
TM Distance: 4 - 6 cm Neck ROM: normal range of motion Mouth opening: Normal 
Intubated Cardiovascular Regular rate and rhythm,  S1 and S2 normal,  no murmur, click, rub, or gallop Dental 
No notable dental hx Pulmonary Breath sounds clear to auscultation Abdominal 
GI exam deferred Other Findings Anesthetic Plan ASA: 4 Anesthesia type: general 
 
Monitoring Plan: Arterial line, BIS, [place], CVP and AMINATA Post procedure ventilation

## 2020-09-16 NOTE — PROGRESS NOTES
09/16/20 8726 ABCDEF Bundle SBT Safety Screen Passed No  
SBT Screen Reason for Failure FiO2 > 50%;PEEP > 7.5

## 2020-09-16 NOTE — PROGRESS NOTES
[id number] bedside and verbal report received from [facility]. Assessment completed and charted. patient sats declined into the 80's, pt suctioned, bloody thick secretions obtained, sats recovered. Groin sites dry and intact, Impella intact 3057-7464 sats declined again, pt suctioned, slow to recover, FIO2 increased for support. 2130- verbal order received for 80 mg lasix. [id number] Dr. Binh Ruiz at bedside for removal of left MAC and placement of quad lumen. Right olamide removed and replaced with left olamide. Patient sats dropped during line placement, FI02 increased for procedure. Placement verified by Sheldon Laughlin. Right MAC and Swanz to be removed tonight and replaced with new line tomorrow per Dr. Binh Ruiz. 2384-2946 patient temp elevated, prn Acetaminophen administered. 1177-6580 Reassessment completed, right MAC and [place] removed, tubings and IV meds replaced for new line. Patient placed on cooling blanket for elevated tempt, 102.2.  
 
5186-8113 No new changes. Norepinephrine on stand-by, Phenylephrine @ 90 mcg/min, Dobutamine 2 mcg/kg/min, temp 101. 2.

## 2020-09-16 NOTE — PROGRESS NOTES
Patient was discussed in interdisciplinary rounds today; Patient remains vented and has lots of secretions; he had bronch today as well. Has rodriguez, OG tube; and is on IV AB tx; is receiving TPN; scheduled to have impella removed today. THANG 
TBD Care manager spoke with patient's brother Jim Blackman, [phone], today. He reports that patient resides in a one level home alone. He stated that other than shoulder surgery, to his knowledge patient has never needed any home health or medical equipment or rehab. Patient was 100% independent prior to this admission. He stated that they have another brother and some other family members who he knows will help in any way that they can. He feels that Inpt rehab would be a choice for discharge plan because they would like to see patient return back to his independent level of functioning. Care Manager will continue to follow for discharge needs. Alvaro Williamson. RN 
Care Manager Ext [phone]

## 2020-09-16 NOTE — DIABETES MGMT
[address] [facility] [address] INITIAL NOTE Presentation Pamella Hillman is a 63 y.o. male admitted from the ER 9/10/20 upon arriving in full arrest. EMS reported patient complaint of difficulty breathing prior to arrival => called EMS on the way to the hospital => cardiac arrest => ACLS. Initially bradycardic and hypertensive. GFR 44/serum creatinine 1.89. Lactic acid 10.1. Admission  with AG 20. A1c of 8.4%. Started on [employer] 9/10/20 at 8pm.  
HX: 
Past Medical History:  
Diagnosis Date  Diabetes ([facility] [address]  Elevated cholesterol  GERD (gastroesophageal reflux disease)   
 helps with meds upsetting stomach  Hypertension  Low vitamin D level  Neuropathy   
 rt lower extremity CAD DX: Cardiac arrest. Cardiogenic shock. ARF. Metabolic acidosis. TX: Emergent ECMO 9/10/20 (now out on 9/14). Impella. Insulin via [email or web address] Clinical care measures: 
 AC ventilatory support via ET @ 90% Sedation via Propofol & Precedex BP management via darci OG to drain IVF Dobutamine gtt TPN Rectal tube Gallego Current clinical course has been complicated by cardiogenic shock post cardiac arrest, ARF, CAMACHO on CKD, hypernatremia, and hyperkalemia. Has high insulin needs: 
9/11/20 325/24 hours 9/12/20 157/24 hours 9/13/20 110/24 hours 9/14/20 86/24 hours 9/15/20 77.4/24 hours 9/15/20 ECMO decannulation yesterday and plans to take Impella out today. Febrile last night (102). Blood cultures and a lactic acid were sent. Sputum culture is negative. Per cardiac surgery plan to keep patient on GS today and start TPN today (insulin was added to infusion), will transition off GS tomorrow and determine basal dose. 9/16/20 Patient to have Impella removed this afternoon. Bronchoscopy done this morning.  Overnight patient had to have lines replaced and TPN had to be stopped. Another bag could not be reordered so orders for D10 infusion were given until new TPN could be started this evening. Diabetes: Patient has known Type 2 diabetes, treated with non-insulin agents PTA. Family history unknown for diabetes at this time. Consulted by Provider for advanced diabetes nursing assessment and care, specifically related to  
[x] Transitioning off mobintent [x] Inpatient management strategy Diabetes-related medical history - Patient can not address Diabetes medication history Drug class Currently in use Discontinued Never used Biguanide Glucophage 1000mg twice daily DDP-4 inhibitor Sulfonylurea Glimiperide 4mg twice daily Thiazolidinedione GLP-1 RA SGLT-2 inhibitors Basal insulin Fixed Dose  Combinations Bolus insulin Subjective NA. Objective Physical exam 
General Responds to pain Vital Signs Afebrile. NSR. Hypotensive on darci Visit Vitals BP (!) 93/57 Pulse 80 Temp 97.4 °F (36.3 °C) Resp 22 Ht 5' 9\" (1.753 m) Wt 103.1 kg (227 lb 4.7 oz) SpO2 100% BMI 33.57 kg/m² Laboratory Lab Results Component Value Date/Time Hemoglobin A1c 8.4 (H) 09/11/2020 05:43 AM  
 
No results found for: LDL, LDLC, DLDLP Lab Results Component Value Date/Time Creatinine (POC) 1.0 09/10/2020 02:55 PM  
 Creatinine 3.82 (H) 09/16/2020 03:50 AM  
 
Lab Results Component Value Date/Time  Sodium 146 (H) 09/16/2020 03:50 AM  
 Potassium 3.5 09/16/2020 03:50 AM  
 Chloride 114 (H) 09/16/2020 03:50 AM  
 CO2 25 09/16/2020 03:50 AM  
 Anion gap 7 09/16/2020 03:50 AM  
 Glucose 97 09/16/2020 03:50 AM  
 BUN 57 (H) 09/16/2020 03:50 AM  
 Creatinine 3.82 (H) 09/16/2020 03:50 AM  
 BUN/Creatinine ratio 15 09/16/2020 03:50 AM  
 GFR est AA 20 (L) 09/16/2020 03:50 AM  
 GFR est non-AA 16 (L) 09/16/2020 03:50 AM  
 Calcium 8.0 (L) 09/16/2020 03:50 AM  
 Bilirubin, total 1.3 (H) 09/16/2020 03:50 AM  
 Alk. phosphatase 72 09/16/2020 03:50 AM  
 Protein, total 5.7 (L) 09/16/2020 03:50 AM  
 Albumin 2.7 (L) 09/16/2020 03:50 AM  
 Globulin 3.0 09/16/2020 03:50 AM  
 A-G Ratio 0.9 (L) 09/16/2020 03:50 AM  
 ALT (SGPT) 9 (L) 09/16/2020 03:50 AM  
 
Lab Results Component Value Date/Time ALT (SGPT) 9 (L) 09/16/2020 03:50 AM  
 
Factors affecting BG pattern Factor Dose Comments Nutrition: 
NPO 
OG to drain 200g CHO  
NPO- TPN to restart tonight @ 42mL (20u/L) Drugs: 
Vasopressor load Toby drip Levo stopped this morning Infection  WBC 12.3 Pain  Responds to pain. BPS 3. Other: Cardiogenic shock Kidney function Dobutamine gtt Worse than yesterday Poor insulin delivery GFR 16/ serum creatinine 3.82 Blood glucose pattern Assessment and Plan Nursing Diagnosis Risk for unstable blood glucose pattern Nursing Intervention Domain 5250 Decision-making Support Nursing Interventions Examined current inpatient diabetes control Explored factors facilitating and impeding inpatient management Evaluation This  male, with Type 2 diabetes, has achieved inpatient blood glucose target of 100-180mg/dl on Glucostabilizer. Inpatient blood glucose management has been impacted by 
[x] Kidney dysfunction 
[x]  Pressor support He is going to the OR this afternoon to have the Impella removed. TPN will be restarted this evening and insulin will remain the same in the infusion. (201.6g with 20 units/L). This will be a 1:10 ratio. It was stopped because he had to have central lines replaced and new TPN could not be reordered over night. Spoke with Fela ALVAREZ NP and recommended based on his total daily insulin needs yesterday to transition him off GS using 20 units of NPH BID. In addition to keeping the same amount of insulin per liter in the TPN infusion for this evening.  Also spoke with Matty Tony RN to make her aware that NPH can be given after he returns from Impella removal and then two hours later turn off GS. Recommendations Recommend: 
 
Transition with 20 units of NPH BID Continue 20units/L of insulin in TPN infusion that will restart this evening Billing Code(s) I personally reviewed chart, notes, data and current medications in the medical record, and examined the patient at bedside before making care recommendations. [x] [id number] IP subsequent hospital care - 30 minutes Matt Ruiz MSN, RN, ACCNS-AG in collaboration with Daria Maldonado DNP, CNS [facility] Access via [address] [place] [address]

## 2020-09-16 NOTE — PROGRESS NOTES
NAME: Nayana Murphy :  1960 MRN:  [id number] Assessment   :                                               Plan: 
CKD/CAMACHO S/P arrest 
Shock Hypernatremia Acute resp. Failure 
  
hyperkalemia 
  Creatinine continues to worsen S/P significant dye load  and diuresis. Fairly good UO. No acute need for RRT. If creatinine worse again tomorrow will place HD cath and decide about timing of RRT. 
  
I suspect he has underlying CKD from DM/HTN.   
CAMACHO likely related to shock. Critically ill at significant risk of decompensation. D/W RN.  
  
 
 
Subjective: Chief Complaint:  intubated Review of Systems:  UTO - intubated. Symptom Y/N Comments  Symptom Y/N Comments Fever/Chills    Chest Pain Poor Appetite    Edema Cough    Abdominal Pain Sputum    Joint Pain SOB/FINLEY    Pruritis/Rash Nausea/vomit    Tolerating PT/OT Diarrhea    Tolerating Diet Constipation    Other Could not obtain due to:   
 
Objective: VITALS:  
Last 24hrs VS reviewed since prior progress note. Most recent are: 
Visit Vitals BP (!) 93/55 Pulse 87 Temp 99 °F (37.2 °C) Resp 21 Ht 5' 9\" (1.753 m) Wt 103.2 kg (227 lb 8.2 oz) SpO2 99% BMI 33.60 kg/m² Intake/Output Summary (Last 24 hours) at 2020 4019 Last data filed at 2020 5153 Gross per 24 hour Intake 3531.83 ml Output 5805 ml Net -2273.17 ml Telemetry Reviewed: PHYSICAL EXAM: 
General: NAD Lab Data Reviewed: (see below) Medications Reviewed: (see below) PMH/SH reviewed - no change compared to H&P 
________________________________________________________________________ Care Plan discussed with: 
Patient Family RN Care Manager Consultant:     
 
  Comments >50% of visit spent in counseling and coordination of care ________________________________________________________________________ Tita Arauz MD  
 
Procedures: see electronic medical records for all procedures/Xrays and details which 
were not copied into this note but were reviewed prior to creation of Plan. LABS: 
Recent Labs  
  09/16/20 
0350 09/15/20 
1421 09/15/20 
4965 WBC 12.3*  --  11.5* HGB 8.2* 8.2* 8.0*  
HCT 24.9* 24.5* 24.5*  
PLT 81*  --  65* Recent Labs  
  09/16/20 
0350 09/15/20 
1421 09/15/20 
4138 * 147* 148* K 3.5 3.6 4.0  
* 114* 116* CO2 25 26 25 BUN 57* 53* 52* CREA 3.82* 3.53* 3.41* GLU 97 96 96  
CA 8.0* 7.9* 7.5* MG 2.2 2.3 2.1 PHOS 4.3  --   --   
 
Recent Labs  
  09/16/20 
0350 09/15/20 
0527 09/14/20 
1201 AP 72 49 42* TP 5.7* 5.6* 5.4* ALB 2.7* 3.0* 3.0*  
GLOB 3.0 2.6 2.4 Recent Labs  
  09/15/20 
1421 09/15/20 
0904 09/15/20 
0351 APTT 34.9* 43.4* 53.6* No results for input(s): FE, TIBC, PSAT, FERR in the last 72 hours. No results found for: FOL, RBCF No results for input(s): PH, PCO2, PO2 in the last 72 hours. No results for input(s): CPK, CKMB in the last 72 hours. No lab exists for component: TROPONINI No components found for: [place] Lab Results Component Value Date/Time  Color YELLOW/STRAW 09/12/2020 05:32 PM  
 Appearance CLEAR 09/12/2020 05:32 PM  
 Specific gravity 1.022 09/12/2020 05:32 PM  
 Specific gravity 1.020 12/11/2015 09:31 AM  
 pH (UA) 7.5 09/12/2020 05:32 PM  
 Protein Negative 09/12/2020 05:32 PM  
 Glucose Negative 09/12/2020 05:32 PM  
 Ketone Negative 09/12/2020 05:32 PM  
 Bilirubin Negative 09/12/2020 05:32 PM  
 Urobilinogen 0.2 09/12/2020 05:32 PM  
 Nitrites Negative 09/12/2020 05:32 PM  
 Leukocyte Esterase Negative 09/12/2020 05:32 PM  
 Epithelial cells FEW 09/12/2020 05:32 PM  
 Bacteria Negative 09/12/2020 05:32 PM  
 WBC 0-4 09/12/2020 05:32 PM  
 RBC 10-20 09/12/2020 05:32 PM  
 
 
MEDICATIONS: 
 Current Facility-Administered Medications Medication Dose Route Frequency  furosemide (LASIX) injection 80 mg  80 mg IntraVENous ONCE  
 0.9% sodium chloride infusion 250 mL  250 mL IntraVENous PRN  
 dextrose 5% infusion  11.4 mL/hr IntraVENous CONTINUOUS  
 cefepime (MAXIPIME) 2 g in 0.9% sodium chloride (MBP/ADV) 100 mL  2 g IntraVENous Q24H  
 metroNIDAZOLE (FLAGYL) IVPB premix 500 mg  500 mg IntraVENous Q12H  
 vancomycin (VANCOCIN) 1250 mg in  ml infusion  1,250 mg IntraVENous Q36H  
 TPN ADULT - CENTRAL   IntraVENous CONTINUOUS  
 0.9% sodium chloride infusion 250 mL  250 mL IntraVENous PRN  
 acetaminophen (TYLENOL) solution 650 mg  650 mg Per NG tube Q4H PRN  
 furosemide (LASIX) 10 mg/mL injection  lidocaine (PF) (XYLOCAINE) 10 mg/mL (1 %) injection  bivalirudin (ANGIOMAX) 250 mg in dextrose 5% 250 mL infusion  4-20 mL/hr Other TITRATE  PHENYLephrine (DARRYL-SYNEPHRINE) 30 mg in 0.9% sodium chloride 250 mL infusion   mcg/min IntraVENous TITRATE  [Held by provider] bivalirudin (ANGIOMAX) 250 mg in 0.9% sodium chloride (MBP/ADV) 50 mL  0.02-2.5 mg/kg/hr IntraVENous TITRATE  ticagrelor (BRILINTA) tablet 90 mg  90 mg Per NG tube Q12H  aspirin chewable tablet 81 mg  81 mg Per NG tube DAILY  albumin human 5% (BUMINATE) solution 12.5 g  12.5 g IntraVENous Q2H PRN  
 sodium chloride (NS) flush 5-40 mL  5-40 mL IntraVENous Q8H  
 sodium chloride (NS) flush 5-40 mL  5-40 mL IntraVENous PRN  
 0.45% sodium chloride infusion  10 mL/hr IntraVENous CONTINUOUS  
 oxyCODONE IR (ROXICODONE) tablet 5 mg  5 mg Oral Q4H PRN  
 oxyCODONE IR (ROXICODONE) tablet 10 mg  10 mg Oral Q4H PRN  
 morphine 10 mg/ml injection 4 mg  4 mg IntraVENous Q2H PRN  
 naloxone (NARCAN) injection 0.4 mg  0.4 mg IntraVENous PRN  
 ondansetron (ZOFRAN) injection 4 mg  4 mg IntraVENous Q4H PRN  
 albuterol (PROVENTIL VENTOLIN) nebulizer solution 2.5 mg  2.5 mg Nebulization Q4H PRN  
  chlorhexidine (PERIDEX) 0.12 % mouthwash 10 mL  10 mL Oral Q12H  
 magnesium oxide (MAG-OX) tablet 400 mg  400 mg Oral BID  
 bisacodyL (DULCOLAX) suppository 10 mg  10 mg Rectal DAILY PRN  
 [Held by provider] senna-docusate (PERICOLACE) 8.6-50 mg per tablet 1 Tab  1 Tab Oral BID  [Held by provider] polyethylene glycol (MIRALAX) packet 17 g  17 g Oral DAILY  ELECTROLYTE REPLACEMENT NOTE: Nurse to review Serum Potassium and Magnesuim levels and Initiate Electrolyte Replacement Protocol as needed  1 Each Other PRN  
 magnesium sulfate 1 g/100 ml IVPB (premix or compounded)  1 g IntraVENous PRN  pantoprazole (PROTONIX) 40 mg in 0.9% sodium chloride 10 mL injection  40 mg IntraVENous DAILY  EPINEPHrine (ADRENALIN) 5 mg in 0.9% sodium chloride 250 mL infusion  0-10 mcg/min IntraVENous TITRATE  
 NOREPINephrine (LEVOPHED) 8 mg in 5% dextrose 250mL (32 mcg/mL) infusion  0.5-16 mcg/min IntraVENous TITRATE  propofol (DIPRIVAN) 10 mg/mL infusion  0-50 mcg/kg/min IntraVENous TITRATE  dexmedeTOMidine (PRECEDEX) 400 mcg in 0.9% sodium chloride 100 mL infusion  0.2-0.7 mcg/kg/hr IntraVENous TITRATE  insulin regular (NOVOLIN R, HUMULIN R) 100 Units in 0.9% sodium chloride 100 mL infusion  1-50 Units/hr IntraVENous TITRATE  glucose chewable tablet 16 g  4 Tab Oral PRN  
 dextrose (D50W) injection syrg 12.5-25 g  12.5-25 g IntraVENous PRN  
 glucagon (GLUCAGEN) injection 1 mg  1 mg IntraMUSCular PRN  
 insulin lispro (HUMALOG) injection   SubCUTAneous AC&HS  alteplase (CATHFLO) 1 mg in sterile water (preservative free) 1 mL injection  1 mg InterCATHeter PRN  
 bacitracin 500 unit/gram packet 1 Packet  1 Packet Topical PRN  
 balsam peru-castor oiL (VENELEX) ointment   Topical BID  DOBUTamine (DOBUTREX) 500 mg/250 mL (2,000 mcg/mL) infusion  3 mcg/kg/min IntraVENous CONTINUOUS

## 2020-09-16 NOTE — PROGRESS NOTES
0730  Bedside shift report received, bilateral groin sites assessed, bilateral pedal pulses palpable and confirmed with doppler. Pt turned and skin assessed. Sacrum pink and blanchable. Venelex applied. Assessment complete. 18  Dr. Williams Carreno at bedside and spoke with RN and RT about plan for bronch today. 8501  Dr. Ama Mohr at bedside. Updated him on pt condition. Plan for mary placement tomorrow in case dialysis needed due to consistently worsening creatinine. Will hold off today since pt still responding to lasix. 46  Pt's brother at bedside. Consents signed for bronchoscopy and mary placement. Dustin Ham at bedside. Bronch started. 1028  Bronch complete. Sample obtained to be sent for culture. Toby increased to 70mcg during procedure 1250  Pt taken to OR for procedure. 1615  Pt returned from OR on toby @ 300mcg and dobut @ 4mcg. BP 90's/30's. Propofol and precedex off and not restarted due to BP unable to tolerate. Bedside report received and assessment complete. [address] with Dr. Mitch Gorman on unit regarding pt status. Discussed using different vasopressor due to toby not very effective. Orders received for levophed and vasopressin. Levo started at 2mcg. ABG and labs drawn and sent. 1659  Critical ph 7.21 reported to Dr. Mitch Gorman and Dr. Senait Hammer. Orders received. Also received orders for calcium repletion. 18  Brother at bedside and updated on pt condition and OR procedure. 1830  Toby weaned off, levo at 2mcg, Dobut at [facility] [place]. Propofol remains off and precedex stopped. Pt opens eyes to voice, but so far no focusing, tracking or movement in extremities.

## 2020-09-16 NOTE — BRIEF OP NOTE
Brief Postoperative Note Patient: Katina Ross YOB: 1960 MRN: [id number] Date of Procedure: 9/16/2020 Pre-Op Diagnosis: IMPELLA REMOVAL Post-Op Diagnosis: same Procedure(s): LEFT FEMORAL CUT DOWN WITH IMPELLA REMOVAL, Left groin exploration, Left femoral artery repair Surgeon(s): 
MD Nerissa Rose MD 
 
Surgical Assistant: Duke Lincoln Anesthesia: General  
 
Estimated Blood Loss (mL): 300cc Complications: none Specimens: none sent Implants:  
Implant Name Type Inv. Item Serial No.  Lot No. LRB No. Used Action PATCH VASC W0.8XL8CM PERIPH BOV PERICARD N PVC N DEHP CRSS - [id number]  PATCH VASC W0.8XL8CM PERIPH BOV PERICARD N PVC N DEHP CRSS [id number] [employer]WD [id number] 1 Implanted Drains:  
Orogastric Tube 09/10/20 (Active) Site Assessment Intact 09/16/20 1200 Securement Device Tape 09/16/20 1200 G Port Status Intermittent Suction 09/16/20 1200 External Insertion Matt (cms) 60 cms 09/16/20 1200 Action Taken Placement verified (comment) 09/16/20 1200 Drainage Description Green 09/16/20 1200 Gastric Residual (mL) 15 ml 09/15/20 1600 Water Flush Volume (mL) 30 mL 09/15/20 1700 Intake (ml) 0 ml 09/12/20 1200 Medication Volume 60 ml 09/15/20 1800 Drainage Chamber Level (ml) 80 ml 09/16/20 1200 Output (ml) 80 ml 09/16/20 1200 Fecal Management (Active) Stool Consistency Liquid 09/16/20 1200 Position of Indicator Line Visible 09/16/20 1200 Signal Indicator Bubble Appropriate 09/16/20 1200 Skin Assessment of the Anal Area Unremarkable 09/16/20 1200 Tube Irrigated No 09/16/20 1200 Irrigation Volume (mL) 30 09/15/20 1200 Drainage Bag Level (mL) 125 09/16/20 1200 Output (ml) 0 ml 09/16/20 1200 Findings: diseased sfa requiring patch repair Electronically Signed by Paulette Mullins MD on 9/16/2020 at 3:43 PM

## 2020-09-16 NOTE — PROGRESS NOTES
[facility] ICU Progress Note Admit Date: 9/10/2020 
 
9/10/20: [facility] ECMO placed 20: PCI 
20: [facility] ECMO decannulated Subjective:  
Pt seen with Dr. Rodney Schaffer. Tmax 102.2,  Impella CP P4 with D5 purge. On Vent PC 18 80% fiO2 PEEP 10. On darci 90, dobut 2, insulin gtt, precedex, propofol. TPN on hold as his lines were changed last night. Objective:  
Vitals: 
Blood pressure (!) 93/55, pulse 87, temperature 99 °F (37.2 °C), resp. rate 21, height 5' 9\" (1.753 m), weight 227 lb 8.2 oz (103.2 kg), SpO2 99 %. Temp (24hrs), Av.4 °F (38.6 °C), Min:99 °F (37.2 °C), Max:102.2 °F (39 °C) Hemodynamics: 
 CO: CO (l/min): 6.4 l/min CI: CI (l/min/m2): 3 l/min/m2 CVP: CVP (mmHg): 8 mmHg (20 0100) SVR:   
 PAP Systolic: PAP Systolic: 59 (96/78/79 8918) PAP Diastolic: PAP Diastolic: 22 (31// 7431) PVR:   
 SV02: SVO2 (%): 54 % (20 0000) SCV02:    
 
 
EKG/Rhythm:  Sinus tach Extubation Date / Time: remains on vent Ventilator: 
Ventilator Volumes Vt Set (ml): 450 ml (20 0738) Vt Exhaled (Machine Breath) (ml): 494 ml (20 0738) Ve Observed (l/min): 12.2 l/min (20 0738) Oxygen Therapy: 
Oxygen Therapy O2 Sat (%): 99 % (20 0738) Pulse via Oximetry: 87 beats per minute (20 0700) O2 Device: Endotracheal tube;Ventilator (20 0752) O2 Temperature: 98.2 °F (36.8 °C) (20 1558) FIO2 (%): 100 % (20 0752) CXR:  
CXR Results  (Last 48 hours) 20 0611  XR CHEST PORT Final result Impression:  IMPRESSION: No significant change. Narrative:  INDICATION:  respiratory failure EXAM: CXR Portable. FINDINGS: Portable chest shows support lines/devices show no significant change  
since 0023 hours. There is no apparent pneumothorax. Lungs show persistent  
right upper lobe collapse, and left airspace disease. Heart size is normal.  
There is difficulty excluding a component of pulmonary edema. 09/16/20 0036  XR CHEST PORT Final result Impression:  IMPRESSION:   
Left IJ central venous catheter as described. No pneumothorax. Interval  
development of left lower lobe and right upper lobe atelectasis. Narrative:  INDICATION:    left central line placement EXAMINATION:  AP CHEST, PORTABLE  
   
COMPARISON: 0732 hours yesterday FINDINGS: Single AP portable view of the chest at 00 23 hours demonstrates  
interval placement of a left IJ central venous catheter, tip at midline, in the  
region of the left brachiocephalic vein. There is no evidence of pneumothorax. There is worsening left lower lobe and right upper lobe aeration. Remainder of  
the tubes and lines are unchanged. Right perihilar opacity persists. 09/15/20 0736  XR CHEST PORT Final result Impression:  IMPRESSION: Improved aeration in the bilateral airspace disease. Bora Jerez Narrative:  EXAM:  XR CHEST PORT INDICATION:  respiratory failure COMPARISON:  9/14/2020 FINDINGS: A portable AP radiograph of the chest was obtained at 732 hours. ET  
tube has been retracted to the thoracic inlet. Left IJ sheath is unchanged. Green[place]-Pily catheter tip overlies main pulmonary artery/right pulmonary artery  
bifurcation. Left ventricular assist device is unchanged. .  There is improved  
aeration bilaterally. Bora Pee Heart size is upper limits of normal.. . Admission Weight: Last Weight Weight: 219 lb 12.8 oz (99.7 kg) Weight: 227 lb 8.2 oz (103.2 kg) Intake / Output / Drain: 
Current Shift: 09/16 0701 - 09/16 1900 In: -  
Out: 250 [Urine:250] Last 24 hrs.:  
 
Intake/Output Summary (Last 24 hours) at 9/16/2020 0957 Last data filed at 9/16/2020 1115 Gross per 24 hour Intake 3531.83 ml Output 5805 ml Net -2273.17 ml EXAM: 
General:   Sedated, intubated. Lungs:   Coarse to auscultation bilaterally. Incision:  Bilateral groin CDI Heart:  Regular rate and rhythm, S1, S2 normal, no murmur, click, rub or gallop. Abdomen:   Soft, non-tender. Bowel sounds hypoactive. No masses,  No organomegaly. Extremities:  1+ edema. PPP. Neurologic:  sedated Labs:  
Recent Labs  
  20 
5285  20 
0350 WBC  --   --  12.3* HGB  --   --  8.2* HCT  --   --  24.9*  
PLT  --   --  81* NA  --   --  146* K  --   --  3.5 BUN  --   --  57* CREA  --   --  3.82* GLU  --   --  97  
GLUCPOC 109*   < >  --   
 < > = values in this interval not displayed. Assessment:  
 
Active Problems: 
  Cardiac arrest ([facility] [address] (9/10/2020) Plan/Recommendations/Medical Decision Makin. Cardiac arrest/cardiogenic shock s/p VA ECMO and Impella CP s/p ECMO decannulation: Cont dobut 2, wean levo and darci for MAP >65. D5 through purge. Plan for impella removal today by Dr. Heide Zhao, Vascular Surgery. 2. CAD s/p Stents: Stents placed . On ASA, brilinta. Cont. No BB/statin until appropriate. 2. DM type II: A1C 8.9. On insulin gtt, but will transition over to NPH BID 20 units per DTS recs. Appreciate diabetes management consult. Will continue insulin in TPN. 3. Acute hypoxic respiratory failure: Pt complained of SOB prior to cardiac arrest. Possible PE however had diffuse CAD so more likely cardiac event than PE. Sputum cx NGTD. On zosyn for possible PNA. Sputum culture 9/15 NGTD. Give 80 IV lasix again today. Pulmonology to bronch this morning due to questionable mucus plug on CXR 4. CAMACHO: Cr 3.82, making urine, avoid nephrotoxic meds, renal following. Per renal: If Creatinine worse tomorrow will plan for CVVH. 5. Shock liver: Improving, monitor 6. Hypernatremia: improving, monitor 7. Hypokalemia: Replete per protocol 8. Diarrhea: improving, Flexiseal in place, monitor 9. Post op anemia st acute blood loss: s/p PRBCs, monitor. 10. Thrombocytopenia: S/p Plt transfusion. Monitor. 11. Fever: Tmax 102.2, BC sent, Lines switched out last night. On zosyn, vanc. Monitor. 12. Nutrition: Continue TPN, on propofol. 13. Dispo: Pt remains critically ill. Keep in ICU. Plans for impella removal later today.  
 
Signed By: Joao Sage NP

## 2020-09-16 NOTE — PROGRESS NOTES
[facility] Pulmonary, Critical Care, and Sleep Medicine Name: Mireya Jarquin MRN: [id number] : 1960 Hospital: [facility] Date: 2020 Critical Care IMPRESSION:  
· Acute hypoxic respiratory failure on vent · Prolonged out of hospital cardiac arrest 
· Bilateral air space disease · Cause uncertain; PE is a strong possibility · Diffuse ASCVD s.p PCI 9/15/20 · Cardiogenic shock on impella, ECMO · Acute renal failure · Shock liver · Severe metabolic acidosis POA · Hypernatremia · DM · Tobacco use RECOMMENDATIONS:  
· Ventilator support at minimal settings--now on 80% FiO2 and 10 PEEP 
· ECMO per cardiac surgery--possible decanulation today · Pressors/inotropes per cardiac surgery · Unable to do CT imaging due to hemodynamic instability/ECMO--that being said given findings on LHC seems much less likely that PE was the culprit · COVID negative · Add vancomycin, change abx · IV fluids--cautious use · Insulin drip · Renal following · Serial neuro exams · bival on hold · PUD prophylaxis · Critically ill with high risk for further decompensation/death. CCT  45  minutes Subjective/History:  
 
Patient presented yesterday afternoon with witnessed out of hospital cardiac arrest.  He was feeling bad yesterday, was driving, pulled his car over and called EMS. Had witnessed PEA arrest, ultimately with nearly 60 minutes of CPR, at times converted to VT. He was started on the \"code shock\" protocol, and now is on ECMO and multiple pressors. Currently intubated/sedated and unable to provide any history at this time. : BP labile overnight. Issues with TV on vent as well and ultimately changed to PC 18 due to high peak pressures on VC. Intubated and sedated. : Eventful afternoon. Taken to the cath lab where he was found to have left main and multivessel CAD; 9 stents placed as well as an impella. Starting oozing from impella site overnight and had to have additional sutures placed. Was also transfused several units of blood product. Remains intubated and sedated this morning.   On vent. 70% PEEP 10. PCV 18 AC 10. More bleeding and transfusions  overnight. ECMO. CO 4.7 3800 RPM. Sweep 2. Imeplla P 2. coarse on exam. On IV Bivalirudin Discussed with Dr. Binh Mathis. Still vent dependent. Seen rianna this AM on rounds. Called brothahmet Vega by phone: Silvia Edwards [phone] . Discussed indication for another therapeutic bronchoscopy. Explained previous findings. Therapeutic scope will arrive today. Hope to perform [rior to OR and removal of ECMO lines. Brother majo to proceed. 9/15 bronch the other day and then removal of chest tube. Fever overnight with more bleeding and additional transfusion of 2  Units PRBCS in OR and another unit [facility] after.  stopped IV bivalirudin. Bleeding has now slowed. Now on IV Toby 100 Levo 3 Dobutamine 2. Vent 80% PEEP 10. Imprella at P4. CXR reviewed- some improvement, cultures from bronch negative. Nasal drainage and bleeding down  Fever down but needing more O2 on vent. Off IV levophed. Still IV toby 90 mcg and IV  2 mcg. Impella P4. older bloody secretions on brilinta. Still critically ill. Called brothahmet Vega by phone: Amilcar Leroy [phone] . Discussed indication for another therapeutic bronchoscopy. Explained previous findings. Therapeutic scope will arrive today. Hope to perform [rior to OR and removal of Impella. Brothahmet carinajeniffer to proceed. Past Medical History:  
Diagnosis Date  Diabetes ([address]  Elevated cholesterol  GERD (gastroesophageal reflux disease)   
 helps with meds upsetting stomach  Hypertension  Low vitamin D level  Neuropathy   
 rt lower extremity Past Surgical History:  
Procedure Laterality Date  CARDIAC SURG PROCEDURE UNLIST    
 heart cath.  normal  
  HX CERVICAL DISKECTOMY 2015  HX COLONOSCOPY  2015  HX HERNIA REPAIR    
 umbilical  
 HX ORTHOPAEDIC    
 reattached tendon and muscle rt shoulder Prior to Admission medications Medication Sig Start Date End Date Taking? Authorizing Provider  
metFORMIN (GLUCOPHAGE) 500 mg tablet  12/9/16   Provider, Historical  
vardenafil (LEVITRA) 20 mg tablet Take 20 mg by mouth as needed. Provider, Historical  
glimepiride (AMARYL) 4 mg tablet Take 4 mg by mouth two (2) times a day. Provider, Historical  
ergocalciferol (VITAMIN D2) 50,000 unit capsule Take 50,000 Units by mouth every seven (7) days. Provider, Historical  
aspirin delayed-release 81 mg tablet Take 81 mg by mouth daily. Provider, Historical  
omeprazole (PRILOSEC) 20 mg capsule Take 20 mg by mouth daily. Provider, Historical  
rosuvastatin (CRESTOR) 20 mg tablet Take 20 mg by mouth nightly. Provider, Historical  
fenofibric acid (TRILIPIX) 135 mg capsule Take 135 mg by mouth nightly. Provider, Historical  
lisinopril (PRINIVIL, ZESTRIL) 20 mg tablet Take 20 mg by mouth two (2) times a day. Provider, Historical  
metFORMIN (GLUCOPHAGE) 1,000 mg tablet Take 2,000 mg by mouth two (2) times a day. Provider, Historical  
 
Current Facility-Administered Medications Medication Dose Route Frequency  dextrose 5% infusion  11.4 mL/hr IntraVENous CONTINUOUS  
 cefepime (MAXIPIME) 2 g in 0.9% sodium chloride (MBP/ADV) 100 mL  2 g IntraVENous Q24H  
 metroNIDAZOLE (FLAGYL) IVPB premix 500 mg  500 mg IntraVENous Q12H  
 vancomycin (VANCOCIN) 1250 mg in  ml infusion  1,250 mg IntraVENous Q36H  
 TPN ADULT - CENTRAL   IntraVENous CONTINUOUS  
 furosemide (LASIX) 10 mg/mL injection  lidocaine (PF) (XYLOCAINE) 10 mg/mL (1 %) injection  bivalirudin (ANGIOMAX) 250 mg in dextrose 5% 250 mL infusion  4-20 mL/hr Other TITRATE  PHENYLephrine (DARRYL-SYNEPHRINE) 30 mg in 0.9% sodium chloride 250 mL infusion   mcg/min IntraVENous TITRATE  [Held by provider] bivalirudin (ANGIOMAX) 250 mg in 0.9% sodium chloride (MBP/ADV) 50 mL  0.02-2.5 mg/kg/hr IntraVENous TITRATE  ticagrelor (BRILINTA) tablet 90 mg  90 mg Per NG tube Q12H  aspirin chewable tablet 81 mg  81 mg Per NG tube DAILY  sodium chloride (NS) flush 5-40 mL  5-40 mL IntraVENous Q8H  
 0.45% sodium chloride infusion  10 mL/hr IntraVENous CONTINUOUS  chlorhexidine (PERIDEX) 0.12 % mouthwash 10 mL  10 mL Oral Q12H  
 magnesium oxide (MAG-OX) tablet 400 mg  400 mg Oral BID  [Held by provider] senna-docusate (PERICOLACE) 8.6-50 mg per tablet 1 Tab  1 Tab Oral BID  [Held by provider] polyethylene glycol (MIRALAX) packet 17 g  17 g Oral DAILY  pantoprazole (PROTONIX) 40 mg in 0.9% sodium chloride 10 mL injection  40 mg IntraVENous DAILY  EPINEPHrine (ADRENALIN) 5 mg in 0.9% sodium chloride 250 mL infusion  0-10 mcg/min IntraVENous TITRATE  
 NOREPINephrine (LEVOPHED) 8 mg in 5% dextrose 250mL (32 mcg/mL) infusion  0.5-16 mcg/min IntraVENous TITRATE  propofol (DIPRIVAN) 10 mg/mL infusion  0-50 mcg/kg/min IntraVENous TITRATE  dexmedeTOMidine (PRECEDEX) 400 mcg in 0.9% sodium chloride 100 mL infusion  0.2-0.7 mcg/kg/hr IntraVENous TITRATE  insulin regular (NOVOLIN R, HUMULIN R) 100 Units in 0.9% sodium chloride 100 mL infusion  1-50 Units/hr IntraVENous TITRATE  insulin lispro (HUMALOG) injection   SubCUTAneous AC&HS  
 balsam peru-castor oiL (VENELEX) ointment   Topical BID  DOBUTamine (DOBUTREX) 500 mg/250 mL (2,000 mcg/mL) infusion  3 mcg/kg/min IntraVENous CONTINUOUS No Known Allergies Social History Tobacco Use  Smoking status: Current Every Day Smoker Packs/day: 1.00 Substance Use Topics  Alcohol use: Yes Comment: rarely History reviewed. No pertinent family history. Review of Systems: 
Review of systems not obtained due to patient factors. Objective:  
Vital Signs: Visit Vitals BP (!) 93/55 Pulse 87 Temp 99 °F (37.2 °C) Resp 21 Ht 5' 9\" (1.753 m) Wt 103.2 kg (227 lb 8.2 oz) SpO2 99% BMI 33.60 kg/m² O2 Device: Endotracheal tube, Ventilator Temp (24hrs), Av.4 °F (38.6 °C), Min:99 °F (37.2 °C), Max:102.2 °F (39 °C) Intake/Output:  
Last shift:      701 - 1900 In: -  
Out: 250 [Urine:250] Last 3 shifts: 1901 - 700 In: 6651.8 [I.V.:5869.3] Out: 8100 [Urine:7245; Drains:25] Intake/Output Summary (Last 24 hours) at 2020 0825 Last data filed at 2020 3573 Gross per 24 hour Intake 3691.01 ml Output 6435 ml Net -2743.99 ml Hemodynamics:  
PAP: PAP Systolic: 59 (70/19/95 6115) CO: CO (l/min): 6.4 l/min (20 0000) Wedge:   CI: CI (l/min/m2): 3 l/min/m2 (20 0000) CVP:  CVP (mmHg): 8 mmHg (20 0100) SVR:    
  PVR:    
 
Ventilator Settings: 
Mode Rate Tidal Volume Pressure FiO2 PEEP Assist control   450 ml  10 cm H2O 100 % 10 cm H20 Peak airway pressure: 27 cm H2O Minute ventilation: 12.2 l/min Physical Exam: 
 
General:  Intubated, sedated Head:  Normocephalic, without obvious abnormality, atraumatic. Eyes:  Conjunctivae/corneas clear. Pupils reactive and equal  
Nose: Nares normal. Septum midline. Mucosa normal.    
Throat: ETT in place Neck: Supple, symmetrical, trachea midline Back:   Symmetric, no curvature. ROM normal.  
Lungs:   Clear to auscultation bilaterally. Chest wall:  No tenderness or deformity. Heart:  Regular rate and rhythm Abdomen:   Soft, non-tender. Bowel sounds normal.   
Extremities: Extremities normal, atraumatic, no cyanosis or clubbing Skin: Skin color, texture, turgor normal. No rashes or lesions Neurologic: Sedated Data:  
 
            
Labs: 
Recent Labs  
  20 
0350 09/15/20 
1421 09/15/20 
0904 09/15/20 
3355 WBC 12.3*  --  11.5* 10.7 HGB 8.2* 8.2* 8.0* 8.1* HCT 24.9* 24.5* 24.5* 24.8*  
 PLT 81*  --  65* 62* Recent Labs  
  09/16/20 
0350 09/15/20 
1421 09/15/20 
0904 09/15/20 
0527  09/14/20 
1201 * 147* 148* 148*   < > 149*  
K 3.5 3.6 4.0 4.2   < > 3.6 * 114* 116* 116*   < > 115* CO2 25 26 25 24   < > 27 GLU 97 96 96 110*   < > 105* BUN 57* 53* 52* 51*   < > 46* CREA 3.82* 3.53* 3.41* 3.32*   < > 2.80* CA 8.0* 7.9* 7.5* 7.8*   < > 8.7 MG 2.2 2.3 2.1 2.2   < > 2.1 PHOS 4.3  --   --   --   --   --   
ALB 2.7*  --   --  3.0*  --  3.0* TBILI 1.3*  --   --  1.3*  --  1.6* ALT 9*  --   --  11*  --  13  
 < > = values in this interval not displayed. Recent Labs  
  09/16/20 
0514 09/15/20 
0812 09/14/20 2009 PHI 7.34* 7.35 7.31* PCO2I 42.7 39.6 47.5*  
PO2I 59* 70* 90 HCO3I 22.9 22.1 23.7 FIO2I 90 70 100 Imaging: 
I have personally reviewed the patients radiographs and have reviewed the reports: 
Low lung volumes, no acute process Total critical care time exclusive of procedures: 35 minutes Mily Duenas MD

## 2020-09-16 NOTE — PROGRESS NOTES
Arterial Line Procedure Note Risks, benefits, alternatives explained and patient agrees to proceed. Sterile prep with alcohol wipes and Chlorhexidine. 0.5 mL 1% Lidocaine placed at insertion site. Left radial artery cannulated x 1 attempt(s) utilizing the Seldinger technique. Catheter secured. Sterile dressing placed. No complications. Central Line Procedure Note Left IJ MAC removed and L neck cleansed. Suture placed for hemostasis. Indication:  Hemodynamic monitoring, volume access post-intraoperatively. Risks, benefits, alternatives explained and patient agrees to proceed. Consent obtained. Patient positioned in Trendelenburg. 7-Step Sterility Protocol followed. (cap, mask, sterile gown, sterile gloves, large sterile sheet, hand hygiene, 2% chlorhexidine for cutaneous antisepsis) 5 mL 1% Lidocaine placed at insertion site. Left internal jugular cannulated x 1 attempt(s) utilizing the Seldinger technique and live ultrasound guidance with sterile gel and sheath. .   
Venous cannulation confirmed with column drop test.   
Quad. Lumen catheter sutured X 3 & Biopatch applied. Sterile Tegaderm placed. CXR pending. Signed By: Maribel Kendrick MD  
Date:           9/15/2020 Time:          11:21 PM

## 2020-09-16 NOTE — PROGRESS NOTES
Progress Note 9/16/2020 4:56 PM 
NAME: Usha Talamantes MRN:  [id number] Admit Diagnosis: Cardiac arrest ([facility] [address] [I46.9] Assessment:   
  
1. Refractory cardiac arrest, initial rhythm appeared to be PEA arrest  Status post ECMO placement for hemodynamic support 2. Cardiogenic Shock 3. Biventricular failure 4. CAD w/ LM and 3v CAD, with MAKEDA 3 flow at baseline, underwent multivessel PCI 5. Probable PE vs ACS 6. Respiratory failure status post intubation 7. Severe metabolic acidosis, improved 8. Non occlusive DVT in Rt femoral artery, may be related to ECMO cannula, on [facility] 9. Anemia 10. Fever, Sepsis 11. Thrombocytopenia 12. CAMACHO 13. Shock liver 14. Diabetes 15. Hypertension 16. Hyperlipidemia 
  
   
  
            Plan: 1. On vasopressor, and impella, s/p decannulation of ECMO, weaned off impella, d/c today 2. Fever, concern for sepsis, on Zosyn and rogers 3. Bronch today for mucus plugging 4. Worsening of renal function, has good UOP, volume overload, getting lasix prn  
5 Unclear cause of Cardiac arrest, Presentation could be PE, ACS, s/p multivessel PCI for cardiogenic shock 6. On ASA, ticagrelor 7. Vent mx per critical care team  
8. Awaiting for neurological recovery, wean sedation as tolerates after removal of impella to assess neurological status Critically ill, prognosis guarded, hemodynamics has improves, awaiting neuro recovery . Discussed with brother kulwinder today.  
  
   
  
 [x]? High complexity decision making was performed 
  
 
  
 
Subjective: HPI: 
Remain intubated and sedated Has fever, concern for sepsis Hypoxia - had bronch and mucus plugging Has bleeding issues and AC was stopped CAMACHO over CKD On Vasopressors, remains stable when impella weaned down ROS: unresponsive / sedated Objective:  
  
Physical Exam: 
 
Last 24hrs VS reviewed since prior progress note. Most recent are: Visit Vitals BP (!) 93/57 Pulse 77 Temp 97.4 °F (36.3 °C) Resp 16 Ht 5' 9\" (1.753 m) Wt 103.2 kg (227 lb 8.2 oz) SpO2 100% BMI 33.60 kg/m² Intake/Output Summary (Last 24 hours) at 9/16/2020 1121 Last data filed at 9/16/2020 1116 Gross per 24 hour Intake 3003.64 ml Output 5335 ml Net -2331.36 ml General: intubated and sedated Neck: Supple Respiratory: on vent Cardiovascular: Regular rate rhythm, S1S2 Abdomen: soft,   non distended Neuro:  Sedated and intubated Skin:   dry Extremity: no edema Data Review Telemetry: ST 
 
EKG:  
NSR, Diffuse STT abn, no ST elevation, prolonged QT Lab Data Personally Reviewed: 
 
Recent Labs  
  09/16/20 
0350 09/15/20 
1421 09/15/20 
7707 WBC 12.3*  --  11.5* HGB 8.2* 8.2* 8.0*  
HCT 24.9* 24.5* 24.5*  
PLT 81*  --  65* Recent Labs  
  09/15/20 
1421 09/15/20 
0904 09/15/20 
0351 APTT 34.9* 43.4* 53.6* Recent Labs  
  09/16/20 
0350 09/15/20 
1421 09/15/20 
7192 * 147* 148* K 3.5 3.6 4.0  
* 114* 116* CO2 25 26 25 BUN 57* 53* 52* CREA 3.82* 3.53* 3.41* GLU 97 96 96  
CA 8.0* 7.9* 7.5* MG 2.2 2.3 2.1 No results for input(s): CPK, CKNDX, TROIQ in the last 72 hours. No lab exists for component: CPKMB Lab Results Component Value Date/Time Triglyceride 420 (H) 09/12/2020 08:07 AM  
 
 
Recent Labs  
  09/16/20 
0350 09/15/20 
0527 09/14/20 
1201 AP 72 49 42* TP 5.7* 5.6* 5.4* ALB 2.7* 3.0* 3.0*  
GLOB 3.0 2.6 2.4 No results for input(s): PH, PCO2, PO2 in the last 72 hours. Medications Personally Reviewed: 
 
Current Facility-Administered Medications Medication Dose Route Frequency  TPN ADULT - CENTRAL   IntraVENous CONTINUOUS  
 insulin NPH (NOVOLIN N, HUMULIN N) injection 20 Units  20 Units SubCUTAneous ACB&D  
 insulin NPH (NOVOLIN N, HUMULIN N) injection 20 Units  20 Units SubCUTAneous Q12H  0.9% sodium chloride infusion 250 mL  250 mL IntraVENous PRN  
 dextrose 5% infusion  11.4 mL/hr IntraVENous CONTINUOUS  
 cefepime (MAXIPIME) 2 g in 0.9% sodium chloride (MBP/ADV) 100 mL  2 g IntraVENous Q24H  
 metroNIDAZOLE (FLAGYL) IVPB premix 500 mg  500 mg IntraVENous Q12H  
 vancomycin (VANCOCIN) 1250 mg in  ml infusion  1,250 mg IntraVENous Q36H  
 TPN ADULT - CENTRAL   IntraVENous CONTINUOUS  
 0.9% sodium chloride infusion 250 mL  250 mL IntraVENous PRN  
 acetaminophen (TYLENOL) solution 650 mg  650 mg Per NG tube Q4H PRN  
 lidocaine (PF) (XYLOCAINE) 10 mg/mL (1 %) injection  bivalirudin (ANGIOMAX) 250 mg in dextrose 5% 250 mL infusion  4-20 mL/hr Other TITRATE  PHENYLephrine (DARRYL-SYNEPHRINE) 30 mg in 0.9% sodium chloride 250 mL infusion   mcg/min IntraVENous TITRATE  [Held by provider] bivalirudin (ANGIOMAX) 250 mg in 0.9% sodium chloride (MBP/ADV) 50 mL  0.02-2.5 mg/kg/hr IntraVENous TITRATE  ticagrelor (BRILINTA) tablet 90 mg  90 mg Per NG tube Q12H  aspirin chewable tablet 81 mg  81 mg Per NG tube DAILY  albumin human 5% (BUMINATE) solution 12.5 g  12.5 g IntraVENous Q2H PRN  
 sodium chloride (NS) flush 5-40 mL  5-40 mL IntraVENous Q8H  
 sodium chloride (NS) flush 5-40 mL  5-40 mL IntraVENous PRN  
 0.45% sodium chloride infusion  10 mL/hr IntraVENous CONTINUOUS  
 oxyCODONE IR (ROXICODONE) tablet 5 mg  5 mg Oral Q4H PRN  
 oxyCODONE IR (ROXICODONE) tablet 10 mg  10 mg Oral Q4H PRN  
 morphine 10 mg/ml injection 4 mg  4 mg IntraVENous Q2H PRN  
 naloxone (NARCAN) injection 0.4 mg  0.4 mg IntraVENous PRN  
 ondansetron (ZOFRAN) injection 4 mg  4 mg IntraVENous Q4H PRN  
 albuterol (PROVENTIL VENTOLIN) nebulizer solution 2.5 mg  2.5 mg Nebulization Q4H PRN  chlorhexidine (PERIDEX) 0.12 % mouthwash 10 mL  10 mL Oral Q12H  
 magnesium oxide (MAG-OX) tablet 400 mg  400 mg Oral BID  
  bisacodyL (DULCOLAX) suppository 10 mg  10 mg Rectal DAILY PRN  
 [Held by provider] senna-docusate (PERICOLACE) 8.6-50 mg per tablet 1 Tab  1 Tab Oral BID  [Held by provider] polyethylene glycol (MIRALAX) packet 17 g  17 g Oral DAILY  ELECTROLYTE REPLACEMENT NOTE: Nurse to review Serum Potassium and Magnesuim levels and Initiate Electrolyte Replacement Protocol as needed  1 Each Other PRN  
 magnesium sulfate 1 g/100 ml IVPB (premix or compounded)  1 g IntraVENous PRN  pantoprazole (PROTONIX) 40 mg in 0.9% sodium chloride 10 mL injection  40 mg IntraVENous DAILY  EPINEPHrine (ADRENALIN) 5 mg in 0.9% sodium chloride 250 mL infusion  0-10 mcg/min IntraVENous TITRATE  
 NOREPINephrine (LEVOPHED) 8 mg in 5% dextrose 250mL (32 mcg/mL) infusion  0.5-16 mcg/min IntraVENous TITRATE  propofol (DIPRIVAN) 10 mg/mL infusion  0-50 mcg/kg/min IntraVENous TITRATE  dexmedeTOMidine (PRECEDEX) 400 mcg in 0.9% sodium chloride 100 mL infusion  0.2-0.7 mcg/kg/hr IntraVENous TITRATE  insulin regular (NOVOLIN R, HUMULIN R) 100 Units in 0.9% sodium chloride 100 mL infusion  1-50 Units/hr IntraVENous TITRATE  glucose chewable tablet 16 g  4 Tab Oral PRN  
 dextrose (D50W) injection syrg 12.5-25 g  12.5-25 g IntraVENous PRN  
 glucagon (GLUCAGEN) injection 1 mg  1 mg IntraMUSCular PRN  
 insulin lispro (HUMALOG) injection   SubCUTAneous AC&HS  alteplase (CATHFLO) 1 mg in sterile water (preservative free) 1 mL injection  1 mg InterCATHeter PRN  
 bacitracin 500 unit/gram packet 1 Packet  1 Packet Topical PRN  
 balsam peru-castor oiL (VENELEX) ointment   Topical BID  DOBUTamine (DOBUTREX) 500 mg/250 mL (2,000 mcg/mL) infusion  3 mcg/kg/min IntraVENous CONTINUOUS Marco A Marie MD

## 2020-09-16 NOTE — PROCEDURES
[facility] Pulmonary, Critical Care, and Sleep Medicine Name: Ben Butler MRN: [id number] : 1960 Hospital: [address] Date: 2020 Bronchoscopy Report Procedure: Therapeutic bronchoscopy. Indication: Mucus Plugging Consent/Treatment: Informed consent was obtained from the  family after risks, benefits and alternatives were explained. Timeout verified the correct patient and correct procedure. Anesthesia:  
Patient on ventilator and receiving  Propofol drip continuous Moderate (conscious) sedation was administered by the endoscopy nurse and supervised by the endoscopist.  The following parameters were monitored: oxygen saturation, heart rate, blood pressure, respiratory rate, EKG, adequacy of pulmonary ventilation, and response to care. Total physician intraservice time was 20 minutes. Procedure Details:  
-- The bronchoscope was introduced through an endotracheal tube. -- The trachea and peterson were completely inspected and were found to be normal. 
-- The right-sided endobronchial anatomy was completely inspected and the RUL was occluded with bloody mucus, RML/RLL patent 
-- The left-sided endobronchial anatomy was completely inspected and was found to be normal.  
 
Specimens:  
None Complications: none Estimated Blood Loss: Minimal 
 
Jodi Coombs MD

## 2020-09-17 NOTE — ANESTHESIA POSTPROCEDURE EVALUATION
Procedure(s): LEFT FEMORAL CUT DOWN WITH IMPELLA REMOVAL, Left groin exploration, Left femoral artery repair. general 
 
Anesthesia Post Evaluation Patient location during evaluation: PACU Note status: Adequate. Level of consciousness: responsive to verbal stimuli and sleepy but conscious Pain management: satisfactory to patient Airway patency: patent Anesthetic complications: no 
Cardiovascular status: acceptable Respiratory status: acceptable Hydration status: acceptable Comments: +Post-Anesthesia Evaluation and Assessment Patient: Jeanie Salas MRN: [id number]  SSN: [id number] YOB: 1960  Age: 63 y.o. Sex: male Cardiovascular Function/Vital Signs BP (!) 94/55   Pulse (!) 105   Temp 36.6 °C (97.8 °F)   Resp 26   Ht 5' 9\" (1.753 m)   Wt 97.8 kg (215 lb 9.8 oz)   SpO2 99%   BMI 31.84 kg/m² Patient is status post Procedure(s): LEFT FEMORAL CUT DOWN WITH IMPELLA REMOVAL, Left groin exploration, Left femoral artery repair. Nausea/Vomiting: Controlled. Postoperative hydration reviewed and adequate. Pain: 
Pain Scale 1: Behavioral Pain Scale (BPS) (09/17/20 0400) Managed. Neurological Status: At baseline. Mental Status and Level of Consciousness: Arousable. Pulmonary Status:  
O2 Device: Endotracheal tube;Ventilator (09/16/20 1630) Adequate oxygenation and airway patent. Complications related to anesthesia: None Post-anesthesia assessment completed. No concerns. I have evaluated the patient and the patient is stable and ready to be discharged from PACU . Signed By: Shravan Cabrera MD  
 9/17/2020 INITIAL Post-op Vital signs:  
Vitals Value Taken Time BP Temp Pulse 97 9/17/2020  7:55 AM  
Resp 14 9/17/2020  7:55 AM  
SpO2 100 % 9/17/2020  7:55 AM  
Vitals shown include unvalidated device data.

## 2020-09-17 NOTE — PROGRESS NOTES
[facility] ICU Progress Note Admit Date: 9/10/2020 
 
9/10/20: VA ECMO placed 20: PCI 
20: VA ECMO decannulated 20: L femoral cutdown and impella removal, left femoral artery repair Subjective:  
Pt seen with Dr. Alma Smith. Tmax 100.2. On TPN. Off sedation On Vent A/C 30 fiO2 80% PEEP 10. Eyes open spontaneously. Objective:  
Vitals: 
Blood pressure (!) 91/54, pulse 95, temperature 97.8 °F (36.6 °C), resp. rate (!) 33, height 5' 9\" (1.753 m), weight 215 lb 9.8 oz (97.8 kg), SpO2 100 %. Temp (24hrs), Av.8 °F (36.6 °C), Min:97.3 °F (36.3 °C), Max:98.7 °F (37.1 °C) Hemodynamics: 
 CO: CO (l/min): 6.4 l/min CI: CI (l/min/m2): 3 l/min/m2 CVP: CVP (mmHg): 10 mmHg (20 0800) SVR:   
 PAP Systolic: PAP Systolic: 59 (47/36/86 7477) PAP Diastolic: PAP Diastolic: 22 (92/45/43 5778) PVR:   
 SV02: SVO2 (%): 54 % (20 0000) SCV02:    
 
 
EKG/Rhythm:  Sinus tach Extubation Date / Time: remains on vent Ventilator: 
Ventilator Volumes Vt Set (ml): 450 ml (20 035) Vt Exhaled (Machine Breath) (ml): 520 ml (20 035) Ve Observed (l/min): 15 l/min (20 035) Oxygen Therapy: 
Oxygen Therapy O2 Sat (%): 100 % (20) Pulse via Oximetry: 97 beats per minute (20) O2 Device: Ventilator (20) O2 Temperature: 98.2 °F (36.8 °C) (20 1558) FIO2 (%): 80 % (09/17/20 0825) CXR:  
CXR Results  (Last 48 hours) 20 0603  XR CHEST PORT Final result Impression:  IMPRESSION:  
1. ET tube is 8 cm above the peterson and could be advanced 3 cm. 2. Left lower lobe remains collapsed. 3. Right upper lobe and right middle lobe have reexpanded. New opacities  
identified in the right mid and lower lung zone are likely atelectasis. Follow-up to resolution is suggested Narrative:  EXAM: XR CHEST PORT INDICATION: respiratory failure COMPARISON: 2020 FINDINGS: A portable AP radiograph of the chest was obtained at 0552 hours. The  
patient is on a cardiac monitor. ET tube is 8 cm above the peterson this could be advanced 3 cm. NG tube courses  
into the stomach. Left IJ catheter remains in place. Cardiac assist device has  
been removed. There is continued opacity at the left lung base consistent left pleural  
effusion and complete left lower lobe atelectasis. The right lung reveals that the right upper lobe and right middle lobe have  
reexpanded. There is rounded opacity in the right perihilar region which is new  
in the interval measuring 5 cm. This is most likely atelectasis. Is also a small  
rounded opacity at the right lung base measuring 2 cm which is new in the  
interval this is also likely atelectasis. Close follow-up is suggested. No  
pneumothorax.  
   
  
 09/16/20 0611  XR CHEST PORT Final result Impression:  IMPRESSION: No significant change. Narrative:  INDICATION:  respiratory failure EXAM: CXR Portable. FINDINGS: Portable chest shows support lines/devices show no significant change  
since 0023 hours. There is no apparent pneumothorax. Lungs show persistent  
right upper lobe collapse, and left airspace disease. Heart size is normal.  
There is difficulty excluding a component of pulmonary edema. 09/16/20 0036  XR CHEST PORT Final result Impression:  IMPRESSION:   
Left IJ central venous catheter as described. No pneumothorax. Interval  
development of left lower lobe and right upper lobe atelectasis. Narrative:  INDICATION:    left central line placement EXAMINATION:  AP CHEST, PORTABLE  
   
COMPARISON: 0732 hours yesterday FINDINGS: Single AP portable view of the chest at 00 23 hours demonstrates  
interval placement of a left IJ central venous catheter, tip at midline, in the  
region of the left brachiocephalic vein. There is no evidence of pneumothorax. There is worsening left lower lobe and right upper lobe aeration. Remainder of  
the tubes and lines are unchanged. Right perihilar opacity persists. Admission Weight: Last Weight Weight: 219 lb 12.8 oz (99.7 kg) Weight: 215 lb 9.8 oz (97.8 kg) Intake / Output / Drain: 
Current Shift:  0701 -  1900 In: -  
Out: 120 [Urine:120] Last 24 hrs.:  
 
Intake/Output Summary (Last 24 hours) at 2020 2429 Last data filed at 2020 0800 Gross per 24 hour Intake 2993.55 ml Output 3825 ml Net -831.45 ml EXAM: 
General:   intubated. Lungs:   Coarse to auscultation bilaterally. Incision:  Bilateral groin CDI Heart:  Regular rate and rhythm, S1, S2 normal, no murmur, click, rub or gallop. Abdomen:   Soft, non-tender. Bowel sounds hypoactive. No masses,  No organomegaly. Extremities:  1+ edema. PPP. Neurologic:  sedated Labs:  
Recent Labs  
  20 
0509 20 
0324 WBC  --  9.7 HGB  --  7.0* HCT  --  21.6* PLT  --  86* NA  --  143 K  --  4.5 BUN  --  71* CREA  --  4.23* GLU  --  240* GLUCPOC 256*  --   
 
 
 Assessment:  
 
Active Problems: 
  Cardiac arrest ([facility] [address] (9/10/2020) Plan/Recommendations/Medical Decision Makin. Cardiac arrest/cardiogenic shock s/p VA ECMO and Impella CP s/p ECMO decannulation and impella removal. Off pressors. Will need repeat TTE to eval EF at some point. 2. CAD s/p Stents: Stents placed . On ASA, brilinta. Cont. No BB/statin until appropriate. 2. DM type II: A1C 8.9. Cont insulin in TPN. Discuss with diabetes management -increase NPH to 40 BID. 3. Acute hypoxic respiratory failure: Pt complained of SOB prior to cardiac arrest. Possible PE however had diffuse CAD so more likely cardiac event than PE. Sputum cx  negative,  NGTD.  On cefepime, flagyl, vanc. Plan for CRRT today to help with fluid removal. Vent support per primary team. 
 
4. CAMACHO: Cr 4.23, making urine, avoid nephrotoxic meds, renal following. Get mary/start CRRT today. 5. Shock liver: Improving, monitor 6. Hypernatremia: improving, monitor 7. Hypokalemia: Replete per protocol 8. Diarrhea: improving, Flexiseal in place, monitor 9. Post op anemia st acute blood loss: s/p PRBCs, give 1 unit PRBC today. 10. Thrombocytopenia: S/p Plt transfusion. Monitor. 11. Fever: Tmax 100.2, WBC 9.7, cont abx. Monitor. 12. Nutrition: Continue TPN, would like to start trickle feeds -discussed with nutrition today. Monitor. 13. Acute DVT: Seen on doppler 9/12, nonocculsive acute DVT to left femoral vein. Discussed with vascular today. Will hold off on anticoagulation today. 13. Dispo: Pt remains critically ill. Keep in ICU. Plans for impella removal later today.  
 
Signed By: Chaparro Madrid NP

## 2020-09-17 NOTE — INTERDISCIPLINARY ROUNDS
Critical care interdisciplinary rounds held on 09/17/2020. Following members present, Pharmacy, Diabetes Treatment, Case Management, Respiratory Therapy, Physical Therapy and Nutrition. Led by EDOUARD Yao RN and Dr. Genevieve Cogan . Plan of care discussed. See clinical pathway for plan of care and interventions and desired outcomes.

## 2020-09-17 NOTE — PROGRESS NOTES
[facility] Name: Mike Maddox MRN: [id number] : 1960 Hospital: [facility] Date: 2020 Critical Care IMPRESSION:  
· Acute hypoxic respiratory failure on vent- high O2 requirements · Prolonged out of hospital cardiac arrest 
· Bilateral air space disease with RUL collapse- resolved after bronch but LLLat risk · Cause uncertain; PE is a strong possibility · Diffuse ASCVD s.p PCI 9/15/20 · Cardiogenic shock on impella, ECMO- now both removed · Acute renal failure- likely needs RRT for volume · Mixed respiratory and metabolic acidosis-  
· Shock liver- better · Hypernatremia · DM · Tobacco use RECOMMENDATIONS:  
· Ventilator support; now on 80% FiO2 and 10 PEEP · Bronchial hygiene · CXR in AM-  
· bronchoscopy prn · Pressors/inotropes per cardiac surgery · Neurology consult · Head CT and spine CT? Will ask neurology- unable to get MRI at this time · If dialysis started, may be able to get CTA chest as well · COVID negative · Cefepime, vancomycin, flagyl  to finish 5-7 day course if cultures remain negative · Reanl, cardiology help much appreciated · DVT, PUD prophylaxis · Critically ill with high risk for further decompensation/death. CCT  35  minutes Subjective/History:  
 
Patient presented yesterday afternoon with witnessed out of hospital cardiac arrest.  He was feeling bad yesterday, was driving, pulled his car over and called EMS. Had witnessed PEA arrest, ultimately with nearly 60 minutes of CPR, at times converted to VT. He was started on the \"code shock\" protocol, and now is on ECMO and multiple pressors. Currently intubated/sedated and unable to provide any history at this time. : BP labile overnight. Issues with TV on vent as well and ultimately changed to PC 18 due to high peak pressures on VC. Intubated and sedated. : Eventful afternoon. Taken to the cath lab where he was found to have left main and multivessel CAD; 9 stents placed as well as an impella. Starting oozing from impella site overnight and had to have additional sutures placed. Was also transfused several units of blood product. Remains intubated and sedated this morning.   On vent. 70% PEEP 10. PCV 18 AC 10. More bleeding and transfusions  overnight. ECMO. CO 4.7 3800 RPM. Sweep 2. Imeplla P 2. coarse on exam. On IV Bivalirudin Discussed with Dr. Shamir Weir. Still vent dependent. Seen rianna this AM on rounds. Called brother Andre Mcnally by phone: Mary Loredo [phone] . Discussed indication for another therapeutic bronchoscopy. Explained previous findings. Therapeutic scope will arrive today. Hope to perform [rior to OR and removal of ECMO lines. Brother sgrjeniffer to proceed. 9/15 bronch the other day and then removal of chest tube. Fever overnight with more bleeding and additional transfusion of 2  Units PRBCS in OR and another unit [facility] after.  stopped IV bivalirudin. Bleeding has now slowed. Now on IV Toby 100 Levo 3 Dobutamine 2. Vent 80% PEEP 10. Imprella at P4. CXR reviewed- some improvement, cultures from bronch negative. Nasal drainage and bleeding down  Fever down but needing more O2 on vent. Off IV levophed. Still IV toby 90 mcg and IV  2 mcg. Impella P4. older bloody secretions on brilinta. Still critically ill. Called brother Andre Mcnally by phone: Charlyne Cheadle [phone] . Discussed indication for another therapeutic bronchoscopy. Explained previous findings. Therapeutic scope will arrive today. Hope to perform [rior to OR and removal of Impella. Brother sgrjeniffer to proceed.  critcally ill on vent. bronch the other day cleared RUL bloodymucous plug. Cultures NGSF. No more fever. CXR reviewed with nursing. Right lung fully reexpanded. Lactic acid 0.9; Impella out yesterday.  Eyes open and pt blinks on command but with no movement of lower limbs. Past Medical History:  
Diagnosis Date  Diabetes ([address]  Elevated cholesterol  GERD (gastroesophageal reflux disease)   
 helps with meds upsetting stomach  Hypertension  Low vitamin D level  Neuropathy   
 rt lower extremity Past Surgical History:  
Procedure Laterality Date  CARDIAC SURG PROCEDURE UNLIST    
 heart cath. normal  
 HX CERVICAL DISKECTOMY 2015  HX COLONOSCOPY  2015  HX HERNIA REPAIR    
 umbilical  
 HX ORTHOPAEDIC    
 reattached tendon and muscle rt shoulder Prior to Admission medications Medication Sig Start Date End Date Taking? Authorizing Provider  
metFORMIN (GLUCOPHAGE) 500 mg tablet  12/9/16   Provider, Historical  
vardenafil (LEVITRA) 20 mg tablet Take 20 mg by mouth as needed. Provider, Historical  
glimepiride (AMARYL) 4 mg tablet Take 4 mg by mouth two (2) times a day. Provider, Historical  
ergocalciferol (VITAMIN D2) 50,000 unit capsule Take 50,000 Units by mouth every seven (7) days. Provider, Historical  
aspirin delayed-release 81 mg tablet Take 81 mg by mouth daily. Provider, Historical  
omeprazole (PRILOSEC) 20 mg capsule Take 20 mg by mouth daily. Provider, Historical  
rosuvastatin (CRESTOR) 20 mg tablet Take 20 mg by mouth nightly. Provider, Historical  
fenofibric acid (TRILIPIX) 135 mg capsule Take 135 mg by mouth nightly. Provider, Historical  
lisinopril (PRINIVIL, ZESTRIL) 20 mg tablet Take 20 mg by mouth two (2) times a day. Provider, Historical  
metFORMIN (GLUCOPHAGE) 1,000 mg tablet Take 2,000 mg by mouth two (2) times a day. Provider, Historical  
 
Current Facility-Administered Medications Medication Dose Route Frequency  TPN ADULT - CENTRAL   IntraVENous CONTINUOUS  
 insulin NPH (NOVOLIN N, HUMULIN N) injection 20 Units  20 Units SubCUTAneous ACB&D  
 vasopressin (VASOSTRICT) 20 Units in 0.9% sodium chloride 100 mL infusion  0-0.04 Units/min IntraVENous TITRATE  insulin lispro (HUMALOG) injection   SubCUTAneous Q6H  
 dextrose 5% infusion  11.4 mL/hr IntraVENous CONTINUOUS  
 cefepime (MAXIPIME) 2 g in 0.9% sodium chloride (MBP/ADV) 100 mL  2 g IntraVENous Q24H  
 metroNIDAZOLE (FLAGYL) IVPB premix 500 mg  500 mg IntraVENous Q12H  
 vancomycin (VANCOCIN) 1250 mg in  ml infusion  1,250 mg IntraVENous Q36H  
 PHENYLephrine (DARRYL-SYNEPHRINE) 30 mg in 0.9% sodium chloride 250 mL infusion   mcg/min IntraVENous TITRATE  ticagrelor (BRILINTA) tablet 90 mg  90 mg Per NG tube Q12H  aspirin chewable tablet 81 mg  81 mg Per NG tube DAILY  sodium chloride (NS) flush 5-40 mL  5-40 mL IntraVENous Q8H  
 0.45% sodium chloride infusion  10 mL/hr IntraVENous CONTINUOUS  chlorhexidine (PERIDEX) 0.12 % mouthwash 10 mL  10 mL Oral Q12H  
 magnesium oxide (MAG-OX) tablet 400 mg  400 mg Oral BID  [Held by provider] senna-docusate (PERICOLACE) 8.6-50 mg per tablet 1 Tab  1 Tab Oral BID  [Held by provider] polyethylene glycol (MIRALAX) packet 17 g  17 g Oral DAILY  pantoprazole (PROTONIX) 40 mg in 0.9% sodium chloride 10 mL injection  40 mg IntraVENous DAILY  EPINEPHrine (ADRENALIN) 5 mg in 0.9% sodium chloride 250 mL infusion  0-10 mcg/min IntraVENous TITRATE  
 NOREPINephrine (LEVOPHED) 8 mg in 5% dextrose 250mL (32 mcg/mL) infusion  0.5-16 mcg/min IntraVENous TITRATE  propofol (DIPRIVAN) 10 mg/mL infusion  0-50 mcg/kg/min IntraVENous TITRATE  dexmedeTOMidine (PRECEDEX) 400 mcg in 0.9% sodium chloride 100 mL infusion  0.2-0.7 mcg/kg/hr IntraVENous TITRATE  balsam peru-castor oiL (VENELEX) ointment   Topical BID  DOBUTamine (DOBUTREX) 500 mg/250 mL (2,000 mcg/mL) infusion  2 mcg/kg/min IntraVENous CONTINUOUS No Known Allergies Social History Tobacco Use  Smoking status: Current Every Day Smoker Packs/day: 1.00 Substance Use Topics  Alcohol use: Yes Comment: rarely History reviewed. No pertinent family history. Review of Systems: 
Review of systems not obtained due to patient factors. Objective:  
Vital Signs:   
Visit Vitals BP (!) 94/55 Pulse (!) 105 Temp 97.8 °F (36.6 °C) Resp 26 Ht 5' 9\" (1.753 m) Wt 97.8 kg (215 lb 9.8 oz) SpO2 99% BMI 31.84 kg/m² O2 Device: Endotracheal tube, Ventilator Temp (24hrs), Av.9 °F (36.6 °C), Min:97.3 °F (36.3 °C), Max:98.9 °F (37.2 °C) Intake/Output:  
Last shift:      No intake/output data recorded. Last 3 shifts: 09/15 1901 -  0700 In: 5325.2 [I.V.:5325.2] Out: 6055 [[id number]] Intake/Output Summary (Last 24 hours) at 2020 9660 Last data filed at 2020 0600 Gross per 24 hour Intake 2993.55 ml Output 3705 ml Net -711.45 ml Hemodynamics:  
PAP: PAP Systolic: 59 ( 8430) CO: CO (l/min): 6.4 l/min (20 0000) Wedge:   CI: CI (l/min/m2): 3 l/min/m2 (20 0000) CVP:  CVP (mmHg): 12 mmHg (20 0600) SVR:    
  PVR:    
 
Ventilator Settings: 
Mode Rate Tidal Volume Pressure FiO2 PEEP Assist control   450 ml  10 cm H2O 80 % 10 cm H20 Peak airway pressure: 32 cm H2O Minute ventilation: 15 l/min Physical Exam: 
 
General:  Intubated, sedated Head:  Normocephalic, without obvious abnormality, atraumatic. Eyes:  Conjunctivae/corneas clear. Pupils reactive and equal  
Nose: Nares normal. Septum midline. Mucosa normal.    
Throat: ETT in place Neck: Supple, symmetrical, trachea midline Back:   Symmetric, no curvature. ROM normal.  
Lungs:   Clear to auscultation bilaterally. Chest wall:  No tenderness or deformity. Heart:  Regular rate and rhythm Abdomen:   Soft, non-tender. Bowel sounds normal.   
Extremities: Extremities normal, atraumatic, no cyanosis or clubbing Skin: Skin color, texture, turgor normal. No rashes or lesions Neurologic: Sedated Data:  
 
            
Labs: 
Recent Labs  
  20 8483 09/16/20 
1711 09/16/20 
0350 WBC 9.7 12.5* 12.3* HGB 7.0* 7.5* 8.2* HCT 21.6* 23.4* 24.9*  
PLT 86* 96* 81* Recent Labs  
  09/17/20 
0324 09/16/20 
1711 09/16/20 
0350  146* 146*  
K 4.5 4.5 3.5 * 113* 114* CO2 23 26 25 * 129* 97 BUN 71* 62* 57* CREA 4.23* 4.06* 3.82* CA 8.8 7.7* 8.0*  
MG 2.7* 2.3 2.2 PHOS 8.6*  --  4.3 ALB 2.5* 2.7* 2.7* TBILI 1.0 1.2* 1.3* ALT 9* 9* 9* Recent Labs  
  09/17/20 
0505 09/16/20 
1902 09/16/20 
1659 PHI 7.24* 7.32* 7.21* PCO2I 52.2* 41.8 61.0*  
PO2I 90 161* 193* HCO3I 22.3 21.5* 24.5 FIO2I 80 80 100 Imaging: 
I have personally reviewed the patients radiographs and have reviewed the reports: 
Low lung volumes, no acute process Total critical care time exclusive of procedures: 35 minutes Keyana Mata MD

## 2020-09-17 NOTE — DIALYSIS
[facility]       [phone] Orders Mode: CVVHD Factor: 25ml/hr DFR: 25ml/kg/hr X97.8kg = 2445ml/hr Rounded to 2450ml/hr Blood Flow Rate: 200ml/min Metrics BP / HR: 85; 102/61 Blood Flow Rate: 180ml/min AP:                         -86 RP: 52 TMP: 14 PD: 24  
FP: 102 DFR: 2450ml/hr Comments / Plan:  
   Pt orders, notes, labs, code status and consents reviewed. BS consent on chart. Newly placed RIJ approved for usage. Time out complete. [id number] primed and tested. Each catheter limb disinfected per p&p, caps removed, hubs disinfected per p&p. +aspiration/flush X2. Lines visible and secure with blood warmer attached to return line and set at 37*C. Education pre/post with primary RN.

## 2020-09-17 NOTE — DIABETES MGMT
[address] PROGRESS NOTE Presentation Usha Talamantes is a 63 y.o. male admitted from the ER 9/10/20 upon arriving in full arrest. EMS reported patient complaint of difficulty breathing prior to arrival => called EMS on the way to the hospital => cardiac arrest => ACLS. Initially bradycardic and hypertensive. GFR 44/serum creatinine 1.89. Lactic acid 10.1. Admission  with AG 20. A1c of 8.4%. Started on [employer] 9/10/20 at 8pm.  
HX: 
Past Medical History:  
Diagnosis Date  Diabetes ([facility] [address]  Elevated cholesterol  GERD (gastroesophageal reflux disease)   
 helps with meds upsetting stomach  Hypertension  Low vitamin D level  Neuropathy   
 rt lower extremity CAD DX: Cardiac arrest. Cardiogenic shock. ARF. Metabolic acidosis. Shock liver. [place]: Emergent ECMO 9/10/20. Impella. Insulin via [employer] Clinical care measures: 
 AC ventilatory support via ET @ 80% TPN 
 OG to drain Current clinical course has been complicated by cardiogenic shock post cardiac arrest, ARF, CAMACHO on CKD, hypernatremia, and hyperkalemia. Has high insulin needs: 
9/11/20 325/24 hours 9/12/20 157/24 hours 9/13/20 110/24 hours. Treated with ECMO & Impella. ECMO decannulation after bronchoscopy. 9/16/20 Impella out. 9/17/20 Waking up. Opens eyes. Wiggles fingers. No movement lower extremities. Off [employer] since last evening. Weaned off multiple pressors. Renal function diminishing; may need Kiko. Diabetes: Patient has known Type 2 diabetes, treated with non-insulin agents PTA. Family history unknown for diabetes at this time. Consulted by Provider for advanced diabetes nursing assessment and care, specifically related to  
[x] Transitioning off [employer] [x] Inpatient management strategy Diabetes-related medical history - Patient can not address Diabetes medication history Drug class Currently in use Discontinued Never used Biguanide Glucophage 1000mg twice daily DDP-4 inhibitor Sulfonylurea Glimiperide 4mg twice daily Thiazolidinedione GLP-1 RA SGLT-2 inhibitors Basal insulin Fixed Dose  Combinations Bolus insulin Subjective NA Objective Physical exam 
General Waking up per nursing. Opens eyes. Wiggles fingers. Vital Signs Afebrile. NSR. Hypotensive. Visit Vitals BP (!) 95/57 Pulse 89 Temp 97.6 °F (36.4 °C) Resp 28 Ht 5' 9\" (1.753 m) Wt 97.8 kg (215 lb 9.8 oz) SpO2 100% BMI 31.84 kg/m² Laboratory Lab Results Component Value Date/Time Hemoglobin A1c 8.4 (H) 09/11/2020 05:43 AM  
 
No results found for: LDL, LDLC, DLDLP Lab Results Component Value Date/Time Creatinine (POC) 1.0 09/10/2020 02:55 PM  
 Creatinine 4.23 (H) 09/17/2020 03:24 AM  
 
Lab Results Component Value Date/Time Sodium 143 09/17/2020 03:24 AM  
 Potassium 4.5 09/17/2020 03:24 AM  
 Chloride 111 (H) 09/17/2020 03:24 AM  
 CO2 23 09/17/2020 03:24 AM  
 Anion gap 9 09/17/2020 03:24 AM  
 Glucose 240 (H) 09/17/2020 03:24 AM  
 BUN 71 (H) 09/17/2020 03:24 AM  
 Creatinine 4.23 (H) 09/17/2020 03:24 AM  
 BUN/Creatinine ratio 17 09/17/2020 03:24 AM  
 GFR est AA 17 (L) 09/17/2020 03:24 AM  
 GFR est non-AA 14 (L) 09/17/2020 03:24 AM  
 Calcium 8.8 09/17/2020 03:24 AM  
 Bilirubin, total 1.0 09/17/2020 03:24 AM  
 Alk. phosphatase 67 09/17/2020 03:24 AM  
 Protein, total 5.9 (L) 09/17/2020 03:24 AM  
 Albumin 2.5 (L) 09/17/2020 03:24 AM  
 Globulin 3.4 09/17/2020 03:24 AM  
 A-G Ratio 0.7 (L) 09/17/2020 03:24 AM  
 ALT (SGPT) 9 (L) 09/17/2020 03:24 AM  
 
Lab Results Component Value Date/Time ALT (SGPT) 9 (L) 09/17/2020 03:24 AM  
 
Factors affecting BG pattern Factor Dose Comments Nutrition: 
NPO 
TPN 
OG to drain 200 grams dextrose/20 units of insulin Infection Cefepime & Flagyl WBC 9.7 Pain  BPS 3. Other: Cardiogenic shock Kidney function  Poor insulin delivery GFR 14/ serum creatinine 4.23 Blood glucose pattern Assessment and Plan Nursing Diagnosis Risk for unstable blood glucose pattern Nursing Intervention Domain 0985 Decision-making Support Nursing Interventions Examined current inpatient diabetes control Explored factors facilitating and impeding inpatient management Evaluation This  male, with Type 2 diabetes, hasn't achieved inpatient blood glucose target of 100-180mg/dl off Glucostabilizer. Inpatient blood glucose management has been impacted by 
[x] Kidney dysfunction Has required 80 units of insulin D for the past 3 days. Would advance basal insulin dosing in turn. Recommendations Recommend: 
 
Increase NPH insulin to 40 units twice daiyl Billing Code(s) I personally reviewed chart, notes, data and current medications in the medical record, and examined the patient at bedside before making care recommendations. [x] [id number] IP subsequent hospital care - 30 minutes [facility] Access via [address] [place] [address]

## 2020-09-17 NOTE — CONSULTS
IP CONSULT TO NEUROLOGY Consult performed by: Derick Ernandez MD 
Consult ordered by: Berta Coates MD 
 
 
 
Chief Complaint: Generalized weakness This is a 58-year-old male who was admitted for cardiac arrest on September 10 by EMS. On 9/13 he received 9 stents and an Impella and started oozing from the Impella requiring blood transfusion. He was weaned off sedation because of blood pressure but has not been able to be taken off the vent. Assesment and Plan 1. Generalized weakness No clear etiology. Probably will need EMG and MRI of the spine and brain. Agree that we can wait on the MRI. Will try to do the EMG int he morning. Ck  - level 2. Altered mental status EEG 3. Coronary artery disease Status post multivessel stenting 3. Respiratory failure Failing to wean off vent 4. Diabetes On sliding scale insulin Allergies Patient has no known allergies. Medications Current Facility-Administered Medications Medication Dose Route Frequency  albuterol (PROVENTIL VENTOLIN) nebulizer solution 2.5 mg  2.5 mg Nebulization Q6H RT  
 acetylcysteine (MUCOMYST) 200 mg/mL (20 %) solution 200 mg  200 mg Nebulization Q6H RT  
 0.9% sodium chloride infusion 250 mL  250 mL IntraVENous PRN  
 acetylcysteine (MUCOMYST) 200 mg/mL (20 %) solution  insulin NPH (NOVOLIN N, HUMULIN N) injection 40 Units  40 Units SubCUTAneous ACB&D  
 insulin NPH (NOVOLIN N, HUMULIN N) injection 20 Units  20 Units SubCUTAneous ONCE  
 TPN ADULT - CENTRAL   IntraVENous CONTINUOUS  
 heparin (porcine) 2,000 Units in lactated Ringers 1,000 mL Irrigation    PRN  
 vasopressin (VASOSTRICT) 20 Units in 0.9% sodium chloride 100 mL infusion  0-0.04 Units/min IntraVENous TITRATE  insulin lispro (HUMALOG) injection   SubCUTAneous Q6H  
 0.9% sodium chloride infusion 250 mL  250 mL IntraVENous PRN  
 dextrose 5% infusion  11.4 mL/hr IntraVENous CONTINUOUS  
  cefepime (MAXIPIME) 2 g in 0.9% sodium chloride (MBP/ADV) 100 mL  2 g IntraVENous Q24H  
 metroNIDAZOLE (FLAGYL) IVPB premix 500 mg  500 mg IntraVENous Q12H  
 0.9% sodium chloride infusion 250 mL  250 mL IntraVENous PRN  
 acetaminophen (TYLENOL) solution 650 mg  650 mg Per NG tube Q4H PRN  
 PHENYLephrine (DARRYL-SYNEPHRINE) 30 mg in 0.9% sodium chloride 250 mL infusion   mcg/min IntraVENous TITRATE  ticagrelor (BRILINTA) tablet 90 mg  90 mg Per NG tube Q12H  aspirin chewable tablet 81 mg  81 mg Per NG tube DAILY  albumin human 5% (BUMINATE) solution 12.5 g  12.5 g IntraVENous Q2H PRN  
 sodium chloride (NS) flush 5-40 mL  5-40 mL IntraVENous Q8H  
 sodium chloride (NS) flush 5-40 mL  5-40 mL IntraVENous PRN  
 0.45% sodium chloride infusion  10 mL/hr IntraVENous CONTINUOUS  
 oxyCODONE IR (ROXICODONE) tablet 5 mg  5 mg Oral Q4H PRN  
 oxyCODONE IR (ROXICODONE) tablet 10 mg  10 mg Oral Q4H PRN  
 morphine 10 mg/ml injection 4 mg  4 mg IntraVENous Q2H PRN  
 naloxone (NARCAN) injection 0.4 mg  0.4 mg IntraVENous PRN  
 ondansetron (ZOFRAN) injection 4 mg  4 mg IntraVENous Q4H PRN  
 albuterol (PROVENTIL VENTOLIN) nebulizer solution 2.5 mg  2.5 mg Nebulization Q4H PRN  chlorhexidine (PERIDEX) 0.12 % mouthwash 10 mL  10 mL Oral Q12H  
 bisacodyL (DULCOLAX) suppository 10 mg  10 mg Rectal DAILY PRN  
 [Held by provider] senna-docusate (PERICOLACE) 8.6-50 mg per tablet 1 Tab  1 Tab Oral BID  [Held by provider] polyethylene glycol (MIRALAX) packet 17 g  17 g Oral DAILY  ELECTROLYTE REPLACEMENT NOTE: Nurse to review Serum Potassium and Magnesuim levels and Initiate Electrolyte Replacement Protocol as needed  1 Each Other PRN  
 magnesium sulfate 1 g/100 ml IVPB (premix or compounded)  1 g IntraVENous PRN  pantoprazole (PROTONIX) 40 mg in 0.9% sodium chloride 10 mL injection  40 mg IntraVENous DAILY  EPINEPHrine (ADRENALIN) 5 mg in 0.9% sodium chloride 250 mL infusion 0-10 mcg/min IntraVENous TITRATE  
 NOREPINephrine (LEVOPHED) 8 mg in 5% dextrose 250mL (32 mcg/mL) infusion  0.5-16 mcg/min IntraVENous TITRATE  propofol (DIPRIVAN) 10 mg/mL infusion  0-50 mcg/kg/min IntraVENous TITRATE  dexmedeTOMidine (PRECEDEX) 400 mcg in 0.9% sodium chloride 100 mL infusion  0.2-0.7 mcg/kg/hr IntraVENous TITRATE  glucose chewable tablet 16 g  4 Tab Oral PRN  
 dextrose (D50W) injection syrg 12.5-25 g  12.5-25 g IntraVENous PRN  
 glucagon (GLUCAGEN) injection 1 mg  1 mg IntraMUSCular PRN  
 alteplase (CATHFLO) 1 mg in sterile water (preservative free) 1 mL injection  1 mg InterCATHeter PRN  
 bacitracin 500 unit/gram packet 1 Packet  1 Packet Topical PRN  
 balsam peru-castor oiL (VENELEX) ointment   Topical BID  DOBUTamine (DOBUTREX) 500 mg/250 mL (2,000 mcg/mL) infusion  2 mcg/kg/min IntraVENous CONTINUOUS Medical History Past Medical History:  
Diagnosis Date  Diabetes ([facility] [address]  Elevated cholesterol  GERD (gastroesophageal reflux disease)   
 helps with meds upsetting stomach  Hypertension  Low vitamin D level  Neuropathy   
 rt lower extremity ROS 
intubated Exam: 
 
Visit Vitals BP (!) 91/54 (BP 1 Location: Right arm, BP Patient Position: At rest) Pulse 94 Temp 97.8 °F (36.6 °C) Resp 30 Ht 5' 9\" (1.753 m) Wt 215 lb 9.8 oz (97.8 kg) SpO2 100% BMI 31.84 kg/m² General:  Intubated awake Head: Normocephalic, atraumatic, anicteric sclera Neck Normal ROM,  no thyromegally Lungs:  Clear to auscultation bilaterally, No wheezes or rubs Ext: No pedal edema Skin: Supple no rash NeurologicExam: 
Mental Status:  Lethargic. Can close his eyes on command. Speech:  Intubated Cranial Nerves:   II-XII intact Motor:   No movement to noxious stimuli Reflexes:   Deep tendon reflexes absent Sensory:    Cannot be assessed Tremor:   No tremor noted. Imaging CT Results (most recent): 
 No results found for this or any previous visit. MRI Results (most recent): 
Results from Hospital Encounter encounter on 11/20/15 MRI CERV SPINE WO CONT Narrative **Final Report** 
  
 
ICD Codes / Adm. Diagnosis: 723.4  M54.12 / Brachial neuritis or radiculit Radiculopathy, cervical elana Examination:  MR Pino RUSSELL  - [id number] - Nov 20 2015 11:55AM 
Accession No:  [id number] Reason:  Cervical radiculopathy REPORT: 
CLINICAL HISTORY: Neck pain INDICATION: Neck pain COMPARISON: None TECHNIQUE: MR imaging of the cervical spine was performed including sagittal  
T1, T2, STIR;  axial GRE, T1. Contrast was not administered. FINDINGS: 
 
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  
There is cord atrophy without cord myelomalacia is no Chiari or syrinx. Dominant left vertebral artery. Major cervical vasculature unremarkable. Othelia Florencio Vertebral body heights are maintained. Marrow signal is normal.  The  
craniocervical junction is intact. .   
 
C2/3:  Disc bulge/osteophyte. Minimal facet arthropathy. Moderate central  
canal stenosis. Othelia Florencio C3/4:  Disc protrusion/osteophyte. Facet arthropathy. Moderate to severe  
central canal stenosis and moderate left foraminal stenosis. Othelia Florencio C4/5:  Protrusion/osteophyte. Facet arthropathy. Severe central canal  
stenosis. Severe left foraminal stenosis. Severe right foraminal stenosis. Othelia Florencio C5/6:  Disc bulge/osteophyte. Facet arthropathy. Severe central canal  
stenosis. Severe left foraminal stenosis. Mild right foraminal stenosis. .. C6/7:  Mild left paracentral protrusion with disc bulge/osteophyte extends  
into the foramina. Moderate to severe central canal stenosis. Severe  
bilateral foraminal stenosis. Othelia Florencio C7/T1:   The spinal canal and neuroforamina are widely patent. Mild cord compression at C4-5 C5-6 and C6-7. No cord signal abnormality. Disc bulge at T2-T3.    
 
IMPRESSION: 
 Multilevel disc and facet degenerative change superimposed on moderate to  
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 
 
There is severe central canal stenosis at C3-4, C4-5, C5-6. There is mild cord compression at C4-5 C5-6 and C6-7 without cord  
myelomalacia. There is effacement of the thecal sac at C3-4. Moderate to severe central canal stenosis and moderate left foraminal  
stenosis at C3-4. Severe bilateral foraminal stenoses C4-5 and C6-7. There are moderate to large disc protrusions at C4-5 and C6-7. Mild/moderate protrusion C34, C5-6. 
 
 
23X Korin Mayes Signing/Reading Doctor: Sirisha Hawkins ([id number]) Approved: Sirisha Hawkins ([id number])  Nov 20 2015 12:44PM                         
 
 
   
 
 
. 
Lab Review Recent Results (from the past 24 hour(s)) GLUCOSE, POC Collection Time: 09/16/20 10:32 AM  
Result Value Ref Range Glucose (POC) 114 (H) 65 - 100 mg/dL Performed by Mazie Nissen Ruther Rota Collection Time: 09/16/20 10:32 AM  
Result Value Ref Range Glucose 114 mg/dL Insulin order 2.7 units/hour Insulin adminstered 2.7 units/hour Multiplier 0.050 Low target 95 mg/dL High target 130 mg/dL D50 order 0.0 ml  
 D50 administered 0.00 ml Minutes until next BG 60 min Order initials rpc Administered initials rpc GLSCOM Comments GLUCOSE, POC Collection Time: 09/16/20 11:37 AM  
Result Value Ref Range Glucose (POC) 116 (H) 65 - 100 mg/dL Performed by Mazie Nissen Ruther Rota Collection Time: 09/16/20 11:37 AM  
Result Value Ref Range Glucose 116 mg/dL Insulin order 2.8 units/hour Insulin adminstered 2.8 units/hour Multiplier 0.050 Low target 95 mg/dL High target 130 mg/dL D50 order 0.0 ml  
 D50 administered 0.00 ml Minutes until next BG 60 min Order initials rpc Administered initials rpc GLSCOM Comments CULTURE, RESPIRATORY/SPUTUM/BRONCH W GRAM STAIN  
 Collection Time: 09/16/20 11:52 AM  
 Specimen: Bronchial Aspirate; Sputum Result Value Ref Range Special Requests: NO SPECIAL REQUESTS    
 GRAM STAIN FEW WBCS SEEN    
 GRAM STAIN RARE EPITHELIAL CELLS SEEN    
 GRAM STAIN NO ORGANISMS SEEN Culture result: PENDING   
GLUCOSE, POC Collection Time: 09/16/20 12:46 PM  
Result Value Ref Range Glucose (POC) 111 (H) 65 - 100 mg/dL Performed by Cindy Bueno Collection Time: 09/16/20 12:47 PM  
Result Value Ref Range Glucose 111 mg/dL Insulin order 2.6 units/hour Insulin adminstered 2.6 units/hour Multiplier 0.050 Low target 95 mg/dL High target 130 mg/dL D50 order 0.0 ml  
 D50 administered 0.00 ml Minutes until next BG 60 min Order initials rpc Administered initials rpc GLSCOM Comments GLUCOSE, POC Collection Time: 09/16/20  2:11 PM  
Result Value Ref Range Glucose (POC) 105 (H) 65 - 100 mg/dL Performed by Shayy Bueno Collection Time: 09/16/20  2:11 PM  
Result Value Ref Range Glucose 105 mg/dL Insulin order 2.3 units/hour Insulin adminstered 2.3 units/hour Multiplier 0.050 Low target 95 mg/dL High target 130 mg/dL D50 order 0.0 ml  
 D50 administered 0.00 ml Minutes until next  min Order initials lbm Administered initials lbm GLSCOM Comments POC EG7 Collection Time: 09/16/20  4:59 PM  
Result Value Ref Range Calcium, ionized (POC) 1.03 (L) 1.12 - 1.32 mmol/L  
 FIO2 (POC) 100 % pH (POC) 7.21 (LL) 7.35 - 7.45    
 pCO2 (POC) 61.0 (H) 35.0 - 45.0 MMHG  
 pO2 (POC) 193 (H) 80 - 100 MMHG  
 HCO3 (POC) 24.5 22 - 26 MMOL/L Base deficit (POC) 3 mmol/L  
 sO2 (POC) 99 (H) 92 - 97 % Site DRAWN FROM ARTERIAL LINE Device: VENT Mode ASSIST CONTROL Tidal volume 450 ml Set Rate 10 bpm  
 PEEP/CPAP (POC) 10 cmH2O Allens test (POC) N/A  Specimen type (POC) ARTERIAL    
 Total resp. rate 19 GLUCOSE, POC Collection Time: 09/16/20  5:00 PM  
Result Value Ref Range Glucose (POC) 126 (H) 65 - 100 mg/dL Performed by Cinthia Palencia Collection Time: 09/16/20  5:00 PM  
Result Value Ref Range Glucose 126 mg/dL Insulin order 3.3 units/hour Insulin adminstered 3.3 units/hour Multiplier 0.050 Low target 95 mg/dL High target 130 mg/dL D50 order 0.0 ml  
 D50 administered 0.00 ml Minutes until next BG 60 min Order initials rpc Administered initials rpc GLSCOM Comments CBC W/O DIFF Collection Time: 09/16/20  5:11 PM  
Result Value Ref Range WBC 12.5 (H) 4.1 - 11.1 K/uL  
 RBC 2.42 (L) 4.10 - 5.70 M/uL HGB 7.5 (L) 12.1 - 17.0 g/dL HCT 23.4 (L) 36.6 - 50.3 % MCV 96.7 80.0 - 99.0 FL  
 MCH 31.0 26.0 - 34.0 PG  
 MCHC 32.1 30.0 - 36.5 g/dL  
 RDW 17.3 (H) 11.5 - 14.5 % PLATELET 96 (L) 160 - 400 K/uL MPV 12.0 8.9 - 12.9 FL  
 NRBC 0.0 0  WBC ABSOLUTE NRBC 0.00 0.00 - 0.01 K/uL METABOLIC PANEL, COMPREHENSIVE Collection Time: 09/16/20  5:11 PM  
Result Value Ref Range Sodium 146 (H) 136 - 145 mmol/L Potassium 4.5 3.5 - 5.1 mmol/L Chloride 113 (H) 97 - 108 mmol/L  
 CO2 26 21 - 32 mmol/L Anion gap 7 5 - 15 mmol/L Glucose 129 (H) 65 - 100 mg/dL BUN 62 (H) 6 - 20 MG/DL Creatinine 4.06 (H) 0.70 - 1.30 MG/DL  
 BUN/Creatinine ratio 15 12 - 20 GFR est AA 18 (L) >60 ml/min/1.73m2 GFR est non-AA 15 (L) >60 ml/min/1.73m2 Calcium 7.7 (L) 8.5 - 10.1 MG/DL Bilirubin, total 1.2 (H) 0.2 - 1.0 MG/DL  
 ALT (SGPT) 9 (L) 12 - 78 U/L  
 AST (SGOT) 45 (H) 15 - 37 U/L Alk. phosphatase 72 45 - 117 U/L Protein, total 5.8 (L) 6.4 - 8.2 g/dL Albumin 2.7 (L) 3.5 - 5.0 g/dL Globulin 3.1 2.0 - 4.0 g/dL A-G Ratio 0.9 (L) 1.1 - 2.2 MAGNESIUM Collection Time: 09/16/20  5:11 PM  
Result Value Ref Range Magnesium 2.3 1.6 - 2.4 mg/dL GLUCOSE, POC  
 Collection Time: 09/16/20  6:26 PM  
Result Value Ref Range Glucose (POC) 121 (H) 65 - 100 mg/dL Performed by Samra Mccurdy Collection Time: 09/16/20  6:27 PM  
Result Value Ref Range Glucose 121 mg/dL Insulin order 3.1 units/hour Insulin adminstered 3.1 units/hour Multiplier 0.050 Low target 95 mg/dL High target 130 mg/dL D50 order 0.0 ml  
 D50 administered 0.00 ml Minutes until next  min Order initials rpc Administered initials rpc GLSCOM Comments POC EG7 Collection Time: 09/16/20  7:02 PM  
Result Value Ref Range Calcium, ionized (POC) 1.22 1.12 - 1.32 mmol/L  
 FIO2 (POC) 80 % pH (POC) 7.32 (L) 7.35 - 7.45    
 pCO2 (POC) 41.8 35.0 - 45.0 MMHG  
 pO2 (POC) 161 (H) 80 - 100 MMHG  
 HCO3 (POC) 21.5 (L) 22 - 26 MMOL/L Base deficit (POC) 5 mmol/L  
 sO2 (POC) 99 (H) 92 - 97 % Site DRAWN FROM ARTERIAL LINE Device: VENT Mode ASSIST CONTROL Tidal volume 450 ml Set Rate 26 bpm  
 PEEP/CPAP (POC) 10 cmH2O Allens test (POC) N/A Specimen type (POC) ARTERIAL Total resp. rate 28 GLUCOSE, POC Collection Time: 09/16/20  9:59 PM  
Result Value Ref Range Glucose (POC) 173 (H) 65 - 100 mg/dL [facility] GLUCOSE, POC Collection Time: 09/16/20 11:39 PM  
Result Value Ref Range Glucose (POC) 231 (H) 65 - 100 mg/dL Performed by [facility] METABOLIC PANEL, COMPREHENSIVE Collection Time: 09/17/20  3:24 AM  
Result Value Ref Range Sodium 143 136 - 145 mmol/L Potassium 4.5 3.5 - 5.1 mmol/L Chloride 111 (H) 97 - 108 mmol/L  
 CO2 23 21 - 32 mmol/L Anion gap 9 5 - 15 mmol/L Glucose 240 (H) 65 - 100 mg/dL BUN 71 (H) 6 - 20 MG/DL Creatinine 4.23 (H) 0.70 - 1.30 MG/DL  
 BUN/Creatinine ratio 17 12 - 20 GFR est AA 17 (L) >60 ml/min/1.73m2 GFR est non-AA 14 (L) >60 ml/min/1.73m2  Calcium 8.8 8.5 - 10.1 MG/DL  
 Bilirubin, total 1.0 0.2 - 1.0 MG/DL  
 ALT (SGPT) 9 (L) 12 - 78 U/L  
 AST (SGOT) 38 (H) 15 - 37 U/L Alk. phosphatase 67 45 - 117 U/L Protein, total 5.9 (L) 6.4 - 8.2 g/dL Albumin 2.5 (L) 3.5 - 5.0 g/dL Globulin 3.4 2.0 - 4.0 g/dL A-G Ratio 0.7 (L) 1.1 - 2.2 PHOSPHORUS Collection Time: 09/17/20  3:24 AM  
Result Value Ref Range Phosphorus 8.6 (H) 2.6 - 4.7 MG/DL  
CBC W/O DIFF Collection Time: 09/17/20  3:24 AM  
Result Value Ref Range WBC 9.7 4.1 - 11.1 K/uL  
 RBC 2.27 (L) 4.10 - 5.70 M/uL HGB 7.0 (L) 12.1 - 17.0 g/dL HCT 21.6 (L) 36.6 - 50.3 % MCV 95.2 80.0 - 99.0 FL  
 MCH 30.8 26.0 - 34.0 PG  
 MCHC 32.4 30.0 - 36.5 g/dL  
 RDW 16.7 (H) 11.5 - 14.5 % PLATELET 86 (L) 663 - 400 K/uL MPV 12.2 8.9 - 12.9 FL  
 NRBC 0.0 0  WBC ABSOLUTE NRBC 0.00 0.00 - 0.01 K/uL MAGNESIUM Collection Time: 09/17/20  3:24 AM  
Result Value Ref Range Magnesium 2.7 (H) 1.6 - 2.4 mg/dL LACTIC ACID Collection Time: 09/17/20  3:30 AM  
Result Value Ref Range Lactic acid 0.9 0.4 - 2.0 MMOL/L  
POC EG7 Collection Time: 09/17/20  5:05 AM  
Result Value Ref Range Calcium, ionized (POC) 1.12 1.12 - 1.32 mmol/L  
 FIO2 (POC) 80 % pH (POC) 7.24 (LL) 7.35 - 7.45    
 pCO2 (POC) 52.2 (H) 35.0 - 45.0 MMHG  
 pO2 (POC) 90 80 - 100 MMHG  
 HCO3 (POC) 22.3 22 - 26 MMOL/L Base deficit (POC) 5 mmol/L  
 sO2 (POC) 95 92 - 97 % Site DRAWN FROM ARTERIAL LINE Device: VENT Mode ASSIST CONTROL Tidal volume 450 ml Set Rate 26 bpm  
 PEEP/CPAP (POC) 10 cmH2O Allens test (POC) N/A Specimen type (POC) ARTERIAL    
GLUCOSE, POC Collection Time: 09/17/20  5:09 AM  
Result Value Ref Range Glucose (POC) 256 (H) 65 - 100 mg/dL Performed by Octavio VALENTIN, ALLOCATE Collection Time: 09/17/20  8:30 AM  
Result Value Ref Range HISTORY CHECKED? Historical check performed Patient is in critical condition and is at risk for sudden deterioration. 30 minutes spent on floor examining patient and reviewing chart

## 2020-09-17 NOTE — PROCEDURES
EEG REPORT Patient Name: Shavonne Browning : 1960 Age: 63 y.o. Ordering physician: Jenna Mora MD  
Date of EE2020 EEG procedure number: [employer]  
Diagnosis: Altered mental status Interpreting physician: Luther Kam MD  
 
Procedure: EEG CLINICAL INDICATION: The patient is a 63 y.o. male who is being evaluated for baseline electro cerebral activities and to rule out seizure focus. Current Facility-Administered Medications Medication Dose Route Frequency  albuterol (PROVENTIL VENTOLIN) nebulizer solution 2.5 mg  2.5 mg Nebulization Q6H RT  
 acetylcysteine (MUCOMYST) 200 mg/mL (20 %) solution 200 mg  200 mg Nebulization Q6H RT  
 0.9% sodium chloride infusion 250 mL  250 mL IntraVENous PRN  
 acetylcysteine (MUCOMYST) 200 mg/mL (20 %) solution  insulin NPH (NOVOLIN N, HUMULIN N) injection 40 Units  40 Units SubCUTAneous ACB&D  
 heparin (porcine) pf 300 Units  300 Units InterCATHeter ONCE  
 heparinized saline 2 units/mL infusion 800 Units  400 mL Irrigation ONCE  
 lidocaine (XYLOCAINE) 20 mg/mL (2 %) injection 400 mg  20 mL SubCUTAneous ONCE  
 TPN ADULT - CENTRAL   IntraVENous CONTINUOUS  
 heparin (porcine) 2,000 Units in lactated Ringers 1,000 mL Irrigation    PRN  
 vasopressin (VASOSTRICT) 20 Units in 0.9% sodium chloride 100 mL infusion  0-0.04 Units/min IntraVENous TITRATE  insulin lispro (HUMALOG) injection   SubCUTAneous Q6H  
 0.9% sodium chloride infusion 250 mL  250 mL IntraVENous PRN  
 dextrose 5% infusion  11.4 mL/hr IntraVENous CONTINUOUS  
 cefepime (MAXIPIME) 2 g in 0.9% sodium chloride (MBP/ADV) 100 mL  2 g IntraVENous Q24H  
 metroNIDAZOLE (FLAGYL) IVPB premix 500 mg  500 mg IntraVENous Q12H  
 0.9% sodium chloride infusion 250 mL  250 mL IntraVENous PRN  
 acetaminophen (TYLENOL) solution 650 mg  650 mg Per NG tube Q4H PRN  
 PHENYLephrine (DARRYL-SYNEPHRINE) 30 mg in 0.9% sodium chloride 250 mL infusion   mcg/min IntraVENous TITRATE  ticagrelor (BRILINTA) tablet 90 mg  90 mg Per NG tube Q12H  aspirin chewable tablet 81 mg  81 mg Per NG tube DAILY  albumin human 5% (BUMINATE) solution 12.5 g  12.5 g IntraVENous Q2H PRN  
 sodium chloride (NS) flush 5-40 mL  5-40 mL IntraVENous Q8H  
 sodium chloride (NS) flush 5-40 mL  5-40 mL IntraVENous PRN  
 0.45% sodium chloride infusion  10 mL/hr IntraVENous CONTINUOUS  
 oxyCODONE IR (ROXICODONE) tablet 5 mg  5 mg Oral Q4H PRN  
 oxyCODONE IR (ROXICODONE) tablet 10 mg  10 mg Oral Q4H PRN  
 morphine 10 mg/ml injection 4 mg  4 mg IntraVENous Q2H PRN  
 naloxone (NARCAN) injection 0.4 mg  0.4 mg IntraVENous PRN  
 ondansetron (ZOFRAN) injection 4 mg  4 mg IntraVENous Q4H PRN  
 albuterol (PROVENTIL VENTOLIN) nebulizer solution 2.5 mg  2.5 mg Nebulization Q4H PRN  chlorhexidine (PERIDEX) 0.12 % mouthwash 10 mL  10 mL Oral Q12H  
 bisacodyL (DULCOLAX) suppository 10 mg  10 mg Rectal DAILY PRN  
 [Held by provider] senna-docusate (PERICOLACE) 8.6-50 mg per tablet 1 Tab  1 Tab Oral BID  [Held by provider] polyethylene glycol (MIRALAX) packet 17 g  17 g Oral DAILY  ELECTROLYTE REPLACEMENT NOTE: Nurse to review Serum Potassium and Magnesuim levels and Initiate Electrolyte Replacement Protocol as needed  1 Each Other PRN  
 magnesium sulfate 1 g/100 ml IVPB (premix or compounded)  1 g IntraVENous PRN  pantoprazole (PROTONIX) 40 mg in 0.9% sodium chloride 10 mL injection  40 mg IntraVENous DAILY  EPINEPHrine (ADRENALIN) 5 mg in 0.9% sodium chloride 250 mL infusion  0-10 mcg/min IntraVENous TITRATE  
 NOREPINephrine (LEVOPHED) 8 mg in 5% dextrose 250mL (32 mcg/mL) infusion  0.5-16 mcg/min IntraVENous TITRATE  propofol (DIPRIVAN) 10 mg/mL infusion  0-50 mcg/kg/min IntraVENous TITRATE  dexmedeTOMidine (PRECEDEX) 400 mcg in 0.9% sodium chloride 100 mL infusion  0.2-0.7 mcg/kg/hr IntraVENous TITRATE  glucose chewable tablet 16 g  4 Tab Oral PRN  
 dextrose (D50W) injection syrg 12.5-25 g  12.5-25 g IntraVENous PRN  
 glucagon (GLUCAGEN) injection 1 mg  1 mg IntraMUSCular PRN  
 alteplase (CATHFLO) 1 mg in sterile water (preservative free) 1 mL injection  1 mg InterCATHeter PRN  
 bacitracin 500 unit/gram packet 1 Packet  1 Packet Topical PRN  
 balsam peru-castor oiL (VENELEX) ointment   Topical BID  DOBUTamine (DOBUTREX) 500 mg/250 mL (2,000 mcg/mL) infusion  2 mcg/kg/min IntraVENous CONTINUOUS Description of Activity: The background of this recording contains a posteriorly-located occipital  rhythm of 5-6 hz that attenuates with eye opening. This was seen maximally over the posterior head region and was symmetric. Throughout the recording, there were no clear areas of spike or spike-and-wave discharges seen. There did appear to be some intermittent focal slowing in the left frontal regions. Hyperventilation was not performed. Photic stimulation was not performed. During drowsiness, there is attenuation of the underlying  frequency and slower theta activity occurs. During the recording, the patient did not achieve stage II sleep Clinical Interpretation: This EEG, performed during wakefulness and drowsiness/sleep, is abnormal. There is moderate generalized slowing as seen in encephalopathies. There are no seizures or epileptiform discharges seen. There did appear to be intermittent left sided slowing, and would recommend obtaining a traditional EEG. This EEG does not rule out the possibility of seizures or the diagnosis of epilepsy. Clinical correlation is recommended.  
  
Merlin Quick, MD

## 2020-09-17 NOTE — PROGRESS NOTES
09/17/20 8166 ABCDEF Bundle SBT Safety Screen Passed No  
SBT Screen Reason for Failure FiO2 > 50%;PEEP > 7.5

## 2020-09-17 NOTE — PROGRESS NOTES
6442  ETT advanced 3cm per Dr. Teofilo Bonillaoring  Dr. Milton Dow at bedside. Assessment complete. Will hold off on CT and get MRI when able 
1000  Dr. Ephraim Ling at bedside. Orders received for kiko placement and CVVH. Brother at bedside and consents obtained. 1100  Spoke with IR regarding kiko placement. Plan for procedure early afternoon. 1200  EEG tech at bedside. [place] with [facility] via phone. Plan for kiko placement \"soon\". Will notify them at time of insertion. [place] placed by IR. PCXR ordered. [facility] called and notified. [place] at bedside. Kiko placement confirmed. 1630  CVVHD started 80  Pt's brother at bedside. Updated him on pt condition 36  Dr. Linn Guerrero at bedside. Orders received to increase factor on CVVHD to 100 if BP tolerates. Also continue to wean vent overnight. Order received for MRI and screening sheet completed with pt's brother at bedside. 2000 Discussed pt condition with Dr. Linn Guerrero. Pt CVVHD only running since 1630 and pt becoming more awake and gagging on ETT. Decision made to hold off on MRI until AM and start dex if needed for pt comfort

## 2020-09-17 NOTE — PROGRESS NOTES
2100: Weaned off Dobutamine drip. 2200: Weaned off Levophed drip. 
0342: Patient nods head to questions. Follows commands with blinking appropriately. No movement to extremities. Patient nods head \"Yes\" to pain. Tearing during bath and respiratory rate into 30's. Bloody sputum with suctioning. PRN Morphine given. 4664: Restarted Precedex drip, patient \"bucking vent\". 36:  aware of morning ABG results. Will increase respiratory rate and given 2 amps sodium bicarb. 6932: Stopped Precedex drip at current time due to drop in BP.

## 2020-09-17 NOTE — PROGRESS NOTES
Progress Note 9/17/2020 4:56 PM 
NAME: Alisa Ling MRN:  [id number] Admit Diagnosis: Cardiac arrest ([facility] [address] [I46.9] Assessment:   
  
1. Refractory cardiac arrest, initial rhythm appeared to be PEA arrest  Status post ECMO placement for hemodynamic support 2. Cardiogenic Shock 3. Biventricular failure 4. CAD w/ LM and 3v CAD, with MAKEDA 3 flow at baseline, underwent multivessel PCI 5. Probable PE vs ACS 6. Respiratory failure status post intubation 7. Severe metabolic acidosis, improved 8. Non occlusive DVT in Rt femoral artery, may be related to ECMO cannula, on [facility] 9. Anemia 10. Fever, Sepsis 11. Thrombocytopenia 12. CAMACHO 13. Shock liver 14. Diabetes 15. Hypertension 16. Hyperlipidemia 
  
   
  
            Plan:    
1. D/marizol impella, s/p decannulation of ECMO, of Vasopressors 2. Fever, concern for sepsis, on Zosyn and vanc 3. Worsening of renal function, has good UOP, volume overload, getting lasix prn, possible CVVH, renal following   
5 Unclear cause of Cardiac arrest, Presentation could be PE, ACS, s/p multivessel PCI for cardiogenic shock 6. On ASA, ticagrelor 7. Vent mx per critical care team  
8. Awaiting for neurological recovery, improving Critically ill, prognosis guarded, hemodynamics has improves, awaiting neuro recovery . Discussed with brother kulwinder yesterday  
  
   
  
 [x]? High complexity decision making was performed 
  
 
  
 
Subjective: HPI: 
Impella removed Opens eyes Off pressors ROS: opens eyes, does not move extremity Objective:  
  
Physical Exam: 
 
Last 24hrs VS reviewed since prior progress note. Most recent are: 
 
Visit Vitals BP (!) 91/54 Pulse 95 Temp 97.8 °F (36.6 °C) Resp 14 Ht 5' 9\" (1.753 m) Wt 97.8 kg (215 lb 9.8 oz) SpO2 100% BMI 31.84 kg/m² Intake/Output Summary (Last 24 hours) at 9/17/2020 3263 Last data filed at 9/17/2020 0600 Gross per 24 hour Intake 2993.55 ml Output 3705 ml Net -711.45 ml General: intubated and sedated Neck: Supple Respiratory: on vent Cardiovascular: Regular rate rhythm, S1S2 Abdomen: soft,   non distended Neuro:  Sedated and intubated Skin:   dry Extremity: no edema Data Review Telemetry: ST 
 
EKG:  
NSR, Diffuse STT abn, no ST elevation, prolonged QT Lab Data Personally Reviewed: 
 
Recent Labs  
  09/17/20 0324 09/16/20 1711 WBC 9.7 12.5* HGB 7.0* 7.5* HCT 21.6* 23.4*  
PLT 86* 96* Recent Labs  
  09/15/20 
1421 09/15/20 
0904 09/15/20 
0351 APTT 34.9* 43.4* 53.6* Recent Labs  
  09/17/20 0324 09/16/20 1711 09/16/20 0350  146* 146*  
K 4.5 4.5 3.5 * 113* 114* CO2 23 26 25 BUN 71* 62* 57* CREA 4.23* 4.06* 3.82* * 129* 97  
CA 8.8 7.7* 8.0*  
MG 2.7* 2.3 2.2 No results for input(s): CPK, CKNDX, TROIQ in the last 72 hours. No lab exists for component: CPKMB Lab Results Component Value Date/Time Triglyceride 420 (H) 09/12/2020 08:07 AM  
 
 
Recent Labs  
  09/17/20 0324 09/16/20 1711 09/16/20 
0350 AP 67 72 72  
TP 5.9* 5.8* 5.7* ALB 2.5* 2.7* 2.7*  
GLOB 3.4 3.1 3.0 No results for input(s): PH, PCO2, PO2 in the last 72 hours. Medications Personally Reviewed: 
 
Current Facility-Administered Medications Medication Dose Route Frequency  TPN ADULT - CENTRAL   IntraVENous CONTINUOUS  
 insulin NPH (NOVOLIN N, HUMULIN N) injection 20 Units  20 Units SubCUTAneous ACB&D  
 heparin (porcine) 2,000 Units in lactated Ringers 1,000 mL Irrigation    PRN  
 vasopressin (VASOSTRICT) 20 Units in 0.9% sodium chloride 100 mL infusion  0-0.04 Units/min IntraVENous TITRATE  insulin lispro (HUMALOG) injection   SubCUTAneous Q6H  
 0.9% sodium chloride infusion 250 mL  250 mL IntraVENous PRN  
 dextrose 5% infusion  11.4 mL/hr IntraVENous CONTINUOUS  
 cefepime (MAXIPIME) 2 g in 0.9% sodium chloride (MBP/ADV) 100 mL  2 g IntraVENous Q24H  
 metroNIDAZOLE (FLAGYL) IVPB premix 500 mg  500 mg IntraVENous Q12H  
 vancomycin (VANCOCIN) 1250 mg in  ml infusion  1,250 mg IntraVENous Q36H  
 0.9% sodium chloride infusion 250 mL  250 mL IntraVENous PRN  
 acetaminophen (TYLENOL) solution 650 mg  650 mg Per NG tube Q4H PRN  
 PHENYLephrine (DARRYL-SYNEPHRINE) 30 mg in 0.9% sodium chloride 250 mL infusion   mcg/min IntraVENous TITRATE  ticagrelor (BRILINTA) tablet 90 mg  90 mg Per NG tube Q12H  aspirin chewable tablet 81 mg  81 mg Per NG tube DAILY  albumin human 5% (BUMINATE) solution 12.5 g  12.5 g IntraVENous Q2H PRN  
 sodium chloride (NS) flush 5-40 mL  5-40 mL IntraVENous Q8H  
 sodium chloride (NS) flush 5-40 mL  5-40 mL IntraVENous PRN  
 0.45% sodium chloride infusion  10 mL/hr IntraVENous CONTINUOUS  
 oxyCODONE IR (ROXICODONE) tablet 5 mg  5 mg Oral Q4H PRN  
 oxyCODONE IR (ROXICODONE) tablet 10 mg  10 mg Oral Q4H PRN  
 morphine 10 mg/ml injection 4 mg  4 mg IntraVENous Q2H PRN  
 naloxone (NARCAN) injection 0.4 mg  0.4 mg IntraVENous PRN  
 ondansetron (ZOFRAN) injection 4 mg  4 mg IntraVENous Q4H PRN  
 albuterol (PROVENTIL VENTOLIN) nebulizer solution 2.5 mg  2.5 mg Nebulization Q4H PRN  chlorhexidine (PERIDEX) 0.12 % mouthwash 10 mL  10 mL Oral Q12H  
 magnesium oxide (MAG-OX) tablet 400 mg  400 mg Oral BID  
 bisacodyL (DULCOLAX) suppository 10 mg  10 mg Rectal DAILY PRN  
 [Held by provider] senna-docusate (PERICOLACE) 8.6-50 mg per tablet 1 Tab  1 Tab Oral BID  [Held by provider] polyethylene glycol (MIRALAX) packet 17 g  17 g Oral DAILY  ELECTROLYTE REPLACEMENT NOTE: Nurse to review Serum Potassium and Magnesuim levels and Initiate Electrolyte Replacement Protocol as needed  1 Each Other PRN  
 magnesium sulfate 1 g/100 ml IVPB (premix or compounded)  1 g IntraVENous PRN  pantoprazole (PROTONIX) 40 mg in 0.9% sodium chloride 10 mL injection  40 mg IntraVENous DAILY  EPINEPHrine (ADRENALIN) 5 mg in 0.9% sodium chloride 250 mL infusion  0-10 mcg/min IntraVENous TITRATE  
 NOREPINephrine (LEVOPHED) 8 mg in 5% dextrose 250mL (32 mcg/mL) infusion  0.5-16 mcg/min IntraVENous TITRATE  propofol (DIPRIVAN) 10 mg/mL infusion  0-50 mcg/kg/min IntraVENous TITRATE  dexmedeTOMidine (PRECEDEX) 400 mcg in 0.9% sodium chloride 100 mL infusion  0.2-0.7 mcg/kg/hr IntraVENous TITRATE  glucose chewable tablet 16 g  4 Tab Oral PRN  
 dextrose (D50W) injection syrg 12.5-25 g  12.5-25 g IntraVENous PRN  
 glucagon (GLUCAGEN) injection 1 mg  1 mg IntraMUSCular PRN  
 alteplase (CATHFLO) 1 mg in sterile water (preservative free) 1 mL injection  1 mg InterCATHeter PRN  
 bacitracin 500 unit/gram packet 1 Packet  1 Packet Topical PRN  
 balsam peru-castor oiL (VENELEX) ointment   Topical BID  DOBUTamine (DOBUTREX) 500 mg/250 mL (2,000 mcg/mL) infusion  2 mcg/kg/min IntraVENous CONTINUOUS Laurie Arce MD

## 2020-09-17 NOTE — PROGRESS NOTES
NAME: Gianna Erickson :  1960 MRN:  [id number] Assessment   :                                               Plan: 
CKD/CAMACHO S/P arrest 
Shock Hypernatremia Acute resp. Failure 
  
hyperkalemia 
  Creatinine continues to worsen S/P significant dye load  and diuresis. Fairly good UO. Will plan on starting CVVHD today. Factor 25.  4K bags. Labs at 0400 and 1600 
  
I suspect he has underlying CKD from DM/HTN.   
CAMACHO likely related to shock. Critically ill at significant risk of decompensation. D/W RN and family at bedside.  
  
 
 
Subjective: Chief Complaint:  intubated Review of Systems:  UTO - intubated. Symptom Y/N Comments  Symptom Y/N Comments Fever/Chills    Chest Pain Poor Appetite    Edema Cough    Abdominal Pain Sputum    Joint Pain SOB/FINLEY    Pruritis/Rash Nausea/vomit    Tolerating PT/OT Diarrhea    Tolerating Diet Constipation    Other Could not obtain due to:   
 
Objective: VITALS:  
Last 24hrs VS reviewed since prior progress note. Most recent are: 
Visit Vitals BP (!) 91/54 (BP 1 Location: Right arm, BP Patient Position: At rest) Pulse 94 Temp 97.8 °F (36.6 °C) Resp 30 Ht 5' 9\" (1.753 m) Wt 97.8 kg (215 lb 9.8 oz) SpO2 100% BMI 31.84 kg/m² Intake/Output Summary (Last 24 hours) at 2020 7245 Last data filed at 2020 0800 Gross per 24 hour Intake 2993.55 ml Output 3825 ml Net -831.45 ml Telemetry Reviewed: PHYSICAL EXAM: 
General: NAD Lab Data Reviewed: (see below) Medications Reviewed: (see below) PMH/SH reviewed - no change compared to H&P 
________________________________________________________________________ Care Plan discussed with: 
Patient Family RN Care Manager Consultant:     
 
  Comments >50% of visit spent in counseling and coordination of care    
 
________________________________________________________________________ Abby Jackson MD  
 
Procedures: see electronic medical records for all procedures/Xrays and details which 
were not copied into this note but were reviewed prior to creation of Plan. LABS: 
Recent Labs  
  09/17/20 0324 09/16/20 1711 WBC 9.7 12.5* HGB 7.0* 7.5* HCT 21.6* 23.4*  
PLT 86* 96* Recent Labs  
  09/17/20 0324 09/16/20 1711 09/16/20 0350  146* 146*  
K 4.5 4.5 3.5 * 113* 114* CO2 23 26 25 BUN 71* 62* 57* CREA 4.23* 4.06* 3.82* * 129* 97  
CA 8.8 7.7* 8.0*  
MG 2.7* 2.3 2.2 PHOS 8.6*  --  4.3 Recent Labs  
  09/17/20 0324 09/16/20 1711 09/16/20 
0350 AP 67 72 72  
TP 5.9* 5.8* 5.7* ALB 2.5* 2.7* 2.7*  
GLOB 3.4 3.1 3.0 Recent Labs  
  09/15/20 
1421 09/15/20 
0904 09/15/20 
0351 APTT 34.9* 43.4* 53.6* No results for input(s): FE, TIBC, PSAT, FERR in the last 72 hours. No results found for: FOL, RBCF No results for input(s): PH, PCO2, PO2 in the last 72 hours. No results for input(s): CPK, CKMB in the last 72 hours. No lab exists for component: TROPONINI No components found for: [place] Lab Results Component Value Date/Time  Color YELLOW/STRAW 09/12/2020 05:32 PM  
 Appearance CLEAR 09/12/2020 05:32 PM  
 Specific gravity 1.022 09/12/2020 05:32 PM  
 Specific gravity 1.020 12/11/2015 09:31 AM  
 pH (UA) 7.5 09/12/2020 05:32 PM  
 Protein Negative 09/12/2020 05:32 PM  
 Glucose Negative 09/12/2020 05:32 PM  
 Ketone Negative 09/12/2020 05:32 PM  
 Bilirubin Negative 09/12/2020 05:32 PM  
 Urobilinogen 0.2 09/12/2020 05:32 PM  
 Nitrites Negative 09/12/2020 05:32 PM  
 Leukocyte Esterase Negative 09/12/2020 05:32 PM  
 Epithelial cells FEW 09/12/2020 05:32 PM  
 Bacteria Negative 09/12/2020 05:32 PM  
 WBC 0-4 09/12/2020 05:32 PM  
 RBC 10-20 09/12/2020 05:32 PM  
 
 
 MEDICATIONS: 
Current Facility-Administered Medications Medication Dose Route Frequency  albuterol (PROVENTIL VENTOLIN) nebulizer solution 2.5 mg  2.5 mg Nebulization Q6H RT  
 acetylcysteine (MUCOMYST) 200 mg/mL (20 %) solution 200 mg  200 mg Nebulization Q6H RT  
 0.9% sodium chloride infusion 250 mL  250 mL IntraVENous PRN  
 acetylcysteine (MUCOMYST) 200 mg/mL (20 %) solution  insulin NPH (NOVOLIN N, HUMULIN N) injection 40 Units  40 Units SubCUTAneous ACB&D  
 insulin NPH (NOVOLIN N, HUMULIN N) injection 20 Units  20 Units SubCUTAneous ONCE  
 TPN ADULT - CENTRAL   IntraVENous CONTINUOUS  
 heparin (porcine) 2,000 Units in lactated Ringers 1,000 mL Irrigation    PRN  
 vasopressin (VASOSTRICT) 20 Units in 0.9% sodium chloride 100 mL infusion  0-0.04 Units/min IntraVENous TITRATE  insulin lispro (HUMALOG) injection   SubCUTAneous Q6H  
 0.9% sodium chloride infusion 250 mL  250 mL IntraVENous PRN  
 dextrose 5% infusion  11.4 mL/hr IntraVENous CONTINUOUS  
 cefepime (MAXIPIME) 2 g in 0.9% sodium chloride (MBP/ADV) 100 mL  2 g IntraVENous Q24H  
 metroNIDAZOLE (FLAGYL) IVPB premix 500 mg  500 mg IntraVENous Q12H  
 0.9% sodium chloride infusion 250 mL  250 mL IntraVENous PRN  
 acetaminophen (TYLENOL) solution 650 mg  650 mg Per NG tube Q4H PRN  
 PHENYLephrine (DARRYL-SYNEPHRINE) 30 mg in 0.9% sodium chloride 250 mL infusion   mcg/min IntraVENous TITRATE  ticagrelor (BRILINTA) tablet 90 mg  90 mg Per NG tube Q12H  aspirin chewable tablet 81 mg  81 mg Per NG tube DAILY  albumin human 5% (BUMINATE) solution 12.5 g  12.5 g IntraVENous Q2H PRN  
 sodium chloride (NS) flush 5-40 mL  5-40 mL IntraVENous Q8H  
 sodium chloride (NS) flush 5-40 mL  5-40 mL IntraVENous PRN  
 0.45% sodium chloride infusion  10 mL/hr IntraVENous CONTINUOUS  
 oxyCODONE IR (ROXICODONE) tablet 5 mg  5 mg Oral Q4H PRN  
 oxyCODONE IR (ROXICODONE) tablet 10 mg  10 mg Oral Q4H PRN  
  morphine 10 mg/ml injection 4 mg  4 mg IntraVENous Q2H PRN  
 naloxone (NARCAN) injection 0.4 mg  0.4 mg IntraVENous PRN  
 ondansetron (ZOFRAN) injection 4 mg  4 mg IntraVENous Q4H PRN  
 albuterol (PROVENTIL VENTOLIN) nebulizer solution 2.5 mg  2.5 mg Nebulization Q4H PRN  chlorhexidine (PERIDEX) 0.12 % mouthwash 10 mL  10 mL Oral Q12H  
 bisacodyL (DULCOLAX) suppository 10 mg  10 mg Rectal DAILY PRN  
 [Held by provider] senna-docusate (PERICOLACE) 8.6-50 mg per tablet 1 Tab  1 Tab Oral BID  [Held by provider] polyethylene glycol (MIRALAX) packet 17 g  17 g Oral DAILY  ELECTROLYTE REPLACEMENT NOTE: Nurse to review Serum Potassium and Magnesuim levels and Initiate Electrolyte Replacement Protocol as needed  1 Each Other PRN  
 magnesium sulfate 1 g/100 ml IVPB (premix or compounded)  1 g IntraVENous PRN  pantoprazole (PROTONIX) 40 mg in 0.9% sodium chloride 10 mL injection  40 mg IntraVENous DAILY  EPINEPHrine (ADRENALIN) 5 mg in 0.9% sodium chloride 250 mL infusion  0-10 mcg/min IntraVENous TITRATE  
 NOREPINephrine (LEVOPHED) 8 mg in 5% dextrose 250mL (32 mcg/mL) infusion  0.5-16 mcg/min IntraVENous TITRATE  propofol (DIPRIVAN) 10 mg/mL infusion  0-50 mcg/kg/min IntraVENous TITRATE  dexmedeTOMidine (PRECEDEX) 400 mcg in 0.9% sodium chloride 100 mL infusion  0.2-0.7 mcg/kg/hr IntraVENous TITRATE  glucose chewable tablet 16 g  4 Tab Oral PRN  
 dextrose (D50W) injection syrg 12.5-25 g  12.5-25 g IntraVENous PRN  
 glucagon (GLUCAGEN) injection 1 mg  1 mg IntraMUSCular PRN  
 alteplase (CATHFLO) 1 mg in sterile water (preservative free) 1 mL injection  1 mg InterCATHeter PRN  
 bacitracin 500 unit/gram packet 1 Packet  1 Packet Topical PRN  
 balsam peru-castor oiL (VENELEX) ointment   Topical BID  DOBUTamine (DOBUTREX) 500 mg/250 mL (2,000 mcg/mL) infusion  2 mcg/kg/min IntraVENous CONTINUOUS

## 2020-09-17 NOTE — CONSULTS
Vascular Surgery Progress Note Efraín Appiah ACNP-BC 
9/17/2020 Subjective:  
 
Mr. Cesar Sims is a 64 yo CM w/ a pmhx significant for obesity, DM, HTN, HLD, and GERD. He is a daily smoker. He is admitted to the hospital with ACS and possible PE. He complained of SOB while driving. He pulled over to the side of the road and EMS was contacted. After their arrival en route to the hospital he became unresponsive w/ PEA. BLS protocol was initiated. Upon arrival to [facility] he was in full cardiac arrest. He was cyanotic. He was emergently taken to the OR for VA ECMO on 09/10/2020. A Casper II device was maintained throughout the procedure (nearly 60 minutes of CPR, at times converted to VT). His post procedure course has been complicated by persistent respiratory failure, cardiogenic shock, CAMACHO w/ severe metabolic acidosis, and shock liver. On 09/12/2020 he underwent a cardiac cath w/ Impella CP insertion and placement of 9 RAFFY (3 LAD, 1 diagonal, 1 OM1, 1 LM, 3 RCA). His post procedure course was complicated by bleeding from his access site resulting in hypotension and loss of pulsatility. 3 units of pRBCs were directly transfused into the ECMO circuit for rapid resuscitation. PLTs were also administered. A RLE duplex since admission was significant for a DVT likely related to ECHO cannulization. ECMO was discontinued on 09/14/2020 w/ reconstruction of femoral artery. He has developed sepsis, fever, and diarrhea. He is s/p bronchoscopy on 09/13, 9/14, & 9/16 for increase secretions,mucus plugging, and RUL collapse w/ possible PNA. We were consulted for Impella device removal and repair of femoral artery which was completed on 09/16/2020. Today is hospital day 6. He remains intubated. This am his eyes are open. He was blinks and nods appropriately to questions. He is not moving his extremities ? ?   He is off pressor support w/ marginal blood pressures. He has been off anticoagulation for since 09/14/2020 for procedures. He has bloody sputum following his multiple bronchoscopies. Nursing Data:  
 
Patient Vitals for the past 24 hrs: 
 BP Temp Pulse Resp SpO2 Weight  
09/17/20 0900   94 30 100 %   
09/17/20 0826   95 (!) 33 100 %   
09/17/20 0825     100 %   
09/17/20 0800 (!) 91/54 97.8 °F (36.6 °C) 95 14 100 %   
09/17/20 0600 (!) 94/55  (!) 105 26 99 %   
09/17/20 0500 (!) 99/59  (!) 103 27 98 %   
09/17/20 0400 (!) 98/59 97.8 °F (36.6 °C) (!) 102 27 99 % 97.8 kg (215 lb 9.8 oz) 09/17/20 0352   (!) 101 28 100 %   
09/17/20 0342    (!) 33 100 %   
09/17/20 0300 (!) 98/58  (!) 102 28 100 %   
09/17/20 0200 (!) 102/57  (!) 106 27 100 %   
09/17/20 0100 93/62  (!) 109 25 100 %   
09/17/20 0000 (!) 97/56  (!) 109 26 100 %   
09/16/20 2305   (!) 112 28 100 %   
09/16/20 2300 (!) 117/54 98.4 °F (36.9 °C) (!) 113 26 99 %   
09/16/20 2200 (!) 101/55 98.7 °F (37.1 °C) (!) 108 25 100 %   
09/16/20 2100 (!) 96/50 98.6 °F (37 °C) (!) 105 26 100 %   
09/16/20 2000 102/66 98.5 °F (36.9 °C) (!) 106 22 100 %   
09/16/20 1925   (!) 101 26 100 %   
09/16/20 1830  98.1 °F (36.7 °C) 96 23 100 %   
09/16/20 1815  98 °F (36.7 °C) 97 25 100 %   
09/16/20 1800 101/63 97.9 °F (36.6 °C) 98 24 100 %   
09/16/20 1745  97.8 °F (36.6 °C) 99 26 100 %   
09/16/20 1730  97.8 °F (36.6 °C) 96 23 100 %   
09/16/20 1715  97.7 °F (36.5 °C) 98 24 100 %   
09/16/20 1705   97 26 100 %   
09/16/20 1700 107/62 97.7 °F (36.5 °C) 99 19 100 %   
09/16/20 1645  97.8 °F (36.6 °C) (!) 106 18 100 %   
09/16/20 1630  97.9 °F (36.6 °C) (!) 107 17 100 %   
09/16/20 1618  98 °F (36.7 °C) (!) 103 11 100 %   
09/16/20 1617 (!) 96/36 98.1 °F (36.7 °C) (!) 102 12 99 %   
09/16/20 1615   (!) 102 12 100 %   
09/16/20 1612   (!) 101 19 99 %   
09/16/20 1607   100 10 98 %   
09/16/20 1245  97.5 °F (36.4 °C) 80 14 100 %   
 09/16/20 1230  97.5 °F (36.4 °C) 81 16 100 %   
09/16/20 1222   80 22 100 %   
09/16/20 1218      103.1 kg (227 lb 4.7 oz) 09/16/20 1215  97.4 °F (36.3 °C) 81 14 100 %   
09/16/20 1200 102/66 97.3 °F (36.3 °C) 83 15 100 %   
09/16/20 1145  97.4 °F (36.3 °C) 85 15 100 %   
09/16/20 1130 (!) 84/51 97.5 °F (36.4 °C) 85 18 99 %   
09/16/20 1115  97.5 °F (36.4 °C) 85 18 97 %   
09/16/20 1100 (!) 79/48 97.5 °F (36.4 °C) 84 20 94 %   
09/16/20 1030  97.4 °F (36.3 °C) 80 29 98 %   
09/16/20 1015  97.4 °F (36.3 °C) 78 17 100 %   
09/16/20 1000 (!) 93/57 97.4 °F (36.3 °C) 77 16 100 %   
09/16/20 0945  97.5 °F (36.4 °C) 76 15 100 %  Intake/Output Summary (Last 24 hours) at 9/17/2020 8016 Last data filed at 9/17/2020 0800 Gross per 24 hour Intake 2993.55 ml Output 3825 ml Net -831.45 ml Exam:  
 
Physical Exam 
Constitutional:   
   Appearance: He is obese. He is toxic-appearing. Interventions: He is sedated and intubated. Comments: Critically ill appearing CM w/ eyes open HENT:  
   Head: Normocephalic. Nose: Nose normal.  
   Mouth/Throat:  
   Mouth: Mucous membranes are dry. Eyes:  
   Pupils: Pupils are equal, round, and reactive to light. Neck: Musculoskeletal: Normal range of motion. Cardiovascular:  
   Rate and Rhythm: Regular rhythm. Tachycardia present. Comments: Left foot is warm and viable. Pulmonary:  
   Effort: Pulmonary effort is normal. He is intubated. Abdominal:  
   General: Abdomen is flat. Palpations: Abdomen is soft. Skin: 
   Coloration: Skin is pale. Neurological:  
   Comments: Nods and blinks appropriately. He is not moving his extremities. Lab Review:  
 
. Recent Results (from the past 24 hour(s)) GLUCOSE, POC Collection Time: 09/16/20 10:32 AM  
Result Value Ref Range Glucose (POC) 114 (H) 65 - 100 mg/dL Performed by Dexter Ko  
 Collection Time: 09/16/20 10:32 AM  
Result Value Ref Range Glucose 114 mg/dL Insulin order 2.7 units/hour Insulin adminstered 2.7 units/hour Multiplier 0.050 Low target 95 mg/dL High target 130 mg/dL D50 order 0.0 ml  
 D50 administered 0.00 ml Minutes until next BG 60 min Order initials rpc Administered initials rpc GLSCOM Comments GLUCOSE, POC Collection Time: 09/16/20 11:37 AM  
Result Value Ref Range Glucose (POC) 116 (H) 65 - 100 mg/dL Performed by Spinnaker Coatingn [employer] Livej[employer] eduardo Fly Collection Time: 09/16/20 11:37 AM  
Result Value Ref Range Glucose 116 mg/dL Insulin order 2.8 units/hour Insulin adminstered 2.8 units/hour Multiplier 0.050 Low target 95 mg/dL High target 130 mg/dL D50 order 0.0 ml  
 D50 administered 0.00 ml Minutes until next BG 60 min Order initials rpc Administered initials rpc GLSCOM Comments CULTURE, RESPIRATORY/SPUTUM/BRONCH W GRAM STAIN Collection Time: 09/16/20 11:52 AM  
 Specimen: Bronchial Aspirate; Sputum Result Value Ref Range Special Requests: NO SPECIAL REQUESTS    
 GRAM STAIN FEW WBCS SEEN    
 GRAM STAIN RARE EPITHELIAL CELLS SEEN    
 GRAM STAIN NO ORGANISMS SEEN Culture result: PENDING   
GLUCOSE, POC Collection Time: 09/16/20 12:46 PM  
Result Value Ref Range Glucose (POC) 111 (H) 65 - 100 mg/dL Performed by Spinnaker [employer] eduardo Fly Collection Time: 09/16/20 12:47 PM  
Result Value Ref Range Glucose 111 mg/dL Insulin order 2.6 units/hour Insulin adminstered 2.6 units/hour Multiplier 0.050 Low target 95 mg/dL High target 130 mg/dL D50 order 0.0 ml  
 D50 administered 0.00 ml Minutes until next BG 60 min Order initials rpc Administered initials rpc GLSCOM Comments GLUCOSE, POC Collection Time: 09/16/20  2:11 PM  
Result Value Ref Range Glucose (POC) 105 (H) 65 - 100 mg/dL Performed by Vasile Calderon Collection Time: 09/16/20  2:11 PM  
Result Value Ref Range Glucose 105 mg/dL Insulin order 2.3 units/hour Insulin adminstered 2.3 units/hour Multiplier 0.050 Low target 95 mg/dL High target 130 mg/dL D50 order 0.0 ml  
 D50 administered 0.00 ml Minutes until next  min Order initials lbm Administered initials lbm GLSCOM Comments POC EG7 Collection Time: 09/16/20  4:59 PM  
Result Value Ref Range Calcium, ionized (POC) 1.03 (L) 1.12 - 1.32 mmol/L  
 FIO2 (POC) 100 % pH (POC) 7.21 (LL) 7.35 - 7.45    
 pCO2 (POC) 61.0 (H) 35.0 - 45.0 MMHG  
 pO2 (POC) 193 (H) 80 - 100 MMHG  
 HCO3 (POC) 24.5 22 - 26 MMOL/L Base deficit (POC) 3 mmol/L  
 sO2 (POC) 99 (H) 92 - 97 % Site DRAWN FROM ARTERIAL LINE Device: VENT Mode ASSIST CONTROL Tidal volume 450 ml Set Rate 10 bpm  
 PEEP/CPAP (POC) 10 cmH2O Allens test (POC) N/A Specimen type (POC) ARTERIAL Total resp. rate 19 GLUCOSE, POC Collection Time: 09/16/20  5:00 PM  
Result Value Ref Range Glucose (POC) 126 (H) 65 - 100 mg/dL Performed by Todd Frankel Cindy Shaver Collection Time: 09/16/20  5:00 PM  
Result Value Ref Range Glucose 126 mg/dL Insulin order 3.3 units/hour Insulin adminstered 3.3 units/hour Multiplier 0.050 Low target 95 mg/dL High target 130 mg/dL D50 order 0.0 ml  
 D50 administered 0.00 ml Minutes until next BG 60 min Order initials rpc Administered initials rpc GLSCOM Comments CBC W/O DIFF Collection Time: 09/16/20  5:11 PM  
Result Value Ref Range WBC 12.5 (H) 4.1 - 11.1 K/uL  
 RBC 2.42 (L) 4.10 - 5.70 M/uL HGB 7.5 (L) 12.1 - 17.0 g/dL HCT 23.4 (L) 36.6 - 50.3 % MCV 96.7 80.0 - 99.0 FL  
 MCH 31.0 26.0 - 34.0 PG  
 MCHC 32.1 30.0 - 36.5 g/dL  
 RDW 17.3 (H) 11.5 - 14.5 % PLATELET 96 (L) 038 - 400 K/uL  MPV 12.0 8.9 - 12.9 FL  
 NRBC 0.0 0  WBC ABSOLUTE NRBC 0.00 0.00 - 0.01 K/uL METABOLIC PANEL, COMPREHENSIVE Collection Time: 09/16/20  5:11 PM  
Result Value Ref Range Sodium 146 (H) 136 - 145 mmol/L Potassium 4.5 3.5 - 5.1 mmol/L Chloride 113 (H) 97 - 108 mmol/L  
 CO2 26 21 - 32 mmol/L Anion gap 7 5 - 15 mmol/L Glucose 129 (H) 65 - 100 mg/dL BUN 62 (H) 6 - 20 MG/DL Creatinine 4.06 (H) 0.70 - 1.30 MG/DL  
 BUN/Creatinine ratio 15 12 - 20 GFR est AA 18 (L) >60 ml/min/1.73m2 GFR est non-AA 15 (L) >60 ml/min/1.73m2 Calcium 7.7 (L) 8.5 - 10.1 MG/DL Bilirubin, total 1.2 (H) 0.2 - 1.0 MG/DL  
 ALT (SGPT) 9 (L) 12 - 78 U/L  
 AST (SGOT) 45 (H) 15 - 37 U/L Alk. phosphatase 72 45 - 117 U/L Protein, total 5.8 (L) 6.4 - 8.2 g/dL Albumin 2.7 (L) 3.5 - 5.0 g/dL Globulin 3.1 2.0 - 4.0 g/dL A-G Ratio 0.9 (L) 1.1 - 2.2 MAGNESIUM Collection Time: 09/16/20  5:11 PM  
Result Value Ref Range Magnesium 2.3 1.6 - 2.4 mg/dL GLUCOSE, POC Collection Time: 09/16/20  6:26 PM  
Result Value Ref Range Glucose (POC) 121 (H) 65 - 100 mg/dL Performed by French Lemos Collection Time: 09/16/20  6:27 PM  
Result Value Ref Range Glucose 121 mg/dL Insulin order 3.1 units/hour Insulin adminstered 3.1 units/hour Multiplier 0.050 Low target 95 mg/dL High target 130 mg/dL D50 order 0.0 ml  
 D50 administered 0.00 ml Minutes until next  min Order initials rpc Administered initials rpc GLSCOM Comments POC EG7 Collection Time: 09/16/20  7:02 PM  
Result Value Ref Range Calcium, ionized (POC) 1.22 1.12 - 1.32 mmol/L  
 FIO2 (POC) 80 % pH (POC) 7.32 (L) 7.35 - 7.45    
 pCO2 (POC) 41.8 35.0 - 45.0 MMHG  
 pO2 (POC) 161 (H) 80 - 100 MMHG  
 HCO3 (POC) 21.5 (L) 22 - 26 MMOL/L Base deficit (POC) 5 mmol/L  
 sO2 (POC) 99 (H) 92 - 97 % Site DRAWN FROM ARTERIAL LINE Device: VENT Mode ASSIST CONTROL Tidal volume 450 ml Set Rate 26 bpm  
 PEEP/CPAP (POC) 10 cmH2O Allens test (POC) N/A Specimen type (POC) ARTERIAL Total resp. rate 28 GLUCOSE, POC Collection Time: 09/16/20  9:59 PM  
Result Value Ref Range Glucose (POC) 173 (H) 65 - 100 mg/dL Performed by Bela Chavarria GLUCOSE, POC Collection Time: 09/16/20 11:39 PM  
Result Value Ref Range Glucose (POC) 231 (H) 65 - 100 mg/dL Performed by Bela Chavarria METABOLIC PANEL, COMPREHENSIVE Collection Time: 09/17/20  3:24 AM  
Result Value Ref Range Sodium 143 136 - 145 mmol/L Potassium 4.5 3.5 - 5.1 mmol/L Chloride 111 (H) 97 - 108 mmol/L  
 CO2 23 21 - 32 mmol/L Anion gap 9 5 - 15 mmol/L Glucose 240 (H) 65 - 100 mg/dL BUN 71 (H) 6 - 20 MG/DL Creatinine 4.23 (H) 0.70 - 1.30 MG/DL  
 BUN/Creatinine ratio 17 12 - 20 GFR est AA 17 (L) >60 ml/min/1.73m2 GFR est non-AA 14 (L) >60 ml/min/1.73m2 Calcium 8.8 8.5 - 10.1 MG/DL Bilirubin, total 1.0 0.2 - 1.0 MG/DL  
 ALT (SGPT) 9 (L) 12 - 78 U/L  
 AST (SGOT) 38 (H) 15 - 37 U/L Alk. phosphatase 67 45 - 117 U/L Protein, total 5.9 (L) 6.4 - 8.2 g/dL Albumin 2.5 (L) 3.5 - 5.0 g/dL Globulin 3.4 2.0 - 4.0 g/dL A-G Ratio 0.7 (L) 1.1 - 2.2 PHOSPHORUS Collection Time: 09/17/20  3:24 AM  
Result Value Ref Range Phosphorus 8.6 (H) 2.6 - 4.7 MG/DL  
CBC W/O DIFF Collection Time: 09/17/20  3:24 AM  
Result Value Ref Range WBC 9.7 4.1 - 11.1 K/uL  
 RBC 2.27 (L) 4.10 - 5.70 M/uL HGB 7.0 (L) 12.1 - 17.0 g/dL HCT 21.6 (L) 36.6 - 50.3 % MCV 95.2 80.0 - 99.0 FL  
 MCH 30.8 26.0 - 34.0 PG  
 MCHC 32.4 30.0 - 36.5 g/dL  
 RDW 16.7 (H) 11.5 - 14.5 % PLATELET 86 (L) 573 - 400 K/uL MPV 12.2 8.9 - 12.9 FL  
 NRBC 0.0 0  WBC ABSOLUTE NRBC 0.00 0.00 - 0.01 K/uL MAGNESIUM Collection Time: 09/17/20  3:24 AM  
Result Value Ref Range Magnesium 2.7 (H) 1.6 - 2.4 mg/dL LACTIC ACID  
 Collection Time: 09/17/20  3:30 AM  
Result Value Ref Range Lactic acid 0.9 0.4 - 2.0 MMOL/L  
POC EG7 Collection Time: 09/17/20  5:05 AM  
Result Value Ref Range Calcium, ionized (POC) 1.12 1.12 - 1.32 mmol/L  
 FIO2 (POC) 80 % pH (POC) 7.24 (LL) 7.35 - 7.45    
 pCO2 (POC) 52.2 (H) 35.0 - 45.0 MMHG  
 pO2 (POC) 90 80 - 100 MMHG  
 HCO3 (POC) 22.3 22 - 26 MMOL/L Base deficit (POC) 5 mmol/L  
 sO2 (POC) 95 92 - 97 % Site DRAWN FROM ARTERIAL LINE Device: VENT Mode ASSIST CONTROL Tidal volume 450 ml Set Rate 26 bpm  
 PEEP/CPAP (POC) 10 cmH2O Allens test (POC) N/A Specimen type (POC) ARTERIAL    
GLUCOSE, POC Collection Time: 09/17/20  5:09 AM  
Result Value Ref Range Glucose (POC) 256 (H) 65 - 100 mg/dL Performed by Vitaliy Perez RBC, ALLOCATE Collection Time: 09/17/20  8:30 AM  
Result Value Ref Range HISTORY CHECKED? Historical check performed Assessment/Plan:  
  
Consult problems Impella device placement (left groin)  
-complicated by acute bleeding from access site resulting in hypotension and loss of pulsatility  
-s/p 3 units of pRBCs were directly transfused into the ECMO circuit for rapid resuscitation 
-s/p PLT administration  
-s/p removal with left femoral artery repair 09/16/2020 Groin dressing dry and intact. Plan for stable removal in 3-4 weeks RLE DVT  
-possible related to ECMO device 
-bloody sputum  
-unable to obtain CTA to exclude PE due to CAMACHO Will discuss plan of care with Dr. Adela Garay Acute problems Multi-organ failure Cardiogenic shock S/p ECMO 09/10/2020 => removal w/ femoral artery repair 09/14/2020 Weaned off vasopressor support w/ marginal BP Cardiac arrest  
-CPR x 60 minutes CAMACHO w/ severe metabolic acidosis Hypernatremia Hypokalemia Shock Liver Acute hypoxic respiratory failure w/ respiratory acidosis Mechanical intubation 09/10/2020 => 
ACS 
-s/p RAFFY x9 9/12/2020 History of HTN 
 Hyperlipidemia Plan per cardiology and cardio thoracic surgery Sepsis  
-fever w/o leukocytosis Consider central fever Pneumonia RUL collapse Mucus plugging Tobacco use 
-s/p bronchoscopy 09/13, 9/14, & 9/16 Diarrhea Anemia of acute blood loss 
-s/p multiple units of pRBCs Thrombocytopenia 
-s/p adminstration of PLTs. Profound weakness 
-patient is not moving his extremities  
-nods and blinks appropriately  
-failed SBT this am (multifactorial) Neurology consulted Active problems Uncontrolled Diabetes Mellitus  
-HA1c 8.9 
-managed w/ oral medications prior to admission GERD Management of comorbid conditions by primary team. 
 
VTE prophylaxis: SCDs Disposition:  
TBD. Patient's condition remains serious Vascular surgery will continue to see patient intermittently while admitted. We appreciate the opportunity to participate in the care of Mr. Leonora Boykin. Patient should f/u in 3-4 weeks with Dr. Marilea Burrell. Please call with any urgent vascular issues.

## 2020-09-18 NOTE — PROGRESS NOTES
Physical Therapy Order received, chart reviewed. Bed rest order will need to be discontinued, prior to PT evaluation. Pt currently ALVARO for a MRI. Will defer at this time, but will continue to follow.

## 2020-09-18 NOTE — PROGRESS NOTES
Acute cardiogenic shock Acute kidney injury Hemorrhage Acute hypotension Severe multivessel CAD Recent PCI Reviewed prior history as patient is new to me Weaned off ECMO Weaned off Impella Remains intubated and sedated Remains on CVVH Continues on TPN Trickle TF's Vent PEEP 6, FiO2 40% Hgb a little low Platelets look good Creatinine in the mid 2's on CVVH Bilirubin elevated Other LFTs improving Lactic acid normal 
 
Light yeast on most recent sputum culture Not moving LE's 
 
Plan on MRI today Planning for IVC filter ABG 7.37 / 42 / 66 / 24 / -1 CXR - clear lung fields Intake/Output Summary (Last 24 hours) at 9/18/2020 1347 Last data filed at 9/18/2020 1219 Gross per 24 hour Intake 1323.75 ml Output 2117 ml Net -793.25 ml Visit Vitals BP (!) 102/57 Pulse 98 Temp 98.5 °F (36.9 °C) Resp 20 Ht 5' 9\" (1.753 m) Wt 215 lb 13.3 oz (97.9 kg) SpO2 96% BMI 31.87 kg/m² Risk of morbidity and mortality - high Medical decision making - high complexity Total critical care time - 75 minutes (CPT 42039, [id number]) I personally spent the above critical care time. This is time spent at this critically ill patient's bedside / unit / floor actively involved in patient care as well as the coordination of care and discussions with the patient's family. This does not include any procedural time which has been billed separately.

## 2020-09-18 NOTE — PROGRESS NOTES
Nutrition Note Chart reviewed, case discussed during CCU rounds. Pt tolerating trophic TF rate thus far. Per discussion during rounds and with Surgical NP Be Quivers, okay to advance TF towards goal rate. TPN bag to run out later today. Would not renew as TF will be at goal rate by tomorrow. Advance rate 10mL q 8h as tolerated to Goal Rate of 40mL/h + Prosource BID + 50mL H2O flush q 4h (provides 2040kcals/110gPro/210gCHO/972mL) Electronically signed by Sandhya Pinedo RD, [address] [place]  on 9/18/2020 at 10:43 AM 
 
Contact: [facility]-[id number]

## 2020-09-18 NOTE — PROGRESS NOTES
09/18/20 3557 ABCDEF Bundle SBT Safety Screen Passed No  
SBT Screen Reason for Failure Oxygen saturation < or equal to 88%; Increased inspiratory effort

## 2020-09-18 NOTE — PROGRESS NOTES
[facility] ICU Progress Note Admit Date: 9/10/2020 
 
9/10/20: VA ECMO placed 20: PCI 
20: VA ECMO decannulated 20: L femoral cutdown and impella removal, left femoral artery repair Subjective:  
Pt seen with Dr. Genesis Lord. Tmax 98.7, On TPN. TF at 10 ml/hr. Off sedation, alert, nods appropriately. On vent A/C 30 40% PEEP 6, in afib this morning. Objective:  
Vitals: 
Blood pressure (!) 86/54, pulse (!) 105, temperature (!) 96.7 °F (35.9 °C), resp. rate 18, height 5' 9\" (1.753 m), weight 215 lb 13.3 oz (97.9 kg), SpO2 97 %. Temp (24hrs), Av.9 °F (36.6 °C), Min:96.7 °F (35.9 °C), Max:98.7 °F (37.1 °C) Hemodynamics: 
 CO: CO (l/min): 6.4 l/min CI: CI (l/min/m2): 3 l/min/m2 CVP: CVP (mmHg): 8 mmHg (20 0800) SVR:   
 PAP Systolic: PAP Systolic: 59 ( 2367) PAP Diastolic: PAP Diastolic: 22 ( 6646) PVR:   
 SV02: SVO2 (%): 54 % (20 0000) SCV02:    
 
 
EKG/Rhythm:   
 
Extubation Date / Time: remains on vent Ventilator: 
Ventilator Volumes Vt Set (ml): 450 ml (20 0353) Vt Exhaled (Machine Breath) (ml): 873 ml (20 08) Ve Observed (l/min): 14.9 l/min (20 08) Oxygen Therapy: 
Oxygen Therapy O2 Sat (%): 97 % (20 08) Pulse via Oximetry: 108 beats per minute (20 08) O2 Device: Ventilator (20) O2 Temperature: 98.2 °F (36.8 °C) (20 1558) FIO2 (%): 40 % (20 0801) CXR:  
CXR Results  (Last 48 hours) 20 1455  XR CHEST PORT Final result Impression:  IMPRESSION:   
1. Right IJ catheter tip overlies SVC right atrial junction. There is no  
pneumothorax. 2. Left lower lobe atelectasis/effusion is stable. 3. Airspace opacities in the right mid and lower thorax are stable. Narrative:  EXAM:  XR CHEST PORT INDICATION:  line placement COMPARISON:  2020 FINDINGS: A portable AP radiograph of the chest was obtained at 1451 hours. Right IJ catheter tip overlies SVC right atrial junction. There is no  
pneumothorax. Left IJ catheter tip is unchanged. NG tube overlies the stomach. ET tube is at the thoracic inlet. .  There is persistent left lower lobe  
atelectasis/airspace disease/effusion. Opacities in the right mid and lower  
thorax are most likely atelectasis and stable. Ricka Distad Heart size is stable. .  The  
bones and soft tissues are grossly within normal limits. 09/17/20 0603  XR CHEST PORT Final result Impression:  IMPRESSION:  
1. ET tube is 8 cm above the peterson and could be advanced 3 cm. 2. Left lower lobe remains collapsed. 3. Right upper lobe and right middle lobe have reexpanded. New opacities  
identified in the right mid and lower lung zone are likely atelectasis. Follow-up to resolution is suggested Narrative:  EXAM: XR CHEST PORT INDICATION: respiratory failure COMPARISON: 9/16/2020 FINDINGS: A portable AP radiograph of the chest was obtained at 0552 hours. The  
patient is on a cardiac monitor. ET tube is 8 cm above the peterson this could be advanced 3 cm. NG tube courses  
into the stomach. Left IJ catheter remains in place. Cardiac assist device has  
been removed. There is continued opacity at the left lung base consistent left pleural  
effusion and complete left lower lobe atelectasis. The right lung reveals that the right upper lobe and right middle lobe have  
reexpanded. There is rounded opacity in the right perihilar region which is new  
in the interval measuring 5 cm. This is most likely atelectasis. Is also a small  
rounded opacity at the right lung base measuring 2 cm which is new in the  
interval this is also likely atelectasis. Close follow-up is suggested. No  
pneumothorax. Admission Weight: Last Weight Weight: 219 lb 12.8 oz (99.7 kg) Weight: 215 lb 13.3 oz (97.9 kg) Intake / Edman Grater / Drain: Current Shift:  0701 -  1900 In: -  
Out: 847 [[id number]] Last 24 hrs.:  
 
Intake/Output Summary (Last 24 hours) at 2020 6096 Last data filed at 2020 9501 Gross per 24 hour Intake 1323.75 ml Output 2079 ml Net -755.25 ml EXAM: 
General:   intubated. Lungs:   Coarse to auscultation bilaterally. Incision:  Bilateral groin CDI Heart:  Regular rate and rhythm, S1, S2 normal, no murmur, click, rub or gallop. Abdomen:   Soft, non-tender. Bowel sounds hypoactive. No masses,  No organomegaly. Extremities:  1+ edema. PPP. Neurologic:  sedated Labs:  
Recent Labs  
  20 
0558 20 
0446 WBC  --  13.9* HGB  --  7.2* HCT  --  22.1*  
PLT  --  134* NA  --  144 K  --  3.7 BUN  --  57* CREA  --  2.76* GLU  --  160* GLUCPOC 162*  --   
 
 
 Assessment:  
 
Active Problems: 
  Cardiac arrest ([facility] [address] (9/10/2020) Plan/Recommendations/Medical Decision Makin. Cardiac arrest/cardiogenic shock s/p VA ECMO and Impella CP s/p ECMO decannulation and impella removal. Off pressors. Will need repeat TTE to eval EF at some point. 2. CAD s/p Stents: Stents placed . On ASA, brilinta. Cont. No BB/statin until appropriate. 2. DM type II: A1C 8.9. Cont insulin in TPN. Cont NPH to 40 BID. 3. Acute hypoxic respiratory failure: Pt complained of SOB prior to cardiac arrest. Possible PE however had diffuse CAD so more likely cardiac event than PE. Sputum cx  negative,  NGTD. On cefepime, flagyl, vanc. Cont CRRT for fluid removal.  Vent support per primary team. Try CPAP today. 4. CAMACHO: started on CRRT , making urine, avoid nephrotoxic meds, renal following. Cont factor 100 as tolerated. 5. Shock liver: Improving, monitor 6. Hypernatremia: improving, monitor 7. Hypokalemia: Replete per protocol 8. Diarrhea: improving, Flexiseal in place, monitor 9. Post op anemia st acute blood loss: s/p PRBCs 10. Thrombocytopenia: S/p Plt transfusion. Monitor. 11. Fever: Tmax 100.2, WBC 9.7, cont abx. Monitor. 12. Nutrition: Continue TPN, Cont TF. Monitor. 13. Acute DVT: Seen on doppler 9/12, nonocculsive acute DVT to left femoral vein. Dopplers this morning. May get IVC filter today. 14. Weakness: Nods appropriately, +shoulder shrug, very weakly moves hands, no movement in feet. For MRI today. 15. Afib: Amio bolus and gtt. Monitor. 16. Dispo: Pt remains critically ill. Keep in ICU.    
 
Signed By: Fred Shelton NP

## 2020-09-18 NOTE — BRIEF OP NOTE
Brief Postoperative Note Patient: Clara Torres YOB: 1960 MRN: [id number] Date of Procedure: 9/18/2020 Pre-Op Diagnosis: DVT Post-Op Diagnosis: Same as preoperative diagnosis. Procedure(s): IVC FILTER INSERTION Angiogram of IVC Surgeon(s): 
Calvin Cox MD 
 
Surgical Assistant: None Anesthesia: MAC Estimated Blood Loss (mL): Minimal 
 
Complications: None Specimens: * No specimens in log * Implants: * No implants in log * Drains:  
Orogastric Tube 09/10/20 (Active) Site Assessment Intact 09/18/20 0630 Securement Device Tape 09/18/20 0630 G Port Status Clamped 09/18/20 1228 External Insertion Matt (cms) 56 cms 09/18/20 0630 Action Taken Placement verified (comment) 09/18/20 0630 Drainage Description Tan 09/17/20 2005 Gastric Residual (mL) 0 ml 09/18/20 0630 Tube Feeding/Formula Options Twocal HN 09/18/20 0630 Modular Nutrients Protein liquid 09/18/20 0630 Tube Feeding/Verify Rate (mL/hr) 10 09/18/20 0700 Water Flush Volume (mL) 30 mL 09/15/20 1700 Intake (ml) 10 ml 09/18/20 0500 Medication Volume 60 ml 09/15/20 1800 Drainage Chamber Level (ml) 180 ml 09/17/20 0800 Output (ml) 100 ml 09/17/20 0800 [REMOVED] Fecal Management (Removed) Stool Consistency Liquid 09/17/20 0800 Position of Indicator Line Visible 09/17/20 0800 Signal Indicator Bubble Appropriate 09/17/20 0800 Skin Assessment of the Anal Area Unremarkable 09/17/20 0800 Tube Irrigated No 09/17/20 0800 Irrigation Volume (mL) 30 09/16/20 2000 Drainage Bag Level (mL) 0 09/17/20 0800 Output (ml) 0 ml 09/17/20 0800 Findings: Permanent [place] filter inserted Electronically Signed by Hayley Serrano MD on 9/18/2020 at 6:10 PM

## 2020-09-18 NOTE — CONSULTS
Vascular Surgery Progress Note Amena Currie ACNP-BC 
9/18/2020 Subjective:  
 
Mr. Kaleigh Wright is a 62 yo CM w/ a pmhx significant for obesity, DM, HTN, HLD, and GERD. He is a daily smoker. He is admitted to the hospital with ACS and possible PE. He complained of SOB while driving. He pulled over to the side of the road and EMS was contacted. After their arrival en route to the hospital he became unresponsive w/ PEA. BLS protocol was initiated. Upon arrival to [facility] he was in full cardiac arrest. He was cyanotic. He was emergently taken to the OR for [facility] ECMO on 09/10/2020. A Casper II device was maintained throughout the procedure (nearly 60 minutes of CPR, at times converted to VT). His post procedure course has been complicated by persistent respiratory failure, cardiogenic shock, CAMACHO w/ severe metabolic acidosis, and shock liver. On 09/12/2020 he underwent a cardiac cath w/ Impella CP insertion and placement of 9 RAFFY (3 LAD, 1 diagonal, 1 OM1, 1 LM, 3 RCA). His post procedure course was complicated by bleeding from his access site resulting in hypotension and loss of pulsatility. 3 units of pRBCs were directly transfused into the ECMO circuit for rapid resuscitation. PLTs were also administered. A RLE duplex since admission was significant for a DVT likely related to ECHO cannulization. ECMO was discontinued on 09/14/2020 w/ reconstruction of femoral artery. He has developed sepsis, fever, and diarrhea. He is s/p bronchoscopy on 09/13, 9/14, & 9/16 for increase secretions,mucus plugging, and RUL collapse w/ possible PNA. We were consulted for Impella device removal and repair of femoral artery which was completed on 09/16/2020. This am he remains intubated. He is sitting up in bed and his alert. Following commands. He has some minimal movement of BLUEs and still no movement of LE. He is hypotensive.   He continues to be off anticoagulation for since 09/14/2020 for procedures. He has bloody sputum following his multiple bronchoscopies and is thrombocytopenic; however, his PLT count has significantly improved this am.     
 
Nursing Data:  
 
Patient Vitals for the past 24 hrs: 
 BP Temp Pulse Resp SpO2 Weight  
09/18/20 0801   (!) 105 18 97 %   
09/18/20 0800 (!) 86/54 98.5 °F (36.9 °C) (!) 110 22 94 %   
09/18/20 0700 130/69  (!) 113 22 99 %   
09/18/20 0400 (!) 99/56 (!) 96.7 °F (35.9 °C) 78 29 98 % 97.9 kg (215 lb 13.3 oz) 09/18/20 0353   83 (!) 32 97 %   
09/18/20 0300 (!) 110/56  81 27 97 %   
09/18/20 0230 110/81  81 27 97 %   
09/18/20 0215 99/63  84 21 98 %   
09/18/20 0200 (!) 103/56  84 28 100 %   
09/18/20 0145 118/68  89 26 100 %   
09/18/20 0130 107/62  76 27 100 %   
09/18/20 0100 (!) 95/59  72 29 100 %   
09/18/20 0045 (!) 89/52  72 26 100 %   
09/18/20 0030 102/62  73 30 100 %   
09/18/20 0015 (!) 106/55  96 16    
09/18/20 0000 100/61 97.6 °F (36.4 °C) 89 22 99 %   
09/17/20 2332   85 (!) 31 99 %   
09/17/20 2300 (!) 98/54  86 22 100 %   
09/17/20 2200 (!) 94/58  86 24 99 %   
09/17/20 2100 (!) 112/56  95 24 100 %   
09/17/20 2000 (!) 111/58 97.3 °F (36.3 °C) 97 25 97 %   
09/17/20 1945 (!) 108/59  95 (!) 31 99 %   
09/17/20 1930 (!) 111/90  88 22 100 %   
09/17/20 1928   93 (!) 32 100 %   
09/17/20 1915 (!) 103/59  83 26 100 %   
09/17/20 1900 110/63  84 28 100 %   
09/17/20 1845 108/60  84 21 100 %   
09/17/20 1830 110/65  85 26 100 %   
09/17/20 1815 113/69  86 21 100 %   
09/17/20 1800 113/64  87 20 100 %   
09/17/20 1745 112/70  86 21 100 %   
09/17/20 1730 112/64  88 26 100 %   
09/17/20 1715 116/63  88 20 100 %   
09/17/20 1700 106/64  91 29 100 %   
09/17/20 1645 111/60  91 25 100 %   
09/17/20 1630 (!) 103/52  85 (!) 31 100 %   
09/17/20 1615 102/61  93 30 100 %   
09/17/20 1600 114/63  93 (!) 31 100 %   
 09/17/20 1545 (!) 104/56  93 20 100 %   
09/17/20 1530 (!) 100/56  93 (!) 31 100 %   
09/17/20 1526  98.6 °F (37 °C)      
09/17/20 1515 (!) 97/58  93 (!) 32 100 %   
09/17/20 1501   96 (!) 31 100 %   
09/17/20 1500 (!) 92/54  95 30 100 %   
09/17/20 1445 (!) 92/53  96 (!) 31 100 %   
09/17/20 1430 (!) 88/55 98 °F (36.7 °C) 97 30 100 %   
09/17/20 1415 (!) 90/50  99 (!) 32 100 %   
09/17/20 1400 (!) 98/56 98.6 °F (37 °C) 100 23 100 %   
09/17/20 1345 (!) 92/54  97 30 100 %   
09/17/20 1330 (!) 85/52 97.5 °F (36.4 °C) 92 30 100 %   
09/17/20 1322     100 %   
09/17/20 1315 (!) 92/55 98.3 °F (36.8 °C) 90 30 100 %   
09/17/20 1300 (!) 95/57 97.6 °F (36.4 °C) 89 28 100 %   
09/17/20 1245 90/60 98.7 °F (37.1 °C) 87 30 100 %   
09/17/20 1230   87 30 100 %   
09/17/20 1215   87 30 100 %   
09/17/20 1200 90/60 97.9 °F (36.6 °C) 89 27 100 %   
09/17/20 1145   89 30 100 %   
09/17/20 1130   90 24 100 %   
09/17/20 1118   86 30 100 %   
09/17/20 1115   88 30 100 %   
09/17/20 1100 (!) 84/51  88 30 100 %   
09/17/20 1045   91 30 100 %   
09/17/20 1030   96 30 100 %   
09/17/20 1015   95 28 100 %   
09/17/20 1000 (!) 85/49  96 29 100 %   
09/17/20 0945   98 30 100 %   
09/17/20 0930   96 28 100 %   
09/17/20 0915   95 (!) 31 100 %  Intake/Output Summary (Last 24 hours) at 9/18/2020 5627 Last data filed at 9/18/2020 3972 Gross per 24 hour Intake 1323.75 ml Output 2079 ml Net -755.25 ml Exam:  
 
Physical Exam 
Constitutional:   
   Appearance: He is obese. Interventions: He is intubated. HENT:  
   Head: Normocephalic. Nose: Nose normal.  
   Mouth/Throat:  
   Mouth: Mucous membranes are dry. Eyes:  
   Pupils: Pupils are equal, round, and reactive to light. Neck: Musculoskeletal: Normal range of motion. Cardiovascular:  
   Rate and Rhythm: Regular rhythm. Tachycardia present. Comments: Left foot is warm and viable. Pulmonary:  
   Effort: Pulmonary effort is normal. He is intubated. Abdominal:  
   General: Abdomen is flat. Palpations: Abdomen is soft. Skin: 
   Coloration: Skin is pale. Neurological:  
   Mental Status: He is alert. Comments: He is alert this am.  He is sitting up in bed. Tracking. Following commands. He can shoulder shrug and moves his BUEs. Still no movement of BLEs. Lab Review:  
 
. Recent Results (from the past 24 hour(s)) CK Collection Time: 09/17/20 11:24 AM  
Result Value Ref Range  (H) 39 - 308 U/L  
GLUCOSE, POC Collection Time: 09/17/20 11:35 AM  
Result Value Ref Range Glucose (POC) 301 (H) 65 - 100 mg/dL Performed by Mercy Hines GLUCOSE, POC Collection Time: 09/17/20 11:37 AM  
Result Value Ref Range Glucose (POC) 263 (H) 65 - 100 mg/dL Performed by Mercy Hines GLUCOSE, POC Collection Time: 09/17/20  7:15 PM  
Result Value Ref Range Glucose (POC) 199 (H) 65 - 100 mg/dL Performed by Mercy Hines POC EG7 Collection Time: 09/17/20  9:48 PM  
Result Value Ref Range Calcium, ionized (POC) 1.11 (L) 1.12 - 1.32 mmol/L  
 FIO2 (POC) 50 % pH (POC) 7.38 7.35 - 7.45    
 pCO2 (POC) 37.4 35.0 - 45.0 MMHG  
 pO2 (POC) 77 (L) 80 - 100 MMHG  
 HCO3 (POC) 22.2 22 - 26 MMOL/L Base deficit (POC) 3 mmol/L  
 sO2 (POC) 95 92 - 97 % Site DRAWN FROM ARTERIAL LINE Device: VENT Mode ASSIST CONTROL Tidal volume 450 ml Set Rate 30 bpm  
 PEEP/CPAP (POC) 10 cmH2O Allens test (POC) N/A Specimen type (POC) ARTERIAL Total resp. rate 30 GLUCOSE, POC Collection Time: 09/17/20 11:43 PM  
Result Value Ref Range Glucose (POC) 166 (H) 65 - 100 mg/dL Performed by Yaquelin Bassett RN   
CBC W/O DIFF Collection Time: 09/18/20  4:46 AM  
Result Value Ref Range WBC 13.9 (H) 4.1 - 11.1 K/uL  
 RBC 2.40 (L) 4.10 - 5.70 M/uL HGB 7.2 (L) 12.1 - 17.0 g/dL HCT 22.1 (L) 36.6 - 50.3 % MCV 92.1 80.0 - 99.0 FL  
 MCH 30.0 26.0 - 34.0 PG  
 MCHC 32.6 30.0 - 36.5 g/dL  
 RDW 16.9 (H) 11.5 - 14.5 % PLATELET 402 (L) 613 - 400 K/uL MPV 12.1 8.9 - 12.9 FL  
 NRBC 0.0 0  WBC ABSOLUTE NRBC 0.00 0.00 - 0.01 K/uL METABOLIC PANEL, COMPREHENSIVE Collection Time: 09/18/20  4:46 AM  
Result Value Ref Range Sodium 144 136 - 145 mmol/L Potassium 3.7 3.5 - 5.1 mmol/L Chloride 111 (H) 97 - 108 mmol/L  
 CO2 26 21 - 32 mmol/L Anion gap 7 5 - 15 mmol/L Glucose 160 (H) 65 - 100 mg/dL BUN 57 (H) 6 - 20 MG/DL Creatinine 2.76 (H) 0.70 - 1.30 MG/DL  
 BUN/Creatinine ratio 21 (H) 12 - 20 GFR est AA 29 (L) >60 ml/min/1.73m2 GFR est non-AA 24 (L) >60 ml/min/1.73m2 Calcium 8.1 (L) 8.5 - 10.1 MG/DL Bilirubin, total 2.2 (H) 0.2 - 1.0 MG/DL  
 ALT (SGPT) 11 (L) 12 - 78 U/L  
 AST (SGOT) 55 (H) 15 - 37 U/L Alk. phosphatase 66 45 - 117 U/L Protein, total 5.8 (L) 6.4 - 8.2 g/dL Albumin 2.5 (L) 3.5 - 5.0 g/dL Globulin 3.3 2.0 - 4.0 g/dL A-G Ratio 0.8 (L) 1.1 - 2.2 PHOSPHORUS Collection Time: 09/18/20  4:46 AM  
Result Value Ref Range Phosphorus 4.1 2.6 - 4.7 MG/DL MAGNESIUM Collection Time: 09/18/20  4:46 AM  
Result Value Ref Range Magnesium 2.6 (H) 1.6 - 2.4 mg/dL POC EG7 Collection Time: 09/18/20  5:02 AM  
Result Value Ref Range Calcium, ionized (POC) 1.17 1.12 - 1.32 mmol/L  
 FIO2 (POC) 40 % pH (POC) 7.42 7.35 - 7.45    
 pCO2 (POC) 36.4 35.0 - 45.0 MMHG  
 pO2 (POC) 67 (L) 80 - 100 MMHG  
 HCO3 (POC) 23.5 22 - 26 MMOL/L Base deficit (POC) 1 mmol/L  
 sO2 (POC) 94 92 - 97 % Site DRAWN FROM ARTERIAL LINE Device: VENT Mode ASSIST CONTROL Tidal volume 450 ml Set Rate 30 bpm  
 PEEP/CPAP (POC) 6 cmH2O Allens test (POC) N/A Specimen type (POC) ARTERIAL Total resp. rate 32 GLUCOSE, POC  Collection Time: 09/18/20  5:58 AM  
 Result Value Ref Range Glucose (POC) 162 (H) 65 - 100 mg/dL Performed by Yaquelin Bassett RN   
POC EG7 Collection Time: 09/18/20  8:33 AM  
Result Value Ref Range Calcium, ionized (POC) 1.17 1.12 - 1.32 mmol/L  
 FIO2 (POC) 40 % pH (POC) 7.37 7.35 - 7.45    
 pCO2 (POC) 41.6 35.0 - 45.0 MMHG  
 pO2 (POC) 66 (L) 80 - 100 MMHG  
 HCO3 (POC) 24.1 22 - 26 MMOL/L Base deficit (POC) 1 mmol/L  
 sO2 (POC) 92 92 - 97 % Site DRAWN FROM ARTERIAL LINE Device: VENT Mode CPAP/SPON    
 PEEP/CPAP (POC) 6 cmH2O Pressure support 6 cmH2O Allens test (POC) N/A Specimen type (POC) ARTERIAL Total resp. rate 20 Assessment/Plan:  
  
Consult problems Impella device placement (left groin)  
-complicated by acute bleeding from access site resulting in hypotension and loss of pulsatility  
-s/p 3 units of pRBCs were directly transfused into the ECMO circuit for rapid resuscitation 
-s/p PLT administration  
-s/p removal with left femoral artery repair 09/16/2020 Groin dressing dry and intact. Plan for stable removal in 3-4 weeks RLE DVT  
-possible related to ECMO device 
-bloody sputum  
-unable to obtain CTA to exclude PE due to CAMACHO 
-thrombocytopenia improved this am.   
Repeat duplex confirms persistent clot. Plan for IVC filter at 3pm today. Acute problems Multi-organ failure Cardiogenic shock 
-s/p ECMO 09/10/2020 => removal w/ femoral artery repair 09/14/2020 
-weaned off vasopressor support w/ marginal BP Cardiac arrest  
-CPR x 60 minutes CAMACHO w/ severe metabolic acidosis  
-acidosis resolved. Creatinine improving Hypernatremia  
-resolved Hypokalemia  
-resolved Shock Liver Acute hypoxic respiratory failure w/ respiratory acidosis Mechanical intubation 09/10/2020 => 
ACS 
-s/p RAFFY x9 9/12/2020 History of HTN Hyperlipidemia Plan per cardiology and cardio thoracic surgery Sepsis  
-fever and leukocytosis resolved Pneumonia RUL collapse Mucus plugging Tobacco use 
-s/p bronchoscopy 09/13, 9/14, & 9/16 Diarrhea Anemia of acute blood loss 
-s/p multiple units of pRBCs Thrombocytopenia 
-s/p adminstration of PLTs 
-improving Profound weakness 
-patient is not moving his lower extremities and minimal movement of UEs 
-nods, blinks, and shrugs his shoulders 
-failed SBT this am (multifactorial) Neurology consulted. Plan for MRI Active problems Uncontrolled Diabetes Mellitus  
-HA1c 8.9 
-managed w/ oral medications prior to admission GERD Management of comorbid conditions by primary team. 
 
VTE prophylaxis: SCDs Disposition:  
Inpatient rehabilitation Vascular surgery will continue to see patient intermittently while admitted. We appreciate the opportunity to participate in the care of Mr. Mercedes Noel. Patient should f/u in 3-4 weeks with Dr. Denise Patterson. Please call with any urgent vascular issues.

## 2020-09-18 NOTE — PROGRESS NOTES
[facility] Name: Rochelle East MRN: [id number] : 1960 Hospital: [facility] Date: 2020 Critical Care IMPRESSION:  
· Acute hypoxic respiratory failure on vent- high O2 requirements · Prolonged out of hospital cardiac arrest 
· Bilateral air space disease with RUL collapse- resolved after bronch but LLLat risk · Cause uncertain; PE is a strong possibility · Diffuse ASCVD s.p PCI 9/15/20 · Cardiogenic shock on impella, ECMO- now both removed · Acute renal failure- likely needs RRT for volume · Mixed respiratory and metabolic acidosis-  
· Shock liver- better · Hypernatremia · DM · Tobacco use RECOMMENDATIONS:  
· Ventilator support; wean as tolerated · Bronchial hygiene · Pressors/inotropes per cardiac surgery · Neurology following · CRRT per renal 
· COVID negative · Cefepime, vancomycin, flagyl  to finish 5-7 day course if cultures remain negative · Reanl, cardiology help much appreciated · DVT, PUD prophylaxis · Sedation as needed Subjective/History: No acute events overnight Now awake, tachypneic, anxious Intubated Current Facility-Administered Medications Medication Dose Route Frequency  albuterol (PROVENTIL VENTOLIN) nebulizer solution 2.5 mg  2.5 mg Nebulization Q6H RT  
 acetylcysteine (MUCOMYST) 200 mg/mL (20 %) solution 200 mg  200 mg Nebulization Q6H RT  
 insulin NPH (NOVOLIN N, HUMULIN N) injection 40 Units  40 Units SubCUTAneous ACB&D  TPN ADULT - CENTRAL   IntraVENous CONTINUOUS  
 cefepime (MAXIPIME) 1 g in 0.9% sodium chloride (MBP/ADV) 50 mL  1 g IntraVENous Q24H  
 bicarbonate dialysis (PRISMASOL) BG K 4/Ca 2.5 5000 ml solution   Extracorporeal DIALYSIS CONTINUOUS  
 vasopressin (VASOSTRICT) 20 Units in 0.9% sodium chloride 100 mL infusion  0-0.04 Units/min IntraVENous TITRATE  insulin lispro (HUMALOG) injection   SubCUTAneous Q6H  
 metroNIDAZOLE (FLAGYL) IVPB premix 500 mg  500 mg IntraVENous Q12H  
 PHENYLephrine (DARRYL-SYNEPHRINE) 30 mg in 0.9% sodium chloride 250 mL infusion   mcg/min IntraVENous TITRATE  ticagrelor (BRILINTA) tablet 90 mg  90 mg Per NG tube Q12H  aspirin chewable tablet 81 mg  81 mg Per NG tube DAILY  sodium chloride (NS) flush 5-40 mL  5-40 mL IntraVENous Q8H  
 0.45% sodium chloride infusion  10 mL/hr IntraVENous CONTINUOUS  chlorhexidine (PERIDEX) 0.12 % mouthwash 10 mL  10 mL Oral Q12H  
 [Held by provider] senna-docusate (PERICOLACE) 8.6-50 mg per tablet 1 Tab  1 Tab Oral BID  [Held by provider] polyethylene glycol (MIRALAX) packet 17 g  17 g Oral DAILY  pantoprazole (PROTONIX) 40 mg in 0.9% sodium chloride 10 mL injection  40 mg IntraVENous DAILY  EPINEPHrine (ADRENALIN) 5 mg in 0.9% sodium chloride 250 mL infusion  0-10 mcg/min IntraVENous TITRATE  
 NOREPINephrine (LEVOPHED) 8 mg in 5% dextrose 250mL (32 mcg/mL) infusion  0.5-16 mcg/min IntraVENous TITRATE  dexmedeTOMidine (PRECEDEX) 400 mcg in 0.9% sodium chloride 100 mL infusion  0.2-0.7 mcg/kg/hr IntraVENous TITRATE  balsam peru-castor oiL (VENELEX) ointment   Topical BID  DOBUTamine (DOBUTREX) 500 mg/250 mL (2,000 mcg/mL) infusion  2 mcg/kg/min IntraVENous CONTINUOUS Review of Systems: 
Review of systems not obtained due to patient factors. Objective:  
Vital Signs:   
Visit Vitals BP (!) 99/56 (BP 1 Location: Right arm, BP Patient Position: At rest) Pulse 78 Temp (!) 96.7 °F (35.9 °C) Resp 29 Ht 5' 9\" (1.753 m) Wt 97.9 kg (215 lb 13.3 oz) SpO2 98% BMI 31.87 kg/m² O2 Device: Endotracheal tube, Ventilator Temp (24hrs), Av.9 °F (36.6 °C), Min:96.7 °F (35.9 °C), Max:98.7 °F (37.1 °C) Intake/Output:  
Last shift:      No intake/output data recorded. Last 3 shifts:  1901 -  0700 In: 1460.7 [I.V.:1084.9] Out: 3668 [[id number]] Intake/Output Summary (Last 24 hours) at 9/18/2020 7018 Last data filed at 9/18/2020 3847 Gross per 24 hour Intake 375.8 ml Output 2088 ml Net -1712.2 ml Hemodynamics:  
PAP: PAP Systolic: 59 (64/19/87 6842) CO: CO (l/min): 6.4 l/min (09/16/20 0000) Wedge:   CI: CI (l/min/m2): 3 l/min/m2 (09/16/20 0000) CVP:  CVP (mmHg): 8 mmHg (09/18/20 0400) SVR:    
  PVR:    
 
Ventilator Settings: 
Mode Rate Tidal Volume Pressure FiO2 PEEP Assist control   450 ml  10 cm H2O 40 % 6 cm H20(wean per Dr. Stevenson Francis) Peak airway pressure: 31 cm H2O Minute ventilation: 17.3 l/min Physical Exam: 
 
General:  Intubated, awake Head:  Normocephalic, without obvious abnormality, atraumatic. Eyes:  Conjunctivae/corneas clear. Pupils reactive and equal  
Nose: Nares normal. Septum midline. Mucosa normal.    
Throat: ETT in place Neck: Supple, symmetrical, trachea midline Back:   Symmetric, no curvature. ROM normal.  
Lungs:   Clear to auscultation bilaterally. Chest wall:  No tenderness or deformity. Heart:  Regular rate and rhythm Abdomen:   Soft, non-tender. Bowel sounds normal.   
Extremities: Extremities normal, atraumatic, no cyanosis or clubbing Skin: Skin color, texture, turgor normal. No rashes or lesions Neurologic: awake Data:  
 
            
Labs: 
Recent Labs  
  09/18/20 
0446 09/17/20 
0324 09/16/20 
1711 WBC 13.9* 9.7 12.5* HGB 7.2* 7.0* 7.5* HCT 22.1* 21.6* 23.4*  
* 86* 96* Recent Labs  
  09/18/20 
0446 09/17/20 
0324 09/16/20 
1711 09/16/20 
0350  143 146* 146*  
K 3.7 4.5 4.5 3.5 * 111* 113* 114* CO2 26 23 26 25 * 240* 129* 97 BUN 57* 71* 62* 57* CREA 2.76* 4.23* 4.06* 3.82* CA 8.1* 8.8 7.7* 8.0*  
MG 2.6* 2.7* 2.3 2.2 PHOS 4.1 8.6*  --  4.3 ALB 2.5* 2.5* 2.7* 2.7* TBILI 2.2* 1.0 1.2* 1.3* ALT 11* 9* 9* 9* Recent Labs  
  09/18/20 
0502 09/17/20 
2148 09/17/20 
0505 PHI 7.42 7.38 7.24* PCO2I 36.4 37.4 52.2*  
PO2I 67* 77* 90 HCO3I 23.5 22.2 22.3 FIO2I 40 50 80 Imaging: 
I have personally reviewed the patients radiographs and have reviewed the reports: CXR: LLL atx Total critical care time exclusive of procedures:  minutes Camryn Hernández MD

## 2020-09-18 NOTE — PROGRESS NOTES
NAME: Lee Chauhan :  1960 MRN:  [id number] Assessment   :                                               Plan: 
CKD/CAMACHO S/P arrest 
Shock Hypernatremia Acute resp. Failure 
  
hyperkalemia 
  CAMACHO S/P significant dye load  and diuresis. Fair UO. Continue  CVVHD today. Factor 100.  4K bags. Labs at 0400 and 1600 
  
I suspect he has underlying CKD from DM/HTN.   
CAMACHO likely related to shock. Critically ill at significant risk of decompensation. D/W RN.  
  
 
 
Subjective: Chief Complaint:  intubated Review of Systems:  UTO - intubated. Symptom Y/N Comments  Symptom Y/N Comments Fever/Chills    Chest Pain Poor Appetite    Edema Cough    Abdominal Pain Sputum    Joint Pain SOB/FINLEY    Pruritis/Rash Nausea/vomit    Tolerating PT/OT Diarrhea    Tolerating Diet Constipation    Other Could not obtain due to:   
 
Objective: VITALS:  
Last 24hrs VS reviewed since prior progress note. Most recent are: 
Visit Vitals BP (!) 95/53 Pulse (!) 106 Temp 98.5 °F (36.9 °C) Resp 24 Ht 5' 9\" (1.753 m) Wt 97.9 kg (215 lb 13.3 oz) SpO2 94% BMI 31.87 kg/m² Intake/Output Summary (Last 24 hours) at 2020 1116 Last data filed at 2020 1112 Gross per 24 hour Intake 1323.75 ml Output 2141 ml Net -817.25 ml Telemetry Reviewed: PHYSICAL EXAM: 
General: NAD Lab Data Reviewed: (see below) Medications Reviewed: (see below) PMH/SH reviewed - no change compared to H&P 
________________________________________________________________________ Care Plan discussed with: 
Patient Family RN Care Manager Consultant:     
 
  Comments >50% of visit spent in counseling and coordination of care    
 
________________________________________________________________________ Jing Howard MD  
 
Procedures: see electronic medical records for all procedures/Xrays and details which 
were not copied into this note but were reviewed prior to creation of Plan. LABS: 
Recent Labs  
  09/18/20 0446 09/17/20 
0324 WBC 13.9* 9.7 HGB 7.2* 7.0*  
HCT 22.1* 21.6*  
* 86* Recent Labs  
  09/18/20 
0446 09/17/20 
0324 09/16/20 
1711 09/16/20 
0350  143 146* 146*  
K 3.7 4.5 4.5 3.5 * 111* 113* 114* CO2 26 23 26 25 BUN 57* 71* 62* 57* CREA 2.76* 4.23* 4.06* 3.82* * 240* 129* 97  
CA 8.1* 8.8 7.7* 8.0*  
MG 2.6* 2.7* 2.3 2.2 PHOS 4.1 8.6*  --  4.3 Recent Labs  
  09/18/20 0446 09/17/20 0324 09/16/20 
1711 AP 66 67 72  
TP 5.8* 5.9* 5.8* ALB 2.5* 2.5* 2.7*  
GLOB 3.3 3.4 3.1 Recent Labs  
  09/15/20 
1421 APTT 34.9* No results for input(s): FE, TIBC, PSAT, FERR in the last 72 hours. No results found for: FOL, RBCF No results for input(s): PH, PCO2, PO2 in the last 72 hours. Recent Labs  
  09/17/20 
1124 * No components found for: [place] Lab Results Component Value Date/Time Color YELLOW/STRAW 09/12/2020 05:32 PM  
 Appearance CLEAR 09/12/2020 05:32 PM  
 Specific gravity 1.022 09/12/2020 05:32 PM  
 Specific gravity 1.020 12/11/2015 09:31 AM  
 pH (UA) 7.5 09/12/2020 05:32 PM  
 Protein Negative 09/12/2020 05:32 PM  
 Glucose Negative 09/12/2020 05:32 PM  
 Ketone Negative 09/12/2020 05:32 PM  
 Bilirubin Negative 09/12/2020 05:32 PM  
 Urobilinogen 0.2 09/12/2020 05:32 PM  
 Nitrites Negative 09/12/2020 05:32 PM  
 Leukocyte Esterase Negative 09/12/2020 05:32 PM  
 Epithelial cells FEW 09/12/2020 05:32 PM  
 Bacteria Negative 09/12/2020 05:32 PM  
 WBC 0-4 09/12/2020 05:32 PM  
 RBC 10-20 09/12/2020 05:32 PM  
 
 
MEDICATIONS: 
Current Facility-Administered Medications Medication Dose Route Frequency  amiodarone (CORDARONE) 375 mg/250 mL D5W infusion  1 mg/min IntraVENous CONTINUOUS  
 Followed by  
Trevor Hampton amiodarone (CORDARONE) 375 mg/250 mL D5W infusion  0.5 mg/min IntraVENous CONTINUOUS  
 fentaNYL citrate (PF) injection 50 mcg  50 mcg IntraVENous Q4H PRN  
 albuterol (PROVENTIL VENTOLIN) nebulizer solution 2.5 mg  2.5 mg Nebulization Q6H RT  
 acetylcysteine (MUCOMYST) 200 mg/mL (20 %) solution 200 mg  200 mg Nebulization Q6H RT  
 0.9% sodium chloride infusion 250 mL  250 mL IntraVENous PRN  
 insulin NPH (NOVOLIN N, HUMULIN N) injection 40 Units  40 Units SubCUTAneous ACB&D  TPN ADULT - CENTRAL   IntraVENous CONTINUOUS  
 bicarbonate dialysis (PRISMASOL) BG K 4/Ca 2.5 5000 ml solution   Extracorporeal DIALYSIS CONTINUOUS  
 heparin (porcine) 2,000 Units in lactated Ringers 1,000 mL Irrigation    PRN  
 insulin lispro (HUMALOG) injection   SubCUTAneous Q6H  
 0.9% sodium chloride infusion 250 mL  250 mL IntraVENous PRN  
 0.9% sodium chloride infusion 250 mL  250 mL IntraVENous PRN  
 acetaminophen (TYLENOL) solution 650 mg  650 mg Per NG tube Q4H PRN  
 PHENYLephrine (DARRYL-SYNEPHRINE) 30 mg in 0.9% sodium chloride 250 mL infusion   mcg/min IntraVENous TITRATE  ticagrelor (BRILINTA) tablet 90 mg  90 mg Per NG tube Q12H  aspirin chewable tablet 81 mg  81 mg Per NG tube DAILY  albumin human 5% (BUMINATE) solution 12.5 g  12.5 g IntraVENous Q2H PRN  
 sodium chloride (NS) flush 5-40 mL  5-40 mL IntraVENous Q8H  
 sodium chloride (NS) flush 5-40 mL  5-40 mL IntraVENous PRN  
 0.45% sodium chloride infusion  10 mL/hr IntraVENous CONTINUOUS  
 oxyCODONE IR (ROXICODONE) tablet 5 mg  5 mg Oral Q4H PRN  
 oxyCODONE IR (ROXICODONE) tablet 10 mg  10 mg Oral Q4H PRN  
 naloxone (NARCAN) injection 0.4 mg  0.4 mg IntraVENous PRN  
 ondansetron (ZOFRAN) injection 4 mg  4 mg IntraVENous Q4H PRN  
 albuterol (PROVENTIL VENTOLIN) nebulizer solution 2.5 mg  2.5 mg Nebulization Q4H PRN  chlorhexidine (PERIDEX) 0.12 % mouthwash 10 mL  10 mL Oral Q12H  bisacodyL (DULCOLAX) suppository 10 mg  10 mg Rectal DAILY PRN  
 [Held by provider] senna-docusate (PERICOLACE) 8.6-50 mg per tablet 1 Tab  1 Tab Oral BID  [Held by provider] polyethylene glycol (MIRALAX) packet 17 g  17 g Oral DAILY  ELECTROLYTE REPLACEMENT NOTE: Nurse to review Serum Potassium and Magnesuim levels and Initiate Electrolyte Replacement Protocol as needed  1 Each Other PRN  
 magnesium sulfate 1 g/100 ml IVPB (premix or compounded)  1 g IntraVENous PRN  pantoprazole (PROTONIX) 40 mg in 0.9% sodium chloride 10 mL injection  40 mg IntraVENous DAILY  EPINEPHrine (ADRENALIN) 5 mg in 0.9% sodium chloride 250 mL infusion  0-10 mcg/min IntraVENous TITRATE  
 NOREPINephrine (LEVOPHED) 8 mg in 5% dextrose 250mL (32 mcg/mL) infusion  0.5-16 mcg/min IntraVENous TITRATE  dexmedeTOMidine (PRECEDEX) 400 mcg in 0.9% sodium chloride 100 mL infusion  0.2-0.7 mcg/kg/hr IntraVENous TITRATE  glucose chewable tablet 16 g  4 Tab Oral PRN  
 dextrose (D50W) injection syrg 12.5-25 g  12.5-25 g IntraVENous PRN  
 glucagon (GLUCAGEN) injection 1 mg  1 mg IntraMUSCular PRN  
 alteplase (CATHFLO) 1 mg in sterile water (preservative free) 1 mL injection  1 mg InterCATHeter PRN  
 bacitracin 500 unit/gram packet 1 Packet  1 Packet Topical PRN  
 balsam peru-castor oiL (VENELEX) ointment   Topical BID  DOBUTamine (DOBUTREX) 500 mg/250 mL (2,000 mcg/mL) infusion  2 mcg/kg/min IntraVENous CONTINUOUS

## 2020-09-18 NOTE — PROGRESS NOTES
3534  Ultrasound at bedside for RLE doppler 0840  CVVH clotted and unable to rinse back. Pt disconnected. Dr. Jacquelin Reid at bedside and updated on pt condition. 1553  Ultrasound tech at bedside for LLE doppler

## 2020-09-18 NOTE — PROGRESS NOTES
6803  Bedside report received and Dr. Krystle Meneses at bedside. Pt gagging on ETT and RR 40's. Precedex restarted at 0.5mcg 
0800  Amina Mariscal NP and RT at bedside. Pt alert, more responsive and able to follow commands. Pt coughing and gagging on ETT. Discussed adjusting vent settings. Placed pt on spontaneous mode. Pt tolerating this better than assist control with a small amount of coaching initially. Pt remains on dex at 0.5mcg 
0819  Ultrasound at bedside for RLE doppler 0840  CVVH clotted and unable to rinse back. Pt disconnected. Dr. Tanner Finch at bedside and updated on pt condition. 8382  Ultrasound tech at bedside for LLE doppler [address] with Dr. Catherine Ahuja regarding MRI. Pt can go to MRI now, but they only have time for scan of brain. Order received to obtain scan of spine as well. D/w Leanna in MRI. Pt placed on schedule for 1700 
1150  Pt placed on OR schedule for IVC filter placement [phone]. Spoke with MRI and re-scheduled pt for 1400. Amina Mariscal NP notified of plan. [phone]  Spoke with Dr. Genevieve Cogan regarding sedation for MRI. Order received for propofol. 1325  CVVHD stopped and pt rinsed back. 1400  Pt in MRI. Propofol started at 30mcg prior to procedure. BP stable. All other IV infusions stopped until after MRI. Dr. Madelaine Schmidt aware. 1500  Pt returned to room and propofol stopped. Dr. Maya Carrasco at bedside and notified RN that pt will be next case in OR. CHG bath given 
1700  Spoke with radiologist regarding CXR. Pt has area of opacity in R lung that may be a mass and recommends CT eval during admission. Tony Ellington notified [phone]  Pt taken to OR for procedure 1830  Pt back from OR. TF restarted at 20cc/hr.

## 2020-09-18 NOTE — ANESTHESIA PREPROCEDURE EVALUATION
Relevant Problems No relevant active problems Anesthetic History No history of anesthetic complications Review of Systems / Medical History Patient summary reviewed, nursing notes reviewed and pertinent labs reviewed Pulmonary Within defined limits Neuro/Psych Within defined limits Cardiovascular Within defined limits Hypertension Angina CHF Dysrhythmias Past MI, CAD and cardiac stents Exercise tolerance: <4 METS Comments: ECMO s/p arrest, now dc'd GI/Hepatic/Renal 
Within defined limits GERD Endo/Other Diabetes Obesity Other Findings Physical Exam 
 
Airway Mallampati: II 
TM Distance: 4 - 6 cm Neck ROM: normal range of motion Mouth opening: Normal 
Intubated Cardiovascular Regular rate and rhythm,  S1 and S2 normal,  no murmur, click, rub, or gallop Dental 
No notable dental hx Pulmonary Breath sounds clear to auscultation Abdominal 
GI exam deferred Other Findings Anesthetic Plan ASA: 4 Anesthesia type: general 
 
 
 
Post procedure ventilation Pt will remain intubated post IVC filter placement

## 2020-09-18 NOTE — DIABETES MGMT
1545 [facility] [address] PROGRESS NOTE Presentation Gianna Erickson is a 63 y.o. male admitted from the ER 9/10/20 upon arriving in full arrest. EMS reported patient complaint of difficulty breathing prior to arrival => called EMS on the way to the hospital => cardiac arrest => ACLS. Initially bradycardic and hypertensive. GFR 44/serum creatinine 1.89. Lactic acid 10.1. Admission  with AG 20. A1c of 8.4%. Started on [place] Eddie 9/10/20 at 8pm.  
HX: 
Past Medical History:  
Diagnosis Date  Diabetes ([facility] [address]  Elevated cholesterol  GERD (gastroesophageal reflux disease)   
 helps with meds upsetting stomach  Hypertension  Low vitamin D level  Neuropathy   
 rt lower extremity CAD DX: Cardiac arrest. Cardiogenic shock. ARF. Metabolic acidosis. Shock liver. TX: Emergent ECMO 9/10/20. ECMO & Impella out. Insulin. Clinical care measures: 
 S ventilatory support via ET @ 40% Amio infusion Sedation via Precedex Nutritional support via TPN and trickle feeds through OG  
  
Current clinical course has been complicated by cardiogenic shock post cardiac arrest, ARF, CAMACHO on CKD, hypernatremia, and hyperkalemia. Has high insulin needs: 
9/11/20 325/24 hours 9/12/20 157/24 hours 9/13/20 110/24 hours. Treated with ECMO & Impella. ECMO decannulation after bronchoscopy. 9/16/20 Impella out. 9/17/20 Waking up. Opens eyes. Wiggles fingers. No movement lower extremities. Off [place] Eddie since last evening. Weaned off multiple pressors. Renal function diminishing; may need Kiko. 9/18/20 Although awake, is weak and not making purposeful movements. TF via OG overnight-plan to advance TF today and taper TPN off this evening. MRI today to examine brain. Diabetes: Patient has known Type 2 diabetes, treated with non-insulin agents PTA. Family history unknown for diabetes at this time.  Consulted by Provider for advanced diabetes nursing assessment and care, specifically related to  
[x] Transitioning off Excell Tabatha [x] Inpatient management strategy Diabetes-related medical history - Patient can not address Diabetes medication history Drug class Currently in use Discontinued Never used Biguanide Glucophage 1000mg twice daily DDP-4 inhibitor Sulfonylurea Glimiperide 4mg twice daily Thiazolidinedione GLP-1 RA SGLT-2 inhibitors Basal insulin Fixed Dose  Combinations Bolus insulin Subjective NA Objective Physical exam 
General Eyes open. Moving head about. Sitting up in bed. Vital Signs Afebrile. ST. Hypotensive. Visit Vitals BP (!) 95/53 Pulse (!) 106 Temp 98.5 °F (36.9 °C) Resp 24 Ht 5' 9\" (1.753 m) Wt 97.9 kg (215 lb 13.3 oz) SpO2 94% BMI 31.87 kg/m² Laboratory Lab Results Component Value Date/Time Hemoglobin A1c 8.4 (H) 09/11/2020 05:43 AM  
 
No results found for: LDL, LDLC, DLDLP Lab Results Component Value Date/Time Creatinine (POC) 1.0 09/10/2020 02:55 PM  
 Creatinine 2.76 (H) 09/18/2020 04:46 AM  
 
Lab Results Component Value Date/Time Sodium 144 09/18/2020 04:46 AM  
 Potassium 3.7 09/18/2020 04:46 AM  
 Chloride 111 (H) 09/18/2020 04:46 AM  
 CO2 26 09/18/2020 04:46 AM  
 Anion gap 7 09/18/2020 04:46 AM  
 Glucose 160 (H) 09/18/2020 04:46 AM  
 BUN 57 (H) 09/18/2020 04:46 AM  
 Creatinine 2.76 (H) 09/18/2020 04:46 AM  
 BUN/Creatinine ratio 21 (H) 09/18/2020 04:46 AM  
 GFR est AA 29 (L) 09/18/2020 04:46 AM  
 GFR est non-AA 24 (L) 09/18/2020 04:46 AM  
 Calcium 8.1 (L) 09/18/2020 04:46 AM  
 Bilirubin, total 2.2 (H) 09/18/2020 04:46 AM  
 Alk. phosphatase 66 09/18/2020 04:46 AM  
 Protein, total 5.8 (L) 09/18/2020 04:46 AM  
 Albumin 2.5 (L) 09/18/2020 04:46 AM  
 Globulin 3.3 09/18/2020 04:46 AM  
 A-G Ratio 0.8 (L) 09/18/2020 04:46 AM  
 ALT (SGPT) 11 (L) 09/18/2020 04:46 AM  
 
Lab Results Component Value Date/Time ALT (SGPT) 11 (L) 09/18/2020 04:46 AM  
 
Factors affecting BG pattern Factor Dose Comments Nutrition: 
NPO 
TPN 
OG   
 
200 grams dextrose/20 units of insulin Trophic feeds at trickle rate Will be tapered this evening Will be advanced today Other: Cardiogenic shock Kidney function CVVHD overnight Blood glucose pattern Assessment and Plan Nursing Diagnosis Risk for unstable blood glucose pattern Nursing Intervention Domain 2736 Decision-making Support Nursing Interventions Examined current inpatient diabetes control Explored factors facilitating and impeding inpatient management Evaluation This  male, with Type 2 diabetes, has achieved inpatient blood glucose target of 100-180mg/dl. Inpatient blood glucose management has been impacted by 
[x] Kidney dysfunction Would continue current strategy. Recommendations Recommend: 
 
No change today Billing Code(s) I personally reviewed chart, notes, data and current medications in the medical record, and examined the patient at bedside before making care recommendations. [x] [id number] IP subsequent hospital care - 30 minutes Aletta Lanes, CNS [facility] via [address] [place] [address]

## 2020-09-18 NOTE — PROGRESS NOTES
Progress Note 9/18/2020 4:56 PM 
NAME: Clinton Fierro MRN:  [id number] Admit Diagnosis: Cardiac arrest ([facility] [address] [I46.9] Assessment:   
  
1. Refractory cardiac arrest, initial rhythm appeared to be PEA arrest  Status post ECMO placement for hemodynamic support 2. Cardiogenic Shock - resolved 3. Biventricular failure - improved 4. CAD w/ LM and 3v CAD, with MAKEDA 3 flow at baseline, underwent multivessel PCI 5. Atrial fibrillation with RVR 6. Probable PE vs ACS 7. Respiratory failure status post intubation 8. Severe metabolic acidosis, improved 9. Non occlusive DVT in Rt femoral artery, may be related to ECMO cannula,  AC d/marizol due to hemoptysis s/p IVC filter 10. Anemia 11. Fever, Sepsis,  
12. Thrombocytopenia 13. CAMACHO over CKD, CVVH 
14. Shock liver - improved 15. Diabetes 16. Hypertension 17. Hyperlipidemia  
  
   
  
            Plan:    
1. D/marizol impella, s/p decannulation of ECMO, of Vasopressors 2. Afib with RVR: started on amio gtt, if remains in Afib then may have to consider [facility], was d/marizol previously for hemoptysis 3. On Aspirin and ticagrelor 4. CVVH per renal  
5. On Abx for fevere, sepsis, PNA 6 Vent mx per critical care team  
7 Anticoagulation was d/marizol due to hemoptysis, has IVC filter today for DVT 8. Awaiting for neurological recovery, improving, follows command, does not move leg, MRI to rule otu spinal injury  
 
  
  
   
  
 [x]? High complexity decision making was performed 
  
 
  
 
Subjective: HPI: 
  
Opens eyes Follows command Does not move LE Has afib with RVR started on amio gtt ROS:   
 
Objective:  
  
Physical Exam: 
 
Last 24hrs VS reviewed since prior progress note. Most recent are: 
 
Visit Vitals BP (!) 95/53 Pulse (!) 106 Temp 98.5 °F (36.9 °C) Resp 24 Ht 5' 9\" (1.753 m) Wt 97.9 kg (215 lb 13.3 oz) SpO2 94% BMI 31.87 kg/m² Intake/Output Summary (Last 24 hours) at 9/18/2020 1247 Last data filed at 9/18/2020 1219 Gross per 24 hour Intake 1323.75 ml Output 2117 ml Net -793.25 ml General: intubated and opens eyes and follows command Neck: Supple Respiratory: on vent Cardiovascular: irregular, tachy Abdomen: soft,   non distended Neuro: does not move legs Skin:   dry Extremity: no edema Data Review Telemetry: ST 
 
EKG:  
NSR, Diffuse STT abn, no ST elevation, prolonged QT Lab Data Personally Reviewed: 
 
Recent Labs  
  09/18/20 0446 09/17/20 
3811 WBC 13.9* 9.7 HGB 7.2* 7.0*  
HCT 22.1* 21.6*  
* 86* Recent Labs  
  09/15/20 
1421 APTT 34.9* Recent Labs  
  09/18/20 0446 09/17/20 
0324 09/16/20 
1711  143 146*  
K 3.7 4.5 4.5  
* 111* 113* CO2 26 23 26 BUN 57* 71* 62* CREA 2.76* 4.23* 4.06* * 240* 129* CA 8.1* 8.8 7.7* MG 2.6* 2.7* 2.3 Recent Labs  
  09/17/20 
1124 * Lab Results Component Value Date/Time Triglyceride 420 (H) 09/12/2020 08:07 AM  
 
 
Recent Labs  
  09/18/20 0446 09/17/20 
0324 09/16/20 
1711 AP 66 67 72  
TP 5.8* 5.9* 5.8* ALB 2.5* 2.5* 2.7*  
GLOB 3.3 3.4 3.1 No results for input(s): PH, PCO2, PO2 in the last 72 hours. Medications Personally Reviewed: 
 
Current Facility-Administered Medications Medication Dose Route Frequency  amiodarone (CORDARONE) 375 mg/250 mL D5W infusion  1 mg/min IntraVENous CONTINUOUS  Followed by  
[facility] amiodarone (CORDARONE) 375 mg/250 mL D5W infusion  0.5 mg/min IntraVENous CONTINUOUS  
 fentaNYL citrate (PF) injection 50 mcg  50 mcg IntraVENous Q4H PRN  
 albuterol (PROVENTIL VENTOLIN) nebulizer solution 2.5 mg  2.5 mg Nebulization Q6H RT  
 acetylcysteine (MUCOMYST) 200 mg/mL (20 %) solution 200 mg  200 mg Nebulization Q6H RT  
 0.9% sodium chloride infusion 250 mL  250 mL IntraVENous PRN  
  insulin NPH (NOVOLIN N, HUMULIN N) injection 40 Units  40 Units SubCUTAneous ACB&D  TPN ADULT - CENTRAL   IntraVENous CONTINUOUS  
 bicarbonate dialysis (PRISMASOL) BG K 4/Ca 2.5 5000 ml solution   Extracorporeal DIALYSIS CONTINUOUS  
 heparin (porcine) 2,000 Units in lactated Ringers 1,000 mL Irrigation    PRN  
 insulin lispro (HUMALOG) injection   SubCUTAneous Q6H  
 0.9% sodium chloride infusion 250 mL  250 mL IntraVENous PRN  
 0.9% sodium chloride infusion 250 mL  250 mL IntraVENous PRN  
 acetaminophen (TYLENOL) solution 650 mg  650 mg Per NG tube Q4H PRN  
 PHENYLephrine (DARRYL-SYNEPHRINE) 30 mg in 0.9% sodium chloride 250 mL infusion   mcg/min IntraVENous TITRATE  ticagrelor (BRILINTA) tablet 90 mg  90 mg Per NG tube Q12H  aspirin chewable tablet 81 mg  81 mg Per NG tube DAILY  albumin human 5% (BUMINATE) solution 12.5 g  12.5 g IntraVENous Q2H PRN  
 sodium chloride (NS) flush 5-40 mL  5-40 mL IntraVENous Q8H  
 sodium chloride (NS) flush 5-40 mL  5-40 mL IntraVENous PRN  
 0.45% sodium chloride infusion  10 mL/hr IntraVENous CONTINUOUS  
 oxyCODONE IR (ROXICODONE) tablet 5 mg  5 mg Oral Q4H PRN  
 oxyCODONE IR (ROXICODONE) tablet 10 mg  10 mg Oral Q4H PRN  
 naloxone (NARCAN) injection 0.4 mg  0.4 mg IntraVENous PRN  
 ondansetron (ZOFRAN) injection 4 mg  4 mg IntraVENous Q4H PRN  
 albuterol (PROVENTIL VENTOLIN) nebulizer solution 2.5 mg  2.5 mg Nebulization Q4H PRN  chlorhexidine (PERIDEX) 0.12 % mouthwash 10 mL  10 mL Oral Q12H  
 bisacodyL (DULCOLAX) suppository 10 mg  10 mg Rectal DAILY PRN  
 [Held by provider] senna-docusate (PERICOLACE) 8.6-50 mg per tablet 1 Tab  1 Tab Oral BID  [Held by provider] polyethylene glycol (MIRALAX) packet 17 g  17 g Oral DAILY  ELECTROLYTE REPLACEMENT NOTE: Nurse to review Serum Potassium and Magnesuim levels and Initiate Electrolyte Replacement Protocol as needed  1 Each Other PRN  
  magnesium sulfate 1 g/100 ml IVPB (premix or compounded)  1 g IntraVENous PRN  pantoprazole (PROTONIX) 40 mg in 0.9% sodium chloride 10 mL injection  40 mg IntraVENous DAILY  EPINEPHrine (ADRENALIN) 5 mg in 0.9% sodium chloride 250 mL infusion  0-10 mcg/min IntraVENous TITRATE  
 NOREPINephrine (LEVOPHED) 8 mg in 5% dextrose 250mL (32 mcg/mL) infusion  0.5-16 mcg/min IntraVENous TITRATE  dexmedeTOMidine (PRECEDEX) 400 mcg in 0.9% sodium chloride 100 mL infusion  0.2-0.7 mcg/kg/hr IntraVENous TITRATE  glucose chewable tablet 16 g  4 Tab Oral PRN  
 dextrose (D50W) injection syrg 12.5-25 g  12.5-25 g IntraVENous PRN  
 glucagon (GLUCAGEN) injection 1 mg  1 mg IntraMUSCular PRN  
 alteplase (CATHFLO) 1 mg in sterile water (preservative free) 1 mL injection  1 mg InterCATHeter PRN  
 bacitracin 500 unit/gram packet 1 Packet  1 Packet Topical PRN  
 balsam peru-castor oiL (VENELEX) ointment   Topical BID  DOBUTamine (DOBUTREX) 500 mg/250 mL (2,000 mcg/mL) infusion  2 mcg/kg/min IntraVENous CONTINUOUS Flory Melgar MD

## 2020-09-18 NOTE — DIALYSIS
[facility]       [phone] Orders Mode: CVVHD restarted @ 1000 Factor: 25 ml/hr DFR: 25 ml/kg/hr x 97.8 kg = 2445 ml/hr Blood Flow Rate: 200 ml/min Metrics BP / HR: 111/44 // 106 Blood Flow Rate: 200 ml/min AP:                         -86  
RP: 69 TMP: 27  
PD: 23  
FP: 120 DFR: 2450 ml/hr Comments / Plan:  
   Filter changed secondary to clotting. Primary RN unable to return blood (ebl 165 ml). Consents, patient, code status, labs and orders verified. Old set discarded in red bio-hazard bag. New [id number] filter set-up, primed, tested and running well at this time. RIJ temporary CVC, dressing CDI with bio-patch dated 9/17/2020. No signs of redness, drainage, or infection visualized. Each catheter limb disinfected for 60 seconds per limb with alcohol swabs. Caps removed, dialysis CVC hub scrubbed with Prevantics for 5 seconds, followed by a 5 second dry time per Hospital P&P. +asp/+flush x 2 ports. Lines visible and connections secure with blood warmer to return line at 37*C. Education, pre and post to Primary RN.

## 2020-09-18 NOTE — PROGRESS NOTES
2025- precedex restarted, as pt vent alarming constantly, pt with severe gagging and coughing, RR in 40s-50s. Will continue to monitor. 2040- current dose of precedex 0.2 not effective at this time with sedation for patient. Dex increased. Will continue to monitor. 2130- precedex decreased, as pt bp falling. 2148-pt vent alarming again, pt noted to be biting ETT. Prompted not to bite, pt able to follow directive. 0030- pt needing to be prompted multiple times not to bite tube. 0045- rodriguez catheter changed, as pt with stool backed up into temperature sensing port all the way back to the 3 lumen hub. Sterile technique applied.   
 
0700- bedside report given to [facility]

## 2020-09-19 NOTE — PROGRESS NOTES
0700 - Bedside and Verbal shift change report given to Tristen Flynn RN (oncoming nurse) by Donavan Puga RN (offgoing nurse). Report included the following information SBAR, Kardex, Procedure Summary, Intake/Output, MAR, Recent Results and Cardiac Rhythm NSR.

## 2020-09-19 NOTE — PROGRESS NOTES
Physical Therapy Attempted to see pt for PT services. Pt remains sedated and not appropriate for PT services. Spoke with pts nurse and he is in agreement. Will defer eval today and have PT follow up on Monday.

## 2020-09-19 NOTE — PROGRESS NOTES
Consult received  Pt has history of expensive anterior cervical fusion with not unexpected finding now of stenosis at the lower adjacent C67 level just below the fusion. However the patient has just suffered a cardiac arrest and would not be a candidate for surgical intervention at this time unless a surgical emergency which it really is not. Full note to follow  Will see pt later today

## 2020-09-19 NOTE — PROGRESS NOTES
09/19/20 0421 ABCDEF Bundle SBT Safety Screen Passed No  
SBT Screen Reason for Failure FiO2 > 50%;PEEP > 7.5

## 2020-09-19 NOTE — ANESTHESIA POSTPROCEDURE EVALUATION
Procedure(s): IVC FILTER INSERTION. general 
 
Anesthesia Post Evaluation Patient location during evaluation: PACU Note status: Adequate. Level of consciousness: responsive to verbal stimuli and sleepy but conscious Pain management: satisfactory to patient Airway patency: patent Anesthetic complications: no 
Cardiovascular status: acceptable Respiratory status: acceptable Hydration status: acceptable Comments: +Post-Anesthesia Evaluation and Assessment Patient: Aj Titus MRN: [id number]  SSN: [id number] YOB: 1960  Age: 63 y.o. Sex: male Cardiovascular Function/Vital Signs BP (!) 98/52   Pulse 98   Temp 36.7 °C (98 °F)   Resp 30   Ht 5' 9\" (1.753 m)   Wt 97.9 kg (215 lb 13.3 oz)   SpO2 93%   BMI 31.87 kg/m² Patient is status post Procedure(s): IVC FILTER INSERTION. Nausea/Vomiting: Controlled. Postoperative hydration reviewed and adequate. Pain: 
Pain Scale 1: Behavioral Pain Scale (BPS) (09/18/20 1945) Managed. Neurological Status: At baseline. Mental Status and Level of Consciousness: Arousable. Pulmonary Status:  
O2 Device: Other (comment) (09/18/20 0763) Adequate oxygenation and airway patent. Complications related to anesthesia: None Post-anesthesia assessment completed. No concerns. Signed By: Cleopatra Mandujano MD  
 9/18/2020 Post anesthesia nausea and vomiting:  controlled INITIAL Post-op Vital signs:  
Vitals Value Taken Time /54 9/18/2020 10:15 PM  
Temp Pulse 99 9/18/2020 10:17 PM  
Resp 29 9/18/2020 10:17 PM  
SpO2 95 % 9/18/2020 10:17 PM  
Vitals shown include unvalidated device data.
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
## 2020-09-19 NOTE — PROGRESS NOTES
NAME: Fredi Abernathy :  1960 MRN:  [id number] Assessment   :                                               Plan: 
CKD/CAMACHO S/P arrest 
Shock Hypernatremia Acute resp. Failure DVT 
 
  Continue  CVVHD today, initiated . Factor 100.  4K bags. Labs at 0400 and 1600. ~ 600 ml uop.  
  
underlying CKD from DM/HTN.   
CAMACHO likely related to shock. and contrast. 
 
Critically ill at significant risk of decompensation. S/p IVC filter, s/p ecmo, s/p PCI 
 
  
  
 
 
Subjective: Chief Complaint:  Intubated. On cvvhd. Tolerating factor 100. I spoke with his nurse. Review of Systems:  UTO - intubated. Symptom Y/N Comments  Symptom Y/N Comments Fever/Chills    Chest Pain Poor Appetite    Edema Cough    Abdominal Pain Sputum    Joint Pain SOB/FINLEY    Pruritis/Rash Nausea/vomit    Tolerating PT/OT Diarrhea    Tolerating Diet Constipation    Other Could not obtain due to:   
 
Objective: VITALS:  
Last 24hrs VS reviewed since prior progress note. Most recent are: 
Visit Vitals BP (!) 133/99 Pulse 92 Temp 96.9 °F (36.1 °C) Resp 30 Ht 5' 9\" (1.753 m) Wt 97.9 kg (215 lb 13.3 oz) SpO2 98% BMI 31.87 kg/m² Intake/Output Summary (Last 24 hours) at 2020 2793 Last data filed at 2020 0600 Gross per 24 hour Intake 2500.89 ml Output 1737 ml Net 763.89 ml Telemetry Reviewed: PHYSICAL EXAM: 
General: NAD 
+1-2 dependent edema FiO2 100% Lab Data Reviewed: (see below) Medications Reviewed: (see below) PMH/SH reviewed - no change compared to H&P 
________________________________________________________________________ Care Plan discussed with: 
Patient Family RN Care Manager Consultant:     
 
  Comments >50% of visit spent in counseling and coordination of care ________________________________________________________________________ Aisha Alvarado MD  
 
Procedures: see electronic medical records for all procedures/Xrays and details which 
were not copied into this note but were reviewed prior to creation of Plan. LABS: 
Recent Labs  
  09/19/20 0441 09/18/20 0446 WBC 19.2* 13.9* HGB 7.5* 7.2* HCT 23.1* 22.1*  
 134* Recent Labs  
  09/19/20 0441 09/18/20 0446 09/17/20 
0324  144 143  
K 3.7 3.7 4.5  
 111* 111* CO2 26 26 23 BUN 51* 57* 71* CREA 2.27* 2.76* 4.23* * 160* 240* CA 7.8* 8.1* 8.8 MG 2.6* 2.6* 2.7* PHOS 3.9 4.1 8.6* Recent Labs  
  09/19/20 0441 09/18/20 0446 09/17/20 
0324 * 66 67  
TP 5.9* 5.8* 5.9* ALB 2.5* 2.5* 2.5*  
GLOB 3.4 3.3 3.4 No results for input(s): INR, PTP, APTT, INREXT, INREXT in the last 72 hours. No results for input(s): FE, TIBC, PSAT, FERR in the last 72 hours. No results found for: FOL, RBCF No results for input(s): PH, PCO2, PO2 in the last 72 hours. Recent Labs  
  09/17/20 
1124 * No components found for: [place] Lab Results Component Value Date/Time Color YELLOW/STRAW 09/12/2020 05:32 PM  
 Appearance CLEAR 09/12/2020 05:32 PM  
 Specific gravity 1.022 09/12/2020 05:32 PM  
 Specific gravity 1.020 12/11/2015 09:31 AM  
 pH (UA) 7.5 09/12/2020 05:32 PM  
 Protein Negative 09/12/2020 05:32 PM  
 Glucose Negative 09/12/2020 05:32 PM  
 Ketone Negative 09/12/2020 05:32 PM  
 Bilirubin Negative 09/12/2020 05:32 PM  
 Urobilinogen 0.2 09/12/2020 05:32 PM  
 Nitrites Negative 09/12/2020 05:32 PM  
 Leukocyte Esterase Negative 09/12/2020 05:32 PM  
 Epithelial cells FEW 09/12/2020 05:32 PM  
 Bacteria Negative 09/12/2020 05:32 PM  
 WBC 0-4 09/12/2020 05:32 PM  
 RBC 10-20 09/12/2020 05:32 PM  
 
 
MEDICATIONS: 
Current Facility-Administered Medications Medication Dose Route Frequency  propofol (DIPRIVAN) 10 mg/mL infusion  0-50 mcg/kg/min IntraVENous TITRATE  potassium chloride 20 mEq in 50 ml IVPB  20 mEq IntraVENous Q1H  
 amiodarone (CORDARONE) 375 mg/250 mL D5W infusion  0.5 mg/min IntraVENous CONTINUOUS  
 fentaNYL citrate (PF) injection 50 mcg  50 mcg IntraVENous Q4H PRN  
 iothalamate meglumine (CONRAY 60) 60 % contrast solution    PRN  
 albuterol (PROVENTIL VENTOLIN) nebulizer solution 2.5 mg  2.5 mg Nebulization Q6H RT  
 acetylcysteine (MUCOMYST) 200 mg/mL (20 %) solution 200 mg  200 mg Nebulization Q6H RT  
 0.9% sodium chloride infusion 250 mL  250 mL IntraVENous PRN  
 insulin NPH (NOVOLIN N, HUMULIN N) injection 40 Units  40 Units SubCUTAneous ACB&D  
 bicarbonate dialysis (PRISMASOL) BG K 4/Ca 2.5 5000 ml solution   Extracorporeal DIALYSIS CONTINUOUS  
 heparin (porcine) 2,000 Units in lactated Ringers 1,000 mL Irrigation    PRN  
 insulin lispro (HUMALOG) injection   SubCUTAneous Q6H  
 0.9% sodium chloride infusion 250 mL  250 mL IntraVENous PRN  
 0.9% sodium chloride infusion 250 mL  250 mL IntraVENous PRN  
 acetaminophen (TYLENOL) solution 650 mg  650 mg Per NG tube Q4H PRN  
 PHENYLephrine (DARRYL-SYNEPHRINE) 30 mg in 0.9% sodium chloride 250 mL infusion   mcg/min IntraVENous TITRATE  ticagrelor (BRILINTA) tablet 90 mg  90 mg Per NG tube Q12H  aspirin chewable tablet 81 mg  81 mg Per NG tube DAILY  albumin human 5% (BUMINATE) solution 12.5 g  12.5 g IntraVENous Q2H PRN  
 sodium chloride (NS) flush 5-40 mL  5-40 mL IntraVENous Q8H  
 sodium chloride (NS) flush 5-40 mL  5-40 mL IntraVENous PRN  
 0.45% sodium chloride infusion  10 mL/hr IntraVENous CONTINUOUS  
 oxyCODONE IR (ROXICODONE) tablet 5 mg  5 mg Oral Q4H PRN  
 oxyCODONE IR (ROXICODONE) tablet 10 mg  10 mg Oral Q4H PRN  
 naloxone (NARCAN) injection 0.4 mg  0.4 mg IntraVENous PRN  
 ondansetron (ZOFRAN) injection 4 mg  4 mg IntraVENous Q4H PRN  
  albuterol (PROVENTIL VENTOLIN) nebulizer solution 2.5 mg  2.5 mg Nebulization Q4H PRN  chlorhexidine (PERIDEX) 0.12 % mouthwash 10 mL  10 mL Oral Q12H  
 bisacodyL (DULCOLAX) suppository 10 mg  10 mg Rectal DAILY PRN  
 [Held by provider] senna-docusate (PERICOLACE) 8.6-50 mg per tablet 1 Tab  1 Tab Oral BID  [Held by provider] polyethylene glycol (MIRALAX) packet 17 g  17 g Oral DAILY  ELECTROLYTE REPLACEMENT NOTE: Nurse to review Serum Potassium and Magnesuim levels and Initiate Electrolyte Replacement Protocol as needed  1 Each Other PRN  
 magnesium sulfate 1 g/100 ml IVPB (premix or compounded)  1 g IntraVENous PRN  pantoprazole (PROTONIX) 40 mg in 0.9% sodium chloride 10 mL injection  40 mg IntraVENous DAILY  EPINEPHrine (ADRENALIN) 5 mg in 0.9% sodium chloride 250 mL infusion  0-10 mcg/min IntraVENous TITRATE  
 NOREPINephrine (LEVOPHED) 8 mg in 5% dextrose 250mL (32 mcg/mL) infusion  0.5-16 mcg/min IntraVENous TITRATE  dexmedeTOMidine (PRECEDEX) 400 mcg in 0.9% sodium chloride 100 mL infusion  0.2-0.7 mcg/kg/hr IntraVENous TITRATE  glucose chewable tablet 16 g  4 Tab Oral PRN  
 dextrose (D50W) injection syrg 12.5-25 g  12.5-25 g IntraVENous PRN  
 glucagon (GLUCAGEN) injection 1 mg  1 mg IntraMUSCular PRN  
 alteplase (CATHFLO) 1 mg in sterile water (preservative free) 1 mL injection  1 mg InterCATHeter PRN  
 bacitracin 500 unit/gram packet 1 Packet  1 Packet Topical PRN  
 balsam peru-castor oiL (VENELEX) ointment   Topical BID  DOBUTamine (DOBUTREX) 500 mg/250 mL (2,000 mcg/mL) infusion  2 mcg/kg/min IntraVENous CONTINUOUS

## 2020-09-19 NOTE — PROGRESS NOTES
Attempted to see pt for OT services. Pt remains sedated and not appropriate for OT services. Spoke with pts nurse and he is in agreement. Will defer eval today and have OT follow up on Monday.

## 2020-09-19 NOTE — PROGRESS NOTES
Acute cardiogenic shock Acute kidney injury Hemorrhage Acute hypotension Severe multivessel CAD Recent PCI Remains intubated and sedated Remains on CVVH Continues on TPN Started on trickle TF's MRI yesterday - may have some cord compression Will get neurosurgery to take a look today IVC filter placed yesterday WBCs elevated Hgb and platelets look good Creatinine in the mid 2's Bilirubin and other LFTs look good 7.38 / 40 / 189 / 24 / -1 Vent - PEEP 8, FiO2 90%, RR 12 CXR - mild improvement in bilateral effusions Addendum: 
 
Passed along issues with spinal cord compression to ICU attending Not clear if something needs to be done at this point Intake/Output Summary (Last 24 hours) at 9/19/2020 8373 Last data filed at 9/19/2020 8625 Gross per 24 hour Intake 2062.28 ml Output 1845 ml Net 217.28 ml Visit Vitals BP (!) 133/99 Pulse 92 Temp 96.9 °F (36.1 °C) Resp 30 Ht 5' 9\" (1.753 m) Wt 215 lb 13.3 oz (97.9 kg) SpO2 98% BMI 31.87 kg/m² Risk of morbidity and mortality - high Medical decision making - high complexity Total critical care time - 30 minutes (CPT 23145) I personally spent the above critical care time. This is time spent at this critically ill patient's bedside / unit / floor actively involved in patient care as well as the coordination of care and discussions with the patient's family. This does not include any procedural time which has been billed separately.

## 2020-09-19 NOTE — PROGRESS NOTES
[facility] Name: Shavonne Browning MRN: [id number] : 1960 Hospital: [facility] Date: 2020 Critical Care IMPRESSION:  
· Acute hypoxic respiratory failure on vent · Prolonged out of hospital cardiac arrest 
· Cardiogenic shock previous requiring  impella, ECMO · Diffuse ASCVD s.p PCI 9/15/20 · Renal insf/metabolic acidosis · Concern of spinal cord compression · Bilateral air space disease with RUL collapse- resolved after bronch but LLLat risk · DVT s/p IVC filter · Shock liver- better · Hypernatremia · DM · Smoker · Covid neg RECOMMENDATIONS:  
· Ventilator support; 
· Pressors/inotropes · CRRT per renal 
· Cefepime, vancomycin, flagyl  to finish 5-7 day course if cultures remain negative · Neuro eval 
· Reanl, cardiology help much appreciated · DVT, PUD prophylaxis · Sedation as needed I reviewed meds and labs and images 30 ccm Subjective/History: No acute events overnight Now awake, tachypneic, anxious Intubated Current Facility-Administered Medications Medication Dose Route Frequency  propofol (DIPRIVAN) 10 mg/mL infusion  0-50 mcg/kg/min IntraVENous TITRATE  amiodarone (CORDARONE) 375 mg/250 mL D5W infusion  0.5 mg/min IntraVENous CONTINUOUS  
 albuterol (PROVENTIL VENTOLIN) nebulizer solution 2.5 mg  2.5 mg Nebulization Q6H RT  
 acetylcysteine (MUCOMYST) 200 mg/mL (20 %) solution 200 mg  200 mg Nebulization Q6H RT  
 insulin NPH (NOVOLIN N, HUMULIN N) injection 40 Units  40 Units SubCUTAneous ACB&D  
 bicarbonate dialysis (PRISMASOL) BG K 4/Ca 2.5 5000 ml solution   Extracorporeal DIALYSIS CONTINUOUS  
 insulin lispro (HUMALOG) injection   SubCUTAneous Q6H  
 PHENYLephrine (DARRYL-SYNEPHRINE) 30 mg in 0.9% sodium chloride 250 mL infusion   mcg/min IntraVENous TITRATE  ticagrelor (BRILINTA) tablet 90 mg  90 mg Per NG tube Q12H  aspirin chewable tablet 81 mg  81 mg Per NG tube DAILY  sodium chloride (NS) flush 5-40 mL  5-40 mL IntraVENous Q8H  
 0.45% sodium chloride infusion  10 mL/hr IntraVENous CONTINUOUS  chlorhexidine (PERIDEX) 0.12 % mouthwash 10 mL  10 mL Oral Q12H  
 [Held by provider] senna-docusate (PERICOLACE) 8.6-50 mg per tablet 1 Tab  1 Tab Oral BID  [Held by provider] polyethylene glycol (MIRALAX) packet 17 g  17 g Oral DAILY  pantoprazole (PROTONIX) 40 mg in 0.9% sodium chloride 10 mL injection  40 mg IntraVENous DAILY  EPINEPHrine (ADRENALIN) 5 mg in 0.9% sodium chloride 250 mL infusion  0-10 mcg/min IntraVENous TITRATE  
 NOREPINephrine (LEVOPHED) 8 mg in 5% dextrose 250mL (32 mcg/mL) infusion  0.5-16 mcg/min IntraVENous TITRATE  dexmedeTOMidine (PRECEDEX) 400 mcg in 0.9% sodium chloride 100 mL infusion  0.2-0.7 mcg/kg/hr IntraVENous TITRATE  balsam peru-castor oiL (VENELEX) ointment   Topical BID  DOBUTamine (DOBUTREX) 500 mg/250 mL (2,000 mcg/mL) infusion  2 mcg/kg/min IntraVENous CONTINUOUS Review of Systems: 
Review of systems not obtained due to patient factors. Objective:  
Vital Signs:   
Visit Vitals BP (!) 80/45 Pulse 92 Temp 96.9 °F (36.1 °C) Resp 28 Ht 5' 9\" (1.753 m) Wt 97.9 kg (215 lb 13.3 oz) SpO2 96% BMI 31.87 kg/m² O2 Device: Endotracheal tube, Ventilator Temp (24hrs), Av.7 °F (36.5 °C), Min:96.7 °F (35.9 °C), Max:98.6 °F (37 °C) Intake/Output:  
Last shift:       07 - 1900 In: 83.8 [I.V.:83.8] Out: 126 Last 3 shifts: 1901 -  0700 In: 3060.2 [I.V.:2530.2] Out: 2170 [KMQWF:5186] Intake/Output Summary (Last 24 hours) at 2020 [facility] Last data filed at 2020 0800 Gross per 24 hour Intake 2076.09 ml Output 2304 ml Net -227.91 ml Hemodynamics:  
PAP: PAP Systolic: 59 (97/57/97 6785) CO: CO (l/min): 6.4 l/min (20 0000) Wedge:   CI: CI (l/min/m2): 3 l/min/m2 (20) CVP:  CVP (mmHg): 10 mmHg (09/19/20 0800) SVR:    
  PVR:    
 
Ventilator Settings: 
Mode Rate Tidal Volume Pressure FiO2 PEEP Assist control   450 ml  6 cm H2O 70 %(decreased to 70% beause of ABG results) 8 cm H20 Peak airway pressure: 22 cm H2O Minute ventilation: 15.3 l/min Physical Exam: 
 
General:  Intubated, awake Head:  Normocephalic, without obvious abnormality, atraumatic. Eyes:  Conjunctivae/corneas clear. Pupils reactive and equal  
Nose: Nares normal. Septum midline. Mucosa normal.    
Throat: ETT in place Neck: Supple, symmetrical, trachea midline Back:   Symmetric, no curvature. ROM normal.  
Lungs:   Clear to auscultation bilaterally. Chest wall:  No tenderness or deformity. Heart:  Regular rate and rhythm Abdomen:   Soft, non-tender. Bowel sounds normal.   
Extremities: Extremities normal, atraumatic, no cyanosis or clubbing Skin: Skin color, texture, turgor normal. No rashes or lesions Neurologic: awake Data:  
 
            
Labs: 
Recent Labs  
  09/19/20 
0441 09/18/20 
0446 09/17/20 
0324 WBC 19.2* 13.9* 9.7 HGB 7.5* 7.2* 7.0*  
HCT 23.1* 22.1* 21.6*  
 134* 86* Recent Labs  
  09/19/20 
0441 09/18/20 
0446 09/17/20 
0324  144 143  
K 3.7 3.7 4.5  
 111* 111* CO2 26 26 23 * 160* 240* BUN 51* 57* 71* CREA 2.27* 2.76* 4.23* CA 7.8* 8.1* 8.8 MG 2.6* 2.6* 2.7* PHOS 3.9 4.1 8.6* ALB 2.5* 2.5* 2.5* TBILI 1.3* 2.2* 1.0 ALT 19 11* 9* Recent Labs  
  09/19/20 
8833 09/19/20 
6089 09/18/20 
9762 PHI 7.38 7.41 7.37  
PCO2I 40.2 35.6 41.6 PO2I 189* 48* 66* HCO3I 23.8 22.7 24.1 FIO2I 90 60 40 Imaging: 
I have personally reviewed the patients radiographs and have reviewed the reports: CXR: LLL atx Total critical care time exclusive of procedures:  minutes Saulo Dorado MD

## 2020-09-19 NOTE — PROGRESS NOTES
1905- report received from [facility]. Vent rate 12, pt RRtotal is 36. Will continue to monitor 
 
0200-pt desatting into the 70s. Pulse ox changed and adjusted with good pleth. Call to RT to see if any vent changes could be made. Pt is chewing on the tube, RR remains in the mid to high 30s. precedex increased to top of order. Ineffective sedation at this time. Will continue to monitor 0230- increase in sedation not working. Pt sats continue to be labile between 79-90, staying mostly in the mid to low 80s. Call to [facility], new orders received. 5827- propofol started, precedex stopped. RT to bedside. Pt suctioned, continue to adjust pulse ox probe and monitor sedation status. 0330- pt resting much better now, rr in high 20s, however sats still from 82-90. RT called to get blood gas when she comes back around.   
 
0345-pt abg stable, pt FiO2 increased to 90% to hold pt sats above 90. 
 
0645- blood gas repeated, fio2 decreased to 70. 
 
0700- Report given to Jim Gabriel

## 2020-09-19 NOTE — PROGRESS NOTES
Chief Complaint: difficulty weaning off the vent Remains intubated without sedation. Much more alert and following commands albeit inconsistently. Has regained some motion in the upper extremities but remains very weak. Lower extremities show no movement. Limited bedside EMG was performed. Assesment and Plan 
  
1. Generalized weakness Limited EMG was performed but could not be completed because of technical limitation of the machine and the location. That being said data collected support a critical illness myopathy  and possible neuropathy (more nerves need testing and both upper extremities have tubes in them). CK level is elevated which supports the diagnosis of a myopathy. This is complicated by : acute embolic stroke as evident on MRI of the brain which may contribute to weakness. In addition he has severe cervical stenosis with cord compression which is chronic (evident in 2015 MRI) but brings the question of wether his loss of sensation torso and below may be secondary to anterior spinal artery infarct given his cardiac arrest and embolic stroke. An MRI of the T and L spine should be considered when feasible. 2. Altered mental status EEG - moderate slowing supporting a non-specific encephalopathy but no seizures.  
  
3. Coronary artery disease Status post multivessel stenting 
  
3. Respiratory failure Failing to wean off vent secondary to myopathy and possibly neuropathy. Treat infection and diabetes  
  
4. Diabetes On sliding scale insulin 5. Embolic stroke Multiple bi hemispheric infarcts on MRI Continue aspirin 
avoid elective surgeries Continue with stroke risk stratification. Allergies Patient has no known allergies. Medications Current Facility-Administered Medications Medication Dose Route Frequency  propofol (DIPRIVAN) 10 mg/mL infusion  0-50 mcg/kg/min IntraVENous TITRATE  amiodarone (CORDARONE) 375 mg/250 mL D5W infusion  0.5 mg/min IntraVENous CONTINUOUS  
 fentaNYL citrate (PF) injection 50 mcg  50 mcg IntraVENous Q4H PRN  
 iothalamate meglumine (CONRAY 60) 60 % contrast solution    PRN  
 albuterol (PROVENTIL VENTOLIN) nebulizer solution 2.5 mg  2.5 mg Nebulization Q6H RT  
 acetylcysteine (MUCOMYST) 200 mg/mL (20 %) solution 200 mg  200 mg Nebulization Q6H RT  
 0.9% sodium chloride infusion 250 mL  250 mL IntraVENous PRN  
 insulin NPH (NOVOLIN N, HUMULIN N) injection 40 Units  40 Units SubCUTAneous ACB&D  
 bicarbonate dialysis (PRISMASOL) BG K 4/Ca 2.5 5000 ml solution   Extracorporeal DIALYSIS CONTINUOUS  
 heparin (porcine) 2,000 Units in lactated Ringers 1,000 mL Irrigation    PRN  
 insulin lispro (HUMALOG) injection   SubCUTAneous Q6H  
 0.9% sodium chloride infusion 250 mL  250 mL IntraVENous PRN  
 0.9% sodium chloride infusion 250 mL  250 mL IntraVENous PRN  
 acetaminophen (TYLENOL) solution 650 mg  650 mg Per NG tube Q4H PRN  
 PHENYLephrine (DARRYL-SYNEPHRINE) 30 mg in 0.9% sodium chloride 250 mL infusion   mcg/min IntraVENous TITRATE  ticagrelor (BRILINTA) tablet 90 mg  90 mg Per NG tube Q12H  aspirin chewable tablet 81 mg  81 mg Per NG tube DAILY  albumin human 5% (BUMINATE) solution 12.5 g  12.5 g IntraVENous Q2H PRN  
 sodium chloride (NS) flush 5-40 mL  5-40 mL IntraVENous Q8H  
 sodium chloride (NS) flush 5-40 mL  5-40 mL IntraVENous PRN  
 0.45% sodium chloride infusion  10 mL/hr IntraVENous CONTINUOUS  
 oxyCODONE IR (ROXICODONE) tablet 5 mg  5 mg Oral Q4H PRN  
 oxyCODONE IR (ROXICODONE) tablet 10 mg  10 mg Oral Q4H PRN  
 naloxone (NARCAN) injection 0.4 mg  0.4 mg IntraVENous PRN  
 ondansetron (ZOFRAN) injection 4 mg  4 mg IntraVENous Q4H PRN  
 albuterol (PROVENTIL VENTOLIN) nebulizer solution 2.5 mg  2.5 mg Nebulization Q4H PRN  chlorhexidine (PERIDEX) 0.12 % mouthwash 10 mL  10 mL Oral Q12H  
 bisacodyL (DULCOLAX) suppository 10 mg  10 mg Rectal DAILY PRN  
  [Held by provider] senna-docusate (PERICOLACE) 8.6-50 mg per tablet 1 Tab  1 Tab Oral BID  [Held by provider] polyethylene glycol (MIRALAX) packet 17 g  17 g Oral DAILY  ELECTROLYTE REPLACEMENT NOTE: Nurse to review Serum Potassium and Magnesuim levels and Initiate Electrolyte Replacement Protocol as needed  1 Each Other PRN  
 magnesium sulfate 1 g/100 ml IVPB (premix or compounded)  1 g IntraVENous PRN  pantoprazole (PROTONIX) 40 mg in 0.9% sodium chloride 10 mL injection  40 mg IntraVENous DAILY  EPINEPHrine (ADRENALIN) 5 mg in 0.9% sodium chloride 250 mL infusion  0-10 mcg/min IntraVENous TITRATE  
 NOREPINephrine (LEVOPHED) 8 mg in 5% dextrose 250mL (32 mcg/mL) infusion  0.5-16 mcg/min IntraVENous TITRATE  dexmedeTOMidine (PRECEDEX) 400 mcg in 0.9% sodium chloride 100 mL infusion  0.2-0.7 mcg/kg/hr IntraVENous TITRATE  glucose chewable tablet 16 g  4 Tab Oral PRN  
 dextrose (D50W) injection syrg 12.5-25 g  12.5-25 g IntraVENous PRN  
 glucagon (GLUCAGEN) injection 1 mg  1 mg IntraMUSCular PRN  
 alteplase (CATHFLO) 1 mg in sterile water (preservative free) 1 mL injection  1 mg InterCATHeter PRN  
 bacitracin 500 unit/gram packet 1 Packet  1 Packet Topical PRN  
 balsam peru-castor oiL (VENELEX) ointment   Topical BID  DOBUTamine (DOBUTREX) 500 mg/250 mL (2,000 mcg/mL) infusion  2 mcg/kg/min IntraVENous CONTINUOUS Medical History Past Medical History:  
Diagnosis Date  Diabetes ([facility] [address]  Elevated cholesterol  GERD (gastroesophageal reflux disease)   
 helps with meds upsetting stomach  Hypertension  Low vitamin D level  Neuropathy   
 rt lower extremity Review of Systems Unable to perform ROS: Intubated Exam: 
 
Visit Vitals BP (!) 96/53 (BP 1 Location: Right arm, BP Patient Position: At rest) Pulse 92 Temp 96.9 °F (36.1 °C) Resp 28 Ht 5' 9\" (1.753 m) Wt 215 lb 13.3 oz (97.9 kg) SpO2 100% BMI 31.87 kg/m² General: Awake intubated Head: Normocephalic, atraumatic, anicteric sclera Neck Normal ROM Cardiac: Regular rate and rhythm. Ext: Generalized edema Skin: Supple no rash NeurologicExam: 
Mental Status: Awake. Follows simple commands. Level of comprehension is questionable. Speech: Intubated Cranial Nerves:  II-XII intact Motor:  Can move fingers. Otherwise strength 1/5 upper ext lower ext 0/5 Reflexes:   Deep tendon reflexes 1 - none/4  Lower ext , 1+ biceps Sensory:   NO perceived sensory perception lower extremities specifically to touch pin prick and vibrations. Limited tactile appreciation upper extremities. Tremor:   No tremor noted. Cerebellar:  Coordination intact. Neurovascular: No carotid bruits. No JVD Imaging CT Results (most recent): No results found for this or any previous visit. MRI Results (most recent): 
Results from Hospital Encounter encounter on 09/10/20 MRI BRAIN WO CONT Narrative EXAM: MRI BRAIN WO CONT 
 
TECHNIQUE: Brain images including sagittal and axial T1-weighted, axial FLAIR, T2-weighted, diffusion weighted, gradient echo,  coronal T2 
 
IV CONTRAST:  None INDICATION:  post cardiac arrest 
 
COMPARISON:  None. FINDINGS: 
BRAIN PARENCHYMA:  Small focus of diffusion restriction in the medial right 
periatrial region, consistent with acute infarction. Additional punctate 
scattered foci of restricted diffusion in the right posterior temporal lobe, 
left cerebellar hemisphere, left parietal lobe, right posterior parietal cortex, 
and probably in the right frontal lobe. Corresponding areas of FLAIR signal 
abnormality. These are in a watershed distribution. No major territorial 
infarct. Additional mild scattered foci of FLAIR/T2 hyperintensity in the 
cerebral white matter, likely due to intracranial small vessel disease. INTRACRANIAL HEMORRHAGE: None. CSF SPACES:  Normal in size and morphology for the patient's age. BASAL CISTERNS:  Patent. MIDLINE SHIFT: None. VASCULAR SYSTEM:  Normal flow voids. PARANASAL SINUSES AND MASTOID AIR CELLS:  Mild bilateral maxillary sinus mucosal 
thickening and small air-fluid levels. . Bilateral sphenoid fluid and mastoid 
fluid. This may relate to intubation. VISUALIZED ORBITS:  No significant abnormalities. VISUALIZED UPPER CERVICAL SPINE:  No significant abnormalities. SELLA:  No enlargement or  focal abnormality. SKULL BASE:  No significant abnormalities. Cerebellar tonsils in normal 
position. CALVARIUM:  Intact. Impression IMPRESSION:   
1. Few small scattered foci of acute infarction in the cerebral hemispheres and 
in the left cerebellum, in a watershed distribution. No major territorial 
infarct. No hemorrhage. 2. Mild white matter signal abnormality consistent with age-related changes and 
small vessel disease. . 
Lab Review Recent Results (from the past 24 hour(s)) CBC W/O DIFF Collection Time: 09/18/20  4:46 AM  
Result Value Ref Range WBC 13.9 (H) 4.1 - 11.1 K/uL  
 RBC 2.40 (L) 4.10 - 5.70 M/uL HGB 7.2 (L) 12.1 - 17.0 g/dL HCT 22.1 (L) 36.6 - 50.3 % MCV 92.1 80.0 - 99.0 FL  
 MCH 30.0 26.0 - 34.0 PG  
 MCHC 32.6 30.0 - 36.5 g/dL  
 RDW 16.9 (H) 11.5 - 14.5 % PLATELET 776 (L) 303 - 400 K/uL MPV 12.1 8.9 - 12.9 FL  
 NRBC 0.0 0  WBC ABSOLUTE NRBC 0.00 0.00 - 0.01 K/uL METABOLIC PANEL, COMPREHENSIVE Collection Time: 09/18/20  4:46 AM  
Result Value Ref Range Sodium 144 136 - 145 mmol/L Potassium 3.7 3.5 - 5.1 mmol/L Chloride 111 (H) 97 - 108 mmol/L  
 CO2 26 21 - 32 mmol/L Anion gap 7 5 - 15 mmol/L Glucose 160 (H) 65 - 100 mg/dL BUN 57 (H) 6 - 20 MG/DL Creatinine 2.76 (H) 0.70 - 1.30 MG/DL  
 BUN/Creatinine ratio 21 (H) 12 - 20 GFR est AA 29 (L) >60 ml/min/1.73m2 GFR est non-AA 24 (L) >60 ml/min/1.73m2 Calcium 8.1 (L) 8.5 - 10.1 MG/DL  Bilirubin, total 2.2 (H) 0.2 - 1.0 MG/DL  
 ALT (SGPT) 11 (L) 12 - 78 U/L  
 AST (SGOT) 55 (H) 15 - 37 U/L Alk. phosphatase 66 45 - 117 U/L Protein, total 5.8 (L) 6.4 - 8.2 g/dL Albumin 2.5 (L) 3.5 - 5.0 g/dL Globulin 3.3 2.0 - 4.0 g/dL A-G Ratio 0.8 (L) 1.1 - 2.2 PHOSPHORUS Collection Time: 09/18/20  4:46 AM  
Result Value Ref Range Phosphorus 4.1 2.6 - 4.7 MG/DL MAGNESIUM Collection Time: 09/18/20  4:46 AM  
Result Value Ref Range Magnesium 2.6 (H) 1.6 - 2.4 mg/dL POC EG7 Collection Time: 09/18/20  5:02 AM  
Result Value Ref Range Calcium, ionized (POC) 1.17 1.12 - 1.32 mmol/L  
 FIO2 (POC) 40 % pH (POC) 7.42 7.35 - 7.45    
 pCO2 (POC) 36.4 35.0 - 45.0 MMHG  
 pO2 (POC) 67 (L) 80 - 100 MMHG  
 HCO3 (POC) 23.5 22 - 26 MMOL/L Base deficit (POC) 1 mmol/L  
 sO2 (POC) 94 92 - 97 % Site DRAWN FROM ARTERIAL LINE Device: VENT Mode ASSIST CONTROL Tidal volume 450 ml Set Rate 30 bpm  
 PEEP/CPAP (POC) 6 cmH2O Allens test (POC) N/A Specimen type (POC) ARTERIAL Total resp. rate 32 GLUCOSE, POC Collection Time: 09/18/20  5:58 AM  
Result Value Ref Range Glucose (POC) 162 (H) 65 - 100 mg/dL Performed by Guy Siddiqi RN   
POC EG7 Collection Time: 09/18/20  8:33 AM  
Result Value Ref Range Calcium, ionized (POC) 1.17 1.12 - 1.32 mmol/L  
 FIO2 (POC) 40 % pH (POC) 7.37 7.35 - 7.45    
 pCO2 (POC) 41.6 35.0 - 45.0 MMHG  
 pO2 (POC) 66 (L) 80 - 100 MMHG  
 HCO3 (POC) 24.1 22 - 26 MMOL/L Base deficit (POC) 1 mmol/L  
 sO2 (POC) 92 92 - 97 % Site DRAWN FROM ARTERIAL LINE Device: VENT Mode CPAP/SPON    
 PEEP/CPAP (POC) 6 cmH2O Pressure support 6 cmH2O Allens test (POC) N/A Specimen type (POC) ARTERIAL Total resp. rate 20 HEP B SURFACE AB Collection Time: 09/18/20 12:29 PM  
Result Value Ref Range Hepatitis B surface Ab >1,000.00 mIU/mL Hep B surface Ab Interp.  REACTIVE (A) NR    
HEP B SURFACE AG  
 Collection Time: 09/18/20 12:29 PM  
Result Value Ref Range Hepatitis B surface Ag <0.10 Index Hep B surface Ag Interp. Negative NEG    
GLUCOSE, POC Collection Time: 09/18/20  6:52 PM  
Result Value Ref Range Glucose (POC) 205 (H) 65 - 100 mg/dL Performed by Mazie Nissen GLUCOSE, POC Collection Time: 09/18/20  6:53 PM  
Result Value Ref Range Glucose (POC) 193 (H) 65 - 100 mg/dL Performed by Mazie Nissen GLUCOSE, POC Collection Time: 09/19/20 12:18 AM  
Result Value Ref Range Glucose (POC) 177 (H) 65 - 100 mg/dL Performed by Nargis Bradshaw RN   
POC EG7 Collection Time: 09/19/20  3:38 AM  
Result Value Ref Range Calcium, ionized (POC) 1.13 1.12 - 1.32 mmol/L  
 FIO2 (POC) 60 % pH (POC) 7.41 7.35 - 7.45    
 pCO2 (POC) 35.6 35.0 - 45.0 MMHG  
 pO2 (POC) 48 (LL) 80 - 100 MMHG  
 HCO3 (POC) 22.7 22 - 26 MMOL/L Base deficit (POC) 2 mmol/L  
 sO2 (POC) 84 (L) 92 - 97 % Site DRAWN FROM ARTERIAL LINE Device: VENT Mode ASSIST CONTROL Tidal volume 450 ml Set Rate 12 bpm  
 PEEP/CPAP (POC) 6 cmH2O Allens test (POC) N/A Specimen type (POC) ARTERIAL Total resp. rate 30 Volume control YES Patient is in critical condition and is at risk for sudden deterioration. 30 minutes spent on floor examining patient and reviewing chart

## 2020-09-19 NOTE — PROGRESS NOTES
Neurology Note Patient ID: 
Gianna Erickson [id number] 
14 y.o. 
1960 Date of Consultation:  September 19, 2020 Subjective: intubated History of Present Illness:  
Gianna Erickson is a 63 y.o. male who was initially admitted to the hospital on September 10, 2020 after having an out-of-hospital cardiac arrest with prolonged downtime. Neurology has been following along due to cognitive status and generalized weakness. The patient did have an EEG which revealed no evidence of seizure activity. He did have a brain MRI which showed multiple areas of new ischemia in the bilateral hemispheres. A MRI of his cervical spine was also performed which showed a significant amount of degenerative disease with prior surgery and multilevel areas of cord compression with chronic myelomalacia identified. Neurosurgery has been consulted for an evaluation. Upon discussing with nursing this morning, there was no new events overnight. Past Medical History:  
Diagnosis Date  Diabetes ([facility] [address]  Elevated cholesterol  GERD (gastroesophageal reflux disease)   
 helps with meds upsetting stomach  Hypertension  Low vitamin D level  Neuropathy   
 rt lower extremity Past Surgical History:  
Procedure Laterality Date  CARDIAC SURG PROCEDURE UNLIST    
 heart cath. normal  
 HX CERVICAL DISKECTOMY 2015  HX COLONOSCOPY  2015  HX HERNIA REPAIR    
 umbilical  
 HX ORTHOPAEDIC    
 reattached tendon and muscle rt shoulder  IR INSERT NON TUNL CVC OVER 5 YRS  9/17/2020 History reviewed. No pertinent family history. Social History Tobacco Use  Smoking status: Current Every Day Smoker Packs/day: 1.00 Substance Use Topics  Alcohol use: Yes Comment: rarely No Known Allergies Prior to Admission medications Medication Sig Start Date End Date Taking? Authorizing Provider metFORMIN (GLUCOPHAGE) 500 mg tablet  12/9/16   Provider, Historical  
vardenafil (LEVITRA) 20 mg tablet Take 20 mg by mouth as needed. Provider, Historical  
glimepiride (AMARYL) 4 mg tablet Take 4 mg by mouth two (2) times a day. Provider, Historical  
ergocalciferol (VITAMIN D2) 50,000 unit capsule Take 50,000 Units by mouth every seven (7) days. Provider, Historical  
aspirin delayed-release 81 mg tablet Take 81 mg by mouth daily. Provider, Historical  
omeprazole (PRILOSEC) 20 mg capsule Take 20 mg by mouth daily. Provider, Historical  
rosuvastatin (CRESTOR) 20 mg tablet Take 20 mg by mouth nightly. Provider, Historical  
fenofibric acid (TRILIPIX) 135 mg capsule Take 135 mg by mouth nightly. Provider, Historical  
lisinopril (PRINIVIL, ZESTRIL) 20 mg tablet Take 20 mg by mouth two (2) times a day. Provider, Historical  
metFORMIN (GLUCOPHAGE) 1,000 mg tablet Take 2,000 mg by mouth two (2) times a day. Provider, Historical  
 
Current Facility-Administered Medications Medication Dose Route Frequency  propofol (DIPRIVAN) 10 mg/mL infusion  0-50 mcg/kg/min IntraVENous TITRATE  amiodarone (CORDARONE) 375 mg/250 mL D5W infusion  0.5 mg/min IntraVENous CONTINUOUS  
 fentaNYL citrate (PF) injection 50 mcg  50 mcg IntraVENous Q4H PRN  
 iothalamate meglumine (CONRAY 60) 60 % contrast solution    PRN  
 albuterol (PROVENTIL VENTOLIN) nebulizer solution 2.5 mg  2.5 mg Nebulization Q6H RT  
 acetylcysteine (MUCOMYST) 200 mg/mL (20 %) solution 200 mg  200 mg Nebulization Q6H RT  
 0.9% sodium chloride infusion 250 mL  250 mL IntraVENous PRN  
 insulin NPH (NOVOLIN N, HUMULIN N) injection 40 Units  40 Units SubCUTAneous ACB&D  
 bicarbonate dialysis (PRISMASOL) BG K 4/Ca 2.5 5000 ml solution   Extracorporeal DIALYSIS CONTINUOUS  
 heparin (porcine) 2,000 Units in lactated Ringers 1,000 mL Irrigation    PRN  
 insulin lispro (HUMALOG) injection   SubCUTAneous Q6H  
  0.9% sodium chloride infusion 250 mL  250 mL IntraVENous PRN  
 0.9% sodium chloride infusion 250 mL  250 mL IntraVENous PRN  
 acetaminophen (TYLENOL) solution 650 mg  650 mg Per NG tube Q4H PRN  
 PHENYLephrine (DARRYL-SYNEPHRINE) 30 mg in 0.9% sodium chloride 250 mL infusion   mcg/min IntraVENous TITRATE  ticagrelor (BRILINTA) tablet 90 mg  90 mg Per NG tube Q12H  aspirin chewable tablet 81 mg  81 mg Per NG tube DAILY  albumin human 5% (BUMINATE) solution 12.5 g  12.5 g IntraVENous Q2H PRN  
 sodium chloride (NS) flush 5-40 mL  5-40 mL IntraVENous Q8H  
 sodium chloride (NS) flush 5-40 mL  5-40 mL IntraVENous PRN  
 0.45% sodium chloride infusion  10 mL/hr IntraVENous CONTINUOUS  
 oxyCODONE IR (ROXICODONE) tablet 5 mg  5 mg Oral Q4H PRN  
 oxyCODONE IR (ROXICODONE) tablet 10 mg  10 mg Oral Q4H PRN  
 naloxone (NARCAN) injection 0.4 mg  0.4 mg IntraVENous PRN  
 ondansetron (ZOFRAN) injection 4 mg  4 mg IntraVENous Q4H PRN  
 albuterol (PROVENTIL VENTOLIN) nebulizer solution 2.5 mg  2.5 mg Nebulization Q4H PRN  chlorhexidine (PERIDEX) 0.12 % mouthwash 10 mL  10 mL Oral Q12H  
 bisacodyL (DULCOLAX) suppository 10 mg  10 mg Rectal DAILY PRN  
 [Held by provider] senna-docusate (PERICOLACE) 8.6-50 mg per tablet 1 Tab  1 Tab Oral BID  [Held by provider] polyethylene glycol (MIRALAX) packet 17 g  17 g Oral DAILY  ELECTROLYTE REPLACEMENT NOTE: Nurse to review Serum Potassium and Magnesuim levels and Initiate Electrolyte Replacement Protocol as needed  1 Each Other PRN  
 magnesium sulfate 1 g/100 ml IVPB (premix or compounded)  1 g IntraVENous PRN  pantoprazole (PROTONIX) 40 mg in 0.9% sodium chloride 10 mL injection  40 mg IntraVENous DAILY  EPINEPHrine (ADRENALIN) 5 mg in 0.9% sodium chloride 250 mL infusion  0-10 mcg/min IntraVENous TITRATE  
 NOREPINephrine (LEVOPHED) 8 mg in 5% dextrose 250mL (32 mcg/mL) infusion  0.5-16 mcg/min IntraVENous TITRATE  dexmedeTOMidine (PRECEDEX) 400 mcg in 0.9% sodium chloride 100 mL infusion  0.2-0.7 mcg/kg/hr IntraVENous TITRATE  glucose chewable tablet 16 g  4 Tab Oral PRN  
 dextrose (D50W) injection syrg 12.5-25 g  12.5-25 g IntraVENous PRN  
 glucagon (GLUCAGEN) injection 1 mg  1 mg IntraMUSCular PRN  
 alteplase (CATHFLO) 1 mg in sterile water (preservative free) 1 mL injection  1 mg InterCATHeter PRN  
 bacitracin 500 unit/gram packet 1 Packet  1 Packet Topical PRN  
 balsam peru-castor oiL (VENELEX) ointment   Topical BID  DOBUTamine (DOBUTREX) 500 mg/250 mL (2,000 mcg/mL) infusion  2 mcg/kg/min IntraVENous CONTINUOUS Review of Systems: 
 
 
 
[x]Unable to obtain  ROS due to  []mental status change  [x]sedated   [x]intubated Objective:  
 
Visit Vitals BP (!) 92/52 Pulse 91 Temp 97.8 °F (36.6 °C) Resp 28 Ht 5' 9\" (1.753 m) Wt 215 lb 13.3 oz (97.9 kg) SpO2 95% BMI 31.87 kg/m² Physical Exam: 
 
Neurological examination Neck: no carotid bruits Lungs: clear to auscultation Heart:  no murmurs, regular rate Lower extremity: no edema Language: coma Cranial nerves:  
Perrrla Facial sensation/motor:  grimace to noxious stimulation Corneal reflex intact Motor: Tone decreased throughout No evidence of fasciculations Was minimal spontaneous movement seen in his upper extremities. No movement noted in his lower extremities. Sensory: 
Upper extremity: Minimal withdrawal to painful stimuli bilaterally Lower extremity: no withdrawal to painful stimuli bilaterally Reflexes: 
Reduced throughout Plantar response:  flexor bilaterally Cerebellar testing:  no abnormal movements Gait: unable to assess due to cognitive status Labs:  
 
Lab Results Component Value Date/Time  Hemoglobin A1c 8.4 (H) 09/11/2020 05:43 AM  
 Sodium 139 09/19/2020 04:41 AM  
 Potassium 3.7 09/19/2020 04:41 AM  
 Chloride 107 09/19/2020 04:41 AM  
 Glucose 170 (H) 09/19/2020 04:41 AM  
 BUN 51 (H) 09/19/2020 04:41 AM  
 Creatinine 2.27 (H) 09/19/2020 04:41 AM  
 Calcium 7.8 (L) 09/19/2020 04:41 AM  
 WBC 19.2 (H) 09/19/2020 04:41 AM  
 HCT 23.1 (L) 09/19/2020 04:41 AM  
 HGB 7.5 (L) 09/19/2020 04:41 AM  
 PLATELET 869 57/12/5676 04:41 AM  
 
 
Imaging: 
 
Results from Hospital Encounter encounter on 09/10/20 MRI BRAIN WO CONT Narrative EXAM: MRI BRAIN WO CONT 
 
TECHNIQUE: Brain images including sagittal and axial T1-weighted, axial FLAIR, T2-weighted, diffusion weighted, gradient echo,  coronal T2 
 
IV CONTRAST:  None INDICATION:  post cardiac arrest 
 
COMPARISON:  None. FINDINGS: 
BRAIN PARENCHYMA:  Small focus of diffusion restriction in the medial right 
periatrial region, consistent with acute infarction. Additional punctate 
scattered foci of restricted diffusion in the right posterior temporal lobe, 
left cerebellar hemisphere, left parietal lobe, right posterior parietal cortex, 
and probably in the right frontal lobe. Corresponding areas of FLAIR signal 
abnormality. These are in a watershed distribution. No major territorial 
infarct. Additional mild scattered foci of FLAIR/T2 hyperintensity in the 
cerebral white matter, likely due to intracranial small vessel disease. INTRACRANIAL HEMORRHAGE: None. CSF SPACES:  Normal in size and morphology for the patient's age. BASAL CISTERNS:  Patent. MIDLINE SHIFT: None. VASCULAR SYSTEM:  Normal flow voids. PARANASAL SINUSES AND MASTOID AIR CELLS:  Mild bilateral maxillary sinus mucosal 
thickening and small air-fluid levels. . Bilateral sphenoid fluid and mastoid 
fluid. This may relate to intubation. VISUALIZED ORBITS:  No significant abnormalities. VISUALIZED UPPER CERVICAL SPINE:  No significant abnormalities. SELLA:  No enlargement or  focal abnormality. SKULL BASE:  No significant abnormalities. Cerebellar tonsils in normal 
position. CALVARIUM:  Intact. Impression IMPRESSION:   
1. Few small scattered foci of acute infarction in the cerebral hemispheres and 
in the left cerebellum, in a watershed distribution. No major territorial 
infarct. No hemorrhage. 2. Mild white matter signal abnormality consistent with age-related changes and 
small vessel disease. No results found for this or any previous visit. Did independently reviewed the brain MRI from 9/18/2020. There are a few small scattered foci of acute stroke in the cerebral hemispheres in the left cerebellum. There is underlying white matter abnormalities consistent with small vessel chronic ischemic disease. Assessment and Plan: The patient is a pleasant 21-year-old gentleman with out-of-hospital cardiac arrest requiring continued respiratory support. Neurology has been following along due to cognitive status, stroke found on neuro imaging, and generalized weakness. Cardiac arrest with probable cerebral anoxia: EEG with no evidence of seizure activity - moderate slowing. We will continue to follow neurological exam from a cognitive standpoint as sedation is weaned. Acute stroke: This is most likely ischemic/embolic in nature due to the cardiac arrest. 
Continue aspirin daily He does have risk factors for stroke including dyslipidemia and diabetes Generalized weakness/significant cervical spine disease with cord compression: The generalized weakness may be multifactorial in etiology. (cervical spine disease, stroke, possible cord ischemia during cardiac arrest, metabolic derangements, systemic effects of infection, possible critical illness myopathy) He does have a significant amount of cervical spine disease. Recent MRI of cervical spine does reveal cord compression and chronic changes within the cord. Neurosurgery has been consulted.  
I am also concerned about diffuse ischemia to the cord causing some of the generalized weakness during his cardiac arrest. 
 His CPK is elevated, this may have been related to his initial cardiac arrest (he did have a shock liver) however the development of a myopathy, critical illness, can be considered. I would recommend trending CPK over the next couple days. Neurology will continue to follow along closely. Active Problems: 
  Cardiac arrest ([facility] [address] (9/10/2020) 50 minutes was spent providing medical care of this critically ill patient reviewing records, obtaining additional history from family, examining patient , discussing with collaborating physicians and nursing, and discussing treatment plans. Signed By:  Brittany Rosado September 19, 2020

## 2020-09-19 NOTE — PROGRESS NOTES
Cardiology Progress Note 9/19/2020 12:03 PM 
 
Admit Date: 9/10/2020 Subjective:  Back in SR overnight. On IV amio. Otherwise no major changes Visit Vitals BP (!) 76/52 Pulse 88 Temp 97.8 °F (36.6 °C) Resp 24 Ht 5' 9\" (1.753 m) Wt 97.9 kg (215 lb 13.3 oz) SpO2 96% BMI 31.87 kg/m²  
 
09/17 1901 - 09/19 0700 In: 3060.2 [I.V.:2530.2] Out: 6554 [[id number]] Objective:  
  
Physical Exam: 
VS as above Neck s obvious JVD Chest coarse BS bilat Card RRR no MRG Neuro unresponsive Data Review:  
Labs:   
7.38/40/189 Hgb 7.5 
K 3.7 Creat 2.3 CXR increase in small pleural eff, bibasilar opacities Telemetry: SR R 80 Assessment: 1. Refractory cardiac arrest, initial rhythm appeared to be PEA arrest  Status post ECMO placement for hemodynamic support 2. Cardiogenic Shock - resolved 3. Biventricular failure - improved 4. CAD w/ LM and 3v CAD, with MAKEDA 3 flow at baseline, underwent multivessel PCI 5. Atrial fibrillation with RVR 6. Respiratory failure status post intubation 7. Severe metabolic acidosis, improved 8. Non occlusive DVT in Rt femoral artery, may be related to ECMO cannula,  AC d/marizol due to hemoptysis s/p IVC filter 9. Anemia 10. Fever, Sepsis, 11. CAMACHO over CKD, CVVH 12. Shock liver - improved 13. Diabetes   
14. Hypertension 15. Hyperlipidemia 16. Recent hemoptysis, anticoag d/marizol 17. ? Spinal cord compression Plan: Cont IV amio, ASA and brilinta

## 2020-09-19 NOTE — DIALYSIS
[facility]       [phone] Orders Mode: CVVHD Factor: 75 ml/hr DFR: 2450 ml/hr Blood Flow Rate: 200 ml/min Metrics BP / HR: 106/47  90 Blood Flow Rate: 200 ml/min AP:                         -93  
RP: 54 TMP: 29 PD: 25  
FP: 108 DFR: 2450 ml/hr Comments / Plan: At bedside to round/assess pt and filter, all pressures within normal range. No need to change at this time. Orders, consent, pt and code status confirmed. RIJ non-tunneled CVC infusing well. Bio-patch C/D/I. HF 1000 running well w/ no visible clots noted. Lines visible, secure, connections fastened well and blood warmer on return line set to 37 °C. Education and pre/post report given to primary RN.

## 2020-09-19 NOTE — DIALYSIS
[facility]       [phone] Orders Mode: CVVHD Factor: 25 ml/hr DFR: 2450 ml/hr Blood Flow Rate: 200 ml/min Metrics BP / HR: 100/44  98 Blood Flow Rate: 200 ml/min AP:                         -91  
RP: 50 TMP: 27  
PD: 21  
FP: 102 DFR: 2450 ml/hr Comments / Plan: At bedside to restart crrt after pt arrived to room from procedure, all blood returned to pt prior to scan per primary RN. Orders, consent, pt and code status confirmed. RIJ non-tunneled CVC +aspiration/+flush x2. Bio-patch C/D/I. New HF 1000 primed, tested, and running well. Lines visible, secure, connections fastened well and blood warmer on return line set to 37 °C. Education and pre/post report given to primary RN.

## 2020-09-20 NOTE — PROGRESS NOTES
Cardiology Progress Note 2020 1:14 PM 
 
Admit Date: 9/10/2020 Subjective:Less responsive this am. No afib Visit Vitals BP (!) 163/125 Pulse 86 Temp 97.4 °F (36.3 °C) Resp 19 Ht 5' 9\" (1.753 m) Wt 98.5 kg (217 lb 2.5 oz) SpO2 98% BMI 32.07 kg/m²  
 
1901 -  0700 In: 1920.3 [I.V.:1060.3] Out: 0390 [[facility]] Objective:  
  
Physical Exam: 
VS as above Chest coarse BS bilat Card RRR no changes Data Review:  
Labs:   
7.44/36/100 BUN 38 Creat 1.8 Hgb 7.1 CXR no changes Telemetry: SR R 92 Assessment: 1. Refractory cardiac arrest, initial rhythm appeared to be PEA arrest  Status post ECMO placement for hemodynamic support 2. Cardiogenic Shock - resolved  
3.  Biventricular failure - improved  
4. CAD w/ LM and 3v CAD, with MAKEDA 3 flow at baseline, underwent multivessel PCI 5. Atrial fibrillation with RVR   
6. Respiratory failure status post intubation 7. Severe metabolic acidosis, improved   
8. Non occlusive DVT in Rt femoral artery, may be related to ECMO cannula,  AC d/marizol due to hemoptysis s/p IVC filter   
9. Anemia 10. Fever, Sepsis, 11. CAMACHO over CKD, CVVH 12. Shock liver - improved  13. Diabetes   
14. Hypertension 15. Hyperlipidemia 16. Recent hemoptysis, anticoag d/marizol 17. ? Spinal cord compression Plan: Cont current Rx. Nothing to add today.

## 2020-09-20 NOTE — DIALYSIS
[facility]          [phone] 
  
   
Orders Mode: CVVHD Factor: 100 ml/hr DFR: 2450 ml/hr Blood Flow Rate: 200 ml/min  
  
   
Metrics BP / HR: 101/46 
95 Blood Flow Rate: 200 ml/min AP:                         -94  
RP: 45 TMP: 46  
PD: 85  
FP: 160 DFR: 2450 ml/hr  
  
Comments / Plan: At bedside to round/assess pt and filter, all pressures within normal range. No need to change at this time. Orders, consent, pt and code status confirmed. RIJ non-tunneled CVC infusing well. Bio-patch C/D/I. HF 1000 running well w/ no visible clots noted. Lines visible, secure, connections fastened well and blood warmer on return line set to 37 °C. Education and pre/post report given to primary RN.

## 2020-09-20 NOTE — PROGRESS NOTES
Neurology Note Patient ID: 
Florian Ramos [id number] 
89 y.o. 
1960 Subjective: intubated History of Present Illness:  
Florian Ramos is a 63 y.o. male who was initially admitted to the hospital on September 10, 2020 after having an out-of-hospital cardiac arrest with prolonged downtime. Neurology has been following along due to cognitive status and generalized weakness. The patient did have an EEG which revealed no evidence of seizure activity. He did have a brain MRI which showed multiple areas of new ischemia in the bilateral hemispheres. A MRI of his cervical spine was also performed which showed a significant amount of degenerative disease with prior surgery and multilevel areas of cord compression with chronic myelomalacia identified. Neurosurgery was consulted and no recommendation for acute intervention. Per nursing, there was no events overnight. Past Medical History:  
Diagnosis Date  Diabetes ([address]  Elevated cholesterol  GERD (gastroesophageal reflux disease)   
 helps with meds upsetting stomach  Hypertension  Low vitamin D level  Neuropathy   
 rt lower extremity Past Surgical History:  
Procedure Laterality Date  CARDIAC SURG PROCEDURE UNLIST    
 heart cath. normal  
 HX CERVICAL DISKECTOMY 2015  HX COLONOSCOPY  2015  HX HERNIA REPAIR    
 umbilical  
 HX ORTHOPAEDIC    
 reattached tendon and muscle rt shoulder  IR INSERT NON TUNL CVC OVER 5 YRS  9/17/2020 History reviewed. No pertinent family history. Social History Tobacco Use  Smoking status: Current Every Day Smoker Packs/day: 1.00 Substance Use Topics  Alcohol use: Yes Comment: rarely No Known Allergies Prior to Admission medications Medication Sig Start Date End Date Taking?  Authorizing Provider  
metFORMIN (GLUCOPHAGE) 500 mg tablet  12/9/16   Provider, Historical  
 vardenafil (LEVITRA) 20 mg tablet Take 20 mg by mouth as needed. Provider, Historical  
glimepiride (AMARYL) 4 mg tablet Take 4 mg by mouth two (2) times a day. Provider, Historical  
ergocalciferol (VITAMIN D2) 50,000 unit capsule Take 50,000 Units by mouth every seven (7) days. Provider, Historical  
aspirin delayed-release 81 mg tablet Take 81 mg by mouth daily. Provider, Historical  
omeprazole (PRILOSEC) 20 mg capsule Take 20 mg by mouth daily. Provider, Historical  
rosuvastatin (CRESTOR) 20 mg tablet Take 20 mg by mouth nightly. Provider, Historical  
fenofibric acid (TRILIPIX) 135 mg capsule Take 135 mg by mouth nightly. Provider, Historical  
lisinopril (PRINIVIL, ZESTRIL) 20 mg tablet Take 20 mg by mouth two (2) times a day. Provider, Historical  
metFORMIN (GLUCOPHAGE) 1,000 mg tablet Take 2,000 mg by mouth two (2) times a day. Provider, Historical  
 
Current Facility-Administered Medications Medication Dose Route Frequency  cefepime (MAXIPIME) 1 g in 0.9% sodium chloride (MBP/ADV) 50 mL  1 g IntraVENous Q24H  propofol (DIPRIVAN) 10 mg/mL infusion  0-50 mcg/kg/min IntraVENous TITRATE  amiodarone (CORDARONE) 375 mg/250 mL D5W infusion  0.5 mg/min IntraVENous CONTINUOUS  
 fentaNYL citrate (PF) injection 50 mcg  50 mcg IntraVENous Q4H PRN  
 iothalamate meglumine (CONRAY 60) 60 % contrast solution    PRN  
 albuterol (PROVENTIL VENTOLIN) nebulizer solution 2.5 mg  2.5 mg Nebulization Q6H RT  
 acetylcysteine (MUCOMYST) 200 mg/mL (20 %) solution 200 mg  200 mg Nebulization Q6H RT  
 0.9% sodium chloride infusion 250 mL  250 mL IntraVENous PRN  
 insulin NPH (NOVOLIN N, HUMULIN N) injection 40 Units  40 Units SubCUTAneous ACB&D  
 bicarbonate dialysis (PRISMASOL) BG K 4/Ca 2.5 5000 ml solution   Extracorporeal DIALYSIS CONTINUOUS  
 heparin (porcine) 2,000 Units in lactated Ringers 1,000 mL Irrigation    PRN  
  insulin lispro (HUMALOG) injection   SubCUTAneous Q6H  
 0.9% sodium chloride infusion 250 mL  250 mL IntraVENous PRN  
 0.9% sodium chloride infusion 250 mL  250 mL IntraVENous PRN  
 acetaminophen (TYLENOL) solution 650 mg  650 mg Per NG tube Q4H PRN  
 PHENYLephrine (DARRYL-SYNEPHRINE) 30 mg in 0.9% sodium chloride 250 mL infusion   mcg/min IntraVENous TITRATE  ticagrelor (BRILINTA) tablet 90 mg  90 mg Per NG tube Q12H  aspirin chewable tablet 81 mg  81 mg Per NG tube DAILY  albumin human 5% (BUMINATE) solution 12.5 g  12.5 g IntraVENous Q2H PRN  
 sodium chloride (NS) flush 5-40 mL  5-40 mL IntraVENous Q8H  
 sodium chloride (NS) flush 5-40 mL  5-40 mL IntraVENous PRN  
 0.45% sodium chloride infusion  10 mL/hr IntraVENous CONTINUOUS  
 oxyCODONE IR (ROXICODONE) tablet 5 mg  5 mg Oral Q4H PRN  
 oxyCODONE IR (ROXICODONE) tablet 10 mg  10 mg Oral Q4H PRN  
 naloxone (NARCAN) injection 0.4 mg  0.4 mg IntraVENous PRN  
 ondansetron (ZOFRAN) injection 4 mg  4 mg IntraVENous Q4H PRN  
 albuterol (PROVENTIL VENTOLIN) nebulizer solution 2.5 mg  2.5 mg Nebulization Q4H PRN  chlorhexidine (PERIDEX) 0.12 % mouthwash 10 mL  10 mL Oral Q12H  
 bisacodyL (DULCOLAX) suppository 10 mg  10 mg Rectal DAILY PRN  
 [Held by provider] senna-docusate (PERICOLACE) 8.6-50 mg per tablet 1 Tab  1 Tab Oral BID  [Held by provider] polyethylene glycol (MIRALAX) packet 17 g  17 g Oral DAILY  ELECTROLYTE REPLACEMENT NOTE: Nurse to review Serum Potassium and Magnesuim levels and Initiate Electrolyte Replacement Protocol as needed  1 Each Other PRN  
 magnesium sulfate 1 g/100 ml IVPB (premix or compounded)  1 g IntraVENous PRN  pantoprazole (PROTONIX) 40 mg in 0.9% sodium chloride 10 mL injection  40 mg IntraVENous DAILY  EPINEPHrine (ADRENALIN) 5 mg in 0.9% sodium chloride 250 mL infusion  0-10 mcg/min IntraVENous TITRATE  
 NOREPINephrine (LEVOPHED) 8 mg in 5% dextrose 250mL (32 mcg/mL) infusion 0.5-16 mcg/min IntraVENous TITRATE  dexmedeTOMidine (PRECEDEX) 400 mcg in 0.9% sodium chloride 100 mL infusion  0.2-0.7 mcg/kg/hr IntraVENous TITRATE  glucose chewable tablet 16 g  4 Tab Oral PRN  
 dextrose (D50W) injection syrg 12.5-25 g  12.5-25 g IntraVENous PRN  
 glucagon (GLUCAGEN) injection 1 mg  1 mg IntraMUSCular PRN  
 alteplase (CATHFLO) 1 mg in sterile water (preservative free) 1 mL injection  1 mg InterCATHeter PRN  
 bacitracin 500 unit/gram packet 1 Packet  1 Packet Topical PRN  
 balsam peru-castor oiL (VENELEX) ointment   Topical BID  DOBUTamine (DOBUTREX) 500 mg/250 mL (2,000 mcg/mL) infusion  2 mcg/kg/min IntraVENous CONTINUOUS Review of Systems: 
 
 
 
[x]Unable to obtain  ROS due to  []mental status change  [x]sedated   [x]intubated Objective:  
 
Visit Vitals BP (!) 88/42 Pulse 94 Temp 98.8 °F (37.1 °C) Resp (!) 32 Ht 5' 9\" (1.753 m) Wt 217 lb 2.5 oz (98.5 kg) SpO2 (!) 89% BMI 32.07 kg/m² Physical Exam: 
 
Neurological examination Neck: no carotid bruits Lungs: clear to auscultation Heart:  no murmurs, regular rate Lower extremity: no edema Language: coma Cranial nerves:  
Perrrla Facial sensation/motor:  grimace to noxious stimulation Corneal reflex intact Motor: Tone decreased throughout No evidence of fasciculations No spontaneous movement seen in the upper or lower extremities today. Sensory: 
Upper extremity: Minimal withdrawal to painful stimuli bilaterally Lower extremity: no withdrawal to painful stimuli bilaterally Reflexes: 
Reduced throughout Plantar response:  flexor bilaterally Cerebellar testing:  no abnormal movements Gait: unable to assess due to cognitive status Labs:  
 
Lab Results Component Value Date/Time  Hemoglobin A1c 8.4 (H) 09/11/2020 05:43 AM  
 Sodium 137 09/20/2020 03:47 AM  
 Potassium 4.2 09/20/2020 03:47 AM  
 Chloride 105 09/20/2020 03:47 AM  
 Glucose 220 (H) 09/20/2020 03:47 AM  
 BUN 38 (H) 09/20/2020 03:47 AM  
 Creatinine 1.76 (H) 09/20/2020 03:47 AM  
 Calcium 7.8 (L) 09/20/2020 03:47 AM  
 WBC 23.6 (H) 09/20/2020 03:47 AM  
 HCT 22.0 (L) 09/20/2020 03:47 AM  
 HGB 7.1 (L) 09/20/2020 03:47 AM  
 PLATELET 555 39/11/2123 03:47 AM  
 
 
Imaging: 
 
Results from Hospital Encounter encounter on 09/10/20 MRI BRAIN WO CONT Narrative EXAM: MRI BRAIN WO CONT 
 
TECHNIQUE: Brain images including sagittal and axial T1-weighted, axial FLAIR, T2-weighted, diffusion weighted, gradient echo,  coronal T2 
 
IV CONTRAST:  None INDICATION:  post cardiac arrest 
 
COMPARISON:  None. FINDINGS: 
BRAIN PARENCHYMA:  Small focus of diffusion restriction in the medial right 
periatrial region, consistent with acute infarction. Additional punctate 
scattered foci of restricted diffusion in the right posterior temporal lobe, 
left cerebellar hemisphere, left parietal lobe, right posterior parietal cortex, 
and probably in the right frontal lobe. Corresponding areas of FLAIR signal 
abnormality. These are in a watershed distribution. No major territorial 
infarct. Additional mild scattered foci of FLAIR/T2 hyperintensity in the 
cerebral white matter, likely due to intracranial small vessel disease. INTRACRANIAL HEMORRHAGE: None. CSF SPACES:  Normal in size and morphology for the patient's age. BASAL CISTERNS:  Patent. MIDLINE SHIFT: None. VASCULAR SYSTEM:  Normal flow voids. PARANASAL SINUSES AND MASTOID AIR CELLS:  Mild bilateral maxillary sinus mucosal 
thickening and small air-fluid levels. . Bilateral sphenoid fluid and mastoid 
fluid. This may relate to intubation. VISUALIZED ORBITS:  No significant abnormalities. VISUALIZED UPPER CERVICAL SPINE:  No significant abnormalities. SELLA:  No enlargement or  focal abnormality. SKULL BASE:  No significant abnormalities. Cerebellar tonsils in normal 
position. CALVARIUM:  Intact. Impression IMPRESSION:   
1. Few small scattered foci of acute infarction in the cerebral hemispheres and 
in the left cerebellum, in a watershed distribution. No major territorial 
infarct. No hemorrhage. 2. Mild white matter signal abnormality consistent with age-related changes and 
small vessel disease. No results found for this or any previous visit. I did independently reviewed the brain MRI from 9/18/2020. There are a few small scattered foci of acute stroke in the cerebral hemispheres in the left cerebellum. There is underlying white matter abnormalities consistent with small vessel chronic ischemic disease. Assessment and Plan: The patient is a pleasant 65-year-old gentleman with out-of-hospital cardiac arrest requiring continued respiratory support. Neurology has been following along due to cognitive status, stroke found on neuro imaging, and generalized weakness. 1. Cardiac arrest with probable cerebral anoxia: EEG performed on 9/17/2020 with no evidence of seizure activity - moderate slowing. I will obtain a follow-up EEG on Monday. We will continue to follow neurological exam from a cognitive standpoint as sedation is weaned. 2. Acute stroke: This is most likely ischemic/embolic in nature due to the cardiac arrest. 
Continue aspirin daily He does have risk factors for stroke including dyslipidemia and diabetes 3. Generalized weakness/significant cervical spine disease with cord compression: The generalized weakness is most likely multifactorial in etiology. (cervical spine disease, stroke, possible cord ischemia during cardiac arrest, metabolic derangements, systemic effects of infection, anoxic brain injury, possible critical illness myopathy) Neurology will continue to follow along closely. Active Problems: 
  Cardiac arrest ([facility] [address] (9/10/2020) 45 minutes was spent providing medical care of this critically ill patient reviewing records, obtaining additional history from family, examining patient , discussing with collaborating physicians and nursing, and discussing treatment plans. Signed By:  Colton Arguelles September 20, 2020

## 2020-09-20 NOTE — PROGRESS NOTES
0730: Report received by Gonsalo Cohen RN. Included: SBAR, KARDEX, MAR, iTRACE, LDA's and wounds. We discussed plan of care, recent results and patient condition.  
 
0804: Report given to Mey Maldonado

## 2020-09-20 NOTE — PROGRESS NOTES
[facility] Name: Florian Ramos MRN: [id number] : 1960 Hospital: [facility] Date: 2020 Critical Care IMPRESSION:  
· Acute hypoxic respiratory failure on vent · Out of hospital cardiac arrest 
· Cardiogenic shock previous requiring  impella, ECMO · Diffuse ASCVD s.p PCI 9/15/20 · Renal insf/metabolic acidosis on CRT · Concern of spinal cord compression - NS note reviewed · Bilateral air space disease with RUL collapse- resolved after bronch but LLLat risk · Hemoptysis and DVT s/p IVC filter · Shock liver · DM · Smoker · Covid neg RECOMMENDATIONS:  
· Ventilator support · Pressors/inotropes as needed · CRRT per renal 
· Complete abx - Cefepime, vancomycin, flagyl · Reanl, cardiology help much appreciated · Glycemic control · DVT, PUD prophylaxis · Sedation as needed I reviewed meds and labs and images 30 ccm Subjective/History: No acute events overnight Now awake, tachypneic, anxious Intubated Current Facility-Administered Medications Medication Dose Route Frequency  propofol (DIPRIVAN) 10 mg/mL infusion  0-50 mcg/kg/min IntraVENous TITRATE  amiodarone (CORDARONE) 375 mg/250 mL D5W infusion  0.5 mg/min IntraVENous CONTINUOUS  
 albuterol (PROVENTIL VENTOLIN) nebulizer solution 2.5 mg  2.5 mg Nebulization Q6H RT  
 acetylcysteine (MUCOMYST) 200 mg/mL (20 %) solution 200 mg  200 mg Nebulization Q6H RT  
 insulin NPH (NOVOLIN N, HUMULIN N) injection 40 Units  40 Units SubCUTAneous ACB&D  
 bicarbonate dialysis (PRISMASOL) BG K 4/Ca 2.5 5000 ml solution   Extracorporeal DIALYSIS CONTINUOUS  
 insulin lispro (HUMALOG) injection   SubCUTAneous Q6H  
 PHENYLephrine (DARRYL-SYNEPHRINE) 30 mg in 0.9% sodium chloride 250 mL infusion   mcg/min IntraVENous TITRATE  ticagrelor (BRILINTA) tablet 90 mg  90 mg Per NG tube Q12H  aspirin chewable tablet 81 mg  81 mg Per NG tube DAILY  sodium chloride (NS) flush 5-40 mL  5-40 mL IntraVENous Q8H  
 0.45% sodium chloride infusion  10 mL/hr IntraVENous CONTINUOUS  chlorhexidine (PERIDEX) 0.12 % mouthwash 10 mL  10 mL Oral Q12H  
 [Held by provider] senna-docusate (PERICOLACE) 8.6-50 mg per tablet 1 Tab  1 Tab Oral BID  [Held by provider] polyethylene glycol (MIRALAX) packet 17 g  17 g Oral DAILY  pantoprazole (PROTONIX) 40 mg in 0.9% sodium chloride 10 mL injection  40 mg IntraVENous DAILY  EPINEPHrine (ADRENALIN) 5 mg in 0.9% sodium chloride 250 mL infusion  0-10 mcg/min IntraVENous TITRATE  
 NOREPINephrine (LEVOPHED) 8 mg in 5% dextrose 250mL (32 mcg/mL) infusion  0.5-16 mcg/min IntraVENous TITRATE  dexmedeTOMidine (PRECEDEX) 400 mcg in 0.9% sodium chloride 100 mL infusion  0.2-0.7 mcg/kg/hr IntraVENous TITRATE  balsam peru-castor oiL (VENELEX) ointment   Topical BID  DOBUTamine (DOBUTREX) 500 mg/250 mL (2,000 mcg/mL) infusion  2 mcg/kg/min IntraVENous CONTINUOUS Review of Systems: 
Review of systems not obtained due to patient factors. Objective:  
Vital Signs:   
Visit Vitals BP (!) 82/49 Pulse 92 Temp 97.7 °F (36.5 °C) Resp 26 Ht 5' 9\" (1.753 m) Wt 98.5 kg (217 lb 2.5 oz) SpO2 97% BMI 32.07 kg/m² O2 Device: Ventilator Temp (24hrs), Av.9 °F (36.6 °C), Min:96.6 °F (35.9 °C), Max:98.8 °F (37.1 °C) Intake/Output:  
Last shift:      701 - 1900 In: 36 Out: 20 [Urine:20] Last 3 shifts: 1901 -  0700 In: 1920.3 [I.V.:1060.3] Out: 8462 [RTWBD:2113] Intake/Output Summary (Last 24 hours) at 2020 0800 Last data filed at 2020 3580 Gross per 24 hour Intake 1107.15 ml Output 3628 ml Net -2520.85 ml Hemodynamics:  
PAP: PAP Systolic: 59 (94/61/89 8556) CO: CO (l/min): 6.4 l/min (20 0000) Wedge:   CI: CI (l/min/m2): 3 l/min/m2 (20 0000) CVP:  CVP (mmHg): 7 mmHg (09/20/20 0600) SVR:    
  PVR:    
 
Ventilator Settings: 
Mode Rate Tidal Volume Pressure FiO2 PEEP Assist control   450 ml  6 cm H2O 80 %(weaned o2 to 80%) 8 cm H20 Peak airway pressure: 22 cm H2O Minute ventilation: 14.5 l/min Physical Exam: 
 
General:  Intubated, awake Head:  Normocephalic, without obvious abnormality, atraumatic. Eyes:  Conjunctivae/corneas clear. Pupils reactive and equal  
Nose: Nares normal. Septum midline. Mucosa normal.    
Throat: ETT in place Neck: Supple, symmetrical, trachea midline Back:   Symmetric, no curvature. ROM normal.  
Lungs:   Clear to auscultation bilaterally. Chest wall:  No tenderness or deformity. Heart:  Regular rate and rhythm Abdomen:   Soft, non-tender. Bowel sounds normal.   
Extremities: Extremities normal, atraumatic, no cyanosis or clubbing Skin: Skin color, texture, turgor normal. No rashes or lesions Neurologic: awake Data:  
 
            
Labs: 
Recent Labs  
  09/20/20 0347 09/19/20 0441 09/18/20 
0446 WBC 23.6* 19.2* 13.9* HGB 7.1* 7.5* 7.2* HCT 22.0* 23.1* 22.1*  
 198 134* Recent Labs  
  09/20/20 0347 09/19/20 
0441 09/18/20 
0446  139 144  
K 4.2 3.7 3.7  107 111* CO2 29 26 26 * 170* 160* BUN 38* 51* 57* CREA 1.76* 2.27* 2.76* CA 7.8* 7.8* 8.1*  
MG 2.9* 2.6* 2.6* PHOS 2.5* 3.9 4.1 ALB 2.3* 2.5* 2.5* TBILI 1.1* 1.3* 2.2* ALT 35 19 11* Recent Labs  
  09/20/20 
9529 09/19/20 
3106 09/19/20 
5810 PHI 7.44 7.38 7.41  
PCO2I 36.6 40.2 35.6 PO2I 100 189* 48* HCO3I 24.5 23.8 22.7 FIO2I 80 90 60 Imaging: 
I have personally reviewed the patients radiographs and have reviewed the reports: CXR: LLL atx Total critical care time exclusive of procedures:  minutes Kristy Millan MD

## 2020-09-20 NOTE — PROGRESS NOTES
Pharmacy Automatic Renal Dosing Protocol - Antimicrobials Indication for Antimicrobials: sepsis, PNA? Current Regimen of Each Antimicrobial:  
Cefepime 1g q 24 hours (; day #1 Previous Antimicrobial Therapy:  
Cefazolin 2g IV q8h ; started 9/10; day 3 Pip/tazo 3.375g q8h (Start Date ; Day 2) Vancomycin 2g X1, then 1,250 mg q 36 hours (9/15; day #3 Metronidazole 250 mg q 8 hours (9/15 -  Cefepime 2g q 24 hours (9/15 -  Significant Cultures:  
 RCx bronch - light yeast - Final 
9/15: Blood cx: pending Radiology / Imaging results: (X-ray, CT scan or MRI):  
9/15: CXR: Improved aeration in the bilateral airspace disease. Osie Ra : CXR: Increase small pleural effusions and bibasilar opacities that may represent atelectasis, edema, or infection Paralysis, amputations, malnutrition: none noted Labs: 
Recent Labs  
  20 
0347 20 
0441 20 
0446 CREA 1.76* 2.27* 2.76* BUN 38* 51* 57* WBC 23.6* 19.2* 13.9* Temp (24hrs), Av °F (36.7 °C), Min:96.6 °F (35.9 °C), Max:98.8 °F (37.1 °C) Creatinine Clearance (mL/min) or Dialysis: Started CVVH on  Impression/Plan:  
Restart Cefepime 1g q 24 hours; CVVH dosing. Discussed with Dr. Keith Parker and Dr. Ayleen Taylor via text WBC trending up Ordered blood cx Afebrile (all lines were changed on 9/15) Antimicrobial stop date pending Pharmacy will follow daily and adjust medications as appropriate for renal function and/or serum levels. Thank you, Tyrese Jewell, PHARMD

## 2020-09-20 NOTE — DIALYSIS
[facility]       [phone] Orders Mode: CVVHD restarted @ 355 913 230 Factor: 100 ml/hr UFR: 25 ml/kg/hr x 97.8 kg = 2445 ml/hr Blood Flow Rate: 200 ml/min Metrics BP / HR: 113/52 // 97 Blood Flow Rate: 200 ml/min AP:                         -91  
RP: 60 TMP: 32  
PD: 23  
FP: 112 UFR: 2450 ml/hr Comments / Plan:  
   Filter changed secondary to clotting. Primary RN unable to return blood (ebl 165 ml). Consents, patient, code status, labs and orders verified. Old set discarded in red bio-hazard bag. New [id number] filter set-up, primed, tested and running well at this time. RIJ temporary CVC, dressing CDI with bio-patch. No signs of redness, drainage, or infection visualized. Each catheter limb disinfected for 60 seconds per limb with alcohol swabs. Caps removed, dialysis CVC hub scrubbed with Prevantics for 5 seconds, followed by a 5 second dry time per Hospital P&P. +asp/+flush x 2 ports. Lines visible and connections secure with blood warmer to return line at 37*C. Education, pre and post to Primary RN.

## 2020-09-20 NOTE — PROGRESS NOTES
09/20/20 0400 ABCDEF Bundle SBT Safety Screen Passed No  
SBT Screen Reason for Failure FiO2 > 50%;PEEP > 7.5 
(peep 8, fio2 80%)

## 2020-09-20 NOTE — DIALYSIS
[facility]          [phone] 
  
   
Orders Mode: CVVHD Factor: 100 ml/hr DFR: 2450 ml/hr Blood Flow Rate: 200 ml/min  
  
   
Metrics BP / HR: 105/46 
93 Blood Flow Rate: 200 ml/min AP:                         -93  
RP: 44 TMP: 26  
PD: 40  
FP: 101 DFR: 2450 ml/hr  
  
Comments / Plan: At bedside to restart crrt after pt filter clotted per primary RN. Primary RN unable to return blood (EBL 165ml). Orders, consent, pt and code status confirmed. RIJ non-tunneled CVC +aspiration/+flush x2. Bio-patch C/D/I and dated 9.17.20. New HF 1000 primed, tested, and running well. Lines visible, secure, connections fastened well and blood warmer on return line set to 37 °C. Education and pre/post report given to primary RN.

## 2020-09-20 NOTE — PROGRESS NOTES
NAME: Jasen Norris :  1960 MRN:  [id number] Assessment   :                                               Plan: 
CKD/CAMACHO S/P arrest 
Shock Hypernatremia Acute resp. Failure DVT 
 
  Continue  CVVHD, initiated . Factor 100.  4K bags. Labs at 0400 and 1600. ~ 1000 ml uop. Clotting system - had low plt's and anemia/hemorrhage for which anticoagulation was stopped. 
  
underlying CKD from DM/HTN.   
CAMACHO/ATN related to shock. and contrast. 
 
Critically ill. Some posturing. For repeat eeg. S/p IVC filter, s/p ecmo, s/p PCI Prn prbc's for hgb < 7 
  
  
 
 
Subjective: Chief Complaint:  Intubated. On cvvhd. Tolerating factor 100. SBP 90's (on no pressors, but might need to continue with UF). FiO2 100 to 80%. I spoke with his nurse. Review of Systems:  UTO - intubated. Symptom Y/N Comments  Symptom Y/N Comments Fever/Chills    Chest Pain Poor Appetite    Edema Cough    Abdominal Pain Sputum    Joint Pain SOB/FINLEY    Pruritis/Rash Nausea/vomit    Tolerating PT/OT Diarrhea    Tolerating Diet Constipation    Other Could not obtain due to:   
 
Objective: VITALS:  
Last 24hrs VS reviewed since prior progress note. Most recent are: 
Visit Vitals BP (!) 82/49 Pulse 92 Temp 97.7 °F (36.5 °C) Resp 26 Ht 5' 9\" (1.753 m) Wt 98.5 kg (217 lb 2.5 oz) SpO2 97% BMI 32.07 kg/m² Intake/Output Summary (Last 24 hours) at 2020 6819 Last data filed at 2020 0600 Gross per 24 hour Intake 1520.3 ml Output 3602 ml Net -2081.7 ml  
  
Telemetry Reviewed: PHYSICAL EXAM: 
General: NAD 
+1 dependent edema FiO2 80% Lab Data Reviewed: (see below) Medications Reviewed: (see below) PMH/SH reviewed - no change compared to H&P 
________________________________________________________________________ Care Plan discussed with: 
Patient Family RN Care Manager Consultant:     
 
  Comments >50% of visit spent in counseling and coordination of care    
 
________________________________________________________________________ Alyce Curling, MD  
 
Procedures: see electronic medical records for all procedures/Xrays and details which 
were not copied into this note but were reviewed prior to creation of Plan. LABS: 
Recent Labs  
  09/20/20 0347 09/19/20 0441 WBC 23.6* 19.2* HGB 7.1* 7.5* HCT 22.0* 23.1*  
 198 Recent Labs  
  09/20/20 0347 09/19/20 0441 09/18/20 0446  139 144  
K 4.2 3.7 3.7  107 111* CO2 29 26 26 BUN 38* 51* 57* CREA 1.76* 2.27* 2.76* * 170* 160* CA 7.8* 7.8* 8.1*  
MG 2.9* 2.6* 2.6* PHOS 2.5* 3.9 4.1 Recent Labs  
  09/20/20 0347 09/19/20 0441 09/18/20 0446 * 130* 66  
TP 6.1* 5.9* 5.8* ALB 2.3* 2.5* 2.5*  
GLOB 3.8 3.4 3.3 No results for input(s): INR, PTP, APTT, INREXT, INREXT in the last 72 hours. No results for input(s): FE, TIBC, PSAT, FERR in the last 72 hours. No results found for: FOL, RBCF No results for input(s): PH, PCO2, PO2 in the last 72 hours. Recent Labs  
  09/17/20 
1124 * No components found for: [place] Lab Results Component Value Date/Time  Color YELLOW/STRAW 09/12/2020 05:32 PM  
 Appearance CLEAR 09/12/2020 05:32 PM  
 Specific gravity 1.022 09/12/2020 05:32 PM  
 Specific gravity 1.020 12/11/2015 09:31 AM  
 pH (UA) 7.5 09/12/2020 05:32 PM  
 Protein Negative 09/12/2020 05:32 PM  
 Glucose Negative 09/12/2020 05:32 PM  
 Ketone Negative 09/12/2020 05:32 PM  
 Bilirubin Negative 09/12/2020 05:32 PM  
 Urobilinogen 0.2 09/12/2020 05:32 PM  
 Nitrites Negative 09/12/2020 05:32 PM  
 Leukocyte Esterase Negative 09/12/2020 05:32 PM  
 Epithelial cells FEW 09/12/2020 05:32 PM  
 Bacteria Negative 09/12/2020 05:32 PM  
 WBC 0-4 09/12/2020 05:32 PM  
 RBC 10-20 09/12/2020 05:32 PM  
 
 
MEDICATIONS: 
Current Facility-Administered Medications Medication Dose Route Frequency  propofol (DIPRIVAN) 10 mg/mL infusion  0-50 mcg/kg/min IntraVENous TITRATE  amiodarone (CORDARONE) 375 mg/250 mL D5W infusion  0.5 mg/min IntraVENous CONTINUOUS  
 fentaNYL citrate (PF) injection 50 mcg  50 mcg IntraVENous Q4H PRN  
 iothalamate meglumine (CONRAY 60) 60 % contrast solution    PRN  
 albuterol (PROVENTIL VENTOLIN) nebulizer solution 2.5 mg  2.5 mg Nebulization Q6H RT  
 acetylcysteine (MUCOMYST) 200 mg/mL (20 %) solution 200 mg  200 mg Nebulization Q6H RT  
 0.9% sodium chloride infusion 250 mL  250 mL IntraVENous PRN  
 insulin NPH (NOVOLIN N, HUMULIN N) injection 40 Units  40 Units SubCUTAneous ACB&D  
 bicarbonate dialysis (PRISMASOL) BG K 4/Ca 2.5 5000 ml solution   Extracorporeal DIALYSIS CONTINUOUS  
 heparin (porcine) 2,000 Units in lactated Ringers 1,000 mL Irrigation    PRN  
 insulin lispro (HUMALOG) injection   SubCUTAneous Q6H  
 0.9% sodium chloride infusion 250 mL  250 mL IntraVENous PRN  
 0.9% sodium chloride infusion 250 mL  250 mL IntraVENous PRN  
 acetaminophen (TYLENOL) solution 650 mg  650 mg Per NG tube Q4H PRN  
 PHENYLephrine (DARRYL-SYNEPHRINE) 30 mg in 0.9% sodium chloride 250 mL infusion   mcg/min IntraVENous TITRATE  ticagrelor (BRILINTA) tablet 90 mg  90 mg Per NG tube Q12H  aspirin chewable tablet 81 mg  81 mg Per NG tube DAILY  albumin human 5% (BUMINATE) solution 12.5 g  12.5 g IntraVENous Q2H PRN  
 sodium chloride (NS) flush 5-40 mL  5-40 mL IntraVENous Q8H  
 sodium chloride (NS) flush 5-40 mL  5-40 mL IntraVENous PRN  
 0.45% sodium chloride infusion  10 mL/hr IntraVENous CONTINUOUS  
 oxyCODONE IR (ROXICODONE) tablet 5 mg  5 mg Oral Q4H PRN  
 oxyCODONE IR (ROXICODONE) tablet 10 mg  10 mg Oral Q4H PRN  
 naloxone (NARCAN) injection 0.4 mg  0.4 mg IntraVENous PRN  
  ondansetron (ZOFRAN) injection 4 mg  4 mg IntraVENous Q4H PRN  
 albuterol (PROVENTIL VENTOLIN) nebulizer solution 2.5 mg  2.5 mg Nebulization Q4H PRN  chlorhexidine (PERIDEX) 0.12 % mouthwash 10 mL  10 mL Oral Q12H  
 bisacodyL (DULCOLAX) suppository 10 mg  10 mg Rectal DAILY PRN  
 [Held by provider] senna-docusate (PERICOLACE) 8.6-50 mg per tablet 1 Tab  1 Tab Oral BID  [Held by provider] polyethylene glycol (MIRALAX) packet 17 g  17 g Oral DAILY  ELECTROLYTE REPLACEMENT NOTE: Nurse to review Serum Potassium and Magnesuim levels and Initiate Electrolyte Replacement Protocol as needed  1 Each Other PRN  
 magnesium sulfate 1 g/100 ml IVPB (premix or compounded)  1 g IntraVENous PRN  pantoprazole (PROTONIX) 40 mg in 0.9% sodium chloride 10 mL injection  40 mg IntraVENous DAILY  EPINEPHrine (ADRENALIN) 5 mg in 0.9% sodium chloride 250 mL infusion  0-10 mcg/min IntraVENous TITRATE  
 NOREPINephrine (LEVOPHED) 8 mg in 5% dextrose 250mL (32 mcg/mL) infusion  0.5-16 mcg/min IntraVENous TITRATE  dexmedeTOMidine (PRECEDEX) 400 mcg in 0.9% sodium chloride 100 mL infusion  0.2-0.7 mcg/kg/hr IntraVENous TITRATE  glucose chewable tablet 16 g  4 Tab Oral PRN  
 dextrose (D50W) injection syrg 12.5-25 g  12.5-25 g IntraVENous PRN  
 glucagon (GLUCAGEN) injection 1 mg  1 mg IntraMUSCular PRN  
 alteplase (CATHFLO) 1 mg in sterile water (preservative free) 1 mL injection  1 mg InterCATHeter PRN  
 bacitracin 500 unit/gram packet 1 Packet  1 Packet Topical PRN  
 balsam peru-castor oiL (VENELEX) ointment   Topical BID  DOBUTamine (DOBUTREX) 500 mg/250 mL (2,000 mcg/mL) infusion  2 mcg/kg/min IntraVENous CONTINUOUS

## 2020-09-20 NOTE — PROGRESS NOTES
0800: bedside and verbal report received from [facility]. Assessment completed. Dr. Nikko Hood in to see. 1130: Brother at bedside, requesting update on pt. Dr. Nikko Hood paged. 1145: Dr. Nikko Hood by to speak with brother. 1900: bedside and verbal report given to Uri William RN Shift Summary: Does not withdraw to stimulus, pupils react, decerebrate posturing. Liquid stools x 4.

## 2020-09-20 NOTE — PROGRESS NOTES
Acute cardiogenic shock Acute renal failure Hemorrhage Acute hypotension Severe multivessel CAD Recent PCI Shock liver Hypoxic respiratory failure Sepsis Remains intubated and sedated Continues on CVVH Neurosurgery does not want to intervene at this time given other medical problems Still requiring high vent support WBCs elevated Hgb a little low Platelets look good Creatinine in the mid 1's on CVVH Bilirubin normal, other LFTs up a bit ABG - 7.44 / 37 / 100 / 25 / 0 Vent - FiO2 80%, PEEP 8, RR 12 Generalized weakness likely leading to high vent support Trickle feeds TPN 
 
CXR - clear lung fields Addendum: 
 
Going over issues with ICU team  
 
WBCs quite elevated Starting cefepime Intake/Output Summary (Last 24 hours) at 9/20/2020 0710 Last data filed at 9/20/2020 5897 Gross per 24 hour Intake 1220.96 ml Output 3784 ml Net -2563.04 ml Visit Vitals BP (!) 82/49 Pulse 92 Temp 97.7 °F (36.5 °C) Resp 26 Ht 5' 9\" (1.753 m) Wt 217 lb 2.5 oz (98.5 kg) SpO2 97% BMI 32.07 kg/m² Risk of morbidity and mortality - high Medical decision making - high complexity Total critical care time - 30 minutes (CPT 96076) I personally spent the above critical care time. This is time spent at this critically ill patient's bedside / unit / floor actively involved in patient care as well as the coordination of care and discussions with the patient's family. This does not include any procedural time which has been billed separately.

## 2020-09-21 NOTE — PROGRESS NOTES
NAME: Mike Maddox :  1960 MRN:  [id number] Assessment   :                                               Plan: 
CKD/CAMACHO S/P arrest 
Shock Hypernatremia Acute resp. Failure DVT 
 
  Continue CVVHD for now, initiated . Factor 100.  4K bags. Labs at 0400 and 1600. ~ 400 ml uop. Clotting system - he has some kidney function -- will suspend CVVH when he needs a system change again and assess for function (give a dose of IV Bumex when he comes off CRRT).   
underlying CKD from DM/HTN.   
CAMACHO/ATN related to shock. and contrast. 
 
Critically ill. Some posturing. For repeat eeg. S/p IVC filter, s/p ecmo, s/p PCI Prn prbc's for hgb < 7 
  
  
 
 
Subjective: Chief Complaint:  Intubated. On cvvhd. Tolerating factor 100. SBP 90's (on a tad of levo). I spoke with his nurse. Review of Systems:  UTO - intubated. Symptom Y/N Comments  Symptom Y/N Comments Fever/Chills    Chest Pain Poor Appetite    Edema Cough    Abdominal Pain Sputum    Joint Pain SOB/FINLEY    Pruritis/Rash Nausea/vomit    Tolerating PT/OT Diarrhea    Tolerating Diet Constipation    Other Could not obtain due to:   
 
Objective: VITALS:  
Last 24hrs VS reviewed since prior progress note. Most recent are: 
Visit Vitals /71 Pulse 96 Temp 98.3 °F (36.8 °C) Resp 26 Ht 5' 9\" (1.753 m) Wt 95.1 kg (209 lb 10.5 oz) SpO2 92% BMI 30.96 kg/m² Intake/Output Summary (Last 24 hours) at 2020 1102 Last data filed at 2020 1000 Gross per 24 hour Intake 2312.51 ml Output 3407 ml Net -1094.49 ml Telemetry Reviewed: PHYSICAL EXAM: 
General: NAD 
+1 dependent edema FiO2 80% Lab Data Reviewed: (see below) Medications Reviewed: (see below) PMH/SH reviewed - no change compared to H&P 
 ________________________________________________________________________ Care Plan discussed with: 
Patient Family RN Care Manager Consultant:     
 
  Comments >50% of visit spent in counseling and coordination of care    
 
________________________________________________________________________ Kayley Galindo MD  
 
Procedures: see electronic medical records for all procedures/Xrays and details which 
were not copied into this note but were reviewed prior to creation of Plan. LABS: 
Recent Labs  
  09/21/20 0429 09/20/20 0347 WBC 31.0* 23.6* HGB 7.2* 7.1*  
HCT 22.7* 22.0*  
 239 Recent Labs  
  09/21/20 0429 09/20/20 
1647 09/20/20 0347 09/19/20 
0441 *  --  137 139  
K 4.3  --  4.2 3.7   --  105 107 CO2 26  --  29 26 BUN 41*  --  38* 51* CREA 1.95*  --  1.76* 2.27* *  --  220* 170* CA 7.6*  --  7.8* 7.8*  
MG 2.7* 2.6* 2.9* 2.6* PHOS 2.6 2.3* 2.5* 3.9 Recent Labs  
  09/21/20 0429 09/20/20 0347 09/19/20 
0441 * 212* 130* TP 6.1* 6.1* 5.9* ALB 2.3* 2.3* 2.5*  
GLOB 3.8 3.8 3.4 No results for input(s): INR, PTP, APTT, INREXT, INREXT in the last 72 hours. No results for input(s): FE, TIBC, PSAT, FERR in the last 72 hours. Lab Results Component Value Date/Time Folate 15.6 09/20/2020 03:47 AM  
  
No results for input(s): PH, PCO2, PO2 in the last 72 hours. No results for input(s): CPK, CKMB in the last 72 hours. No lab exists for component: TROPONINI No components found for: [place] Lab Results Component Value Date/Time  Color YELLOW/STRAW 09/12/2020 05:32 PM  
 Appearance CLEAR 09/12/2020 05:32 PM  
 Specific gravity 1.022 09/12/2020 05:32 PM  
 Specific gravity 1.020 12/11/2015 09:31 AM  
 pH (UA) 7.5 09/12/2020 05:32 PM  
 Protein Negative 09/12/2020 05:32 PM  
 Glucose Negative 09/12/2020 05:32 PM  
 Ketone Negative 09/12/2020 05:32 PM  
 Bilirubin Negative 09/12/2020 05:32 PM  
 Urobilinogen 0.2 09/12/2020 05:32 PM  
 Nitrites Negative 09/12/2020 05:32 PM  
 Leukocyte Esterase Negative 09/12/2020 05:32 PM  
 Epithelial cells FEW 09/12/2020 05:32 PM  
 Bacteria Negative 09/12/2020 05:32 PM  
 WBC 0-4 09/12/2020 05:32 PM  
 RBC 10-20 09/12/2020 05:32 PM  
 
 
MEDICATIONS: 
Current Facility-Administered Medications Medication Dose Route Frequency  fentaNYL (PF) 1,500 mcg/30 mL (50 mcg/mL) infusion  0-200 mcg/hr IntraVENous TITRATE  insulin NPH (NOVOLIN N, HUMULIN N) injection 45 Units  45 Units SubCUTAneous ACB&D  cefepime (MAXIPIME) 1 g in 0.9% sodium chloride (MBP/ADV) 50 mL  1 g IntraVENous Q24H  propofol (DIPRIVAN) 10 mg/mL infusion  0-50 mcg/kg/min IntraVENous TITRATE  amiodarone (CORDARONE) 375 mg/250 mL D5W infusion  0.5 mg/min IntraVENous CONTINUOUS  
 fentaNYL citrate (PF) injection 50 mcg  50 mcg IntraVENous Q4H PRN  
 iothalamate meglumine (CONRAY 60) 60 % contrast solution    PRN  
 albuterol (PROVENTIL VENTOLIN) nebulizer solution 2.5 mg  2.5 mg Nebulization Q6H RT  
 acetylcysteine (MUCOMYST) 200 mg/mL (20 %) solution 200 mg  200 mg Nebulization Q6H RT  
 bicarbonate dialysis (PRISMASOL) BG K 4/Ca 2.5 5000 ml solution   Extracorporeal DIALYSIS CONTINUOUS  
 heparin (porcine) 2,000 Units in lactated Ringers 1,000 mL Irrigation    PRN  
 insulin lispro (HUMALOG) injection   SubCUTAneous Q6H  
 acetaminophen (TYLENOL) solution 650 mg  650 mg Per NG tube Q4H PRN  
 PHENYLephrine (DARRYL-SYNEPHRINE) 30 mg in 0.9% sodium chloride 250 mL infusion   mcg/min IntraVENous TITRATE  ticagrelor (BRILINTA) tablet 90 mg  90 mg Per NG tube Q12H  aspirin chewable tablet 81 mg  81 mg Per NG tube DAILY  albumin human 5% (BUMINATE) solution 12.5 g  12.5 g IntraVENous Q2H PRN  
 sodium chloride (NS) flush 5-40 mL  5-40 mL IntraVENous Q8H  
 sodium chloride (NS) flush 5-40 mL  5-40 mL IntraVENous PRN  
  0.45% sodium chloride infusion  10 mL/hr IntraVENous CONTINUOUS  
 oxyCODONE IR (ROXICODONE) tablet 5 mg  5 mg Oral Q4H PRN  
 oxyCODONE IR (ROXICODONE) tablet 10 mg  10 mg Oral Q4H PRN  
 naloxone (NARCAN) injection 0.4 mg  0.4 mg IntraVENous PRN  
 ondansetron (ZOFRAN) injection 4 mg  4 mg IntraVENous Q4H PRN  
 albuterol (PROVENTIL VENTOLIN) nebulizer solution 2.5 mg  2.5 mg Nebulization Q4H PRN  chlorhexidine (PERIDEX) 0.12 % mouthwash 10 mL  10 mL Oral Q12H  
 bisacodyL (DULCOLAX) suppository 10 mg  10 mg Rectal DAILY PRN  
 [Held by provider] senna-docusate (PERICOLACE) 8.6-50 mg per tablet 1 Tab  1 Tab Oral BID  [Held by provider] polyethylene glycol (MIRALAX) packet 17 g  17 g Oral DAILY  ELECTROLYTE REPLACEMENT NOTE: Nurse to review Serum Potassium and Magnesuim levels and Initiate Electrolyte Replacement Protocol as needed  1 Each Other PRN  
 magnesium sulfate 1 g/100 ml IVPB (premix or compounded)  1 g IntraVENous PRN  pantoprazole (PROTONIX) 40 mg in 0.9% sodium chloride 10 mL injection  40 mg IntraVENous DAILY  EPINEPHrine (ADRENALIN) 5 mg in 0.9% sodium chloride 250 mL infusion  0-10 mcg/min IntraVENous TITRATE  
 NOREPINephrine (LEVOPHED) 8 mg in 5% dextrose 250mL (32 mcg/mL) infusion  0.5-16 mcg/min IntraVENous TITRATE  dexmedeTOMidine (PRECEDEX) 400 mcg in 0.9% sodium chloride 100 mL infusion  0.2-1.4 mcg/kg/hr IntraVENous TITRATE  glucose chewable tablet 16 g  4 Tab Oral PRN  
 dextrose (D50W) injection syrg 12.5-25 g  12.5-25 g IntraVENous PRN  
 glucagon (GLUCAGEN) injection 1 mg  1 mg IntraMUSCular PRN  
 alteplase (CATHFLO) 1 mg in sterile water (preservative free) 1 mL injection  1 mg InterCATHeter PRN  
 bacitracin 500 unit/gram packet 1 Packet  1 Packet Topical PRN  
 balsam peru-castor oiL (VENELEX) ointment   Topical BID  DOBUTamine (DOBUTREX) 500 mg/250 mL (2,000 mcg/mL) infusion  2 mcg/kg/min IntraVENous CONTINUOUS

## 2020-09-21 NOTE — PROGRESS NOTES
Neurology Note Patient ID: 
Estela Woods [id number] 
28 y.o. 
1960 Subjective: intubated History of Present Illness:  
Estela Woods is a 63 y.o. male who was initially admitted to the hospital on September 10, 2020 after having an out-of-hospital cardiac arrest with prolonged downtime. Neurology has been following along due to cognitive status and generalized weakness There were no events reported overnight. This am, he was minimally responsive. Past Medical History:  
Diagnosis Date  Diabetes ([address]  Elevated cholesterol  GERD (gastroesophageal reflux disease)   
 helps with meds upsetting stomach  Hypertension  Low vitamin D level  Neuropathy   
 rt lower extremity Past Surgical History:  
Procedure Laterality Date  CARDIAC SURG PROCEDURE UNLIST    
 heart cath. normal  
 HX CERVICAL DISKECTOMY 2015  HX COLONOSCOPY  2015  HX HERNIA REPAIR    
 umbilical  
 HX ORTHOPAEDIC    
 reattached tendon and muscle rt shoulder  IR INSERT NON TUNL CVC OVER 5 YRS  9/17/2020 History reviewed. No pertinent family history. Social History Tobacco Use  Smoking status: Current Every Day Smoker Packs/day: 1.00 Substance Use Topics  Alcohol use: Yes Comment: rarely No Known Allergies Current Facility-Administered Medications Medication Dose Route Frequency  fentaNYL (PF) 1,500 mcg/30 mL (50 mcg/mL) infusion  0-200 mcg/hr IntraVENous TITRATE  insulin NPH (NOVOLIN N, HUMULIN N) injection 45 Units  45 Units SubCUTAneous ACB&D  cefepime (MAXIPIME) 1 g in 0.9% sodium chloride (MBP/ADV) 50 mL  1 g IntraVENous Q24H  propofol (DIPRIVAN) 10 mg/mL infusion  0-50 mcg/kg/min IntraVENous TITRATE  amiodarone (CORDARONE) 375 mg/250 mL D5W infusion  0.5 mg/min IntraVENous CONTINUOUS  
 fentaNYL citrate (PF) injection 50 mcg  50 mcg IntraVENous Q4H PRN  
  iothalamate meglumine (CONRAY 60) 60 % contrast solution    PRN  
 albuterol (PROVENTIL VENTOLIN) nebulizer solution 2.5 mg  2.5 mg Nebulization Q6H RT  
 acetylcysteine (MUCOMYST) 200 mg/mL (20 %) solution 200 mg  200 mg Nebulization Q6H RT  
 bicarbonate dialysis (PRISMASOL) BG K 4/Ca 2.5 5000 ml solution   Extracorporeal DIALYSIS CONTINUOUS  
 heparin (porcine) 2,000 Units in lactated Ringers 1,000 mL Irrigation    PRN  
 insulin lispro (HUMALOG) injection   SubCUTAneous Q6H  
 acetaminophen (TYLENOL) solution 650 mg  650 mg Per NG tube Q4H PRN  
 PHENYLephrine (DARRYL-SYNEPHRINE) 30 mg in 0.9% sodium chloride 250 mL infusion   mcg/min IntraVENous TITRATE  ticagrelor (BRILINTA) tablet 90 mg  90 mg Per NG tube Q12H  aspirin chewable tablet 81 mg  81 mg Per NG tube DAILY  albumin human 5% (BUMINATE) solution 12.5 g  12.5 g IntraVENous Q2H PRN  
 sodium chloride (NS) flush 5-40 mL  5-40 mL IntraVENous Q8H  
 sodium chloride (NS) flush 5-40 mL  5-40 mL IntraVENous PRN  
 0.45% sodium chloride infusion  10 mL/hr IntraVENous CONTINUOUS  
 oxyCODONE IR (ROXICODONE) tablet 5 mg  5 mg Oral Q4H PRN  
 oxyCODONE IR (ROXICODONE) tablet 10 mg  10 mg Oral Q4H PRN  
 naloxone (NARCAN) injection 0.4 mg  0.4 mg IntraVENous PRN  
 ondansetron (ZOFRAN) injection 4 mg  4 mg IntraVENous Q4H PRN  
 albuterol (PROVENTIL VENTOLIN) nebulizer solution 2.5 mg  2.5 mg Nebulization Q4H PRN  chlorhexidine (PERIDEX) 0.12 % mouthwash 10 mL  10 mL Oral Q12H  
 bisacodyL (DULCOLAX) suppository 10 mg  10 mg Rectal DAILY PRN  
 [Held by provider] senna-docusate (PERICOLACE) 8.6-50 mg per tablet 1 Tab  1 Tab Oral BID  [Held by provider] polyethylene glycol (MIRALAX) packet 17 g  17 g Oral DAILY  ELECTROLYTE REPLACEMENT NOTE: Nurse to review Serum Potassium and Magnesuim levels and Initiate Electrolyte Replacement Protocol as needed  1 Each Other PRN  
  magnesium sulfate 1 g/100 ml IVPB (premix or compounded)  1 g IntraVENous PRN  pantoprazole (PROTONIX) 40 mg in 0.9% sodium chloride 10 mL injection  40 mg IntraVENous DAILY  EPINEPHrine (ADRENALIN) 5 mg in 0.9% sodium chloride 250 mL infusion  0-10 mcg/min IntraVENous TITRATE  
 NOREPINephrine (LEVOPHED) 8 mg in 5% dextrose 250mL (32 mcg/mL) infusion  0.5-16 mcg/min IntraVENous TITRATE  dexmedeTOMidine (PRECEDEX) 400 mcg in 0.9% sodium chloride 100 mL infusion  0.2-1.4 mcg/kg/hr IntraVENous TITRATE  glucose chewable tablet 16 g  4 Tab Oral PRN  
 dextrose (D50W) injection syrg 12.5-25 g  12.5-25 g IntraVENous PRN  
 glucagon (GLUCAGEN) injection 1 mg  1 mg IntraMUSCular PRN  
 alteplase (CATHFLO) 1 mg in sterile water (preservative free) 1 mL injection  1 mg InterCATHeter PRN  
 bacitracin 500 unit/gram packet 1 Packet  1 Packet Topical PRN  
 balsam peru-castor oiL (VENELEX) ointment   Topical BID  DOBUTamine (DOBUTREX) 500 mg/250 mL (2,000 mcg/mL) infusion  2 mcg/kg/min IntraVENous CONTINUOUS Review of Systems: 
 
[x]Unable to obtain  ROS due to  []mental status change  [x]sedated   [x]intubated Objective:  
 
Visit Vitals /71 Pulse 99 Temp 98.3 °F (36.8 °C) Resp 27 Ht 5' 9\" (1.753 m) Wt 209 lb 10.5 oz (95.1 kg) SpO2 95% BMI 30.96 kg/m² Physical Exam: 
 
Neurological examination Neck: no carotid bruits Lungs: clear to auscultation Heart:  no murmurs, regular rate Lower extremity: no edema Language: the patient will not follow any commands. Possibly intermittent tracking. Cranial nerves:  
Perrrla Facial sensation/motor:  grimace to noxious stimulation Corneal reflex intact Motor: Tone decreased throughout No evidence of fasciculations No spontaneous movement seen in the upper or lower extremities. This is similar to yesterday. Sensory: 
Upper extremity: no withdrawal to painful stimuli bilaterally Lower extremity: no withdrawal to painful stimuli bilaterally Reflexes: 
Reduced throughout Plantar response:  flexor bilaterally Cerebellar testing:  no abnormal movements Labs:  
 
Lab Results Component Value Date/Time Hemoglobin A1c 8.4 (H) 09/11/2020 05:43 AM  
 Sodium 135 (L) 09/21/2020 04:29 AM  
 Potassium 4.3 09/21/2020 04:29 AM  
 Chloride 104 09/21/2020 04:29 AM  
 Glucose 230 (H) 09/21/2020 04:29 AM  
 BUN 41 (H) 09/21/2020 04:29 AM  
 Creatinine 1.95 (H) 09/21/2020 04:29 AM  
 Calcium 7.6 (L) 09/21/2020 04:29 AM  
 WBC 31.0 (H) 09/21/2020 04:29 AM  
 HCT 22.7 (L) 09/21/2020 04:29 AM  
 HGB 7.2 (L) 09/21/2020 04:29 AM  
 PLATELET 466 04/00/3503 04:29 AM  
 
 
Imaging: 
 
Results from Hospital Encounter encounter on 09/10/20 MRI BRAIN WO CONT Narrative EXAM: MRI BRAIN WO CONT 
 
TECHNIQUE: Brain images including sagittal and axial T1-weighted, axial FLAIR, T2-weighted, diffusion weighted, gradient echo,  coronal T2 
 
IV CONTRAST:  None INDICATION:  post cardiac arrest 
 
COMPARISON:  None. FINDINGS: 
BRAIN PARENCHYMA:  Small focus of diffusion restriction in the medial right 
periatrial region, consistent with acute infarction. Additional punctate 
scattered foci of restricted diffusion in the right posterior temporal lobe, 
left cerebellar hemisphere, left parietal lobe, right posterior parietal cortex, 
and probably in the right frontal lobe. Corresponding areas of FLAIR signal 
abnormality. These are in a watershed distribution. No major territorial 
infarct. Additional mild scattered foci of FLAIR/T2 hyperintensity in the 
cerebral white matter, likely due to intracranial small vessel disease. INTRACRANIAL HEMORRHAGE: None. CSF SPACES:  Normal in size and morphology for the patient's age. BASAL CISTERNS:  Patent. MIDLINE SHIFT: None. VASCULAR SYSTEM:  Normal flow voids.   
PARANASAL SINUSES AND MASTOID AIR CELLS:  Mild bilateral maxillary sinus mucosal 
 thickening and small air-fluid levels. . Bilateral sphenoid fluid and mastoid 
fluid. This may relate to intubation. VISUALIZED ORBITS:  No significant abnormalities. VISUALIZED UPPER CERVICAL SPINE:  No significant abnormalities. SELLA:  No enlargement or  focal abnormality. SKULL BASE:  No significant abnormalities. Cerebellar tonsils in normal 
position. CALVARIUM:  Intact. Impression IMPRESSION:   
1. Few small scattered foci of acute infarction in the cerebral hemispheres and 
in the left cerebellum, in a watershed distribution. No major territorial 
infarct. No hemorrhage. 2. Mild white matter signal abnormality consistent with age-related changes and 
small vessel disease. No results found for this or any previous visit. I did independently reviewed the brain MRI from 9/18/2020. There are a few small scattered foci of acute stroke in the cerebral hemispheres in the left cerebellum. There is underlying white matter abnormalities consistent with small vessel chronic ischemic disease. Assessment and Plan: The patient is a pleasant 56-year-old gentleman with out-of-hospital cardiac arrest requiring continued respiratory support. Neurology has been following along due to cognitive status, stroke found on neuro imaging, and generalized weakness. 1. Cardiac arrest with probable cerebral anoxia: 
 
Follow up EEG with no evidence of seizures. Reactivity was noted. Full report dictated separately. Off of sedation, there was no convincing following of commands. Prognosis from a cognitive standpoint is uncertain given an eeg with no significant abnormalities. 2. Acute stroke: This is most likely ischemic/embolic in nature due to the cardiac arrest. 
Continue aspirin daily He does have risk factors for stroke including dyslipidemia and diabetes 3. Generalized weakness/significant cervical spine disease with cord compression: The generalized weakness is most likely multifactorial in etiology. (cervical spine disease, stroke, possible cord ischemia during cardiac arrest, metabolic derangements, systemic effects of infection, anoxic brain injury, possible critical illness myopathy) Will need aggressive therapy. Neurology will continue to follow along closely. Active Problems: 
  Cardiac arrest ([facility] [address] (9/10/2020) 45 minutes was spent providing medical care of this critically ill patient reviewing records, obtaining additional history from family, examining patient , discussing with collaborating physicians and nursing, and discussing treatment plans. Signed By:  Colton Arguelles September 21, 2020

## 2020-09-21 NOTE — PROGRESS NOTES
09/21/20 5713 ABCDEF Bundle SBT Safety Screen Passed No  
SBT Screen Reason for Failure FiO2 > 50%;PEEP > 7.5 (o2 80%, peep 8, tried to wean o2 to 70% and o2 sat dropped )

## 2020-09-21 NOTE — PROGRESS NOTES
PT note:  
 
Chart reviewed and spoke with nursing. Patient is currently intubated and sedated. Noted patient does not withdraw and with decerebrate posturing. Will hold PT evaluation this date and follow up tomorrow for evalution.   
 
Chelita Rocha, PT, DPT

## 2020-09-21 NOTE — DIALYSIS
[id number] [facility]          [phone] 
  
     
Orders Mode: CVVHD restarted @ 3334 Factor: 100 ml/hr UFR: 25 ml/kg/hr Blood Flow Rate: 200 ml/min  
  
     
Metrics BP / HR: 96/47 
97 Blood Flow Rate: 200 ml/min AP:                         -96 RP: 79 TMP: 28  
PD: 25  
FP: 119 UFR: 2450 ml/hr  
  
Comments / Plan:  
Filter changed secondary to TMP pressures jumped from 30s to 400-500s all of sudden per primary RN. Primary RN able to return blood ~165 ml. Consents, patient, code status, labs and orders verified. Old set discarded in red bio-hazard bag. New [id number] filter set-up, primed, tested and running well at this time. RIJ temporary CVC, dressing CDI with bio-patch. No signs of redness, drainage, or infection visualized. Each catheter limb disinfected for 60 seconds per limb with alcohol swabs. Caps removed, dialysis CVC hub scrubbed with Prevantics for 15 seconds, followed by a 5 second dry time per Hospital P&P. +asp/+flush x 2 ports. Lines visible and connections secure with blood warmer to return line at 37*C. Education, pre and post to Primary RN.

## 2020-09-21 NOTE — PROGRESS NOTES
0800: Shift assessment completed. Patient with steady upward gaze, no focusing or tracking. Patient has no command following and does not respond to pain via deep nail bed pressure in BUE and BLE. No spontaneous movement of extremities. Very weak cough with in-line suctioning and no gag present. Pupils equal and reactive, 3+, R pupil sluggish. 0915: Patient's NOK, brother Mee Payment at bedside. Appropriate questions asked. 1200: Shift reassessment. Patient maintains upward gaze but does shift eyes in direction of calling his name, no direct focus and no tracking. Patient follows commands in RUE by moving fingers in attempt to squeeze my hand. Grimace noted with RUE deep nail bed pressure. Remainder of neuro exam is consistent with 0800.  
 
1420: CVVH clotted off. Unable to return blood. Dr. Casper Purcell notified. Verbal order given to GISSEL Pringle for 1mg Bumex now and she will re-evaluate labs in the AM tomorrow. 1530: Dr. Tal Randle at bedside to re-evaluate. He called and spoke with Lolis Anderson. 1545: Dr. Gabby Allen at bedside to reassess. 1600: Shift reassessment. Upward gaze persists; patient shifting eyes in direction of calling his name, no focus or tracking. No command following this hour and no withdrawal to deep nail bed pressure in all 4 extremities. +cough, no gag. 1630: Patient bathed. Flexi-seal placed due to large amount of watery stool and sacral blisters that have opened. 1800: Patient's brother at bedside. He verbalized that he is interesting is possibly withdrawing care but would like to wait until this morning and see if there are any changes to patient's neuro status. 2000: Dr. Gabby Allen notified of brother's wishes to consider withdrawal tomorrow if no neuro improvements by GISSEL Pringle. Verbal orders received for palliative consult and transfuse Hgb <7.0.

## 2020-09-21 NOTE — PROGRESS NOTES
Patient was discussed in ID Rounds in CCU today; Patient is sedated, on vent; CVVH; OG tube and receiving tube feedings; EEG is scheduled for today. THANG is not known at this time due to the seriousness of patient's condition. Care Manager will continue to follow for discharge needs. Karina Vora RN Care Manager Ext [phone]

## 2020-09-21 NOTE — PROGRESS NOTES
Pharmacy Automatic Renal Dosing Protocol - Antimicrobials Indication for Antimicrobials: sepsis, PNA? Current Regimen of Each Antimicrobial:  
Zosyn 3.375 gm q12h (, day 1) Anidulafungin 200 mg x 1, then 100 mg q24h (, day 1) Previous Antimicrobial Therapy:  
Cefepime 1g q 24 hours ( - ) Cefazolin 2g IV q8h ; started 9/10; day 3 Pip/tazo 3.375g q8h (Start Date ; Day 2) Vancomycin 2g X1, then 1,250 mg q 36 hours (9/15; day #3 Metronidazole 250 mg q 8 hours (9/15 -  Cefepime 2g q 24 hours (9/15 - ) Significant Cultures:  
 RCx bronch - light yeast - Final 
9/15: Blood cx: pending  bronch - yeast - final 
 blood: pending  fungal? 
 
Radiology / Imaging results: (X-ray, CT scan or MRI):  
9/15: CXR: Improved aeration in the bilateral airspace disease. Tonye Cogan : CXR: Increase small pleural effusions and bibasilar opacities that may represent atelectasis, edema, or infection Paralysis, amputations, malnutrition: none noted Labs: 
Recent Labs  
  20 
0429 20 
0347 20 
0441 CREA 1.95* 1.76* 2.27* BUN 41* 38* 51* WBC 31.0* 23.6* 19.2* Temp (24hrs), Av.4 °F (36.9 °C), Min:97.4 °F (36.3 °C), Max:99.3 °F (37.4 °C) Creatinine Clearance (mL/min) or Dialysis: Started CVVH on  Impression/Plan:  
· Zosyn 3.375 gm q8h; CVVH dosing. · Vancomycin 2000 mg x 1, 1250 mg every 24 hours CVVH dosing · WBC trending up x 4 days, but afebrile, no steroids · Antimicrobial stop date pending Pharmacy will follow daily and adjust medications as appropriate for renal function and/or serum levels. Thank you, Ayleen Piña, PHARMD

## 2020-09-21 NOTE — CONSULTS
IInfectious Disease Consult Rebecca Isaac MD FAC Date of Consultation:  9/21/20 Date of Admission: 9/10/2020 Referring Physician: Amarilis Fraga NP Subjective:  
 
Patient is a 63 y.o. male who is being seen for - \" Leucocytosis\" IMPRESSION:  
· Acute hypoxic respiratory failure on ventilator · Status post prolonged out of hospital cardiac arrest 
· Cardiologenic shock requiring Impella, ECMO · Leukocytosis WBC 31,000, multifactorial 
· CXRbibasilar opacities ? infectious, small pleural effusions · Bronchoscopy9/16 right-sided bronchusright upper lobe occluded with bloody mucus, RML, RLL patent, left-sided bronchus normal, bronchoscopy 9/14 unremarkable · Resp culture 9/13normal respiratory [place], 9/16 light yeast, apparent C albicans · Blood culture9/15, 9/20 NG 
· CAD status post PCI 9/15 · CAMACHO on CKD on CRRT 
· Hemoptysis, DVT s/p IVC filter · Shock liver · Diabetes mellitus · Covid ruled out, Covid negative · Acute CVA · Status post diarrhea · Status post thrombocytopenia · Smoking history · Concern for spinal cord compression, neurosurgery on case · Status post  change of lines, Gallego exchange, femoral sides unremarkable PLAN:  
  
· Repeat blood cultures x 2 with next fever, sputum cultures · Change to empiric Vancomycin, Zosyn, and Anidulafungin · Pharmacy on consult for dosing with vancomycin trough 15-20 · Aspiration precautions · CRRT per renal 
· Vent management fluids pressors per ICU team. 
· Pressors fluids, vent management per ICU team 
· CRRT per renal  
 
Agustin Tijerina is seen for  \" Leucocytosis\". Agustin Tijerina is a  60 y.o. male with a no known  past medical history presented  to the ED with severe cardiac arrest.  By report, patient had been was in the car and had  
pulled over to the side of the road.   911 arrived with a BLS unit and reported patient's that he had been very short of breath and \"did not feel well\". As the patient was being loaded  
into the car patient seemed to be lightheaded and dizzy. In route patient became unresponsive and lost pulses. Patient had an eye gel placed for noninvasive ventilation and CPR 
 was initiated. No other history was available at the time arrival. Per history & chart review on arrival to [facility] he had been in full cardiac arrest. He was cyanotic. He was emergently taken to the OR for [facility] ECMO on 09/10/2020. A Casper II device was maintained throughout the procedure (nearly 60 minutes of CPR, at times converted to VT). His post procedure course had been complicated by persistent respiratory failure,cardiogenic shock, CAMACHO w/ severe metabolic acidosis, and shock liver. On 09/12/2020 pt  underwent a cardiac cath w/ Impella CP insertion and placement of 9 RAFFY . His post procedure course was complicated by bleeding from his access site resulting in hypotension and loss of pulsatility. 3 units of pRBCs were directly transfused into the ECMO circuit for rapid resuscitation. PLTs were also administered. A RLE duplex since admission was significant for a DVT likely related to ECHO cannulization. ECMO was discontinued on 09/14/2020 w/ reconstruction of femoral artery. He has developed  fever, and diarrhea. He is s/p bronchoscopy on 09/13, 9/14, & 9/16 for increase secretions,mucus plugging, and RUL collapse & PNA. Impella device removal and repair of femoral artery which was completed on 09/16/2020. Patient seen today. Discussed with RN. Endotracheal secretions present, less than before Status post change of lines. Status post Gallego exchange. Diarrhea less. Patient remains afebrile 98.4. WBC 31,000 Patient Active Problem List  
Diagnosis Code  Cardiac arrest ([facility] [address] I46.9  End of life care Z51.5  Comfort measures only status Z51.5 Past Medical History:  
Diagnosis Date  Diabetes ([facility] [address]  Elevated cholesterol  GERD (gastroesophageal reflux disease)   
 helps with meds upsetting stomach  Hypertension  Low vitamin D level  Neuropathy   
 rt lower extremity History reviewed. No pertinent family history. Social History Tobacco Use  Smoking status: Current Every Day Smoker Packs/day: 1.00 Substance Use Topics  Alcohol use: Yes Comment: rarely Past Surgical History:  
Procedure Laterality Date  CARDIAC SURG PROCEDURE UNLIST    
 heart cath. normal  
 HX CERVICAL DISKECTOMY 2015  HX COLONOSCOPY  2015  HX HERNIA REPAIR    
 umbilical  
 HX ORTHOPAEDIC    
 reattached tendon and muscle rt shoulder  IR INSERT NON TUNL CVC OVER 5 YRS  9/17/2020 Prior to Admission medications Medication Sig Start Date End Date Taking? Authorizing Provider  
metFORMIN (GLUCOPHAGE) 500 mg tablet  12/9/16   Provider, Historical  
vardenafil (LEVITRA) 20 mg tablet Take 20 mg by mouth as needed. Provider, Historical  
glimepiride (AMARYL) 4 mg tablet Take 4 mg by mouth two (2) times a day. Provider, Historical  
ergocalciferol (VITAMIN D2) 50,000 unit capsule Take 50,000 Units by mouth every seven (7) days. Provider, Historical  
aspirin delayed-release 81 mg tablet Take 81 mg by mouth daily. Provider, Historical  
omeprazole (PRILOSEC) 20 mg capsule Take 20 mg by mouth daily. Provider, Historical  
rosuvastatin (CRESTOR) 20 mg tablet Take 20 mg by mouth nightly. Provider, Historical  
fenofibric acid (TRILIPIX) 135 mg capsule Take 135 mg by mouth nightly. Provider, Historical  
lisinopril (PRINIVIL, ZESTRIL) 20 mg tablet Take 20 mg by mouth two (2) times a day. Provider, Historical  
metFORMIN (GLUCOPHAGE) 1,000 mg tablet Take 2,000 mg by mouth two (2) times a day. Provider, Historical  
 
No Known Allergies Review of Systems:  Review of systems not obtained due to patient factors. 10 point review of systems obtained . All other systems negative Objective:  
Blood pressure (!) 89/49, pulse (!) 109, temperature 99.8 °F (37.7 °C), resp. rate 24, height 5' 9\" (1.753 m), weight 212 lb 1.3 oz (96.2 kg), SpO2 97 %. No data recorded. No current facility-administered medications for this encounter. Current Outpatient Medications Medication Sig  
 metFORMIN (GLUCOPHAGE) 500 mg tablet  vardenafil (LEVITRA) 20 mg tablet Take 20 mg by mouth as needed.  glimepiride (AMARYL) 4 mg tablet Take 4 mg by mouth two (2) times a day.  ergocalciferol (VITAMIN D2) 50,000 unit capsule Take 50,000 Units by mouth every seven (7) days.  aspirin delayed-release 81 mg tablet Take 81 mg by mouth daily.  omeprazole (PRILOSEC) 20 mg capsule Take 20 mg by mouth daily.  rosuvastatin (CRESTOR) 20 mg tablet Take 20 mg by mouth nightly.  fenofibric acid (TRILIPIX) 135 mg capsule Take 135 mg by mouth nightly.  lisinopril (PRINIVIL, ZESTRIL) 20 mg tablet Take 20 mg by mouth two (2) times a day.  metFORMIN (GLUCOPHAGE) 1,000 mg tablet Take 2,000 mg by mouth two (2) times a day. Exam:   
General:  Sedated and on the ventilator. No acute distress. Eyes:  Sclera anicteric. Pupils equal, round, reactive to light. Mouth/Throat: Orotracheal tube in place. Neck: Supple. Lungs:   Reduced auscultation bilaterally, good effort. Cardiovascular:  Regular rate and rhythm, no murmur, click, rub, or gallop. Abdomen:   Soft, non-tender, bowel sounds normal, non-distended. Extremities: No edema. Skin: No acute rash or lesions. Lymph Nodes: Cervical and supraclavicular normal.  
Musculoskeletal:  No swelling or deformity. Lines/Devices:  Intact, no erythema, drainage, or tenderness. Psychiatric: Sedated  on ventilator. Data Reviewed:  
 
Lab Results Component Value Date/Time  Culture result: LIGHT YEAST, (APPARENT CANDIDA ALBICANS) (A) 09/21/2020 04:59 PM  
 Culture result: NO NORMAL RESPIRATORY SAUL ISOLATED 09/21/2020 04:59 PM  
 Culture result: NO GROWTH 6 DAYS 09/20/2020 10:06 AM  
 Culture result: LIGHT YEAST, (APPARENT CANDIDA ALBICANS) (A) 09/16/2020 11:52 AM  
 Culture result: NO NORMAL RESPIRATORY SAUL ISOLATED 09/16/2020 11:52 AM  
  
 
 
XR Results (most recent): 
Results from Hospital Encounter encounter on 09/10/20 XR CHEST PORT Narrative EXAM:  XR CHEST PORT INDICATION: Bibasilar opacities. COMPARISON: 9/20/2020 at 0523 hours TECHNIQUE: Portable AP semiupright chest view at 0527 hours FINDINGS: The support devices are stable. There is stable cardiac silhouette 
enlargement. There are increased small pleural effusions and bibasilar opacities. There is no 
pneumothorax. The bones and upper abdomen are stable. Impression IMPRESSION: 
 
Increased small pleural effusions and bibasilar opacities. ICD-10-CM ICD-9-CM 1. Cardiac arrest ([facility])  I46.9 427.5 2. Acute respiratory failure with hypoxia and hypercapnia ([facility])  J96.01 518.81   
 J96.02    
3. Shock ([facility] [address]  R57.9 785.50 CARDIAC PROCEDURE  
   CARDIAC PROCEDURE  
4. Weakness generalized  R53.1 780.79   
5. Type 2 diabetes mellitus with hyperglycemia, unspecified whether long term insulin use ([facility])  E11.65 250.00   
6. Acute renal failure with tubular necrosis ([facility])  N17.0 584.5 7. Coronary artery disease due to lipid rich plaque  I25.10 414.00 I25.83 414.3 8. Cerebrovascular accident (CVA) due to embolism of other cerebral artery ([facility])  I63.49 434.11   
9. Encephalopathy acute  G93.40 348.30   
10. Hypoxic brain injury ([facility] [address]  G93.1 348. 1 Current Regimen of Each Antimicrobial- start today Zosyn 3.375 gm q12h Anidulafungin 200 mg x 1, then 100 mg q24h Vancomycin - pharmacy to dose  
  
Previous Antimicrobial Therapy:  
Cefepime 1g q 24 hours (9/20 - 9/21) Cefazolin 2g IV q8h ; started 9/10; day 3 Pip/tazo 3.375g q8h (Start Date 9/13; Day 2) Vancomycin 2g X1, then 1,250 mg q 36 hours (9/15; day #3 Metronidazole 250 mg q 8 hours (9/15 - 9/19 Cefepime 2g q 24 hours (9/15 - 9/19) Signed By: Jessica Kidd MD FACP

## 2020-09-21 NOTE — PROGRESS NOTES
2000, Bedside and Verbal shift change report given to GISSEL Joshua (oncoming nurse) by Ana Brittle, RN (offgoing nurse). Report included the following information SBAR, Kardex, Intake/Output, MAR, Accordion, Recent Results, Med Rec Status and Cardiac Rhythm 1 AVB SR. Temp;  Afebrile Neuro; Sedated on Propofol 40 josé miguel/kg/min, no follow command, no eyes contact, pupils 3 reactive  GCS;  Eye; 2-4, verbal; 1 for ETT, motor; 1. Patient grimace to pain then he open eyes > no tracking, cough with suction and by him self weak, bit the tube, continuous arms movement left > right, Reassessment;   
2007 Oxycodone 5 mg Tab given for pain, 9562-3970 Still grimace and bit the tube, stacking breath on ventilator rate 28-33, Fentanyl 50 josé miguel IV PRN given, Propofol reduced to 30 josé miguel/kg/min. 
0600 Propofol titrate off, Precedex at 0.7 josé miguel/kg/hr, pain med as per [facility] RASS -1 Respiratory; Sat 94% on Ventilator A/C mode, , RR 12/pt's 28-30, Peep 8, FiO2 just increased to 90%, ETT 24 cm at lips, secretion; small, thick Tan pink brownish, coarse Rhonchi diminished with inspiratory lung sounds. Reassessment;   
2300 Sat 99%, No wheezing but patient still stacking his breathing from time to time > RT notified,  
0600 RT adjust the setting of Vent to [facility] MV about 18, -900 ml, RT switched him back to VC > no stacking breath, FiO2 80% Cardiac; 1 AVB SR, 94 B/min, NIBP 95/55 mmHg, IBP left ART line; 102/48 mmHg. on Levophed at 3 josé miguel/min. Amiodarone at 0.5 mg/min Reassessment;  Levophed up to 6 josé miguel/min then titrated back to 3 josé miguel/min, HR  GI; NPO with OG tube ( 55 cm at lips) on TowCal HN at 40 ml/h, free water 50 ml Q 4 hr, residual; 10 ml Tan, Abd soft with active bowel sound, on NS 0.45% at 10 ml/hr. Reassessment; No residual. No BM overnight, Renal; CRRT 4K, factor of 100 ml/hr, rodriguez cath with anuria sedimentation present. Reassessment;   
9121-1723 Filter pressure 275, , TMP 99, [facility] on call paged 235 [facility] CRRT restarted [id number] CRRT alarm going off TMP pressure High about 500, follow suggestion on machine did not help > blood returned to patient and [facility] Nurse updated,  
3466 CRRT resumed, 
 
Skin; Ecchymosis at groin, scratches both arms, Blister at ankles, thighs, tattoos, edema arms and leg +2 pitting, . Endo; SSI every 6 hours, NPH 40 units SQ BID Lines; ETT, right IJ Kiko, Left Ij CVC,OGT, Gallego. Left radial ART line Code; Full. Lab; , 
DVT; Brilinta. Plan; ventilator management > PEEP precaution, SBT at am > Peep of 8 and FiO2 80%, titrate Levophed as tolerated as per order, pain and agitation management, Feeding as per order, CRRT management, follow lab tomorrow,   
 
0700 Bedside and Verbal shift change report given to Messi Ho RN (oncoming nurse) by Deja Denton RN (offgoing nurse).  Report included the following information SBAR, Kardex, Intake/Output, MAR, Accordion, Recent Results, Med Rec Status and Cardiac Rhythm 1AVB SR.

## 2020-09-21 NOTE — PROCEDURES
EEG REPORT Patient Name: Estela Woods : 1960 Age: 63 y.o. Ordering physician: Dr. Zia Dennis Date of EE2020  9:02 am - 9:23 am 
Diagnosis: encephalopathy Interpreting physician: SAVANNAH Brambila Procedure: EEG CLINICAL INDICATION: The patient is a 63 y.o. male who is being evaluated for baseline electro cerebral activities and to rule out seizure focus. Current Facility-Administered Medications Medication Dose Route Frequency  fentaNYL (PF) 1,500 mcg/30 mL (50 mcg/mL) infusion  0-200 mcg/hr IntraVENous TITRATE  insulin NPH (NOVOLIN N, HUMULIN N) injection 45 Units  45 Units SubCUTAneous ACB&D  
 anidulafungin (ERAXIS) 200 mg in 0.9% sodium chloride 260 mL IVPB  200 mg IntraVENous ONCE  piperacillin-tazobactam (ZOSYN) 3.375 g in 0.9% sodium chloride (MBP/ADV) 100 mL  3.375 g IntraVENous Q8H  
 [START ON 2020] anidulafungin (ERAXIS) 100 mg in 0.9% sodium chloride 130 mL IVPB  100 mg IntraVENous Q24H  
 vancomycin (VANCOCIN) 2000 mg in  ml infusion  2,000 mg IntraVENous ONCE  
 [START ON 2020] vancomycin (VANCOCIN) 1250 mg in  ml infusion  1,250 mg IntraVENous Q24H  
 bumetanide (BUMEX) injection 1 mg  1 mg IntraVENous ONCE  propofol (DIPRIVAN) 10 mg/mL infusion  0-50 mcg/kg/min IntraVENous TITRATE  amiodarone (CORDARONE) 375 mg/250 mL D5W infusion  0.5 mg/min IntraVENous CONTINUOUS  
 fentaNYL citrate (PF) injection 50 mcg  50 mcg IntraVENous Q4H PRN  
 iothalamate meglumine (CONRAY 60) 60 % contrast solution    PRN  
 albuterol (PROVENTIL VENTOLIN) nebulizer solution 2.5 mg  2.5 mg Nebulization Q6H RT  
 acetylcysteine (MUCOMYST) 200 mg/mL (20 %) solution 200 mg  200 mg Nebulization Q6H RT  
 bicarbonate dialysis (PRISMASOL) BG K 4/Ca 2.5 5000 ml solution   Extracorporeal DIALYSIS CONTINUOUS  
 heparin (porcine) 2,000 Units in lactated Ringers 1,000 mL Irrigation    PRN  
  insulin lispro (HUMALOG) injection   SubCUTAneous Q6H  
 acetaminophen (TYLENOL) solution 650 mg  650 mg Per NG tube Q4H PRN  
 PHENYLephrine (DARRYL-SYNEPHRINE) 30 mg in 0.9% sodium chloride 250 mL infusion   mcg/min IntraVENous TITRATE  ticagrelor (BRILINTA) tablet 90 mg  90 mg Per NG tube Q12H  aspirin chewable tablet 81 mg  81 mg Per NG tube DAILY  albumin human 5% (BUMINATE) solution 12.5 g  12.5 g IntraVENous Q2H PRN  
 sodium chloride (NS) flush 5-40 mL  5-40 mL IntraVENous Q8H  
 sodium chloride (NS) flush 5-40 mL  5-40 mL IntraVENous PRN  
 0.45% sodium chloride infusion  10 mL/hr IntraVENous CONTINUOUS  
 oxyCODONE IR (ROXICODONE) tablet 5 mg  5 mg Oral Q4H PRN  
 oxyCODONE IR (ROXICODONE) tablet 10 mg  10 mg Oral Q4H PRN  
 naloxone (NARCAN) injection 0.4 mg  0.4 mg IntraVENous PRN  
 ondansetron (ZOFRAN) injection 4 mg  4 mg IntraVENous Q4H PRN  
 albuterol (PROVENTIL VENTOLIN) nebulizer solution 2.5 mg  2.5 mg Nebulization Q4H PRN  chlorhexidine (PERIDEX) 0.12 % mouthwash 10 mL  10 mL Oral Q12H  
 bisacodyL (DULCOLAX) suppository 10 mg  10 mg Rectal DAILY PRN  
 [Held by provider] senna-docusate (PERICOLACE) 8.6-50 mg per tablet 1 Tab  1 Tab Oral BID  [Held by provider] polyethylene glycol (MIRALAX) packet 17 g  17 g Oral DAILY  ELECTROLYTE REPLACEMENT NOTE: Nurse to review Serum Potassium and Magnesuim levels and Initiate Electrolyte Replacement Protocol as needed  1 Each Other PRN  
 magnesium sulfate 1 g/100 ml IVPB (premix or compounded)  1 g IntraVENous PRN  pantoprazole (PROTONIX) 40 mg in 0.9% sodium chloride 10 mL injection  40 mg IntraVENous DAILY  EPINEPHrine (ADRENALIN) 5 mg in 0.9% sodium chloride 250 mL infusion  0-10 mcg/min IntraVENous TITRATE  
 NOREPINephrine (LEVOPHED) 8 mg in 5% dextrose 250mL (32 mcg/mL) infusion  0.5-16 mcg/min IntraVENous TITRATE  dexmedeTOMidine (PRECEDEX) 400 mcg in 0.9% sodium chloride 100 mL infusion  0.2-1.4 mcg/kg/hr IntraVENous TITRATE  glucose chewable tablet 16 g  4 Tab Oral PRN  
 dextrose (D50W) injection syrg 12.5-25 g  12.5-25 g IntraVENous PRN  
 glucagon (GLUCAGEN) injection 1 mg  1 mg IntraMUSCular PRN  
 alteplase (CATHFLO) 1 mg in sterile water (preservative free) 1 mL injection  1 mg InterCATHeter PRN  
 bacitracin 500 unit/gram packet 1 Packet  1 Packet Topical PRN  
 balsam peru-castor oiL (VENELEX) ointment   Topical BID  DOBUTamine (DOBUTREX) 500 mg/250 mL (2,000 mcg/mL) infusion  2 mcg/kg/min IntraVENous CONTINUOUS  
 
 
 
 
DESCRIPTION OF PROCEDURE:  
 
This is a digitally recorded electroencephalogram 
Electrodes were applied in accordance with the international 10-20 system of electrode placement. 18 channels of scalp EEG are recorded A channel was used for EoG Another channel was used for ECG The data is stored digitally and reviewed in reformatted montages for optimal display EEG  was reviewed in both bipolar and referential montages Description of Activity: The background of this recording contains no well-formed alpha activity was seen. There was a mixture of diffuse theta and delta activity seen throughout the study. The recording did have reactivity to external stimuli. Throughout the recording, there were no clear areas of focal slowing nor spike or spike-and-wave discharges seen. Hyperventilation was not performed. Photic stimulation no response in the posterior head regions. Clinical Interpretation: This EEG is abnormal. There is generalized slowing as seen in encephalopathies. There is no focal asymmetry, seizures or epileptiform discharges seen. Clinical correlation is recommended SAVANNAH Rojo

## 2020-09-21 NOTE — PROGRESS NOTES
Progress Note 9/21/2020 4:56 PM 
NAME: Fredi Abrenathy MRN:  [id number] Admit Diagnosis: Cardiac arrest ([facility] [address] [I46.9] Assessment:   
  
1. Refractory cardiac arrest, initial rhythm appeared to be PEA arrest  Status post ECMO placement for hemodynamic support 2. Cardiogenic Shock - resolved 3. Biventricular failure - improved 4. CAD w/ LM and 3v CAD, with MAKEDA 3 flow at baseline, underwent multivessel PCI 5. Atrial fibrillation with RVR , converted to NSR on amio gtt 6. Probable PE, less likely  vs ACS 7. Respiratory failure status post intubation 8. Severe metabolic acidosis, improved 9. Non occlusive DVT in Rt femoral artery, may be related to ECMO cannula,  AC d/marizol due to hemoptysis s/p IVC filter 10. Anemia 11. Fever, Sepsis,  
12. Thrombocytopenia 13. CAMACHO over CKD, CVVH 
14. Shock liver - improved 15. Diabetes 16. Hypertension 17. Hyperlipidemia 18. CVA, Small foci of scattered acute infarction in the cerebral hemispheres and in lt cerebellum in  [facility] distribution  
  
   
  
            Plan: 1. Remains in NSR, on Amio 2. Low dose vasopressor 3. On Aspirin and palvix 4. Mental status appear to be deteriorated compared to 2-3 days ago, Neuro eval for for prognosis 5. CVVH per renal  
6 Vent mx per critical care team  
  
  
   
  
 [x]? High complexity decision making was performed 
  
 
  
 
Subjective: HPI: 
  
Does not follow command In NSR Neuro eval for prognosis ROS:  Pt unresponsive, opens eyes Objective:  
  
Physical Exam: 
 
Last 24hrs VS reviewed since prior progress note. Most recent are: 
 
Visit Vitals BP (!) 98/58 Pulse 99 Temp 99.3 °F (37.4 °C) Resp 22 Ht 5' 9\" (1.753 m) Wt 95.1 kg (209 lb 10.5 oz) SpO2 96% BMI 30.96 kg/m² Intake/Output Summary (Last 24 hours) at 9/21/2020 1371 Last data filed at 9/21/2020 4205 Gross per 24 hour Intake 2492.51 ml Output 3709 ml  
 Net -1216.49 ml  
  
 
 
General: intubated and opens eyes and does not follow command Neck: Supple Respiratory: on vent Cardiovascular: RRR, no MGR Abdomen: soft,   non distended Neuro: unable to assess Skin:   dry Extremity: trace edema Data Review Telemetry: ST 
 
EKG:  
NSR, Diffuse STT abn, no ST elevation, prolonged QT Lab Data Personally Reviewed: 
 
Recent Labs  
  09/21/20 
0429 09/20/20 
3451 WBC 31.0* 23.6* HGB 7.2* 7.1*  
HCT 22.7* 22.0*  
 239 No results for input(s): INR, PTP, APTT, INREXT, INREXT in the last 72 hours. Recent Labs  
  09/21/20 0429 09/20/20 
1647 09/20/20 
0347 09/19/20 
0441 *  --  137 139  
K 4.3  --  4.2 3.7   --  105 107 CO2 26  --  29 26 BUN 41*  --  38* 51* CREA 1.95*  --  1.76* 2.27* *  --  220* 170* CA 7.6*  --  7.8* 7.8*  
MG 2.7* 2.6* 2.9* 2.6* No results for input(s): CPK, CKNDX, TROIQ in the last 72 hours. No lab exists for component: CPKMB Lab Results Component Value Date/Time Cholesterol, total 146 09/20/2020 03:47 AM  
 HDL Cholesterol 20 09/20/2020 03:47 AM  
 LDL, calculated 55.2 09/20/2020 03:47 AM  
 Triglyceride 354 (H) 09/20/2020 03:47 AM  
 CHOL/HDL Ratio 7.3 (H) 09/20/2020 03:47 AM  
 
 
Recent Labs  
  09/21/20 
0429 09/20/20 
0347 09/19/20 
0441 * 212* 130* TP 6.1* 6.1* 5.9* ALB 2.3* 2.3* 2.5*  
GLOB 3.8 3.8 3.4 No results for input(s): PH, PCO2, PO2 in the last 72 hours. Medications Personally Reviewed: 
 
Current Facility-Administered Medications Medication Dose Route Frequency  fentaNYL (PF) 1,500 mcg/30 mL (50 mcg/mL) infusion  0-200 mcg/hr IntraVENous TITRATE  cefepime (MAXIPIME) 1 g in 0.9% sodium chloride (MBP/ADV) 50 mL  1 g IntraVENous Q24H  propofol (DIPRIVAN) 10 mg/mL infusion  0-50 mcg/kg/min IntraVENous TITRATE  amiodarone (CORDARONE) 375 mg/250 mL D5W infusion  0.5 mg/min IntraVENous CONTINUOUS  
  fentaNYL citrate (PF) injection 50 mcg  50 mcg IntraVENous Q4H PRN  
 iothalamate meglumine (CONRAY 60) 60 % contrast solution    PRN  
 albuterol (PROVENTIL VENTOLIN) nebulizer solution 2.5 mg  2.5 mg Nebulization Q6H RT  
 acetylcysteine (MUCOMYST) 200 mg/mL (20 %) solution 200 mg  200 mg Nebulization Q6H RT  
 insulin NPH (NOVOLIN N, HUMULIN N) injection 40 Units  40 Units SubCUTAneous ACB&D  
 bicarbonate dialysis (PRISMASOL) BG K 4/Ca 2.5 5000 ml solution   Extracorporeal DIALYSIS CONTINUOUS  
 heparin (porcine) 2,000 Units in lactated Ringers 1,000 mL Irrigation    PRN  
 insulin lispro (HUMALOG) injection   SubCUTAneous Q6H  
 acetaminophen (TYLENOL) solution 650 mg  650 mg Per NG tube Q4H PRN  
 PHENYLephrine (DARRYL-SYNEPHRINE) 30 mg in 0.9% sodium chloride 250 mL infusion   mcg/min IntraVENous TITRATE  ticagrelor (BRILINTA) tablet 90 mg  90 mg Per NG tube Q12H  aspirin chewable tablet 81 mg  81 mg Per NG tube DAILY  albumin human 5% (BUMINATE) solution 12.5 g  12.5 g IntraVENous Q2H PRN  
 sodium chloride (NS) flush 5-40 mL  5-40 mL IntraVENous Q8H  
 sodium chloride (NS) flush 5-40 mL  5-40 mL IntraVENous PRN  
 0.45% sodium chloride infusion  10 mL/hr IntraVENous CONTINUOUS  
 oxyCODONE IR (ROXICODONE) tablet 5 mg  5 mg Oral Q4H PRN  
 oxyCODONE IR (ROXICODONE) tablet 10 mg  10 mg Oral Q4H PRN  
 naloxone (NARCAN) injection 0.4 mg  0.4 mg IntraVENous PRN  
 ondansetron (ZOFRAN) injection 4 mg  4 mg IntraVENous Q4H PRN  
 albuterol (PROVENTIL VENTOLIN) nebulizer solution 2.5 mg  2.5 mg Nebulization Q4H PRN  chlorhexidine (PERIDEX) 0.12 % mouthwash 10 mL  10 mL Oral Q12H  
 bisacodyL (DULCOLAX) suppository 10 mg  10 mg Rectal DAILY PRN  
 [Held by provider] senna-docusate (PERICOLACE) 8.6-50 mg per tablet 1 Tab  1 Tab Oral BID  [Held by provider] polyethylene glycol (MIRALAX) packet 17 g  17 g Oral DAILY  ELECTROLYTE REPLACEMENT NOTE: Nurse to review Serum Potassium and Magnesuim levels and Initiate Electrolyte Replacement Protocol as needed  1 Each Other PRN  
 magnesium sulfate 1 g/100 ml IVPB (premix or compounded)  1 g IntraVENous PRN  pantoprazole (PROTONIX) 40 mg in 0.9% sodium chloride 10 mL injection  40 mg IntraVENous DAILY  EPINEPHrine (ADRENALIN) 5 mg in 0.9% sodium chloride 250 mL infusion  0-10 mcg/min IntraVENous TITRATE  
 NOREPINephrine (LEVOPHED) 8 mg in 5% dextrose 250mL (32 mcg/mL) infusion  0.5-16 mcg/min IntraVENous TITRATE  dexmedeTOMidine (PRECEDEX) 400 mcg in 0.9% sodium chloride 100 mL infusion  0.2-1.4 mcg/kg/hr IntraVENous TITRATE  glucose chewable tablet 16 g  4 Tab Oral PRN  
 dextrose (D50W) injection syrg 12.5-25 g  12.5-25 g IntraVENous PRN  
 glucagon (GLUCAGEN) injection 1 mg  1 mg IntraMUSCular PRN  
 alteplase (CATHFLO) 1 mg in sterile water (preservative free) 1 mL injection  1 mg InterCATHeter PRN  
 bacitracin 500 unit/gram packet 1 Packet  1 Packet Topical PRN  
 balsam peru-castor oiL (VENELEX) ointment   Topical BID  DOBUTamine (DOBUTREX) 500 mg/250 mL (2,000 mcg/mL) infusion  2 mcg/kg/min IntraVENous CONTINUOUS Nicolasa Zavala MD

## 2020-09-21 NOTE — PROGRESS NOTES
Mr An Criseldadee stacking breaths most of this shift was biting on ET tube, bite block placed, still stacking breaths even with sedation on

## 2020-09-21 NOTE — PROGRESS NOTES
OT note:  
 
Chart reviewed and spoke with nursing. Patient is currently intubated and sedated. Noted patient does not withdraw and with decerebrate posturing. Will hold OT evaluation this date and follow up tomorrow for evalution.   
 
Yoko Monk, OTR/L

## 2020-09-21 NOTE — PROGRESS NOTES
[facility] ICU Progress Note Admit Date: 9/10/2020 
 
9/10/20: VA ECMO placed 20: PCI 
20: VA ECMO decannulated 20: L femoral cutdown and impella removal, left femoral artery repair Subjective:  
Pt seen with Dr. Piotr Calabrese. Tmax 99.3, Intubated at 80% FiO2. Levo at 3, Amiodarone. On TF at goal. 
 
 Objective:  
Vitals: 
Blood pressure 131/71, pulse 99, temperature 98.3 °F (36.8 °C), resp. rate 27, height 5' 9\" (1.753 m), weight 209 lb 10.5 oz (95.1 kg), SpO2 95 %. Temp (24hrs), Av.4 °F (36.9 °C), Min:97.4 °F (36.3 °C), Max:99.3 °F (37.4 °C) EKG/Rhythm:  Sinus rhythm in the 90s Extubation Date / Time: remains on vent Ventilator: 
Ventilator Volumes Vt Set (ml): 500 ml (20 0755) Vt Exhaled (Machine Breath) (ml): 544 ml (20 0755) Ve Observed (l/min): 13.9 l/min (20 0755) Oxygen Therapy: 
Oxygen Therapy O2 Sat (%): 95 % (20 1000) Pulse via Oximetry: 99 beats per minute (20 0755) O2 Device: Ventilator (20 0755) O2 Temperature: 98.6 °F (37 °C) (20 1455) FIO2 (%): 80 % (20 0755) CXR:  
CXR Results  (Last 48 hours) 20 0525  XR CHEST PORT Final result Impression:  IMPRESSION: No significant change. Narrative:  INDICATION:  respiratory failure EXAM: CXR Portable. FINDINGS: Portable chest shows support lines/devices show no significant change  
since yesterday. There is no apparent pneumothorax. Lungs show bibasilar  
haziness favoring effusions and atelectasis. Heart size is large, stable. There  
is no overt pulmonary edema. Admission Weight: Last Weight Weight: 219 lb 12.8 oz (99.7 kg) Weight: 209 lb 10.5 oz (95.1 kg) Intake / Output / Drain: 
Current Shift:  0701 -  1900 In: -  
Out: 301 Last 24 hrs.:  
 
Intake/Output Summary (Last 24 hours) at 2020 1044 Last data filed at 2020 1000 Gross per 24 hour Intake 2352.51 ml Output 3663 ml  
 Net -1310.49 ml EXAM: 
General:   intubated. Lungs:   Coarse to auscultation bilaterally. Incision:  Bilateral groin CDI Heart:  Regular rate and rhythm, S1, S2 normal, no murmur, click, rub or gallop. Abdomen:   Soft, non-tender. Bowel sounds hypoactive. No masses,  No organomegaly. Extremities:  1+ edema. PPP. Neurologic:  sedated. Not moving extremities spontaneously X 4-some withdrals to pain. Not following commands. May be tracking some. Labs:  
Recent Labs  
  20 
0525 20 
5216 WBC  --  31.0* HGB  --  7.2* HCT  --  22.7*  
PLT  --  330 NA  --  135* K  --  4.3 BUN  --  41* CREA  --  1.95* GLU  --  230* [facility] 244*  --   
 
 
 Assessment:  
 
Active Problems: 
  Cardiac arrest ([facility] [address] (9/10/2020) Plan/Recommendations/Medical Decision Makin. Cardiac arrest/cardiogenic shock s/p VA ECMO and Impella CP s/p ECMO decannulation and impella removal. Off pressors. Will need repeat TTE to eval EF at some point. 2. CAD s/p Stents: Stents placed . On ASA, brilinta. Cont. No BB/statin until appropriate. 2. DM type II: A1C 8.9. Cont insulin in TPN. Cont NPH to 40 BID. 3. Acute hypoxic respiratory failure: Pt complained of SOB prior to cardiac arrest. Possible PE however had diffuse CAD so more likely cardiac event than PE. Sputum cx  negative,  NGTD. On cefepime, flagyl, vanc. Cont CRRT for fluid removal.  Vent support per primary team.  
 
4. CAMACHO: started on CRRT , making urine, avoid nephrotoxic meds, renal following. Cont factor 100 as tolerated. 5. Shock liver: Improving, monitor 6. Hypernatremia: resolved, monitor 7. Hypokalemia: Replete per protocol 8. Diarrhea: improving,  monitor 9. Post op anemia st acute blood loss: stable H&H. Currently about transfusion threshold. Continue to monitor. 10. Thrombocytopenia: Resolved. S/p Plt transfusion. Monitor. 11. Leukocytosis. WBC to 31k today. Will ask ID to see. BC sent again yesterday. Will send a sputum culture. Will add Eraxis to broaden spectrum until seen by ID and then defer to their recommendations. 12. Nutrition: Continue TF-currently at goal. Monitor. 13. Acute DVT: Seen on doppler 9/12, nonocculsive acute DVT to left femoral vein. 14. Afib: Amio bolus and gtt. Monitor. 15. Acute embolic CVA: Watershed distribution on MRI. Continue ASA, Statin when appropriate. Aggressive PT/OT/ST when appropriate. Neurology following. Discussions with family concerning Trach/Peg ongoing. Dispo: Pt remains critically ill. Keep in ICU.    
 
Signed By: Michael Lobato, NP

## 2020-09-21 NOTE — ROUTINE PROCESS
0800: Bedside report received from Uri William RN using, SBAR, [address] [place], [address] MAR and I/O. Shift assessment completed.

## 2020-09-21 NOTE — PROGRESS NOTES
Comprehensive Nutrition Assessment Type and Reason for Visit: Juan Barbosa Nutrition Recommendations/Plan:  
Continue TF as ordered Nutrition Assessment:   Chart reviewed, case discussed during CCU rounds. Pt remains intubated and sedated with precedex and fentanyl, needs re-estimated. TF at goal rate with minimal residuals. TF meets 95% kcal and 100% protein needs. Lytes WNL. BG in the 200's, insulin adjusted per diabetes team.  Pt on CVVH, plans to transition off. PEG and trach being discussed. Levo at 3. BM noted on 9/19. TPN discontinued. Estimated Daily Nutrient Needs: 
Energy (kcal):  PSU 2141 (MSJ 1746) Protein (g):  95-110g (1.3-1.5gPro/kg IBW) Fluid (ml/day):  1800mL Nutrition Related Findings:  Meds: cefepime, humalog, insulin NPH, protonix; Drips: levo, fentanyl, precedex. Edema: +2 pitting-BLE, +1-2 BUE. BM 9/19 Wounds:   
Surgical wound Current Nutrition Therapies:  
Current Tube Feeding (TF) Orders: · Feeding Route:  OGT · Formula:  TwoCal HN  
· Schedule:  contiuous · Regimen:  40mL/h · Additives/Modulars:  Prosource BID · Water Flushes:  50mL H2O flush q 6h · Current TF & Flush Orders Provides:  2040kcals/110gPro/210gCHO/972mL · Goal TF & Flush Orders Provides:   2040kcals/110gPro/210gCHO/972mL Anthropometric Measures: 
· Height:  5' 9\" (175.3 cm) · Current Body Wt:  95.1 kg (209 lb 10.5 oz) · Ideal Body Wt:  160 lbs:  142.2 % · BMI Category:  Obese class 1 (BMI 30.0-34. 9) Nutrition Diagnosis:  
· Inadequate protein-energy intake related to cognitive or neurological impairment, other (specify)(respiratory failure) as evidenced by NPO or clear liquid status due to medical condition Previous dx resolved. Nutrition Interventions:  
Food and/or Nutrient Delivery: Continue tube feeding Nutrition Education and Counseling: No recommendations at this time Coordination of Nutrition Care: Continued inpatient monitoring, Interdisciplinary rounds Goals: Pt will tolerate TF @ goal rate with residuals <500mL in 2-4 days. Nutrition Monitoring and Evaluation:  
Food/Nutrient Intake Outcomes: Enteral nutrition intake/tolerance Physical Signs/Symptoms Outcomes: Biochemical data, Fluid status or edema, Weight, Hemodynamic status, GI status Discharge Planning: Too soon to determine Electronically signed by Kalina Jimenez RD, [address]  on 9/21/2020 at 1:12 PM 
 
Contact: [phone]

## 2020-09-21 NOTE — DIALYSIS
CRRT Note [facility] [id number] filter changed due to clotting. Per Dr. Halina Knight CRRT until tomorrow morning after labs. Estimated blood loss (165 ml), primary RN not able to return pt blood. RIJ temporary CVC, dressing CDI. Both lines flushed with normal saline & primary RN to place Heparin lock in both ports. Old set discarded in red biohazard bag. Education & pre/post report to Jonnie Edwards, primary RN.

## 2020-09-21 NOTE — DIALYSIS
[facility]          [phone] 
  
   
Orders Mode: CVVHD restarted @ [phone] Factor: 100 ml/hr UFR: 25 ml/kg/hr Blood Flow Rate: 200 ml/min  
  
   
Metrics BP / HR: 96/65 
97 Blood Flow Rate: 200 ml/min AP:                         -97  
RP: 61 TMP: 19 PD: 24  
FP: 117 UFR: 2450 ml/hr  
  
Comments / Plan:  
Filter changed secondary to clotting. This RN able to return blood ~165 ml. Consents, patient, code status, labs and orders verified. Old set discarded in red bio-hazard bag. New [id number] filter set-up, primed, tested and running well at this time. RIJ temporary CVC, dressing CDI with bio-patch. No signs of redness, drainage, or infection visualized. Each catheter limb disinfected for 60 seconds per limb with alcohol swabs. Caps removed, dialysis CVC hub scrubbed with Prevantics for 15 seconds, followed by a 5 second dry time per Hospital P&P. +asp/+flush x 2 ports. Lines visible and connections secure with blood warmer to return line at 37*C. Education, pre and post to Primary RN.

## 2020-09-21 NOTE — DIABETES MGMT
[facility] [address] PROGRESS NOTE Presentation Mike Maddox is a 63 y.o. male admitted from the ER 9/10/20 upon arriving in full arrest. EMS reported patient complaint of difficulty breathing prior to arrival => called EMS on the way to the hospital => cardiac arrest => ACLS. Initially bradycardic and hypertensive. GFR 44/serum creatinine 1.89. Lactic acid 10.1. Admission  with AG 20. A1c of 8.4%. Started on [place] 9/10/20 at 8pm.  
HX: 
Past Medical History:  
Diagnosis Date  Diabetes ([facility] [address]  Elevated cholesterol  GERD (gastroesophageal reflux disease)   
 helps with meds upsetting stomach  Hypertension  Low vitamin D level  Neuropathy   
 rt lower extremity CAD DX: Cardiac arrest. Cardiogenic shock. ARF. Metabolic acidosis. Shock liver. Sepsis. TX: Emergent ECMO 9/10/20. ECMO & Impella out. Insulin. CVVHD today. Neurology back in over the weekend to evaluate. Clinical care measures: 
 AC ventilatory support via ET @ 80% Amio infusion Sedation via Precedex BP management vialevo Nutritional support (TF) via OG Current clinical course has been complicated by cardiogenic shock post cardiac arrest, ARF, CAMACHO on CKD, hypernatremia, and hyperkalemia. Has high insulin needs: 
9/11/20 325/24 hours 9/12/20 157/24 hours 9/13/20 110/24 hours. Treated with ECMO & Impella. ECMO decannulation after bronchoscopy. 9/16/20 Impella out. 9/17/20 Waking up. Opens eyes. Wiggles fingers. No movement lower extremities. Off [facility] since last evening. Weaned off multiple pressors. Renal function diminishing; may need Kiko. 9/18/20 Although awake, is weak and not making purposeful movements. TF via OG overnight-plan to advance TF today and taper TPN off this evening. MRI today to examine brain.  
9/21/20 Something happened over the weekend resulting in patient now back on vent with sedation. Patient with asynchronous breathing with the ventilator per nursing today. Undergoing EEG now. Dr. Erik Nelson to speak with family today about prognosis and next steps (trach & PEG). Diabetes: Patient has known Type 2 diabetes, treated with non-insulin agents PTA. Family history unknown for diabetes at this time. Consulted by Provider for advanced diabetes nursing assessment and care, specifically related to  
[x] Transitioning off Verneda Pall [x] Inpatient management strategy Diabetes-related medical history - Patient can not address Diabetes medication history Drug class Currently in use Discontinued Never used Biguanide Glucophage 1000mg twice daily DDP-4 inhibitor Sulfonylurea Glimiperide 4mg twice daily Thiazolidinedione GLP-1 RA SGLT-2 inhibitors Basal insulin Fixed Dose  Combinations Bolus insulin Subjective Being re-evaluated by Neurology now. Objective Physical exam 
General Eyes open. Responds to pain per nursing. Vital Signs Low grade fever. ST. Hypotensive. Visit Vitals BP (!) 98/58 Pulse 99 Temp 99.3 °F (37.4 °C) Resp 22 Ht 5' 9\" (1.753 m) Wt 95.1 kg (209 lb 10.5 oz) SpO2 96% BMI 30.96 kg/m² Laboratory Lab Results Component Value Date/Time Hemoglobin A1c 8.4 (H) 09/11/2020 05:43 AM  
 
Lab Results Component Value Date/Time LDL, calculated 55.2 09/20/2020 03:47 AM  
 
Lab Results Component Value Date/Time Creatinine (POC) 1.0 09/10/2020 02:55 PM  
 Creatinine 1.95 (H) 09/21/2020 04:29 AM  
 
Lab Results Component Value Date/Time  Sodium 135 (L) 09/21/2020 04:29 AM  
 Potassium 4.3 09/21/2020 04:29 AM  
 Chloride 104 09/21/2020 04:29 AM  
 CO2 26 09/21/2020 04:29 AM  
 Anion gap 5 09/21/2020 04:29 AM  
 Glucose 230 (H) 09/21/2020 04:29 AM  
 BUN 41 (H) 09/21/2020 04:29 AM  
 Creatinine 1.95 (H) 09/21/2020 04:29 AM  
 BUN/Creatinine ratio 21 (H) 09/21/2020 04:29 AM  
 GFR est AA 43 (L) 09/21/2020 04:29 AM  
 GFR est non-AA 35 (L) 09/21/2020 04:29 AM  
 Calcium 7.6 (L) 09/21/2020 04:29 AM  
 Bilirubin, total 1.2 (H) 09/21/2020 04:29 AM  
 Alk. phosphatase 219 (H) 09/21/2020 04:29 AM  
 Protein, total 6.1 (L) 09/21/2020 04:29 AM  
 Albumin 2.3 (L) 09/21/2020 04:29 AM  
 Globulin 3.8 09/21/2020 04:29 AM  
 A-G Ratio 0.6 (L) 09/21/2020 04:29 AM  
 ALT (SGPT) 31 09/21/2020 04:29 AM  
 
Lab Results Component Value Date/Time ALT (SGPT) 31 09/21/2020 04:29 AM  
 
Factors affecting BG pattern Factor Dose Comments Nutrition: 
NPO 
TPN 
OG   
 
 
210 grams/24 hours Off since Friday evening At goal rate (40/hr) Infection Cefepime Q24 Blood cultures negative Other: Cardiogenic shock Kidney function Back on Levo CVVHD Blood glucose pattern Assessment and Plan Nursing Diagnosis Risk for unstable blood glucose pattern Nursing Intervention Domain 5253 Decision-making Support Nursing Interventions Examined current inpatient diabetes control Explored factors facilitating and impeding inpatient management Evaluation This  male, with Type 2 diabetes, had achieved inpatient blood glucose target of 100-180mg/dl, but BGs have trended up since Saturday. Has received 12 units of corrective insulin on Saturday & Sunday, likely related to TF carbohydrate. Inpatient blood glucose management has been impacted by 
[x] Kidney dysfunction 
[x] Variation in feeding delivery Would optimize insulin dosing to limit need for corrective. Recommendations Recommend: 
 
Increase NPH insulin to 45 units twice daily (to cover TF's impact on BG) Billing Code(s) I personally reviewed chart, notes, data and current medications in the medical record, and examined the patient at bedside before making care recommendations. [x] [id number] IP subsequent hospital care - 30 minutes [facility] Access via [address] [place] [address]

## 2020-09-21 NOTE — PROGRESS NOTES
[facility] Name: Brandon Green MRN: [id number] : 1960 Hospital: Replaced by [facility] Date: 2020 Critical Care IMPRESSION:  
· Acute hypoxic respiratory failure on vent · Out of hospital cardiac arrest 
· Cardiogenic shock previous requiring  impella, ECMO · Diffuse ASCVD s.p PCI 9/15/20 · Renal insf/metabolic acidosis on CRT · Concern of spinal cord compression - NS note reviewed · Bilateral air space disease with RUL collapse- resolved after bronch but LLLat risk · Hemoptysis and DVT s/p IVC filter · Shock liver · DM · Smoker · Covid neg RECOMMENDATIONS:  
· Ventilator support, adjust vent settings · Pressors/inotropes as needed · CRRT per renal 
· Complete abx - Cefepime, vancomycin, flagyl · Reanl, cardiology help much appreciated · Glycemic control · DVT, PUD prophylaxis · Sedation as needed · Discussion undergoing with family about trach and peg Subjective/History: No acute events overnight Sedated, Intubated Current Facility-Administered Medications Medication Dose Route Frequency  fentaNYL (PF) 1,500 mcg/30 mL (50 mcg/mL) infusion  0-200 mcg/hr IntraVENous TITRATE  cefepime (MAXIPIME) 1 g in 0.9% sodium chloride (MBP/ADV) 50 mL  1 g IntraVENous Q24H  propofol (DIPRIVAN) 10 mg/mL infusion  0-50 mcg/kg/min IntraVENous TITRATE  amiodarone (CORDARONE) 375 mg/250 mL D5W infusion  0.5 mg/min IntraVENous CONTINUOUS  
 albuterol (PROVENTIL VENTOLIN) nebulizer solution 2.5 mg  2.5 mg Nebulization Q6H RT  
 acetylcysteine (MUCOMYST) 200 mg/mL (20 %) solution 200 mg  200 mg Nebulization Q6H RT  
 insulin NPH (NOVOLIN N, HUMULIN N) injection 40 Units  40 Units SubCUTAneous ACB&D  
 bicarbonate dialysis (PRISMASOL) BG K 4/Ca 2.5 5000 ml solution   Extracorporeal DIALYSIS CONTINUOUS  
 insulin lispro (HUMALOG) injection   SubCUTAneous Q6H  
  PHENYLephrine (DARRYL-SYNEPHRINE) 30 mg in 0.9% sodium chloride 250 mL infusion   mcg/min IntraVENous TITRATE  ticagrelor (BRILINTA) tablet 90 mg  90 mg Per NG tube Q12H  aspirin chewable tablet 81 mg  81 mg Per NG tube DAILY  sodium chloride (NS) flush 5-40 mL  5-40 mL IntraVENous Q8H  
 0.45% sodium chloride infusion  10 mL/hr IntraVENous CONTINUOUS  chlorhexidine (PERIDEX) 0.12 % mouthwash 10 mL  10 mL Oral Q12H  
 [Held by provider] senna-docusate (PERICOLACE) 8.6-50 mg per tablet 1 Tab  1 Tab Oral BID  [Held by provider] polyethylene glycol (MIRALAX) packet 17 g  17 g Oral DAILY  pantoprazole (PROTONIX) 40 mg in 0.9% sodium chloride 10 mL injection  40 mg IntraVENous DAILY  EPINEPHrine (ADRENALIN) 5 mg in 0.9% sodium chloride 250 mL infusion  0-10 mcg/min IntraVENous TITRATE  
 NOREPINephrine (LEVOPHED) 8 mg in 5% dextrose 250mL (32 mcg/mL) infusion  0.5-16 mcg/min IntraVENous TITRATE  dexmedeTOMidine (PRECEDEX) 400 mcg in 0.9% sodium chloride 100 mL infusion  0.2-1.4 mcg/kg/hr IntraVENous TITRATE  balsam peru-castor oiL (VENELEX) ointment   Topical BID  DOBUTamine (DOBUTREX) 500 mg/250 mL (2,000 mcg/mL) infusion  2 mcg/kg/min IntraVENous CONTINUOUS Review of Systems: 
Review of systems not obtained due to patient factors. Objective:  
Vital Signs:   
Visit Vitals BP (!) 98/58 Pulse 99 Temp 99.3 °F (37.4 °C) Resp 22 Ht 5' 9\" (1.753 m) Wt 95.1 kg (209 lb 10.5 oz) SpO2 96% BMI 30.96 kg/m² O2 Device: Ventilator Temp (24hrs), Av.4 °F (36.9 °C), Min:97.4 °F (36.3 °C), Max:99.3 °F (37.4 °C) Intake/Output:  
Last shift:      No intake/output data recorded. Last 3 shifts:  1901 -  0700 In: 7672 [I.V.:1286] Out: 5424 [WYYML:403] Intake/Output Summary (Last 24 hours) at 2020 1653 Last data filed at 2020 0700 Gross per 24 hour Intake 2532.51 ml Output 3723 ml Net -1190.49 ml Hemodynamics: PAP: PAP Systolic: 59 (96/01/20 7926) CO: CO (l/min): 6.4 l/min (09/16/20 0000) Wedge:   CI: CI (l/min/m2): 3 l/min/m2 (09/16/20 0000) CVP:  CVP (mmHg): 11 mmHg (09/21/20 0700) SVR:    
  PVR:    
 
Ventilator Settings: 
Mode Rate Tidal Volume Pressure FiO2 PEEP Assist control, Volume control   500 ml  6 cm H2O 80 % 6 cm H20 Peak airway pressure: 18 cm H2O Minute ventilation: 13.9 l/min Physical Exam: 
 
General:  Intubated, sedated Head:  Normocephalic, without obvious abnormality, atraumatic. Eyes:  Conjunctivae/corneas clear. Pupils reactive and equal  
Nose: Nares normal. Septum midline. Mucosa normal.    
Throat: ETT in place Neck: Supple, symmetrical, trachea midline Back:   Symmetric, no curvature. ROM normal.  
Lungs:   Clear to auscultation bilaterally. Chest wall:  No tenderness or deformity. Heart:  Regular rate and rhythm Abdomen:   Soft, non-tender. Bowel sounds normal.   
Extremities: Extremities normal, atraumatic, no cyanosis or clubbing Skin: Skin color, texture, turgor normal. No rashes or lesions Neurologic: sedated Data:  
 
            
Labs: 
Recent Labs  
  09/21/20 
0429 09/20/20 
0347 09/19/20 
0441 WBC 31.0* 23.6* 19.2* HGB 7.2* 7.1* 7.5* HCT 22.7* 22.0* 23.1*  
 239 198 Recent Labs  
  09/21/20 
0429 09/20/20 
1647 09/20/20 
0347 09/19/20 
0441 *  --  137 139  
K 4.3  --  4.2 3.7   --  105 107 CO2 26  --  29 26 *  --  220* 170* BUN 41*  --  38* 51* CREA 1.95*  --  1.76* 2.27* CA 7.6*  --  7.8* 7.8*  
MG 2.7* 2.6* 2.9* 2.6* PHOS 2.6 2.3* 2.5* 3.9 ALB 2.3*  --  2.3* 2.5* TBILI 1.2*  --  1.1* 1.3* ALT 31  --  35 19 Recent Labs  
  09/21/20 
7981 09/20/20 
3473 09/19/20 
5283 PHI 7.38 7.44 7.38  
PCO2I 36.0 36.6 40.2 PO2I 64* 100 189* HCO3I 21.2* 24.5 23.8 FIO2I 80 80 90 Imaging: 
I have personally reviewed the patients radiographs and have reviewed the reports: CXR: none today Total critical care time exclusive of procedures:  minutes Norva Severin, MD

## 2020-09-22 NOTE — PROGRESS NOTES
Neurology Note Patient ID: 
Nayana Murphy [id number] 
8 y.o. 
1960 Subjective: intubated History of Present Illness:  
Nayana Murphy is a 63 y.o. male who was initially admitted to the hospital on September 10, 2020 after having an out-of-hospital cardiac arrest with prolonged downtime. Neurology has been following along due to cognitive status and generalized weakness There were no events reported overnight. This am, he was minimally responsive. There were no acute events overnight, however patient received a unit of blood this am.  
 
 
Past Medical History:  
Diagnosis Date  Diabetes ([address]  Elevated cholesterol  GERD (gastroesophageal reflux disease)   
 helps with meds upsetting stomach  Hypertension  Low vitamin D level  Neuropathy   
 rt lower extremity Past Surgical History:  
Procedure Laterality Date  CARDIAC SURG PROCEDURE UNLIST    
 heart cath. normal  
 HX CERVICAL DISKECTOMY 2015  HX COLONOSCOPY  2015  HX HERNIA REPAIR    
 umbilical  
 HX ORTHOPAEDIC    
 reattached tendon and muscle rt shoulder  IR INSERT NON TUNL CVC OVER 5 YRS  9/17/2020 History reviewed. No pertinent family history. Social History Tobacco Use  Smoking status: Current Every Day Smoker Packs/day: 1.00 Substance Use Topics  Alcohol use: Yes Comment: rarely No Known Allergies Current Facility-Administered Medications Medication Dose Route Frequency  0.9% sodium chloride infusion 250 mL  250 mL IntraVENous PRN  
 0.9% sodium chloride infusion 250 mL  250 mL IntraVENous PRN  
 insulin NPH (NOVOLIN N, HUMULIN N) injection 60 Units  60 Units SubCUTAneous BID  insulin NPH (NOVOLIN N, HUMULIN N) injection 15 Units  15 Units SubCUTAneous ONCE  
 fentaNYL (PF) 1,500 mcg/30 mL (50 mcg/mL) infusion  0-200 mcg/hr IntraVENous TITRATE  piperacillin-tazobactam (ZOSYN) 3.375 g in 0.9% sodium chloride (MBP/ADV) 100 mL  3.375 g IntraVENous Q8H  
 anidulafungin (ERAXIS) 100 mg in 0.9% sodium chloride 130 mL IVPB  100 mg IntraVENous Q24H  
 vancomycin (VANCOCIN) 1250 mg in  ml infusion  1,250 mg IntraVENous Q24H  propofol (DIPRIVAN) 10 mg/mL infusion  0-50 mcg/kg/min IntraVENous TITRATE  amiodarone (CORDARONE) 375 mg/250 mL D5W infusion  0.5 mg/min IntraVENous CONTINUOUS  
 fentaNYL citrate (PF) injection 50 mcg  50 mcg IntraVENous Q4H PRN  
 iothalamate meglumine (CONRAY 60) 60 % contrast solution    PRN  
 albuterol (PROVENTIL VENTOLIN) nebulizer solution 2.5 mg  2.5 mg Nebulization Q6H RT  
 acetylcysteine (MUCOMYST) 200 mg/mL (20 %) solution 200 mg  200 mg Nebulization Q6H RT  
 bicarbonate dialysis (PRISMASOL) BG K 4/Ca 2.5 5000 ml solution   Extracorporeal DIALYSIS CONTINUOUS  
 heparin (porcine) 2,000 Units in lactated Ringers 1,000 mL Irrigation    PRN  
 insulin lispro (HUMALOG) injection   SubCUTAneous Q6H  
 acetaminophen (TYLENOL) solution 650 mg  650 mg Per NG tube Q4H PRN  
 PHENYLephrine (DARRYL-SYNEPHRINE) 30 mg in 0.9% sodium chloride 250 mL infusion   mcg/min IntraVENous TITRATE  ticagrelor (BRILINTA) tablet 90 mg  90 mg Per NG tube Q12H  aspirin chewable tablet 81 mg  81 mg Per NG tube DAILY  albumin human 5% (BUMINATE) solution 12.5 g  12.5 g IntraVENous Q2H PRN  
 sodium chloride (NS) flush 5-40 mL  5-40 mL IntraVENous Q8H  
 sodium chloride (NS) flush 5-40 mL  5-40 mL IntraVENous PRN  
 0.45% sodium chloride infusion  10 mL/hr IntraVENous CONTINUOUS  
 oxyCODONE IR (ROXICODONE) tablet 5 mg  5 mg Oral Q4H PRN  
 oxyCODONE IR (ROXICODONE) tablet 10 mg  10 mg Oral Q4H PRN  
 naloxone (NARCAN) injection 0.4 mg  0.4 mg IntraVENous PRN  
 ondansetron (ZOFRAN) injection 4 mg  4 mg IntraVENous Q4H PRN  
 albuterol (PROVENTIL VENTOLIN) nebulizer solution 2.5 mg  2.5 mg Nebulization Q4H PRN  chlorhexidine (PERIDEX) 0.12 % mouthwash 10 mL  10 mL Oral Q12H  bisacodyL (DULCOLAX) suppository 10 mg  10 mg Rectal DAILY PRN  
 [Held by provider] senna-docusate (PERICOLACE) 8.6-50 mg per tablet 1 Tab  1 Tab Oral BID  [Held by provider] polyethylene glycol (MIRALAX) packet 17 g  17 g Oral DAILY  ELECTROLYTE REPLACEMENT NOTE: Nurse to review Serum Potassium and Magnesuim levels and Initiate Electrolyte Replacement Protocol as needed  1 Each Other PRN  
 magnesium sulfate 1 g/100 ml IVPB (premix or compounded)  1 g IntraVENous PRN  pantoprazole (PROTONIX) 40 mg in 0.9% sodium chloride 10 mL injection  40 mg IntraVENous DAILY  EPINEPHrine (ADRENALIN) 5 mg in 0.9% sodium chloride 250 mL infusion  0-10 mcg/min IntraVENous TITRATE  
 NOREPINephrine (LEVOPHED) 8 mg in 5% dextrose 250mL (32 mcg/mL) infusion  0.5-16 mcg/min IntraVENous TITRATE  dexmedeTOMidine (PRECEDEX) 400 mcg in 0.9% sodium chloride 100 mL infusion  0.2-1.4 mcg/kg/hr IntraVENous TITRATE  glucose chewable tablet 16 g  4 Tab Oral PRN  
 dextrose (D50W) injection syrg 12.5-25 g  12.5-25 g IntraVENous PRN  
 glucagon (GLUCAGEN) injection 1 mg  1 mg IntraMUSCular PRN  
 alteplase (CATHFLO) 1 mg in sterile water (preservative free) 1 mL injection  1 mg InterCATHeter PRN  
 bacitracin 500 unit/gram packet 1 Packet  1 Packet Topical PRN  
 balsam peru-castor oiL (VENELEX) ointment   Topical BID  DOBUTamine (DOBUTREX) 500 mg/250 mL (2,000 mcg/mL) infusion  2 mcg/kg/min IntraVENous CONTINUOUS Review of Systems: 
 
[x]Unable to obtain  ROS due to  []mental status change  [x]sedated   [x]intubated Objective:  
 
Visit Vitals /61 Pulse (!) 101 Temp 99.8 °F (37.7 °C) Resp 27 Ht 5' 9\" (1.753 m) Wt 212 lb 1.3 oz (96.2 kg) SpO2 95% BMI 31.32 kg/m² Physical Exam: 
 
Neurological examination Neck: no carotid bruits Lungs: clear to auscultation Heart:  no murmurs, regular rate Lower extremity: no edema Language: the patient will not follow any commands. No tracking today. Cranial nerves:  
Perrrla Facial sensation/motor:  Minimal grimace to noxious nasal stimulation Corneal reflex intact Motor: Tone decreased throughout No evidence of fasciculations No spontaneous movement seen in the upper or lower extremities. Sensory: 
Upper extremity: no withdrawal to painful stimuli bilaterally Lower extremity: no withdrawal to painful stimuli bilaterally Reflexes: 
Reduced throughout Plantar response:  flexor bilaterally Cerebellar testing:  no abnormal movements Labs:  
 
Lab Results Component Value Date/Time Hemoglobin A1c 8.4 (H) 09/11/2020 05:43 AM  
 Sodium 135 (L) 09/22/2020 03:48 AM  
 Potassium 4.8 09/22/2020 03:48 AM  
 Chloride 104 09/22/2020 03:48 AM  
 Glucose 301 (H) 09/22/2020 03:48 AM  
 BUN 56 (H) 09/22/2020 03:48 AM  
 Creatinine 3.19 (H) 09/22/2020 03:48 AM  
 Calcium 7.9 (L) 09/22/2020 03:48 AM  
 WBC 27.5 (H) 09/22/2020 03:48 AM  
 HCT 22.2 (L) 09/22/2020 03:48 AM  
 HGB 6.9 (L) 09/22/2020 03:48 AM  
 PLATELET 335 (H) 88/02/4730 03:48 AM  
 
 
Imaging: 
 
Results from Hospital Encounter encounter on 09/10/20 MRI BRAIN WO CONT Narrative EXAM: MRI BRAIN WO CONT 
 
TECHNIQUE: Brain images including sagittal and axial T1-weighted, axial FLAIR, T2-weighted, diffusion weighted, gradient echo,  coronal T2 
 
IV CONTRAST:  None INDICATION:  post cardiac arrest 
 
COMPARISON:  None. FINDINGS: 
BRAIN PARENCHYMA:  Small focus of diffusion restriction in the medial right 
periatrial region, consistent with acute infarction. Additional punctate 
scattered foci of restricted diffusion in the right posterior temporal lobe, 
left cerebellar hemisphere, left parietal lobe, right posterior parietal cortex, 
and probably in the right frontal lobe. Corresponding areas of FLAIR signal 
abnormality. These are in a watershed distribution.  No major territorial 
 infarct. Additional mild scattered foci of FLAIR/T2 hyperintensity in the 
cerebral white matter, likely due to intracranial small vessel disease. INTRACRANIAL HEMORRHAGE: None. CSF SPACES:  Normal in size and morphology for the patient's age. BASAL CISTERNS:  Patent. MIDLINE SHIFT: None. VASCULAR SYSTEM:  Normal flow voids. PARANASAL SINUSES AND MASTOID AIR CELLS:  Mild bilateral maxillary sinus mucosal 
thickening and small air-fluid levels. . Bilateral sphenoid fluid and mastoid 
fluid. This may relate to intubation. VISUALIZED ORBITS:  No significant abnormalities. VISUALIZED UPPER CERVICAL SPINE:  No significant abnormalities. SELLA:  No enlargement or  focal abnormality. SKULL BASE:  No significant abnormalities. Cerebellar tonsils in normal 
position. CALVARIUM:  Intact. Impression IMPRESSION:   
1. Few small scattered foci of acute infarction in the cerebral hemispheres and 
in the left cerebellum, in a watershed distribution. No major territorial 
infarct. No hemorrhage. 2. Mild white matter signal abnormality consistent with age-related changes and 
small vessel disease. No results found for this or any previous visit. I did independently reviewed the brain MRI from 9/18/2020. There are a few small scattered foci of acute stroke in the cerebral hemispheres in the left cerebellum. There is underlying white matter abnormalities consistent with small vessel chronic ischemic disease. Assessment and Plan: The patient is a pleasant 48-year-old gentleman with out-of-hospital cardiac arrest requiring continued respiratory support. Neurology has been following along due to cognitive status, stroke found on neuro imaging, and generalized weakness. 1. Cardiac arrest with diffuse cerebral hypoxia/anoxia: 
 
Follow up EEG with no evidence of seizures.   
Prognosis is guarded for meaningful recovery given length of time since arrest and minimal significant neurological recovery to date. I did discuss this at length with the brother today and answered all of his questions. Palliative care has been consulted and will talk with brother today 2. Acute stroke: This is most likely ischemic/embolic in nature due to the cardiac arrest. 
Continue aspirin daily He does have risk factors for stroke including dyslipidemia and diabetes 3. Generalized weakness/significant cervical spine disease with cord compression: The generalized weakness is most likely multifactorial in etiology. (cervical spine disease, stroke, possible cord ischemia during cardiac arrest, metabolic derangements, systemic effects of infection, anoxic brain injury, possible critical illness myopathy) Neurology will continue to follow along closely. Active Problems: 
  Cardiac arrest ([facility] [address] (9/10/2020) 40 minutes was spent providing medical care of this critically ill patient reviewing records, obtaining additional history from family, examining patient , discussing with collaborating physicians and nursing, and discussing treatment plans. I spent considerable time with the brother answering all of his questions. Signed By:  Bhavik Morning September 22, 2020

## 2020-09-22 NOTE — PROGRESS NOTES
0730  Bedside and verbal report from Meron Tirado RN. Dr. Sofía Tran here to see patient. [facility] here to see patient.

## 2020-09-22 NOTE — ROUTINE PROCESS
Bedside report received from [facility] using, SBAR, [address], [address] [facility] and I/O. Shift assessment completed.

## 2020-09-22 NOTE — PROGRESS NOTES
[address] [place] [address] Dr Provider Death Note Called to examine patient who has . No response to verbal and tactile stimuli. No respiratory effort. Absent heart sounds and pulses. Pupils fixed and dilated. Patient pronounced dead at [phone] Vicki Enriquez NP

## 2020-09-22 NOTE — PROGRESS NOTES
[facility] ICU Progress Note Admit Date: 9/10/2020 
 
9/10/20: VA ECMO placed 20: PCI 
20: [facility] ECMO decannulated 20: L femoral cutdown and impella removal, left femoral artery repair Subjective:  
Pt seen with Dr. Alma Smith. Tmax 100.1, Intubated at 80% FiO2. Sedation off, has received some fentanyl. On TF at goal. 
 
 Objective:  
Vitals: 
Blood pressure 110/60, pulse (!) 102, temperature 99.8 °F (37.7 °C), resp. rate 26, height 5' 9\" (1.753 m), weight 212 lb 1.3 oz (96.2 kg), SpO2 95 %. Temp (24hrs), Av.2 °F (37.3 °C), Min:97.4 °F (36.3 °C), Max:100.1 °F (37.8 °C) EKG/Rhythm:  Sinus rhythm in the 90s Extubation Date / Time: remains on vent Ventilator: 
Ventilator Volumes Vt Set (ml): 500 ml (20) Vt Exhaled (Machine Breath) (ml): 519 ml (20) Vt Spont (ml): 498 ml (20) Ve Observed (l/min): 14 l/min (20) Oxygen Therapy: 
Oxygen Therapy O2 Sat (%): 95 % (20 1000) Pulse via Oximetry: 103 beats per minute (20 08) O2 Device: Ventilator (20) O2 Temperature: 98.6 °F (37 °C) (20 1455) FIO2 (%): 70 % (20 08) CXR:  
CXR Results  (Last 48 hours) 20 0625  XR CHEST PORT Final result Impression:  IMPRESSION:  
   
Increased small pleural effusions and bibasilar opacities. Narrative:  EXAM:  XR CHEST PORT INDICATION: Bibasilar opacities. COMPARISON: 2020 at 0523 hours TECHNIQUE: Portable AP semiupright chest view at 0527 hours FINDINGS: The support devices are stable. There is stable cardiac silhouette  
enlargement. There are increased small pleural effusions and bibasilar opacities. There is no  
pneumothorax. The bones and upper abdomen are stable. Admission Weight: Last Weight Weight: 219 lb 12.8 oz (99.7 kg) Weight: 212 lb 1.3 oz (96.2 kg) Intake / Output / Drain: 
Current Shift:  0701 -  1900 In: 608.4 [I.V.:77.1] Out: 101 [Urine:101] Last 24 hrs.:  
 
Intake/Output Summary (Last 24 hours) at 2020 1022 Last data filed at 2020 1000 Gross per 24 hour Intake 3209.93 ml Output 1518 ml Net 1691.93 ml EXAM: 
General:   intubated. Lungs:   Coarse to auscultation bilaterally. Incision:  Bilateral groin CDI Heart:  Regular rate and rhythm, S1, S2 normal, no murmur, click, rub or gallop. Abdomen:   Soft, non-tender. Bowel sounds hypoactive. No masses,  No organomegaly. Extremities:  1+ edema. PPP. Neurologic:  Not following commands off sedation Labs:  
Recent Labs  
  20 
0510 20 
8609 WBC  --  27.5* HGB  --  6.9*  
HCT  --  22.2*  
PLT  --  493* NA  --  135* K  --  4.8 BUN  --  56* CREA  --  3.19* GLU  --  301* GLUCPOC 297*  --   
 
 
 Assessment:  
 
Active Problems: 
  Cardiac arrest ([facility] [address] (9/10/2020) Plan/Recommendations/Medical Decision Makin. Cardiac arrest/cardiogenic shock s/p VA ECMO and Impella CP s/p ECMO decannulation and impella removal.  
 
2. CAD s/p Stents: Stents placed . On ASA, brilinta. Cont. No BB/statin until appropriate. 2. DM type II: A1C 8.9. Cont insulin in TPN. Cont NPH to 40 BID. 3. Acute hypoxic respiratory failure: Pt complained of SOB prior to cardiac arrest. Possible PE however had diffuse CAD so more likely cardiac event than PE. Sputum cx  negative,  NGTD. On cefepime, flagyl, vanc. Cont CRRT for fluid removal.  Vent support per primary team.  
 
4. CAMACHO: started on CRRT , making urine, avoid nephrotoxic meds, renal following. Cont factor 100 as tolerated. 5. Shock liver: Improving, monitor 6. Hypernatremia: resolved, monitor 7. Hypokalemia: Replete per protocol 8. Diarrhea: improving,  monitor 9. Post op anemia st acute blood loss: stable H&H.  Currently about transfusion threshold. Continue to monitor. 10. Thrombocytopenia: Resolved. S/p Plt transfusion. Monitor. 11. Leukocytosis. WBC to 31k today. Will ask ID to see. BC sent again yesterday. Will send a sputum culture. Will add Eraxis to broaden spectrum until seen by ID and then defer to their recommendations. 12. Nutrition: Continue TF-currently at goal. Monitor. 13. Acute DVT: Seen on doppler 9/12, nonocculsive acute DVT to left femoral vein. 14. Afib: Amio bolus and gtt. Monitor. 15. Acute embolic CVA: Primary issue at this point. Continue ASA. Neuro following. Palliative care to see today and have meeting with brother. Dispo: Pt remains critically ill. Keep in ICU.    
 
Signed By: Edith Alonso PA-C

## 2020-09-22 NOTE — CONSULTS
Palliative Medicine Consult Rick: [phone]) Patient Name: Julia Macias YOB: 1960 Date of Initial Consult: 2/21/2020 Reason for Consult: Care Decisions Requesting Provider: Mora Girard MD 
Primary Care Physician: Sary Piedra MD 
 
 SUMMARY:  
Julia Macias is a 63 y.o. with a past history of DM, HLD, GERD, HTN, and neuropathy, who was admitted on 9/10/2020 from home with a diagnosis of cardiac arrest. He was driving himself the ED from [place] when he had to pull over and called 911. He suffered a cardiac arrest, with prolonged down time, and went emergently to the OR on arrival to the ED for emergent VA ECMO. He never had meaningful recovery neurologically, so Palliative Medicine was consulted for EOL conversations PALLIATIVE DIAGNOSES:  
1. End of life care 2. Comfort measures only 3. Need for psychosocial support 4. Cardiac arrest 
 
 
 PLAN:  
1. Received consult and reviewed chart- spoke with attending and neuro prior to meeting patient 2. I spoke with patients brother who was at the bedside. He shared that his brother was a loner, and very independent 3. He said the family talked last night, and if there was even a small chance that he would not be able to feed himself, his brother would not want to be kept alive like that 4. He wants to proceed with compassionate extubation to honor his brothers wishes and the family supports this (other brother and nephew are the only living family left) 5. Entered in comfort orders, patient was extubated to comfort. Was very comfortable through the process and did not require excalation of comfort meds 6. His brother was not at bedside when he passed, but came back up to say goodbye afterwards 7. Initial consult note routed to primary continuity provider and/or primary health care team members 8.  Communicated plan of care with: Palliative Denise MIRANDA Team 
 
 GOALS OF CARE / TREATMENT PREFERENCES:  
 
GOALS OF CARE: 
Patient/Health Care Proxy Stated Goals: Comfort TREATMENT PREFERENCES:  
Code Status: DNR Advance Care Planning: 
[x] [facility] Interdisciplinary Team has updated the ACP Navigator with [address] and Patient Capacity Primary Decision Maker (Active): Piotr Starks - Other Relative - [phone] Along with their other brother who is unable to come to the hospital emotionally due to battling with drug addiction Advance Care Planning 12/21/2015 Patient's Healthcare Decision Maker is: Legal Next of Kin Confirm Advance Directive None Patient Would Like to Complete Advance Directive No  
 
 
Medical Interventions: Comfort measures Other Instructions: Other: As far as possible, the palliative care team has discussed with patient / health care proxy about goals of care / treatment preferences for patient. HISTORY:  
 
History obtained from: chart, brother CHIEF COMPLAINT: none- unresponsive HPI/SUBJECTIVE: The patient is:  
[] Verbal and participatory [x] Non-participatory due to: Unresponsive ont eh vent Clinical Pain Assessment (nonverbal scale for severity on nonverbal patients):  
Clinical Pain Assessment Severity: 0 Duration: for how long has pt been experiencing pain (e.g., 2 days, 1 month, years) Frequency: how often pain is an issue (e.g., several times per day, once every few days, constant) FUNCTIONAL ASSESSMENT:  
 
Palliative Performance Scale (PPS): PPS: 10 PSYCHOSOCIAL/SPIRITUAL SCREENING:  
 
Palliative IDT has assessed this patient for cultural preferences / practices and a referral made as appropriate to needs (Cultural Services, Patient Advocacy, Ethics, etc.) Any spiritual / Church concerns: 
[] Yes /  [x] No 
 
Caregiver Burnout: 
[] Yes /  [x] No /  [] No Caregiver Present Anticipatory grief assessment:  
[x] Normal  / [] Maladaptive ESAS Anxiety: Anxiety: 0 
 
ESAS Depression: Depression: 0 REVIEW OF SYSTEMS:  
 
Positive and pertinent negative findings in ROS are noted above in HPI. The following systems were [x] reviewed / [] unable to be reviewed as noted in HPI Other findings are noted below. Systems: constitutional, ears/nose/mouth/throat, respiratory, gastrointestinal, genitourinary, musculoskeletal, integumentary, neurologic, psychiatric, endocrine. Positive findings noted below. Modified ESAS Completed by: provider Fatigue: 10    
Depression: 0 Pain: 0 Anxiety: 0 Nausea: 0 Anorexia: 10 Dyspnea: 0 Stool Occurrence(s): 1 PHYSICAL EXAM:  
 
From RN flowsheet: 
Wt Readings from Last 3 Encounters:  
09/22/20 212 lb 1.3 oz (96.2 kg) 09/17/20 215 lb 9.8 oz (97.8 kg) 02/14/17 220 lb 6.4 oz (100 kg) Blood pressure (!) 89/49, pulse (!) 109, temperature 99.8 °F (37.7 °C), resp. rate 24, height 5' 9\" (1.753 m), weight 212 lb 1.3 oz (96.2 kg), SpO2 97 %. Pain Scale 1: Behavioral Pain Scale (BPS) Pain Intensity 1: 3 Pain Intervention(s) 1: Medication (see MAR) Last bowel movement, if known:  
 
Constitutional: unresponsive Respiratory: on vent HISTORY:  
 
Active Problems: 
  Cardiac arrest ([facility] [address] (9/10/2020) End of life care () Comfort measures only status () Past Medical History:  
Diagnosis Date  Diabetes ([address]  Elevated cholesterol  GERD (gastroesophageal reflux disease)   
 helps with meds upsetting stomach  Hypertension  Low vitamin D level  Neuropathy   
 rt lower extremity Past Surgical History:  
Procedure Laterality Date  CARDIAC SURG PROCEDURE UNLIST    
 heart cath. normal  
 HX CERVICAL DISKECTOMY 2015  HX COLONOSCOPY  2015  HX HERNIA REPAIR    
 umbilical  
 HX ORTHOPAEDIC    
 reattached tendon and muscle rt shoulder  IR INSERT NON TUNL CVC OVER 5 YRS  9/17/2020 History reviewed. No pertinent family history. History reviewed, no pertinent family history. Social History Tobacco Use  Smoking status: Current Every Day Smoker Packs/day: 1.00 Substance Use Topics  Alcohol use: Yes Comment: rarely No Known Allergies Current Facility-Administered Medications Medication Dose Route Frequency  LORazepam (ATIVAN) injection 2 mg  2 mg IntraVENous Q15MIN PRN  
 glycopyrrolate (ROBINUL) injection 0.2 mg  0.2 mg IntraVENous Q4H PRN  
 morphine injection 4 mg  4 mg IntraVENous Q15MIN PRN  
 sodium chloride (NS) flush 5-40 mL  5-40 mL IntraVENous PRN  chlorhexidine (PERIDEX) 0.12 % mouthwash 10 mL  10 mL Oral Q12H  
 balsam peru-castor oiL (VENELEX) ointment   Topical BID  
 
 
 
 LAB AND IMAGING FINDINGS:  
 
Lab Results Component Value Date/Time WBC 27.5 (H) 09/22/2020 03:48 AM  
 HGB 6.9 (L) 09/22/2020 03:48 AM  
 PLATELET 211 (H) 46/26/7556 03:48 AM  
 
Lab Results Component Value Date/Time Sodium 135 (L) 09/22/2020 03:48 AM  
 Potassium 4.8 09/22/2020 03:48 AM  
 Chloride 104 09/22/2020 03:48 AM  
 CO2 26 09/22/2020 03:48 AM  
 BUN 56 (H) 09/22/2020 03:48 AM  
 Creatinine 3.19 (H) 09/22/2020 03:48 AM  
 Calcium 7.9 (L) 09/22/2020 03:48 AM  
 Magnesium 2.9 (H) 09/22/2020 03:48 AM  
 Phosphorus 4.0 09/22/2020 03:48 AM  
  
Lab Results Component Value Date/Time Alk. phosphatase 232 (H) 09/22/2020 03:48 AM  
 Protein, total 6.6 09/22/2020 03:48 AM  
 Albumin 2.1 (L) 09/22/2020 03:48 AM  
 Globulin 4.5 (H) 09/22/2020 03:48 AM  
 
Lab Results Component Value Date/Time INR 0.9 12/11/2015 09:31 AM  
 Prothrombin time 9.9 12/11/2015 09:31 AM  
 aPTT 34.9 (H) 09/15/2020 02:21 PM  
  
No results found for: IRON, FE, TIBC, IBCT, PSAT, FERR No results found for: PH, PCO2, PO2 No components found for: [place] Lab Results Component Value Date/Time  (H) 09/17/2020 11:24 AM  
  
 
 
   
 
Total time: Counseling / coordination time, spent as noted above:  
> 50% counseling / coordination?:  
 
Prolonged service was provided for  []30 min   []75 min in face to face time in the presence of the patient, spent as noted above. Time Start:  
Time End:  
Note: this can only be billed with 50548 (initial) or 49902 (follow up). If multiple start / stop times, list each separately.

## 2020-09-22 NOTE — DISCHARGE SUMMARY
[facility] Discharge Summary Patient ID: 
Estela Woods [id number] 
76 y.o. 
1960 Admit date: 9/10/2020 Discharge date: 2020 Admitting Physician: Vikki Tam MD  
 
Referring Cardiologist: Torey Hastings PCP: Ev Daly MD 
 
Admitting Diagnoses: Cardiac Arrest 
 
Discharge Diagnoses:  
 
Hospital Problems  Date Reviewed: 2020 Codes Class Noted POA End of life care ICD-10-CM: Z51.5 ICD-9-CM: V66.7  Unknown Unknown Comfort measures only status ICD-10-CM: Z51.5 ICD-9-CM: V66.7  Unknown Unknown Cardiac arrest [facility]) ICD-10-CM: I46.9 ICD-9-CM: 427.5  9/10/2020 Unknown Discharged Condition:  Disposition:  Procedures for this admission:  Procedure(s): IVC FILTER INSERTION Discharge Medications: My Medications ASK your doctor about these medications Instructions Each Dose to Equal Morning Noon Evening Bedtime  
aspirin delayed-release 81 mg tablet Your last dose was: Your next dose is: Take 81 mg by mouth daily. 81 mg 
  
  
  
  
  
Crestor 20 mg tablet Generic drug:  rosuvastatin Your last dose was: Your next dose is: Take 20 mg by mouth nightly. 20 mg 
  
  
  
  
  
glimepiride 4 mg tablet Commonly known as:  AMARYL Your last dose was: Your next dose is: Take 4 mg by mouth two (2) times a day. 4 mg Levitra 20 mg tablet Generic drug:  vardenafiL Your last dose was: Your next dose is: Take 20 mg by mouth as needed. 20 mg 
  
  
  
  
  
lisinopriL 20 mg tablet Commonly known as:  Rylan Acostas Your last dose was: Your next dose is: Take 20 mg by mouth two (2) times a day. 20 mg 
  
  
  
  
  
* metFORMIN 1,000 mg tablet Commonly known as:  GLUCOPHAGE Your last dose was: Your next dose is: Take 2,000 mg by mouth two (2) times a day.  
 2,000 mg 
  
  
 * metFORMIN 500 mg tablet Commonly known as:  GLUCOPHAGE Your last dose was: Your next dose is:   
 
  
    
  
  
  
  
omeprazole 20 mg capsule Commonly known as:  PRILOSEC Your last dose was: Your next dose is: Take 20 mg by mouth daily. 20 mg 
  
  
  
  
  
Trilipix 135 mg capsule Generic drug:  fenofibric acid Your last dose was: Your next dose is: Take 135 mg by mouth nightly. 135 mg 
  
  
  
  
  
Vitamin D2 1,250 mcg (50,000 unit) capsule Generic drug:  ergocalciferol Your last dose was: Your next dose is: Take 50,000 Units by mouth every seven (7) days. 50,000 Units * This list has 2 medication(s) that are the same as other medications prescribed for you. Read the directions carefully, and ask your doctor or other care provider to review them with you. HPI/Hospital course: Mr. Frances Arndt is a 63year old male who presented to the hospital with ACS and possible PE. He became progressively short of breath while driving, had to pull over to the side of the road. Contacted EMS and was transported the hospital where he became unresponsive in PEA arrest. He was emergently placed on VA ECMO during resuscitation. He ultimately underwent cardiac cath with 9/12/2020 with multiple stents plants as well as an impella CP. This was complicated by bleeding from the impella insertion site which resolved with pressure and platelet infusions. ECMO was ultimately removed via cutdown on 9/14/2020 with assistance from the impella CP. This was subsequently removed by vascular surgery on 9/16/2020. He continued to display poor neurological recovery despite maximal efforts. Hemodynamically he improved with removal of support devices but without meaningful neurological recovery. After discussions with palliative care, family made the decision to move towards comfort care to honor the patient's prior wishes. Discharge Vital Signs:  
Visit Vitals BP (!) 89/49 Pulse (!) 109 Temp 99.8 °F (37.7 °C) Resp 24 Ht 5' 9\" (1.753 m) Wt 212 lb 1.3 oz (96.2 kg) SpO2 97% BMI 31.32 kg/m² Labs:  
Recent Labs  
  09/22/20 
0510 09/22/20 
0022 WBC  --  27.5* HGB  --  6.9*  
HCT  --  22.2*  
PLT  --  493* NA  --  135* K  --  4.8 BUN  --  56* CREA  --  3.19* GLU  --  301* GLUCPOC 297*  --   
 
 
Diagnostics: CXR Results  (Last 48 hours) 09/22/20 0625  XR CHEST PORT Final result Impression:  IMPRESSION:  
   
Increased small pleural effusions and bibasilar opacities. Narrative:  EXAM:  XR CHEST PORT INDICATION: Bibasilar opacities. COMPARISON: 9/20/2020 at 0523 hours TECHNIQUE: Portable AP semiupright chest view at 0527 hours FINDINGS: The support devices are stable. There is stable cardiac silhouette  
enlargement. There are increased small pleural effusions and bibasilar opacities. There is no  
pneumothorax. The bones and upper abdomen are stable. Patient Instructions/Follow Up Care: 
none Signed: 
MONALISA Holliday 
9/22/2020 
12:38 PM

## 2020-09-22 NOTE — DIABETES MGMT
[facility] [address] PROGRESS NOTE Presentation Gianna Erickson is a 63 y.o. male admitted from the ER 9/10/20 upon arriving in full arrest. EMS reported patient complaint of difficulty breathing prior to arrival => called EMS on the way to the hospital => cardiac arrest => ACLS. Initially bradycardic and hypertensive. GFR 44/serum creatinine 1.89. Lactic acid 10.1. Admission  with AG 20. A1c of 8.4%. Started on [employer] 9/10/20 at 8pm.  
HX: 
Past Medical History:  
Diagnosis Date  Diabetes ([facility] [address]  Elevated cholesterol  GERD (gastroesophageal reflux disease)   
 helps with meds upsetting stomach  Hypertension  Low vitamin D level  Neuropathy   
 rt lower extremity CAD DX: Cardiac arrest. Cardiogenic shock. ARF. Metabolic acidosis. Shock liver. Sepsis. TX: Emergent ECMO 9/10/20. ECMO & Impella out. Insulin. CVVHD today. Neurology back in over the weekend to evaluate. Clinical care measures: 
 S ventilatory support via ET @ 70% Amio infusion Pain management via Fentanyl BP management via levo Nutritional support (TF) via OG Current clinical course has been complicated by cardiogenic shock post cardiac arrest, ARF, CAMACHO on CKD, hypernatremia, and hyperkalemia. Has high insulin needs: 
9/11/20 325/24 hours 9/12/20 157/24 hours 9/13/20 110/24 hours. Treated with ECMO & Impella. ECMO decannulation after bronchoscopy. 9/16/20 Impella out. 9/17/20 Waking up. Opens eyes. Wiggles fingers. No movement lower extremities. Off [employer] since last evening. Weaned off multiple pressors. Renal function diminishing; may need Kiko. 9/18/20 Although awake, is weak and not making purposeful movements. TF via OG overnight-plan to advance TF today and taper TPN off this evening. MRI today to examine brain.  
9/21/20 Something happened over the weekend resulting in patient now back on vent with sedation. Patient with asynchronous breathing with the ventilator per nursing today. Undergoing EEG now. Dr. Breanna Isidro to speak with family today about prognosis and next steps (trach & PEG).  
9/22/20 Incredibly ill man. Brother at bedside-crying this morning. Palliative care to discuss next steps today. Diabetes: Patient has known Type 2 diabetes, treated with non-insulin agents PTA. Family history unknown for diabetes at this time. Consulted by Provider for advanced diabetes nursing assessment and care, specifically related to  
[x] Transitioning off [address] [x] Inpatient management strategy Diabetes-related medical history - Patient can not address Diabetes medication history Drug class Currently in use Discontinued Never used Biguanide Glucophage 1000mg twice daily DDP-4 inhibitor Sulfonylurea Glimiperide 4mg twice daily Thiazolidinedione GLP-1 RA SGLT-2 inhibitors Basal insulin Fixed Dose  Combinations Bolus insulin Subjective Unresponsive. Objective Physical exam 
General Eyes open. Responds to voice per nursing. Vital Signs Low grade fever. ST. Hypotensive. Visit Vitals BP (!) 89/49 Pulse (!) 109 Temp 99.8 °F (37.7 °C) Resp 24 Ht 5' 9\" (1.753 m) Wt 96.2 kg (212 lb 1.3 oz) SpO2 97% BMI 31.32 kg/m² Laboratory Lab Results Component Value Date/Time Hemoglobin A1c 8.4 (H) 09/11/2020 05:43 AM  
 
Lab Results Component Value Date/Time LDL, calculated 55.2 09/20/2020 03:47 AM  
 
Lab Results Component Value Date/Time Creatinine (POC) 1.0 09/10/2020 02:55 PM  
 Creatinine 3.19 (H) 09/22/2020 03:48 AM  
 
Lab Results Component Value Date/Time  Sodium 135 (L) 09/22/2020 03:48 AM  
 Potassium 4.8 09/22/2020 03:48 AM  
 Chloride 104 09/22/2020 03:48 AM  
 CO2 26 09/22/2020 03:48 AM  
 Anion gap 5 09/22/2020 03:48 AM  
 Glucose 301 (H) 09/22/2020 03:48 AM  
 BUN 56 (H) 09/22/2020 03:48 AM  
 Creatinine 3.19 (H) 09/22/2020 03:48 AM  
 BUN/Creatinine ratio 18 09/22/2020 03:48 AM  
 GFR est AA 24 (L) 09/22/2020 03:48 AM  
 GFR est non-AA 20 (L) 09/22/2020 03:48 AM  
 Calcium 7.9 (L) 09/22/2020 03:48 AM  
 Bilirubin, total 1.3 (H) 09/22/2020 03:48 AM  
 Alk. phosphatase 232 (H) 09/22/2020 03:48 AM  
 Protein, total 6.6 09/22/2020 03:48 AM  
 Albumin 2.1 (L) 09/22/2020 03:48 AM  
 Globulin 4.5 (H) 09/22/2020 03:48 AM  
 A-G Ratio 0.5 (L) 09/22/2020 03:48 AM  
 ALT (SGPT) 30 09/22/2020 03:48 AM  
 
Lab Results Component Value Date/Time ALT (SGPT) 30 09/22/2020 03:48 AM  
 
Factors affecting BG pattern Factor Dose Comments Nutrition: 
NPO 
OG   
 
210 grams/24 hours At goal rate (40/hr) Infection  Blood cultures negative Other: Cardiogenic shock Kidney function Back on Levo CVVHD Blood glucose pattern Assessment and Plan Nursing Diagnosis Risk for unstable blood glucose pattern Nursing Intervention Domain 5224 Decision-making Support Nursing Interventions Examined current inpatient diabetes control Explored factors facilitating and impeding inpatient management Evaluation This  male, with Type 2 diabetes, had achieved inpatient blood glucose target of 100-180mg/dl. Decision to compassionately extubate made. No further care indicated. Recommendations Recommend: 
 
Sign off Billing Code(s) I personally reviewed chart, notes, data and current medications in the medical record, and examined the patient at bedside before making care recommendations. [x] 19083 IP subsequent hospital care - 15 minutes Britt Hernandez, CNS [facility] Access via [address] [place] [address]

## 2020-09-22 NOTE — PROGRESS NOTES
Talked with patients brother He shared that the family was in agreement that Mr Heraclio Lover would not want to live this way They want to move forward with a compassionate extubation No family needs to visit today Entered in comfort orders and talked to nursing Full note to follow Alfredo Vu, TRISTAN

## 2020-09-22 NOTE — PROGRESS NOTES
0800: Shift assessment completed. Patient opens eyes to voice and appears to focus in general direction of name being called; no tracking. Pupils equal, reactive, 3+, sluggish bilaterally. Patient makes no purposeful movement, does not follow commands, and does not withdraw to painful stimuli anywhere; no grimace noted. No gag; weak cough with in-line suctioning. Patient on spontaneous vent mode, tachypneic in uppers 20s. 1 unit PRBCs infusing for hgb 6.9. Dual skin assessment performed with Ismael Brunson RN. See flowsheets. 0840: Transfusion completed. 0915: Patient's brother at bedside. Notified that palliative medicine team would be here shortly to speak with him about care decisions. 0930: [facility] notified for GCS 5, discussion of possible withdrawal of care by family. Spoke with Mima Gamino at [facility]; organ coordinator, Marcello Calzada, to call back. 0945: Spoke with [employer] coordinator. She is checking to see if patient is a candidate for donation and will call back. 1000: Malia Cortes NP at bedside to discuss care decisions with brother, Shilpi Bobo. Per [employer] coordinator, patient is not a candidate for organ donation. 1005: Per Malia Cortes NP, plan is for compassionate extubation. Brother does not intend to stay but does request a call if Mr. Kirk Anderson passing seems imminent. 1030: Compassionate care initiated. Amio, levophed, fentanyl drips d/c'd. Morphine, ativan, robinul given per MAR orders. Tube feedings stopped. L A-line d/c'd. RT notified of need for extubation. 1155: Patient noted to suddenly have very low RR with dropping HR on monitor. Called and notified patient's brother of imminent passing; he is heading to hospital.  
 
[phone]: Patient asystole on monitor. Malia Cortes NP at bedside to confirm. Time of death [phone].  
 
200: Patient's brother arrived to bedside. 1213: [employer], notified of time of death.  Spoke with Caleb Dyer; he will actually be considered for tissue and eye donation. [phone]: Patient's brother unsure of  home; given supervisor's number by Ole Benton RN for him to contact once he knows this information. 1253: [facility]; [facility]. 1315: Dr. Josesito Hook notified via [employer] of patient's death. 1326: Patient's watch and glasses were found in patient  in room. Spoke with patient's brother, he would like to .  He will contact unit when he arrives to hospital.

## 2020-09-22 NOTE — PROGRESS NOTES
[facility] Name: Corina Juarez MRN: [id number] : 1960 Hospital: [facility] Date: 2020 Critical Care IMPRESSION:  
· Acute hypoxic respiratory failure on vent · Out of hospital cardiac arrest 
· Cardiogenic shock previous requiring  impella, ECMO · Diffuse ASCVD s.p PCI 9/15/20 · Renal insf/metabolic acidosis on CRT · Concern of spinal cord compression - NS note reviewed · Bilateral air space disease with RUL collapse- resolved after bronch but LLLat risk · Hemoptysis and DVT s/p IVC filter · Shock liver · DM · Smoker · Covid neg RECOMMENDATIONS:  
· Ventilator support, on SBT per CTS team 
· Pressors/inotropes as needed · CRRT per renal 
· Complete abx - Cefepime, vancomycin, flagyl · Renal, cardiology help much appreciated · Glycemic control · DVT, PUD prophylaxis · Transfuse? ?  
· Sedation as needed · Discussion undergoing with family about trach and peg and further care Subjective/History: No acute events overnight Sedated, Intubated No improvement Current Facility-Administered Medications Medication Dose Route Frequency  fentaNYL (PF) 1,500 mcg/30 mL (50 mcg/mL) infusion  0-200 mcg/hr IntraVENous TITRATE  insulin NPH (NOVOLIN N, HUMULIN N) injection 45 Units  45 Units SubCUTAneous ACB&D  piperacillin-tazobactam (ZOSYN) 3.375 g in 0.9% sodium chloride (MBP/ADV) 100 mL  3.375 g IntraVENous Q8H  
 anidulafungin (ERAXIS) 100 mg in 0.9% sodium chloride 130 mL IVPB  100 mg IntraVENous Q24H  
 vancomycin (VANCOCIN) 1250 mg in  ml infusion  1,250 mg IntraVENous Q24H  propofol (DIPRIVAN) 10 mg/mL infusion  0-50 mcg/kg/min IntraVENous TITRATE  amiodarone (CORDARONE) 375 mg/250 mL D5W infusion  0.5 mg/min IntraVENous CONTINUOUS  
 albuterol (PROVENTIL VENTOLIN) nebulizer solution 2.5 mg  2.5 mg Nebulization Q6H RT  
  acetylcysteine (MUCOMYST) 200 mg/mL (20 %) solution 200 mg  200 mg Nebulization Q6H RT  
 bicarbonate dialysis (PRISMASOL) BG K 4/Ca 2.5 5000 ml solution   Extracorporeal DIALYSIS CONTINUOUS  
 insulin lispro (HUMALOG) injection   SubCUTAneous Q6H  
 PHENYLephrine (DARRYL-SYNEPHRINE) 30 mg in 0.9% sodium chloride 250 mL infusion   mcg/min IntraVENous TITRATE  ticagrelor (BRILINTA) tablet 90 mg  90 mg Per NG tube Q12H  aspirin chewable tablet 81 mg  81 mg Per NG tube DAILY  sodium chloride (NS) flush 5-40 mL  5-40 mL IntraVENous Q8H  
 0.45% sodium chloride infusion  10 mL/hr IntraVENous CONTINUOUS  chlorhexidine (PERIDEX) 0.12 % mouthwash 10 mL  10 mL Oral Q12H  
 [Held by provider] senna-docusate (PERICOLACE) 8.6-50 mg per tablet 1 Tab  1 Tab Oral BID  [Held by provider] polyethylene glycol (MIRALAX) packet 17 g  17 g Oral DAILY  pantoprazole (PROTONIX) 40 mg in 0.9% sodium chloride 10 mL injection  40 mg IntraVENous DAILY  EPINEPHrine (ADRENALIN) 5 mg in 0.9% sodium chloride 250 mL infusion  0-10 mcg/min IntraVENous TITRATE  
 NOREPINephrine (LEVOPHED) 8 mg in 5% dextrose 250mL (32 mcg/mL) infusion  0.5-16 mcg/min IntraVENous TITRATE  dexmedeTOMidine (PRECEDEX) 400 mcg in 0.9% sodium chloride 100 mL infusion  0.2-1.4 mcg/kg/hr IntraVENous TITRATE  balsam peru-castor oiL (VENELEX) ointment   Topical BID  DOBUTamine (DOBUTREX) 500 mg/250 mL (2,000 mcg/mL) infusion  2 mcg/kg/min IntraVENous CONTINUOUS Review of Systems: 
Review of systems not obtained due to patient factors. Objective:  
Vital Signs:   
Visit Vitals /76 Pulse (!) 104 Temp 99.7 °F (37.6 °C) Resp 26 Ht 5' 9\" (1.753 m) Wt 96.2 kg (212 lb 1.3 oz) SpO2 97% BMI 31.32 kg/m² O2 Device: Endotracheal tube Temp (24hrs), Av.1 °F (37.3 °C), Min:97.4 °F (36.3 °C), Max:100.1 °F (37.8 °C) Intake/Output:  
Last shift:      No intake/output data recorded. Last 3 shifts: 09/20 1901 - 09/22 0700 In: 3991.6 [I.V.:2061.6] Out: 3809 [Urine:757; Drains:350] Intake/Output Summary (Last 24 hours) at 9/22/2020 0745 Last data filed at 9/22/2020 9449 Gross per 24 hour Intake 2627.75 ml Output 1762 ml Net 865.75 ml Hemodynamics:  
PAP: PAP Systolic: 59 (23/72/39 0844) CO: CO (l/min): 6.4 l/min (09/16/20 0000) Wedge:   CI: CI (l/min/m2): 3 l/min/m2 (09/16/20 0000) CVP:  CVP (mmHg): 3 mmHg (09/22/20 0600) SVR:    
  PVR:    
 
Ventilator Settings: 
Mode Rate Tidal Volume Pressure FiO2 PEEP Spontaneous   500 ml  8 cm H2O 70 % 8 cm H20 Peak airway pressure: 18 cm H2O Minute ventilation: 13.8 l/min Physical Exam: 
 
General:  Intubated, sedated Head:  Normocephalic, without obvious abnormality, atraumatic. Eyes:  Conjunctivae/corneas clear. Pupils reactive and equal  
Nose: Nares normal. Septum midline. Mucosa normal.    
Throat: ETT in place Neck: Supple, symmetrical, trachea midline Back:   Symmetric, no curvature. ROM normal.  
Lungs:   Clear to auscultation bilaterally. Chest wall:  No tenderness or deformity. Heart:  Regular rate and rhythm Abdomen:   Soft, non-tender. Bowel sounds normal.   
Extremities: Extremities normal, atraumatic, no cyanosis or clubbing Skin: Skin color, texture, turgor normal. No rashes or lesions Neurologic: sedated Data:  
 
            
Labs: 
Recent Labs  
  09/22/20 
0348 09/21/20 
0429 09/20/20 
3429 WBC 27.5* 31.0* 23.6* HGB 6.9* 7.2* 7.1*  
HCT 22.2* 22.7* 22.0*  
* 330 239 Recent Labs  
  09/22/20 
0348 09/21/20 
0429 09/20/20 
1647 09/20/20 
0347 * 135*  --  137  
K 4.8 4.3  --  4.2  104  --  105 CO2 26 26  --  29  
* 230*  --  220* BUN 56* 41*  --  38* CREA 3.19* 1.95*  --  1.76* CA 7.9* 7.6*  --  7.8*  
MG 2.9* 2.7* 2.6* 2.9*  
PHOS 4.0 2.6 2.3* 2.5* ALB 2.1* 2.3*  --  2.3* TBILI 1.3* 1.2*  --  1.1* ALT 30 31  --  35  
 
 Recent Labs  
  09/22/20 
3634 09/22/20 
6730 09/21/20 
1620 09/21/20 
4431 PHI 7.33* 7.35 7.38 7.38  
PCO2I 43.4 40.1 40.7 36.0 PO2I 66*  --  72* 64* HCO3I 23.0 22.2 23.9 21.2*  
FIO2I 70 70 70 80 Imaging: 
I have personally reviewed the patients radiographs and have reviewed the reports: CXR: bibasliar atx/effusions Total critical care time exclusive of procedures:  minutes Sary Humphrey MD

## 2020-09-22 NOTE — PROGRESS NOTES
Pharmacy Automatic Renal Dosing Protocol - Antimicrobials Indication for Antimicrobials: sepsis, PNA? Current Regimen of Each Antimicrobial:  
Zosyn 3.375 gm q12h (, day 2 Anidulafungin 200 mg x 1, then 100 mg q24h (, day 2 Vancomycin - pharmacy to dose (, day 2 Previous Antimicrobial Therapy:  
Cefepime 1g q 24 hours ( - ) Cefazolin 2g IV q8h ; started 9/10; day 3 Pip/tazo 3.375g q8h (Start Date ; Day 2) Vancomycin 2g X1, then 1,250 mg q 36 hours (9/15; day #3 Metronidazole 250 mg q 8 hours (9/15 -  Cefepime 2g q 24 hours (9/15 - ) Goal Level: VANCOMYCIN TROUGH GOAL RANGE Vancomycin Trough: 15 - 20 mcg/mL  (AUC: 400 - 600 mg/hr/Liter/day) Date Dose & Interval Measured (mcg/mL) Extrapolated (mcg/mL) Date & time of next level:   
 
Significant Cultures:  
 RCx bronch - light yeast - Final 
9/15: Blood cx: pending  bronch - yeast - final 
 blood: pending  fungal? 
 
Radiology / Imaging results: (X-ray, CT scan or MRI):  
9/15: CXR: Improved aeration in the bilateral airspace disease. Geralene Steph : CXR: Increase small pleural effusions and bibasilar opacities that may represent atelectasis, edema, or infection Paralysis, amputations, malnutrition: none noted Labs: 
Recent Labs  
  20 
0348 20 
0429 20 
0347 CREA 3.19* 1.95* 1.76* BUN 56* 41* 38* WBC 27.5* 31.0* 23.6* Temp (24hrs), Av.2 °F (37.3 °C), Min:97.4 °F (36.3 °C), Max:100.1 °F (37.8 °C) Creatinine Clearance (mL/min) or Dialysis: Started CVVH on  Impression/Plan:  
Zosyn 3.375 gm q8h; CVVH dosing, but the CVVH clotted and might not resume today. Will continue with the current dose. Vancomycin 2000 mg x 1, 1250 mg every 24 hours CVVH dosing (next dose is today at 6 PM and if the CVVH is not going to resume, then will adjust the dose possibly to q 48 hours. WBC trending elevated, but trending down Febrile Antimicrobial stop date pending Pharmacy will follow daily and adjust medications as appropriate for renal function and/or serum levels. Thank you, Malena Spain, PHARMD

## 2020-09-22 NOTE — PROGRESS NOTES
OT note:  
 
Chart reviewed and noted OT orders discontinued by palliative team as family decided to withdraw care.  Thank you for the referral.  
 
Maurice Garcia, OTR/L

## 2020-09-22 NOTE — PROGRESS NOTES
PT note:  
 
Chart reviewed and noted PT orders discontinued by palliative team as family decided to withdraw care.  Thank you for the referral.  
 
Peter Jade, PT, DPT

## 2020-09-22 NOTE — PROGRESS NOTES
While rounding CCU,  was informed of patient's death. Patient's family member, Jigna Kaur, was present. Offered condolences and listening presence. Jigna Kaur seems at peace and expressed patient is in a better place as he shared Mr. Leonora Boykin is where he should be. Jigna Kaur expressed no spiritual needs or concerns at this time. Offered assurance of prayer. ROBBY Escobedo, [facility], Staff  [facility]  Paging Service  [phone])

## 2020-09-22 NOTE — PROGRESS NOTES
Progress Note 9/22/2020 4:56 PM 
NAME: Ted Cunha MRN:  [id number] Admit Diagnosis: Cardiac arrest ([facility] [address] [I46.9] Assessment:   
  
1. Refractory cardiac arrest, initial rhythm appeared to be PEA arrest  Status post ECMO placement for hemodynamic support 2. Cardiogenic Shock - resolved 3. Biventricular failure - improved 4. CAD w/ LM and 3v CAD, with MAKEDA 3 flow at baseline, underwent multivessel PCI 5. Atrial fibrillation with RVR , converted to NSR on amio gtt 6. Probable PE, less likely  vs ACS 7. Respiratory failure status post intubation 8. Severe metabolic acidosis, improved 9. Non occlusive DVT in Rt femoral artery, may be related to ECMO cannula,  AC d/marizol due to hemoptysis s/p IVC filter 10. Anemia 11. Fever, Sepsis,  
12. Thrombocytopenia 13. CAMACHO over CKD, CVVH 
14. Shock liver - improved 15. Diabetes 16. Hypertension 17. Hyperlipidemia 18. CVA, Small foci of scattered acute infarction in the cerebral hemispheres and in lt cerebellum in  [facility] distribution  
  
   
  
            Plan: 1. Remains unresponsive 2. In NSR on amio 3. Low dose vasopressor 4. On Aspirin and ticagrelor Mental status appear to be deteriorated and remained unresponsive, palliative care consulted, discussion with brother regarding goals of care   
   
  
 [x]? High complexity decision making was performed 
  
 
  
 
Subjective: HPI: 
  
Does not follow command Neuro eval for poor prognosis ROS:  Pt unresponsive, opens eyes Objective:  
  
Physical Exam: 
 
Last 24hrs VS reviewed since prior progress note. Most recent are: 
 
Visit Vitals /60 Pulse (!) 102 Temp 99.8 °F (37.7 °C) Resp 26 Ht 5' 9\" (1.753 m) Wt 96.2 kg (212 lb 1.3 oz) SpO2 95% BMI 31.32 kg/m² Intake/Output Summary (Last 24 hours) at 9/22/2020 1117 Last data filed at 9/22/2020 1000 Gross per 24 hour Intake 3127.74 ml Output 1294 ml Net 1833.74 ml General: intubated and opens eyes and does not follow command Neck: Supple Respiratory: on vent Cardiovascular: RRR, no MGR Abdomen: soft,   non distended Neuro: unable to assess Skin:   dry Extremity: trace edema Data Review Telemetry: ST 
 
EKG:  
NSR, Diffuse STT abn, no ST elevation, prolonged QT Lab Data Personally Reviewed: 
 
Recent Labs  
  09/22/20 
0348 09/21/20 
8834 WBC 27.5* 31.0* HGB 6.9* 7.2* HCT 22.2* 22.7*  
* 330 No results for input(s): INR, PTP, APTT, INREXT, INREXT in the last 72 hours. Recent Labs  
  09/22/20 0348 09/21/20 
0429 09/20/20 
1647 09/20/20 0347 * 135*  --  137  
K 4.8 4.3  --  4.2  104  --  105 CO2 26 26  --  29  
BUN 56* 41*  --  38* CREA 3.19* 1.95*  --  1.76* * 230*  --  220* CA 7.9* 7.6*  --  7.8*  
MG 2.9* 2.7* 2.6* 2.9* No results for input(s): CPK, CKNDX, TROIQ in the last 72 hours. No lab exists for component: CPKMB Lab Results Component Value Date/Time Cholesterol, total 146 09/20/2020 03:47 AM  
 HDL Cholesterol 20 09/20/2020 03:47 AM  
 LDL, calculated 55.2 09/20/2020 03:47 AM  
 Triglyceride 354 (H) 09/20/2020 03:47 AM  
 CHOL/HDL Ratio 7.3 (H) 09/20/2020 03:47 AM  
 
 
Recent Labs  
  09/22/20 
0348 09/21/20 
0429 09/20/20 0347 * 219* 212* TP 6.6 6.1* 6.1* ALB 2.1* 2.3* 2.3*  
GLOB 4.5* 3.8 3.8 No results for input(s): PH, PCO2, PO2 in the last 72 hours. Medications Personally Reviewed: 
 
Current Facility-Administered Medications Medication Dose Route Frequency  LORazepam (ATIVAN) injection 2 mg  2 mg IntraVENous Q15MIN PRN  
 glycopyrrolate (ROBINUL) injection 0.2 mg  0.2 mg IntraVENous Q4H PRN  
 morphine injection 4 mg  4 mg IntraVENous Q15MIN PRN  
 sodium chloride (NS) flush 5-40 mL  5-40 mL IntraVENous PRN  chlorhexidine (PERIDEX) 0.12 % mouthwash 10 mL  10 mL Oral Q12H  balsam peru-castor oiL (VENELEX) ointment   Topical BID Goldy Santiago MD

## 2020-09-22 NOTE — PROGRESS NOTES
1900: Bedside and Verbal shift change report given to Carmen Soares RN (oncoming nurse) by Sandee Canseco RN (offgoing nurse). Report included the following information SBAR, Kardex, OR Summary, Procedure Summary, Intake/Output, MAR, Accordion, Recent Results, Med Rec Status and Cardiac Rhythm ST.  
 
2000: Pt has upward gaze, will turn head towards voice and focus eyes, does not track. No command following, no response to painful stimuli or movement in extremities. Minimal weak cough, absent gag even with in-line suctioning. Pupils equal and reactive but 3mm and sluggish. Pt tachypneic on current vent settings 30-33 RR:  
AC 12/500/70%/8peep Request RT to trial for spontaneous breathing as patient had better ventilation during dayshift. 2030: Pt switched to Spontaneous 8/8/70%, tolerating better with RR 27-29. 
 
2135: Pt back to tachypneic rate 29-33. Fentanyl gtt increased to 75 mcg/hr. 2230: RT notified of WOB/tachypnea unchanged. Will try to go back on rate. 0000: Reassessment unchanged. Pt continues with upward gaze and will shift head or eyes toward direction of calling name. Will focus but cannot track. No command following, no withdrawal to stimuli. Minimal cough noted and absent gag. 0400: Reassessment unchanged. hgb at 6.9, order to transfuse 1x PRBC.  
 
0700: Levophed titrated to 6mcg/min overnight; 
Amio continues at 0.5mg/min Fentanyl at 75 mcg/hr Report given to [facility]

## 2020-09-23 LAB
ABO + RH BLD: NORMAL
BACTERIA SPEC CULT: ABNORMAL
BACTERIA SPEC CULT: ABNORMAL
BLD PROD TYP BPU: NORMAL
BLOOD GROUP ANTIBODIES SERPL: NORMAL
BPU ID: NORMAL
CROSSMATCH RESULT,%XM: NORMAL
GRAM STN SPEC: ABNORMAL
SERVICE CMNT-IMP: ABNORMAL
SPECIMEN EXP DATE BLD: NORMAL
STATUS OF UNIT,%ST: NORMAL
UNIT DIVISION, %UDIV: 0

## 2020-09-26 LAB
BACTERIA SPEC CULT: NORMAL
SERVICE CMNT-IMP: NORMAL

## 2020-10-09 NOTE — OP NOTES
[facility] 
OPERATIVE REPORT Name:  Paul East 
MR#:  [id number] :  1960 ACCOUNT #:  [de-identified] DATE OF SERVICE:  2020 PREOPERATIVE DIAGNOSIS:  Left femoral Impella. POSTOPERATIVE DIAGNOSIS:  Left femoral Impella. PROCEDURES PERFORMED: 1. Left femoral artery cutdown with removal of Impella. 2.  Repair and reconstruction of left femoral artery with a bovine pericardial patch. SURGEON:  Disha Castillo MD 
 
ASSISTANT:  Luther Ashley MD 
 
ANESTHESIA:  General. 
 
COMPLICATIONS:  None. SPECIMENS REMOVED:  None. IMPLANTS:  Bovine pericardial patch. ESTIMATED BLOOD LOSS:  300 mL. DRAINS:  None. INDICATIONS:  The patient is a 69-year-old male with significant cardiac disease who required prolonged Impella support via a left femoral artery Impella insertion site. He now presents for surgical removal of the Impella. PROCEDURE:  After informed consent was obtained. The patient was taken to the operating room. The left groin including the Impella cannula was prepped and draped as a sterile field. A transverse incision was made just above the skin exit site of the Impella cannula and dissection was carried down to the common femoral artery. The common femoral artery was exposed proximally and distally to the Impella insertion site and encircled with vessel loops. The patient was systemically heparinized. The Impella was removed and the artery was occluded just as the device was removed. The lumen of the common femoral artery was inspected through the opening at the Impella insertion site and there was some subacute appearing thrombus visible within the lumen. Because of this, I felt it was necessary to pass a Belinda catheter prior to repairing the arteriotomy site. A #4 Belinda catheter was passed distally and no thrombus was retrieved and there was good backbleeding.   Attempted to pass a #4 Belinda catheter proximally because although there was some prograde flow this was not normal flow for a common femoral artery. The Belinda catheter had some difficulty passing proximally. After several attempts at manipulation, the clamp was again flashed and there was now a virtually no inflow. It was clear that a more extensive exposure of the femoral artery will be required. The artery was dissected free and the circumflex iliac arteries were encircled with vessel loops and the distal external iliac artery was encircled with a vessel loop. The distal external iliac artery could be palpated above the level of the inguinal ligament and there was a good pulse there. The femoral bifurcation was dissected free and the origin of the profunda and the proximal SFA were encircled with vessel loops. The small arteriotomy at the site of the Impella insertion was converted to a longitudinal arteriotomy. The artery was opened lengthwise. There was significant disruption of the posterior wall of the common femoral artery with layers of the arterial wall  very easily. The longitudinal arteriotomy had been continued proximal to this within the true lumen and there was now good inflow there. Because it did not appear that the posterior wall could be satisfactorily repaired with simple tacking sutures and because of the intima in the superficial layers of the media peeled apart extremely easily and were very friable it was clear that this area would need to be resected. The common femoral artery was dissected free circumferentially and the short segment of the posterior wall where the media had end-to-end been disrupted was completely resected so that there were essentially to segments of the common femoral artery.   These were sufficiently mobilized to allow for primary reapproximation of the posterior wall which was done with running 5-0 Prolene suture. When this was completed a bovine pericardial patch was used to close the longitudinal arteriotomy which extended from the proximal common femoral artery onto the proximal SFA. The patch was sewn in with running 5-0 Prolene suture. Flushing maneuvers were performed prior to completion and the #4 Belinda catheter was again run down the leg one final time and no thrombus was retrieved and there was good backbleeding. The final sutures were placed in the patch repair and then flow was restored first to the profunda and then to the SFA. The suture line was hemostatic. The profunda and the SFA were interrogated with a continuous wave Doppler and had good flow. The groin wound was irrigated extensively with antibiotic irrigation and was closed with multiple layers of Vicryl suture and skin staples. The patient was transferred back to the intensive care unit in guarded condition. All counts were correct. Nisha Nunez MD 
 
 
[id number] 
D:  10/08/2020 23:45 
T:  10/09/2020 0:21 
JOB #:  [id number]

## 2020-10-09 NOTE — OP NOTES
[address] 
OPERATIVE REPORT Name:  Ayala Madsen 
MR#:  [id number] :  1960 ACCOUNT #:  [de-identified] DATE OF SERVICE:  2020 PREOPERATIVE DIAGNOSIS:  Deep vein thrombosis. POSTOPERATIVE DIAGNOSIS:  Deep vein thrombosis. PROCEDURE PERFORMED: 
1. Angiogram of inferior vena cava. 2.  Insertion of inferior vena cava filter. 3.  Fluoroscopy. SURGEON:  Xuan Oconnell MD 
 
ASSISTANT:  None. ANESTHESIA:  MAC. COMPLICATIONS:  None. SPECIMENS REMOVED:  None. IMPLANTS:  Dov inferior vena cava filter. ESTIMATED BLOOD LOSS:  Minimal. 
 
DRAINS:  None. INDICATIONS:  The patient is a 60-year-old critically ill male who has lower extremity deep vein thrombosis and requires insertion of an inferior vena cava filter. PROCEDURE:  After informed consent was obtained, the patient was taken to the operating room and sedated. He was given intravenous antibiotics within 1 hour of the start of the procedure. The right groin was prepped and draped as a sterile field. Under ultrasound guidance, micropuncture access was obtained to the right common femoral vein and the tract was dilated and then under fluoroscopic guidance, a wire was advanced up into the inferior vena cava and the introducer sheath for the Dov filter was inserted up into the inferior vena cava at the L3 level. An angiogram of the inferior vena cava was done and the renal veins were identified. A permanent [place] inferior vena cava filter was then deployed in the usual fashion placing the tip of the filter at the lower edge of the renal veins. The filter landed in good position and the introducer sheath was removed and direct pressure was held at the puncture site with good hemostasis. A dry dressing was applied and the patient was transferred back to the intensive care unit in guarded condition. All counts were correct.  
 
 
 
Nisha Nunez MD 
 
 
 [id number] 
D:  10/09/2020 1:02 
T:  10/09/2020 2:20 
JOB #:  [id number]

## 2020-11-08 NOTE — PROGRESS NOTES
Acute cardiogenic shock Acute kidney injury Hemorrhage Acute hypotension Severe multivessel CAD Acute hypoxic and hypercarbic respiratory failure Anemia from acute hemorrhage 
  
 Remains intubated and sedated Minimal neuro response now MRI from Friday with watershed infarct S/p IVC filter On cRRT factor 100 Now with afib - on amio gtt. Lengthy discussion with brother re: patient wishes for end of life. Discussed giving another day to evaluate 
  
Total critical care time - 40 minutes (CPT 99291x1)  
  
I personally spent the above critical care time.  This is time spent at this critically ill patient's bedside / unit / floor actively involved in patient care as well as the coordination of care and discussions with the patient's family.  This does not include any procedural time which has been billed separately.

## 2020-11-08 NOTE — PROGRESS NOTES
Acute cardiogenic shock Acute kidney injury Hemorrhage Acute hypotension Severe multivessel CAD Acute hypoxic and hypercarbic respiratory failure Anemia from acute hemorrhage Cerebrovascular accident Completely of sedation except few fentanyl doses Minimal to no neuro response. Not following commands Now in sinus. BP stable On CRRT Appreciate neurology input Consultation with palliative care today 
  
Total critical care time - 40 minutes (CPT 99291x1)  
  
I personally spent the above critical care time.  This is time spent at this critically ill patient's bedside / unit / floor actively involved in patient care as well as the coordination of care and discussions with the patient's family.  This does not include any procedural time which has been billed separately.

## 2020-11-08 NOTE — PROGRESS NOTES
Acute cardiogenic shock Acute kidney injury Hemorrhage Acute hypotension Severe multivessel CAD Acute hypoxic and hypercarbic respiratory failure 
  
POD1 s/p [facility] ECMO for C-shock. On multiple inotropes and pressors. Hgb ok at 11.9 Intubated and sedated for now. Neuro not yet assess Cr up at 2.2. Will re-eval BiV function today with echo LFTs trending down Discussed with cardiology. No imminent need for [facility] 
  
  
  
Total critical care time - 45 minutes (CPT 22293)  
  
I personally spent the above critical care time.  This is time spent at this critically ill patient's bedside / unit / floor actively involved in patient care as well as the coordination of care and discussions with the patient's family.  This does not include any procedural time which has been billed separately.

## 2020-11-08 NOTE — PROGRESS NOTES
Acute cardiogenic shock Acute kidney injury Hemorrhage Acute hypotension Severe multivessel CAD Acute hypoxic and hypercarbic respiratory failure Anemia from acute hemorrhage 
  
  
[facility] for C-shock, post-procedure day 2 s/p LHC + PCI 
   
Sedated on propofol and dex 
  
Cr 2.56 - continues to rise Hgb 8.4 
  
AMINATA this AM to eval RV and LV function at bedside. LFTs trending down Neuro status still in question Bronchoscopy and culture. Continue diureseis Addendum: I personally coordinated the bedside AMINATA with cardiac anesthesia and was present for the exam and conducted the weaning trial. His RV appears to function well now s/p revascularization, and his LV is able to contract well enough with inotropic support to warrant Impella CP alone. With this information we will proceed with decannulation today from [place] [facility]. Discussed in detail with his brother.  
  
Total critical care time - 85 minutes (CPT 15088B6, 13193H8)  
  
I personally spent the above critical care time.  This is time spent at this critically ill patient's bedside / unit / floor actively involved in patient care as well as the coordination of care and discussions with the patient's family.  This does not include any procedural time which has been billed separately.

## 2020-11-08 NOTE — CONSULTS
Cardiothoracic Surgery Consult Subjective:  
  
Alfredo De Luna is a 63 y.o. male who was referred for cardiac evaluation. Brought into the ED in cardiac arrest 
 
Past Medical History:  
Diagnosis Date  Diabetes ([address]  Elevated cholesterol  GERD (gastroesophageal reflux disease)   
 helps with meds upsetting stomach  Hypertension  Low vitamin D level  Neuropathy   
 rt lower extremity Past Surgical History:  
Procedure Laterality Date  CARDIAC SURG PROCEDURE UNLIST    
 heart cath. normal  
 HX CERVICAL DISKECTOMY 2015  HX COLONOSCOPY  2015  HX HERNIA REPAIR    
 umbilical  
 HX ORTHOPAEDIC    
 reattached tendon and muscle rt shoulder  IR INSERT NON TUNL CVC OVER 5 YRS  9/17/2020 Social History Tobacco Use  Smoking status: Current Every Day Smoker Packs/day: 1.00 Substance Use Topics  Alcohol use: Yes Comment: rarely History reviewed. No pertinent family history. No current facility-administered medications for this encounter. Current Outpatient Medications Medication Sig  
 metFORMIN (GLUCOPHAGE) 500 mg tablet  vardenafil (LEVITRA) 20 mg tablet Take 20 mg by mouth as needed.  glimepiride (AMARYL) 4 mg tablet Take 4 mg by mouth two (2) times a day.  ergocalciferol (VITAMIN D2) 50,000 unit capsule Take 50,000 Units by mouth every seven (7) days.  aspirin delayed-release 81 mg tablet Take 81 mg by mouth daily.  omeprazole (PRILOSEC) 20 mg capsule Take 20 mg by mouth daily.  rosuvastatin (CRESTOR) 20 mg tablet Take 20 mg by mouth nightly.  fenofibric acid (TRILIPIX) 135 mg capsule Take 135 mg by mouth nightly.  lisinopril (PRINIVIL, ZESTRIL) 20 mg tablet Take 20 mg by mouth two (2) times a day.  metFORMIN (GLUCOPHAGE) 1,000 mg tablet Take 2,000 mg by mouth two (2) times a day. No Known Allergies Review of Systems:  
Deferred due to emergency evaluation Objective:  
 
No data found. Physical Exam: Intubated and sedated Pale Poor pulses Lungs diminished Assessment:  
 
Code Shock Need emergent intervention Plan:  
 
 
[facility] Signed By: Lolis Benson MD   
 November 8, 2020

## 2020-11-08 NOTE — PROGRESS NOTES
Acute cardiogenic shock Acute kidney injury Hemorrhage Acute hypotension Severe multivessel CAD Acute hypoxic and hypercarbic respiratory failure Anemia from acute hemorrhage POD2 [facility]p [facility] for C-shock 
  
Nodded appropriately yesterday. Sedated on propofol and dex 
  
Improved pulsatility since yesterday morning. TTE shows reduced biventricular function. On [email or web address]@[email or web address], Toby was weaned off yesterday 
  
Renal function at 2.1, Na 150 K 5.2 ; liver functioning well 
  
On heparin for A/C Circuit flowing well. Sweep down to 5.5 
  
CXR clear; low vent settings.  
  
Plan: 
Wean flow slightly to allow more intrinsic pulsatility Will add 40 iv lasix x1 and monitor output - should help with hypernatremia and hyperkalemia; appreciate nephro input Switch to bivalrudin given low platelet count Sedation lightened to assess neuro status - then maintain sedation Suture placed around LIJ for bleeding at insertion site Monday weaning trial with bedside AMINATA - consider LHC if global biv dysfunction 
 
(70 minutes) 
  Addendum 1: (65 minutes) I asked Dr. Jonah Motta to perform a LHC and PA angiogram 
  He is stable for transfer to cath lab. Updated his brother. 
  
We may be able to get some answers, and potentially intervene. LHC shows severe multivessel CAD. Images reviewed with dr. Jernigan Crew and plan discussed to intervene with PCI and Impella. Use of Impella should allow us to decannulate from [place] [facility] in a few days if RV recovers. Addendum 2: (45 minutes) Called for brisk bleeding from groin and hemoglobin of 6.1 with new hypotension due to bleeding around Impella site. Arrived at bedside and placed a new pursestring suture around the Impella site and femoral venous cannulation site. I then held pressure over the insertion site for approximately 20 minutes while a pack of platelets infused and until it was completed.   
At this time he was also acutely hypotensive and lost pulsatility due to the acute blood loss. I supervised administration of 3 pRBCs directly into the ECMO circuit for rapid resuscitation. His blood pressure and pulsatility responded. A new dressing was applied and bleeding issues resolved. 
  
 
 
Total critical care time - 180 minutes (CPT 99291x1, 99292x3)  
  
I personally spent the above critical care time.  This is time spent at this critically ill patient's bedside / unit / floor actively involved in patient care as well as the coordination of care and discussions with the patient's family.  This does not include any procedural time which has been billed separately.

## 2020-11-08 NOTE — PROGRESS NOTES
Acute cardiogenic shock Acute kidney injury Hemorrhage Acute hypotension Severe multivessel CAD Acute hypoxic and hypercarbic respiratory failure Anemia from acute hemorrhage 
  
 POD1 s/p Impella decannulation. He is off pressors now Opening eyes spontaneously. Intubated Cr up to 4.23. For Kiko and CRRT today Hgb down to 7.0. Will transfuse for this. Plt 86 Needs neuro imaging. Will arrange for MRI tmrw On TPN On brillinta 
 
 
  
Total critical care time - 75 minutes (CPT [id number], 99292x1)  
  
I personally spent the above critical care time.  This is time spent at this critically ill patient's bedside / unit / floor actively involved in patient care as well as the coordination of care and discussions with the patient's family.  This does not include any procedural time which has been billed separately.

## 2020-11-08 NOTE — PROGRESS NOTES
Acute cardiogenic shock Acute kidney injury Hemorrhage Acute hypotension Severe multivessel CAD Acute hypoxic and hypercarbic respiratory failure Anemia from acute hemorrhage 
  
Hemodynamic stable on P4 s/p decann from [place] [facility]. Dobut 2  Low dose levo, darci 100 Intubated and sedated Cr up to 3.32. Making urine. Appreciate renal input Continue brillinta for stents Remains febrile on broad spectrum abx. Cultures pending Liver continues to improve Need to start tpn. Hgb 8.1 - recheck this afternoon 
  
Total critical care time - 75 minutes (CPT 99291x1, 99292x1)  
  
I personally spent the above critical care time.  This is time spent at this critically ill patient's bedside / unit / floor actively involved in patient care as well as the coordination of care and discussions with the patient's family.  This does not include any procedural time which has been billed separately.

## 2020-11-08 NOTE — PROGRESS NOTES
Acute cardiogenic shock Acute kidney injury Hemorrhage Acute hypotension Severe multivessel CAD Acute hypoxic and hypercarbic respiratory failure Anemia from acute hemorrhage 
  
  
Remained febrile yesterday. Changed all lines Impella at [facility]. Tee Sal still at 2. Pressors decreased Remains on max vent support. Will request bronchoscopy today Cr still climbing, but making urine. Will likely need CRRT On brillinta for fresh stents May be able to remove impella soon LFTs improving Discussed progress at length with his brother 
  
Total critical care time - 40 minutes (CPT 92478)  
  
I personally spent the above critical care time.  This is time spent at this critically ill patient's bedside / unit / floor actively involved in patient care as well as the coordination of care and discussions with the patient's family.  This does not include any procedural time which has been billed separately.

## 2020-11-15 NOTE — PROGRESS NOTES
Comprehensive Nutrition Assessment Type and Reason for Visit: Earline Cheng Nutrition Recommendations/Plan:  
Initiate trophic TF via OGT:  Start TwoCal HN @ 10mL/h + Prosource BID + 50mL H2O flush q 4h If pt tolerates trophic feed x 24h, recommend advancing rate 10mL q 8h as tolerated to Goal Rate of 40mL/h + Prosource BID + 50mL H2O flush q 4h (provides 2040kcals/110gPro/210gCHO/972mL) If pt tolerates trophics, recommend DC TPN Nutrition Assessment:   Chart reviewed, case discussed during CCU rounds. Pt remains intubated but is off propofol. Impella is out. Plans to start CVVH today. Per discussion with Cardiac Surgergy NP will start trophic feeds today. Provided goal rate recommendations above, hopefully can advance tomorrow and get off of TPN. TF at goal rate will meet 94% kcal and 100% protein needs. K 4.5 and phos 8.6, should come down on CVVH. . Estimated Daily Nutrient Needs: 
Energy (kcal):  PSU 2176 (-[phone]) Protein (g):  95-110g (1.3-1.5gPro/kg IBW) Fluid (ml/day):  1800mL Nutrition Related Findings:  Meds: cefepime, humalog, magox, flagyl, protonix, insulin NPH; Drips: precedex. Edema: trace-generalized, BLE, +1-2-BUE. Flexiseal.   
 
Wounds:   
Surgical wound Current Nutrition Therapies:  
Current Parenteral Nutrition Orders: · Type and Formula:   D20, 5% AA · Lipids:  none · Duration:  continuous · Rate/Volume:  42mL/h · Current PN Order Provides:  887kcals/50gPro/202gDextrose · Goal PN Orders Provides:  none Anthropometric Measures: 
· Height:  5' 9\" (175.3 cm) · Current Body Wt:  97.8 kg (215 lb 9.8 oz) · Ideal Body Wt:  160 lbs:  142.2 % · BMI Category:  Obese class 1 (BMI 30.0-34. 9) Nutrition Diagnosis:  
· Inadequate protein-energy intake related to cognitive or neurological impairment, other (specify)(respiratory failure) as evidenced by NPO or clear liquid status due to medical condition Previous dx resolving. Nutrition Interventions:  
Food and/or Nutrient Delivery: Start tube feeding, Discontinue parenteral nutrition Nutrition Education and Counseling: No recommendations at this time Coordination of Nutrition Care: Continued inpatient monitoring, Interdisciplinary rounds Goals: Pt will tolerate TF initiation with residuals <500mL and wean off of TPN in 2-4 days. Nutrition Monitoring and Evaluation:  
Food/Nutrient Intake Outcomes: Enteral nutrition intake/tolerance, Parenteral nutrition intake/tolerance Physical Signs/Symptoms Outcomes: Biochemical data, Fluid status or edema, Weight, Hemodynamic status, GI status Discharge Planning: Too soon to determine Electronically signed by Yves Dunne RD, [address]  on 9/17/2020 at 11:01 AM 
 
Contact: [facility] children

## 2021-05-30 NOTE — PROGRESS NOTES
Attempted to see pt for OT services. Pt is out of room for MRI and will have IVC filter placed today per chart.   Will follow up tomorrow for eval.  
 Brit Sierra, NP

## 2023-06-27 NOTE — Clinical Note
Impella inserted. Impella insertion site: LFA, at the left ventricle. Performance level set at/to = P7. Post procedure Impella status: patient remains on Impella. [place]ac Pregnancy And Lactation Text: This medication is Pregnancy Category X and contraindicated in pregnancy and in women who may become pregnant. It is unknown if this medication is excreted in breast milk.

## 2024-12-11 NOTE — Clinical Note
[facility]    Vascular Neurology Progress Note    Interval History     Mundo was seen and examined this AM, states that he still feels a bit unsteady and difficulty with coordination mainly on R side.     Hospital Course     Chief complaint: Dizziness (/) and Vomiting    47-year-old male with past medical history significant for hyperlipidemia, Tobaccoism, Et0h abuse, medication noncompliance and ischemic strokes who presented with 4 days of gait instability, disequilibrium, nausea/vomiting and diarrhea. On admission, /94, MRI brain showed an acute infarct of right cerebellum mainly involving right SCA territory. MRA head and neck revealed occlusion of right superior cerebellar artery. CBC showing leukocytosis, A1c at goal, .  CT CAP with contrast showing wedge-shaped infarcts in both kidneys concerning for a systemic embolic etiology.     Assessment and Plan     Acute ischemic stroke of right cerebellum due to embolic stroke of undetermined source (ESUS)  Wedge-shaped infarcts of bilateral kidneys  History of ischemic strokes  Medication noncompliance  Tobaccoism   Et0h abuse        47-year-old male with past medical history significant for hyperlipidemia, Tobaccoism, Et0h abuse, medication noncompliance and ischemic strokes who presented with 4 days of gait instability, disequilibrium, nausea/vomiting and diarrhea. On admission, /94, MRI brain showed an acute infarct of right cerebellum mainly involving right SCA territory. MRA head and neck revealed occlusion of right superior cerebellar artery. CBC showing leukocytosis, A1c at goal, .  Etiology of recurrent ischemic strokes is likely ESUS, additional contributors include medication noncompliance, smoking/Et0h abuse and some vascular risk factors.  Given relatively young age would like to rule out additional causes of his ischemic stroke including but not limited to hypercoagulable and cardioembolic source  Lesion 4: Guidewire inserted "for his strokes, will also let heme-onc weigh in on potential hypercoagulability as the cause of his recurrent ischemic strokes, also his CT chest abdomen pelvis showed wedge-shaped infarcts in both kidneys further solidifying our hypothesis of systemic embolic vs hypercoagulable etiology.     DVT Prophylaxis: ok for chemical DVT prophylaxis      Recommendations   - Consult heme-onc for hypercoagulability eval  - UA for any UTI given renal imaging abnormalities.   - TTE, MARIA TERESA with bubble study  - Neurochecks and Vital Signs every 4 hours   - Long-term blood pressure goal <130/80  - Daily aspirin 325 mg for secondary stroke prevention  - Statin: atorvastatin 40mg daily  - Telemetry, EKG  - Bedside Glucose Monitoring  - Nutrition: pending swallow screen  - PT/OT/SLP/Rehab  - Stroke Education  - Smoking Cessation  - Euthermia, Euglycemia    Patient Follow-up    - in 6-8 weeks with general neurology or stroke [facility] ([phone])    We will continue to follow.       The Stroke Staff is Dr. Redman.    Brice Rahman MD  Vascular Neurology Fellow    To page me or covering stroke neurology team member, click here: [facility]  Choose \"On Call\" tab at top, then select \"NEUROLOGY/ALL SITES\" from middle drop-down box, press Enter, then look for \"stroke\" or \"telestroke\" for your site.    Physical Examination     Temp: 98.4  F (36.9  C) Temp src: Oral BP: 127/88 Pulse: 73   Resp: 16 SpO2: 97 % O2 Device: None (Room air)      Neurologic    Mental Status:  alert, oriented x 3, follows commands, no aphasia, naming and repetition normal  Cranial Nerves:  visual fields : Left superior temporal field defect, facial movements symmetric, hearing not formally tested but intact to conversation, no dysarthria  Motor:  normal muscle tone and bulk, no abnormal movements, able to move all limbs spontaneously, no pronator drift, strength full in all 4 extremities.  Slowed rapid movements on right  Coordination: Dysmetria on finger-to-nose " and heel-to-shin on right  Station/Gait:  deferred    Stroke Scales     Stroke Scales     NIHSS  1a. Level of Consciousness  0   1b. LOC Questions  0   1c. LOC Commands  0   2.   Best Gaze  0   3.   Visual  1   4.   Facial Palsy  0   5a. Motor Arm, Left  0   5b. Motor Arm, Right  0   6a. Motor Leg, Left  0   6b. Motor Leg, right  0   7.   Limb Ataxia  2   8.   Sensory  0   9.   Best Language  0   10. Dysarthria  0   11. Extinction and Inattention   0   Total  3           Imaging/Labs   (Bolded imaging and labs new and/or personally reviewed or re-reviewed by me today)  MRI/Head CT Large right cerebellar hemisphere infarct, no hemorrhage, no mass effect   Intracranial Vasculature No large vessel occlusion; likely right superior cerebellar artery occlusion. Could not visualize right AICA   Cervical Vasculature Normal      Echocardiogram Ordered, pending    EKG/Telemetry Normal sinus rhythm   Other Testing Not Applicable       LDL  12/9/2024: 145 mg/dL   A1C  12/9/2024: 5.3 %   Troponin 12/9/2024: <6 ng/L           Other labs reviewed by me today:

## (undated) DEVICE — PROBE VASC 8MHZ WTRPRF

## (undated) DEVICE — GUIDE CATH CONVEY 5FR AL.75 -- 39228-22

## (undated) DEVICE — AGENT HEMSTAT W4XL4IN OXIDIZED REGENERATED CELOS ABSRB SFT

## (undated) DEVICE — STAPLER SKIN H3.9MM WIRE DIA0.58MM CRWN 6.9MM 35 STPL ROT

## (undated) DEVICE — SUT ETHLN 3-0 18IN PS1 BLK --

## (undated) DEVICE — SUT PROL 4-0 36IN RB1 DA BLU --

## (undated) DEVICE — SOLUTION IV 500ML 0.9% SOD CHL FLX CONT

## (undated) DEVICE — COVER LT HNDL PLAS RIG 1 PER PK

## (undated) DEVICE — GUIDEWIRE VASC L145CM 0.035IN J TIP L3MM PTFE FIX COR NAMIC

## (undated) DEVICE — MICROPUNCTURE INTRODUCER SET SILHOUETTE TRANSITIONLESS WITH STAINLESS STEEL WIRE GUIDE: Brand: MICROPUNCTURE

## (undated) DEVICE — FOGARTY ARTERIAL EMBOLECTOMY CATHETER 4F 80CM: Brand: FOGARTY

## (undated) DEVICE — REM POLYHESIVE ADULT PATIENT RETURN ELECTRODE: Brand: VALLEYLAB

## (undated) DEVICE — TOWEL,OR,DSP,ST,BLUE,STD,2/PK,40PK/CS: Brand: MEDLINE

## (undated) DEVICE — 3M™ TEGADERM™ TRANSPARENT FILM DRESSING FRAME STYLE, 1627, 4 IN X 10 IN (10 CM X 25 CM), 20/CT 4CT/CASE: Brand: 3M™ TEGADERM™

## (undated) DEVICE — CATHETER BLLN 142CM  SPRINTER LEGEND RX 2MM X 12MM GW

## (undated) DEVICE — SPONGE GZ W4XL4IN COT 12 PLY TYP VII WVN C FLD DSGN

## (undated) DEVICE — RUNTHROUGH NS EXTRA FLOPPY PTCA GUIDEWIRE: Brand: RUNTHROUGH

## (undated) DEVICE — GOWN,SIRUS,NONRNF,SETINSLV,2XL,18/CS: Brand: MEDLINE

## (undated) DEVICE — SOLUTION IV 1000ML 0.9% SOD CHL

## (undated) DEVICE — STRAP,POSITIONING,KNEE/BODY,FOAM,4X60": Brand: MEDLINE

## (undated) DEVICE — PUMP HEART IMPELLA CP03 --

## (undated) DEVICE — Device

## (undated) DEVICE — GOWN,SIRUS,NONRNF,SETINSLV,XL,20/CS: Brand: MEDLINE

## (undated) DEVICE — STERILE POLYISOPRENE POWDER-FREE SURGICAL GLOVES: Brand: PROTEXIS

## (undated) DEVICE — PUMP BLD CENTRIMAG

## (undated) DEVICE — SUTURE VCRL SZ 0 L18IN ABSRB VLT L40MM CT 1/2 CIR J752D

## (undated) DEVICE — SUTURE PROL SZ 5-0 L30IN NONABSORBABLE BLU L13MM RB-2 1/2 8710H

## (undated) DEVICE — CATHETER PTCA EUPHORA L142CM BLLN L15MM DIA2MM 0.022IN 14ATM

## (undated) DEVICE — TUBING, SUCTION, 1/4" X 12', STRAIGHT: Brand: MEDLINE

## (undated) DEVICE — SURGICAL PROCEDURE PACK BASIN MAJ SET CUST NO CAUT

## (undated) DEVICE — PACK PROCEDURE SURG HRT CATH

## (undated) DEVICE — SUTURE PERMAHAND SZ 2-0 L18IN NONABSORBABLE BLK L26MM PS 1588H

## (undated) DEVICE — SUTURE PERMAHAND SZ 2-0 L30IN NONABSORBABLE BLK SILK W/O A305H

## (undated) DEVICE — 72" ARTERIAL PRESSURE TUBING: Brand: ICU MEDICAL

## (undated) DEVICE — 450 ML BOTTLE OF 0.05% CHLORHEXIDINE GLUCONATE IN 99.95% STERILE WATER FOR IRRIGATION, USP AND APPLICATOR.: Brand: IRRISEPT ANTIMICROBIAL WOUND LAVAGE

## (undated) DEVICE — DRAPE XR C ARM 41X74IN LF --

## (undated) DEVICE — LABEL MED CARD MRMC STRL

## (undated) DEVICE — TRAY,IRRIGATION,PISTON SYRINGE,60ML,STRL: Brand: MEDLINE

## (undated) DEVICE — KIT ANGIOGRAPHY CUST MRMC

## (undated) DEVICE — PINNACLE INTRODUCER SHEATH: Brand: PINNACLE

## (undated) DEVICE — SYR 3ML LL TIP 1/10ML GRAD --

## (undated) DEVICE — SUT PROL 0 30IN CT1 BLU --

## (undated) DEVICE — NEEDLE HYPO 18GA L1.5IN PNK S STL HUB POLYPR SHLD REG BVL

## (undated) DEVICE — CV INCISE SHEET: Brand: CONVERTORS

## (undated) DEVICE — TOWEL SURG W17XL27IN STD BLU COT NONFENESTRATED PREWASHED

## (undated) DEVICE — STERILE POLYISOPRENE POWDER-FREE SURGICAL GLOVES WITH EMOLLIENT COATING: Brand: PROTEXIS

## (undated) DEVICE — YANKAUER,BULB TIP,W/O VENT,RIGID,STERILE: Brand: MEDLINE

## (undated) DEVICE — SUTURE PROL SZ 6-0 L24IN NONABSORBABLE BLU L9.3MM BV-1 VISI 8305H

## (undated) DEVICE — INTENDED FOR TISSUE SEPARATION, AND OTHER PROCEDURES THAT REQUIRE A SHARP SURGICAL BLADE TO PUNCTURE OR CUT.: Brand: BARD-PARKER ® CARBON RIB-BACK BLADES

## (undated) DEVICE — RADIFOCUS GLIDEWIRE: Brand: GLIDEWIRE

## (undated) DEVICE — DESTINATION PERIPHERAL GUIDING SHEATH: Brand: DESTINATION

## (undated) DEVICE — Device: Brand: VASCULAR DILATOR KIT

## (undated) DEVICE — CATHETER BLLN SPRINTER LEGEND RX 142CM L12MM DIA2.5MM GWIRE 0.022IN 8ATM

## (undated) DEVICE — DRAPE FLD WRM W44XL66IN C6L FOR INTRATEMP SYS THERMABASIN

## (undated) DEVICE — KIT CANN 19FR TIP L18CM VENT CONN 3/8IN ART POLYUR PERC

## (undated) DEVICE — PREP SKN CHLRAPRP APL 26ML STR --

## (undated) DEVICE — HANDLE LT SNAP ON ULT DURABLE LENS FOR TRUMPF ALC DISPOSABLE

## (undated) DEVICE — NC TREK CORONARY DILATATION CATHETER 2.0 MM X 15 MM / RAPID-EXCHANGE: Brand: NC TREK

## (undated) DEVICE — SUTURE PERMA-HAND 0 L18IN NONABSORBABLE BLK CT-1 L36MM 1/2 C021D

## (undated) DEVICE — NC TREK CORONARY DILATATION CATHETER 3.5 MM X 15 MM / RAPID-EXCHANGE: Brand: NC TREK

## (undated) DEVICE — DECANTER BAG 9": Brand: MEDLINE INDUSTRIES, INC.

## (undated) DEVICE — STENT RONYX40015UX RESOLUTE ONYX 4.00X15
Type: IMPLANTABLE DEVICE | Status: NON-FUNCTIONAL
Brand: RESOLUTE ONYX™

## (undated) DEVICE — CANNULA PERF 25FR L30IN MULTISTAGE FEM VEN W/ INSRT KT

## (undated) DEVICE — GLOVE SURG SZ 7.5 L11.2IN THK9.8MIL STRW LTX POLYMER BEAD

## (undated) DEVICE — SEALANT FIBRIN 10 CC FRZN PRE FILLED SYR TISSEEL

## (undated) DEVICE — CANNULA PERF 19FR L30IN MULTISTAGE FEM VEN W/ INSRT KT

## (undated) DEVICE — SYR 10ML LUER LOK 1/5ML GRAD --

## (undated) DEVICE — DRAPE PRB US TRNSDCR 6X96IN --

## (undated) DEVICE — DRAPE SURG W41XL74IN CLR FULL SZ C ARM 3 ADH POLY STRP E

## (undated) DEVICE — PAD,ABDOMINAL,5"X9",ST,LF,25/BX: Brand: MEDLINE INDUSTRIES, INC.

## (undated) DEVICE — CATH GUID COR EB375 7FR 100CM -- LAUNCHER

## (undated) DEVICE — BAG RED 3PLY 2MIL 30X40 IN

## (undated) DEVICE — KIT ACCS INTRO 4FR L10CM NDL 21GA L7CM GWIRE L40CM

## (undated) DEVICE — NC TREK CORONARY DILATATION CATHETER 4.5 MM X 12 MM / RAPID-EXCHANGE: Brand: NC TREK

## (undated) DEVICE — FOGARTY ARTERIAL EMBOLECTOMY CATHETER 3F 80CM: Brand: FOGARTY

## (undated) DEVICE — SYR POWER 150ML 8IN FILL TUBE --

## (undated) DEVICE — SUTURE VCRL SZ 2-0 L36IN ABSRB UD L40MM CT 1/2 CIR J957H

## (undated) DEVICE — INTRODUCER SHTH 7FR CANN L5.5CM DIL TIP 35MM ORNG TUNGSTEN

## (undated) DEVICE — COVER,TABLE,HEAVY DUTY,60"X90",STRL: Brand: MEDLINE

## (undated) DEVICE — SUTURE PERMAHAND SZ 3-0 L30IN NONABSORBABLE BLK SILK BRAID A304H

## (undated) DEVICE — CATHETER ANGIO AD 6FR L100CM COR W/O HYDRPHLC RADIOGRAPHIC

## (undated) DEVICE — SYR 20ML LL STRL LF --

## (undated) DEVICE — SET TRNQT L55IN RED BLU CLR CLR CODE TB DLP

## (undated) DEVICE — SPONGE: SPECIALTY PEANUT XR 100/CS: Brand: MEDICAL ACTION INDUSTRIES

## (undated) DEVICE — INFECTION CONTROL KIT SYS

## (undated) DEVICE — SPONGE LAP 18X18IN STRL -- 5/PK

## (undated) DEVICE — DRAPE,REIN 53X77,STERILE: Brand: MEDLINE

## (undated) DEVICE — SUTURE VCRL SZ 3-0 L27IN ABSRB UD L26MM SH 1/2 CIR J416H

## (undated) DEVICE — Device: Brand: PROWATER